# Patient Record
Sex: FEMALE | Race: BLACK OR AFRICAN AMERICAN | NOT HISPANIC OR LATINO | Employment: FULL TIME | URBAN - METROPOLITAN AREA
[De-identification: names, ages, dates, MRNs, and addresses within clinical notes are randomized per-mention and may not be internally consistent; named-entity substitution may affect disease eponyms.]

---

## 2017-01-17 ENCOUNTER — OFFICE VISIT (OUTPATIENT)
Dept: FAMILY MEDICINE | Facility: HOSPITAL | Age: 42
End: 2017-01-17
Attending: FAMILY MEDICINE
Payer: MEDICAID

## 2017-01-17 ENCOUNTER — LAB VISIT (OUTPATIENT)
Dept: LAB | Facility: HOSPITAL | Age: 42
End: 2017-01-17
Attending: FAMILY MEDICINE
Payer: MEDICAID

## 2017-01-17 VITALS — DIASTOLIC BLOOD PRESSURE: 102 MMHG | TEMPERATURE: 97 F | SYSTOLIC BLOOD PRESSURE: 142 MMHG | HEART RATE: 77 BPM

## 2017-01-17 DIAGNOSIS — J30.2 SEASONAL ALLERGIC RHINITIS, UNSPECIFIED ALLERGIC RHINITIS TRIGGER: ICD-10-CM

## 2017-01-17 DIAGNOSIS — I50.20 SYSTOLIC CONGESTIVE HEART FAILURE, UNSPECIFIED CONGESTIVE HEART FAILURE CHRONICITY: ICD-10-CM

## 2017-01-17 DIAGNOSIS — I10 ESSENTIAL HYPERTENSION: ICD-10-CM

## 2017-01-17 DIAGNOSIS — I50.20 SYSTOLIC HEART FAILURE, UNSPECIFIED HEART FAILURE CHRONICITY: ICD-10-CM

## 2017-01-17 DIAGNOSIS — I50.20 SYSTOLIC CONGESTIVE HEART FAILURE, UNSPECIFIED CONGESTIVE HEART FAILURE CHRONICITY: Primary | ICD-10-CM

## 2017-01-17 DIAGNOSIS — Z12.39 BREAST CANCER SCREENING: ICD-10-CM

## 2017-01-17 PROCEDURE — 99214 OFFICE O/P EST MOD 30 MIN: CPT | Performed by: FAMILY MEDICINE

## 2017-01-17 RX ORDER — LORATADINE 10 MG/1
10 TABLET ORAL DAILY PRN
Qty: 90 TABLET | Refills: 0 | Status: SHIPPED | OUTPATIENT
Start: 2017-01-17 | End: 2017-03-19

## 2017-01-17 RX ORDER — LISINOPRIL 40 MG/1
40 TABLET ORAL DAILY
Qty: 90 TABLET | Refills: 3 | Status: SHIPPED | OUTPATIENT
Start: 2017-01-17 | End: 2017-08-18

## 2017-01-17 RX ORDER — CLONIDINE HYDROCHLORIDE 0.2 MG/1
0.2 TABLET ORAL
Status: DISCONTINUED | OUTPATIENT
Start: 2017-01-17 | End: 2017-03-26 | Stop reason: HOSPADM

## 2017-01-17 RX ORDER — SPIRONOLACTONE 25 MG/1
TABLET ORAL
COMMUNITY
Start: 2016-12-14 | End: 2017-01-17 | Stop reason: DRUGHIGH

## 2017-01-17 RX ORDER — CLONIDINE HYDROCHLORIDE 0.1 MG/1
0.1 TABLET ORAL
Status: DISCONTINUED | OUTPATIENT
Start: 2017-01-17 | End: 2017-03-26 | Stop reason: HOSPADM

## 2017-01-17 RX ORDER — CARVEDILOL 25 MG/1
25 TABLET ORAL 2 TIMES DAILY WITH MEALS
Qty: 180 TABLET | Refills: 3 | Status: SHIPPED | OUTPATIENT
Start: 2017-01-17 | End: 2018-01-22 | Stop reason: SDUPTHER

## 2017-01-17 RX ORDER — SPIRONOLACTONE 50 MG/1
50 TABLET, FILM COATED ORAL DAILY
Qty: 90 TABLET | Refills: 3 | Status: SHIPPED | OUTPATIENT
Start: 2017-01-17 | End: 2018-01-22 | Stop reason: SDUPTHER

## 2017-01-17 RX ORDER — METOPROLOL TARTRATE 25 MG/1
25 TABLET, FILM COATED ORAL 2 TIMES DAILY
Qty: 180 TABLET | Refills: 3 | Status: SHIPPED | OUTPATIENT
Start: 2017-01-17 | End: 2017-01-17

## 2017-01-17 RX ORDER — AMLODIPINE BESYLATE 10 MG/1
10 TABLET ORAL DAILY
Qty: 90 TABLET | Refills: 3 | Status: SHIPPED | OUTPATIENT
Start: 2017-01-17 | End: 2017-08-04

## 2017-01-17 RX ORDER — FUROSEMIDE 20 MG/1
20 TABLET ORAL DAILY
Qty: 90 TABLET | Refills: 3 | Status: SHIPPED | OUTPATIENT
Start: 2017-01-17 | End: 2018-01-22 | Stop reason: SDUPTHER

## 2017-01-17 RX ORDER — CARVEDILOL 25 MG/1
TABLET ORAL
COMMUNITY
Start: 2016-12-14 | End: 2017-01-17 | Stop reason: SDUPTHER

## 2017-01-17 RX ORDER — CHLORTHALIDONE 25 MG/1
25 TABLET ORAL DAILY
Qty: 90 TABLET | Refills: 3 | Status: SHIPPED | OUTPATIENT
Start: 2017-01-17 | End: 2017-08-11

## 2017-01-17 NOTE — MR AVS SNAPSHOT
[facility]  [address], [address]  [place] [address]  Phone: [phone]  Fax: [phone]                  Josefina [place]   2017 8:40 AM   Office Visit    Description:  Female : 1975   Provider:  Wisam Arita MD   Department:  [facility]           Reason for Visit     Medication Refill     Headache           Diagnoses this Visit        Comments    Systolic congestive heart failure, unspecified congestive heart failure chronicity    -  Primary     Essential hypertension         Systolic heart failure, unspecified heart failure chronicity         Breast cancer screening         Seasonal allergic rhinitis, unspecified allergic rhinitis trigger                To Do List           Future Appointments        Provider Department Dept Phone    2017 11:40 AM APPOINTMENT LABEM [facility] [phone]    2017 11:50 AM APPOINTMENT LABEM [facility] [phone]    2017 11:20 AM Wisam Arita MD [facility] [phone]    2017 3:00 PM [facility] MAMMO1 [facility] [phone]    2017 3:45 PM [facility] US1 [facility] [phone]      Goals (5 Years of Data)     None       These Medications        Disp Refills Start End    spironolactone (ALDACTONE) 50 MG tablet 90 tablet 3 2017    Take 1 tablet (50 mg total) by mouth once daily. - Oral    [facility] [id number] [place] [address]  [address] [facility] AT [facility] Ph #: [phone]       lisinopril (PRINIVIL,ZESTRIL) 40 MG tablet 90 tablet 3 2017    Take 1 tablet (40 mg total) by mouth once daily. - Oral    Pharmacy: [facility] [id number] [place] [address], [place] - [address] [facility] AT [facility] Ph #: [phone]       furosemide (LASIX) 20 MG tablet 90 tablet 3 2017     Take 1 tablet (20 mg total) by mouth once daily. - Oral    Pharmacy: [address] AT [facility] Ph #: [phone]       amlodipine (NORVASC) 10 MG tablet 90 tablet 3 1/17/2017 1/17/2018    Take 1 tablet (10 mg total) by mouth once daily. - Oral    Pharmacy: [address] AT [facility] Ph #: [phone]       carvedilol (COREG) 25 MG tablet 180 tablet 3 1/17/2017     Take 1 tablet (25 mg total) by mouth 2 (two) times daily with meals. - Oral    Pharmacy: [address] AT [facility] Ph #: [phone]       loratadine (CLARITIN) 10 mg tablet 90 tablet 0 1/17/2017 1/17/2018    Take 1 tablet (10 mg total) by mouth daily as needed. - Oral    Pharmacy: [address] AT [facility] Ph #: [phone]       chlorthalidone (HYGROTEN) 25 MG Tab 90 tablet 3 1/17/2017 4/17/2017    Take 1 tablet (25 mg total) by mouth once daily. - Oral    Pharmacy: [address] AT [facility] Ph #: [phone]         [facility]     [facility] On [facility] Care Line - 24/7 Assistance  Registered nurses in the [facility] provide clinical advisement, health education, appointment booking, and other advisory services.  Call for this free service at [phone].             Medications           Message regarding Medications     Verify the changes and/or additions to your medication regime listed below are the same as discussed with your clinician today.  If any of these changes or additions are incorrect, please notify your healthcare provider.        START taking these NEW medications        Refills    carvedilol (COREG) 25 MG tablet 3    Sig: Take 1 tablet (25 mg total) by mouth 2 (two) times  daily with meals.    Class: Normal    Route: Oral    loratadine (CLARITIN) 10 mg tablet 0    Sig: Take 1 tablet (10 mg total) by mouth daily as needed.    Class: Normal    Route: Oral      These medications were administered today        Dose Freq    cloNIDine tablet 0.2 mg 0.2 mg Clinic/HOD 1 time    Sig: Take 1 tablet (0.2 mg total) by mouth one time.    Class: Normal    Route: Oral    cloNIDine tablet 0.1 mg 0.1 mg Clinic/HOD 1 time    Sig: Take 1 tablet (0.1 mg total) by mouth one time.    Class: Normal    Route: Oral      CHANGE how you are taking these medications     Start Taking Instead of    amlodipine (NORVASC) 10 MG tablet amlodipine (NORVASC) 5 MG tablet    Dosage:  Take 1 tablet (10 mg total) by mouth once daily. Dosage:  Take 1 tablet (5 mg total) by mouth once daily.    Reason for Change:  Reorder       STOP taking these medications     diazepam (VALIUM) 5 MG tablet Take 2 tablets (10 mg total) by mouth every 6 (six) hours as needed for Anxiety.    methylPREDNISolone (MEDROL DOSEPACK) 4 mg tablet use as directed    metoprolol tartrate (LOPRESSOR) 25 MG tablet Take 25 mg by mouth 2 (two) times daily.    cetirizine (ZYRTEC) 10 MG tablet Take 1 tablet (10 mg total) by mouth once daily.           Verify that the below list of medications is an accurate representation of the medications you are currently taking.  If none reported, the list may be blank. If incorrect, please contact your healthcare provider. Carry this list with you in case of emergency.           Current Medications     amlodipine (NORVASC) 10 MG tablet Take 1 tablet (10 mg total) by mouth once daily.    aspirin 81 MG Chew Take 1 tablet (81 mg total) by mouth once daily.    carvedilol (COREG) 25 MG tablet Take 1 tablet (25 mg total) by mouth 2 (two) times daily with meals.    fluticasone (FLONASE) 50 mcg/actuation nasal spray 1 spray by Each Nare route 2 (two) times daily as needed.    furosemide (LASIX) 20 MG tablet Take 1 tablet (20 mg  total) by mouth once daily.    lisinopril (PRINIVIL,ZESTRIL) 40 MG tablet Take 1 tablet (40 mg total) by mouth once daily.    loratadine (CLARITIN) 10 mg tablet Take 1 tablet (10 mg total) by mouth daily as needed.    nicotine (NICODERM CQ) 14 mg/24 hr Place 1 patch onto the skin once daily.    spironolactone (ALDACTONE) 50 MG tablet Take 1 tablet (50 mg total) by mouth once daily.    chlorthalidone (HYGROTEN) 25 MG Tab Take 1 tablet (25 mg total) by mouth once daily.           Clinical Reference Information           Vital Signs - Last Recorded  Most recent update: 1/17/2017  8:57 AM by Angela Guerin LPN    BP Pulse Temp             (!) 209/135 77 97.2 °F (36.2 °C)         Blood Pressure          Most Recent Value    BP  (!)  209/135      Allergies as of 1/17/2017     No Known Allergies      Immunizations Administered on Date of Encounter - 1/17/2017     None      Orders Placed During Today's Visit     Future Labs/Procedures Expected by Expires    ALDOSTERONE  1/17/2017 3/18/2018    CBC auto differential  1/17/2017 3/18/2018    Comprehensive metabolic panel  1/17/2017 3/18/2018    CORTISOL, RANDOM  1/17/2017 3/18/2018    Hemoglobin A1c  1/17/2017 3/18/2018    Lipid panel  1/17/2017 3/18/2018    Mammo Digital Screening Bilateral With CAD  1/17/2017 3/17/2018    RENIN  1/17/2017 3/18/2018    TSH  1/17/2017 3/18/2018    Urinalysis  1/17/2017 3/18/2018    US Renal Artery Stenosis Hyperten (xpd)  1/17/2017 1/17/2018    Metanephrines, Fractionated 24 hr Urine  As directed 1/17/2018      [facility] Sign-Up     Activating your [facility] account is as easy as 1-2-3!     1) Visit [email or web address], select Sign Up Now, enter this activation code and your date of birth, then select Next.  [email or web address]  Expires: 3/3/2017 10:42 AM      2) Create a username and password to use when you visit [facility] in the future and select a security question in case you lose your password and select Next.    3) Enter your e-mail address  and click Sign Up!    Additional Information  If you have questions, please e-mail [email or web address] or call [phone] to talk to our [facility] staff. Remember, [facility] is NOT to be used for urgent needs. For medical emergencies, dial 911.         Smoking Cessation     If you would like to quit smoking:   You may be eligible for free services if you are a [place] resident and started smoking cigarettes before September 1, 1988.  Call the [employer]oking [employer]essation [employer] ([employer]) toll free at (697) 804-6819 or (082) 342-8840.   Call 4-636-QUIT-NOW if you do not meet the above criteria.

## 2017-01-17 NOTE — PROGRESS NOTES
"Subjective:       Patient ID: Josefina [place]     Chief Complaint: Medication Refill and Headache    HPI   41 y.o. female with PMH of HTN, CHFrEF with EF of 30% (2/17/16) presenting with headache x 3 days. Pt states that she ran out of her medications 3 days ago. Endorses headache, blurry vision. Denies chest pain, palpitations, dizziness, photophobia. Also endorses non-productive cough x 2 weeks and chills. Denies fever, sick contacts, or N/V/d.    Baseline able to sleep flat most days and needs 2 pillows 2 days/week. Baseline able to walk 10 blocks which has not changed. Complains of bilateral lower extremity "shooting" pain when she wakes up in the morning, worse in feet and after walking all day at work. Last saw cardiology 1 year ago but lost her insurance and unable to follow-up, but now has new job with benefits.    Reports baseline BP 130s/70s at home and states she is medication compliant.    Review of Systems   Constitutional: Positive for chills. Negative for fever.   HENT: Negative for sore throat.    Eyes: Negative.    Respiratory: Positive for cough and shortness of breath. Negative for apnea.    Cardiovascular: Positive for leg swelling. Negative for chest pain.   Gastrointestinal: Negative.  Negative for abdominal pain.   Endocrine: Negative.    Genitourinary: Negative.    Musculoskeletal: Negative.    Skin: Negative.    Neurological: Positive for numbness and headaches.   Psychiatric/Behavioral: Negative for confusion.       Objective:      Vitals:    01/17/17 0856   BP: (!) 209/135   Pulse: 77   Temp: 97.2 °F (36.2 °C)     Physical Exam   Constitutional: She appears well-developed and well-nourished.   HENT:   Head: Normocephalic and atraumatic.   Eyes: EOM are normal. Pupils are equal, round, and reactive to light.   Neck: Normal range of motion.   Cardiovascular: Normal rate and regular rhythm.    Left pedal pulse +1  Right pedal pulse +2  Bilateral lower extremities cool to palpation "   Pulmonary/Chest: Breath sounds normal. She has no wheezes.   Abdominal: Soft.   Musculoskeletal: Normal range of motion.       Assessment:       1. Systolic congestive heart failure, unspecified congestive heart failure chronicity    2. Essential hypertension    3. Systolic heart failure, unspecified heart failure chronicity    4. Breast cancer screening    5. Seasonal allergic rhinitis, unspecified allergic rhinitis trigger        Plan:       Systolic congestive heart failure, unspecified congestive heart failure chronicity  -     spironolactone (ALDACTONE) 50 MG tablet; Take 1 tablet (50 mg total) by mouth once daily.  Dispense: 90 tablet; Refill: 3  -     lisinopril (PRINIVIL,ZESTRIL) 40 MG tablet; Take 1 tablet (40 mg total) by mouth once daily.  Dispense: 90 tablet; Refill: 3  -     furosemide (LASIX) 20 MG tablet; Take 1 tablet (20 mg total) by mouth once daily.  Dispense: 90 tablet; Refill: 3  -     Lipid panel; Future; Expected date: 1/17/17  -     Hemoglobin A1c; Future; Expected date: 1/17/17  -     Comprehensive metabolic panel; Future; Expected date: 1/17/17  -     Urinalysis; Future; Expected date: 1/17/17  -     carvedilol (COREG) 25 MG tablet; Take 1 tablet (25 mg total) by mouth 2 (two) times daily with meals.  Dispense: 180 tablet; Refill: 3  -     chlorthalidone (HYGROTEN) 25 MG Tab; Take 1 tablet (25 mg total) by mouth once daily.  Dispense: 90 tablet; Refill: 3  -  Cardiology consult  -  Last EF was 30%    Essential hypertension  -     cloNIDine tablet 0.2 mg; Take 1 tablet (0.2 mg total) by mouth one time.  -     furosemide (LASIX) 20 MG tablet; Take 1 tablet (20 mg total) by mouth once daily.  Dispense: 90 tablet; Refill: 3  -     amlodipine (NORVASC) 10 MG tablet; Take 1 tablet (10 mg total) by mouth once daily.  Dispense: 90 tablet; Refill: 3  -     CBC auto differential; Future; Expected date: 1/17/17  -      Renal Artery Stenosis Hyperten (xpd); Future; Expected date: 1/17/17  -      Metanephrines, Fractionated 24 hr Urine; Future  -     TSH; Future; Expected date: 1/17/17  -     CORTISOL, RANDOM; Future; Expected date: 1/17/17  -     RENIN; Future; Expected date: 1/17/17  -     ALDOSTERONE; Future; Expected date: 1/17/17  -     cloNIDine tablet 0.1 mg; Take 1 tablet (0.1 mg total) by mouth one time.  -  Clonidine 0.2 mg given in-office, re-check /128  -  Clonidine 0.1 mg given again, re-check /104 and relief of her HA. Stated she felt back at her baseline and remained asymptomatic for >1hr  -  patient was encouraged to go to the ED if she started having SOB, CP, HA, or other signs of end organ damage  -  Work up secondary hypertension ordered  - RTC in 1 week    Breast cancer screening  - Received a mammogram previously and stated it was abnormal but never followed up. Family history of breast cancer (2 aunts and 2 great aunts diagnosed in their 40s  -     Mammo Digital Screening Bilateral With CAD; Future; Expected date: 1/17/17    Seasonal allergic rhinitis, unspecified allergic rhinitis trigger  -     loratadine (CLARITIN) 10 mg tablet; Take 1 tablet (10 mg total) by mouth daily as needed.  Dispense: 90 tablet; Refill: 0    Health Maintenance:   - Pap smear at next visit  - Follow-up CBC, CMP, lipid panel.    Smoking Cessation counseling:  - Encourage smoking cessation. Patient went to smoking cessation meetings previously but does not have the time after she started working.  - Advised patient to f/u with meeting supervisors to see if there are other meetings on days she is off from work.    Return in about 1 week (around 1/24/2017).

## 2017-01-24 ENCOUNTER — TELEPHONE (OUTPATIENT)
Dept: FAMILY MEDICINE | Facility: HOSPITAL | Age: 42
End: 2017-01-24

## 2017-01-24 DIAGNOSIS — E11.9 TYPE 2 DIABETES MELLITUS WITHOUT COMPLICATION, WITHOUT LONG-TERM CURRENT USE OF INSULIN: Primary | ICD-10-CM

## 2017-01-24 DIAGNOSIS — E78.5 HYPERLIPIDEMIA, UNSPECIFIED HYPERLIPIDEMIA TYPE: ICD-10-CM

## 2017-01-24 RX ORDER — ATORVASTATIN CALCIUM 40 MG/1
40 TABLET, FILM COATED ORAL DAILY
Qty: 90 TABLET | Refills: 3 | Status: SHIPPED | OUTPATIENT
Start: 2017-01-24 | End: 2018-03-26 | Stop reason: SDUPTHER

## 2017-01-24 RX ORDER — METFORMIN HYDROCHLORIDE 500 MG/1
500 TABLET, EXTENDED RELEASE ORAL
Qty: 90 TABLET | Refills: 3 | Status: SHIPPED | OUTPATIENT
Start: 2017-01-24 | End: 2017-01-26 | Stop reason: SDUPTHER

## 2017-01-24 NOTE — TELEPHONE ENCOUNTER
Patient was called to inform her about her abnormal labs. She would benefit from lipitor and A1C was 6.5. Will start lipitor and metformin. She states her BP has improved to 170s SBP and is asymptomatic. Patient is scheduled to come in 2 days for a follow up. Will have further discussions at that time.

## 2017-01-26 ENCOUNTER — OFFICE VISIT (OUTPATIENT)
Dept: FAMILY MEDICINE | Facility: HOSPITAL | Age: 42
End: 2017-01-26
Attending: FAMILY MEDICINE
Payer: MEDICAID

## 2017-01-26 VITALS
HEIGHT: 68 IN | SYSTOLIC BLOOD PRESSURE: 113 MMHG | HEART RATE: 74 BPM | WEIGHT: 190.94 LBS | DIASTOLIC BLOOD PRESSURE: 76 MMHG | BODY MASS INDEX: 28.94 KG/M2

## 2017-01-26 DIAGNOSIS — E78.5 HYPERLIPIDEMIA, UNSPECIFIED HYPERLIPIDEMIA TYPE: ICD-10-CM

## 2017-01-26 DIAGNOSIS — I10 ESSENTIAL HYPERTENSION: Primary | ICD-10-CM

## 2017-01-26 DIAGNOSIS — E11.9 TYPE 2 DIABETES MELLITUS WITHOUT COMPLICATION, WITHOUT LONG-TERM CURRENT USE OF INSULIN: ICD-10-CM

## 2017-01-26 DIAGNOSIS — R19.7 DIARRHEA, UNSPECIFIED TYPE: ICD-10-CM

## 2017-01-26 PROCEDURE — 99213 OFFICE O/P EST LOW 20 MIN: CPT | Performed by: FAMILY MEDICINE

## 2017-01-26 RX ORDER — METFORMIN HYDROCHLORIDE 500 MG/1
TABLET, EXTENDED RELEASE ORAL
Qty: 90 TABLET | Refills: 3 | Status: SHIPPED | OUTPATIENT
Start: 2017-01-26 | End: 2018-03-27

## 2017-01-26 NOTE — PROGRESS NOTES
I assume primary medical responsibility for this patient, I have reviewed the case history, findings, diagnosis and treatment plan with the resident and agree that the care is reasonable and necessary. This service has been performed by a resident without the presence of a teaching physician under the primary care exception  Merly Uriarte  1/26/2017

## 2017-01-26 NOTE — PROGRESS NOTES
Subjective:       Patient ID: Josefina [place] is a 41 y.o. female.    Chief Complaint: Follow-up and Gynecologic Exam    HPI   42 yo female, w/ h/o HTN, CHFrEF with EF of 30%, newly dx with HLD and T2DM, presents for a pap smear and results. Patient was called earlier and asked to start metformin and lipitor due to her lab values. Patient states she has chronic diarrhea since having a cholecystectomy in 2013 but she thinks it was worse after taking metformin yesterday. Patient states she took her all her medications except her metformin today.     Review of Systems   Constitutional: Negative for chills and fever.   HENT: Negative for congestion.    Respiratory: Negative for cough, shortness of breath and wheezing.    Cardiovascular: Negative for chest pain, palpitations and leg swelling.   Gastrointestinal: Negative for abdominal pain, constipation, diarrhea, nausea and vomiting.   Genitourinary: Negative for difficulty urinating, dysuria, frequency and urgency.   Neurological: Negative for dizziness and headaches.       Objective:      Vitals:    01/26/17 1123   BP: 113/76   Pulse: 74     Physical Exam   Constitutional: She is oriented to person, place, and time. No distress.   HENT:   Head: Normocephalic and atraumatic.   Cardiovascular: Normal rate, regular rhythm and normal heart sounds.    Pulmonary/Chest: Effort normal and breath sounds normal.   Abdominal: Soft. Bowel sounds are normal. She exhibits no distension. There is no tenderness.   Genitourinary: Cervix exhibits no motion tenderness and no discharge. Right adnexum displays no mass and no tenderness. Left adnexum displays no mass and no tenderness. No tenderness in the vagina. No vaginal discharge found.   Musculoskeletal: She exhibits no edema.   Neurological: She is alert and oriented to person, place, and time.   Skin: Skin is warm and dry.   Psychiatric: She has a normal mood and affect. Her behavior is normal. Judgment and thought content  normal.       Assessment:       1. Essential hypertension    2. Hyperlipidemia, unspecified hyperlipidemia type    3. Type 2 diabetes mellitus without complication, without long-term current use of insulin    4. Diarrhea, unspecified type        Plan:       Essential hypertension  - continue meds  - cards consult placed at last visit due to her HF    Hyperlipidemia, unspecified hyperlipidemia type  - on lipitor    Type 2 diabetes mellitus without complication, without long-term current use of insulin  - Due to her diarrhea. Advised patient to slowly increase her metformin from 250mg to 500mg  -     metformin (GLUCOPHAGE-XR) 500 MG 24 hr tablet; Take .5 tab everyday and increase it to 1 tab in 2 weeks.  Dispense: 90 tablet; Refill: 3    Diarrhea, unspecified type  - may be 2/2 to cholecystectomy and worsened by metformin  - advised patient about a low fat diet.    Patient stopped smoking for 1 week. Brochure was given to her last visit.  F/u pap smear    Return in about 6 months (around 7/26/2017).

## 2017-02-07 ENCOUNTER — HOSPITAL ENCOUNTER (OUTPATIENT)
Dept: RADIOLOGY | Facility: HOSPITAL | Age: 42
Discharge: HOME | End: 2017-02-07
Attending: FAMILY MEDICINE
Payer: MEDICAID

## 2017-02-07 DIAGNOSIS — I10 ESSENTIAL HYPERTENSION: ICD-10-CM

## 2017-02-07 DIAGNOSIS — Z12.39 BREAST CANCER SCREENING: ICD-10-CM

## 2017-02-07 PROCEDURE — 76770 US EXAM ABDO BACK WALL COMP: CPT | Mod: 26,59,, | Performed by: RADIOLOGY

## 2017-02-07 PROCEDURE — 76770 US EXAM ABDO BACK WALL COMP: CPT | Mod: TC

## 2017-02-07 PROCEDURE — 77067 SCR MAMMO BI INCL CAD: CPT | Mod: 26,,, | Performed by: RADIOLOGY

## 2017-02-07 PROCEDURE — 77067 SCR MAMMO BI INCL CAD: CPT | Mod: TC

## 2017-02-07 PROCEDURE — 77063 BREAST TOMOSYNTHESIS BI: CPT | Mod: 26,,, | Performed by: RADIOLOGY

## 2017-02-07 PROCEDURE — 93975 VASCULAR STUDY: CPT | Mod: 26,,, | Performed by: RADIOLOGY

## 2017-02-09 ENCOUNTER — CLINICAL SUPPORT (OUTPATIENT)
Dept: SMOKING CESSATION | Facility: CLINIC | Age: 42
End: 2017-02-09
Payer: COMMERCIAL

## 2017-02-09 DIAGNOSIS — F17.200 NICOTINE DEPENDENCE: Primary | ICD-10-CM

## 2017-02-09 PROCEDURE — 99407 BEHAV CHNG SMOKING > 10 MIN: CPT | Mod: S$GLB,,, | Performed by: GENERAL PRACTICE

## 2017-03-19 ENCOUNTER — HOSPITAL ENCOUNTER (EMERGENCY)
Facility: HOSPITAL | Age: 42
Discharge: HOME | End: 2017-03-19
Attending: FAMILY MEDICINE
Payer: MEDICAID

## 2017-03-19 VITALS
DIASTOLIC BLOOD PRESSURE: 63 MMHG | TEMPERATURE: 98 F | HEIGHT: 68 IN | RESPIRATION RATE: 16 BRPM | BODY MASS INDEX: 29.7 KG/M2 | WEIGHT: 196 LBS | OXYGEN SATURATION: 100 % | SYSTOLIC BLOOD PRESSURE: 111 MMHG | HEART RATE: 89 BPM

## 2017-03-19 DIAGNOSIS — L02.612 ABSCESS OF LEFT GREAT TOE: ICD-10-CM

## 2017-03-19 DIAGNOSIS — L60.0 INGROWN NAIL OF GREAT TOE OF LEFT FOOT: Primary | ICD-10-CM

## 2017-03-19 PROCEDURE — 11765 WEDGE EXCISION SKN NAIL FOLD: CPT | Mod: TA

## 2017-03-19 PROCEDURE — 25000003 PHARM REV CODE 250: Performed by: FAMILY MEDICINE

## 2017-03-19 PROCEDURE — 99283 EMERGENCY DEPT VISIT LOW MDM: CPT | Mod: 25

## 2017-03-19 RX ORDER — LIDOCAINE HYDROCHLORIDE 10 MG/ML
5 INJECTION INFILTRATION; PERINEURAL
Status: COMPLETED | OUTPATIENT
Start: 2017-03-19 | End: 2017-03-19

## 2017-03-19 RX ORDER — HYDROCODONE BITARTRATE AND ACETAMINOPHEN 5; 325 MG/1; MG/1
1 TABLET ORAL EVERY 8 HOURS PRN
Qty: 12 TABLET | Refills: 0 | Status: SHIPPED | OUTPATIENT
Start: 2017-03-19 | End: 2017-07-21

## 2017-03-19 RX ORDER — HYDROCODONE BITARTRATE AND ACETAMINOPHEN 10; 325 MG/1; MG/1
1 TABLET ORAL
Status: COMPLETED | OUTPATIENT
Start: 2017-03-19 | End: 2017-03-19

## 2017-03-19 RX ORDER — CEPHALEXIN 500 MG/1
500 CAPSULE ORAL 4 TIMES DAILY
Qty: 40 CAPSULE | Refills: 0 | Status: ON HOLD | OUTPATIENT
Start: 2017-03-19 | End: 2017-03-26 | Stop reason: HOSPADM

## 2017-03-19 RX ORDER — SULFAMETHOXAZOLE AND TRIMETHOPRIM 800; 160 MG/1; MG/1
1 TABLET ORAL
Status: COMPLETED | OUTPATIENT
Start: 2017-03-19 | End: 2017-03-19

## 2017-03-19 RX ADMIN — SULFAMETHOXAZOLE AND TRIMETHOPRIM 1 TABLET: 800; 160 TABLET ORAL at 09:03

## 2017-03-19 RX ADMIN — LIDOCAINE HYDROCHLORIDE 5 ML: 10 INJECTION, SOLUTION INFILTRATION; PERINEURAL at 09:03

## 2017-03-19 RX ADMIN — HYDROCODONE BITARTRATE AND ACETAMINOPHEN 1 TABLET: 10; 325 TABLET ORAL at 09:03

## 2017-03-19 RX ADMIN — NEOMYCIN AND POLYMYXIN B SULFATES AND BACITRACIN ZINC 1 EACH: 400; 3.5; 5 OINTMENT TOPICAL at 10:03

## 2017-03-19 NOTE — ED AVS SNAPSHOT
[facility]  [address]  [place] [address]               Josefina Martin   3/19/2017  8:58 PM   ED    Description:  Female : 1975   Department:  [facility]           Your Care was Coordinated By:     Provider Role From To    Jacques Alva MD Attending Provider 17 2108 --      Reason for Visit     Toe Injury           Diagnoses this Visit        Comments    Ingrown nail of great toe of left foot    -  Primary     Abscess of left great toe           ED Disposition     None           To Do List           Follow-up Information     Follow up with Kris Robertson MD.    Specialty:  Family Medicine    Contact information:    [address] [facility] [address]   [address]   [facility] [address]  [phone]         These Medications        Disp Refills Start End    cephALEXin (KEFLEX) 500 MG capsule 40 capsule 0 3/19/2017 3/29/2017    Take 1 capsule (500 mg total) by mouth 4 (four) times daily. - Oral    [facility] [address] [place], [place] [address] [address] [facility] Ph #: [phone]       hydrocodone-acetaminophen 5-325mg (NORCO) 5-325 mg per tablet 12 tablet 0 3/19/2017     Take 1 tablet by mouth every 8 (eight) hours as needed. - Oral    [facility] [address] [place] [address] [address] [facility] Ph #: [phone]         [facility]     [facility] -  Assistance  Registered nurses in the [facility] provide clinical advisement, health education, appointment booking, and other advisory services.  Call for this free service at [phone].             Medications           Message regarding Medications     Verify the changes and/or additions to your medication regime listed below are the same as discussed with your clinician today.  If any of these changes or additions are incorrect, please notify your healthcare provider.         START taking these NEW medications        Refills    cephALEXin (KEFLEX) 500 MG capsule 0    Sig: Take 1 capsule (500 mg total) by mouth 4 (four) times daily.    Class: Print    Route: Oral    hydrocodone-acetaminophen 5-325mg (NORCO) 5-325 mg per tablet 0    Sig: Take 1 tablet by mouth every 8 (eight) hours as needed.    Class: Print    Route: Oral      These medications were administered today        Dose Freq    lidocaine HCL 10 mg/ml (1%) injection 5 mL 5 mL ED 1 Time    Si mLs by Infiltration route ED 1 Time.    Class: Normal    Route: Infiltration    sulfamethoxazole-trimethoprim 800-160mg per tablet 1 tablet 1 tablet ED 1 Time    Sig: Take 1 tablet by mouth ED 1 Time.    Class: Normal    Route: Oral    hydrocodone-acetaminophen 10-325mg per tablet 1 tablet 1 tablet ED 1 Time    Sig: Take 1 tablet by mouth ED 1 Time.    Class: Normal    Route: Oral    neomycin-bacitracnZn-polymyxnB packet 1 each 1 packet ED 1 Time    Sig: Apply 1 each topically ED 1 Time.    Class: Normal    Route: Topical (Top)      STOP taking these medications     nicotine (NICODERM CQ) 14 mg/24 hr Place 1 patch onto the skin once daily.    loratadine (CLARITIN) 10 mg tablet Take 1 tablet (10 mg total) by mouth daily as needed.    fluticasone (FLONASE) 50 mcg/actuation nasal spray 1 spray by Each Nare route 2 (two) times daily as needed.           Verify that the below list of medications is an accurate representation of the medications you are currently taking.  If none reported, the list may be blank. If incorrect, please contact your healthcare provider. Carry this list with you in case of emergency.           Current Medications     amlodipine (NORVASC) 10 MG tablet Take 1 tablet (10 mg total) by mouth once daily.    aspirin 81 MG Chew Take 1 tablet (81 mg total) by mouth once daily.    atorvastatin (LIPITOR) 40 MG tablet Take 1 tablet (40 mg total) by mouth once daily.    carvedilol (COREG) 25 MG tablet Take 1 tablet (25 mg  "total) by mouth 2 (two) times daily with meals.    chlorthalidone (HYGROTEN) 25 MG Tab Take 1 tablet (25 mg total) by mouth once daily.    furosemide (LASIX) 20 MG tablet Take 1 tablet (20 mg total) by mouth once daily.    lisinopril (PRINIVIL,ZESTRIL) 40 MG tablet Take 1 tablet (40 mg total) by mouth once daily.    metformin (GLUCOPHAGE-XR) 500 MG 24 hr tablet Take .5 tab everyday and increase it to 1 tab in 2 weeks.    spironolactone (ALDACTONE) 50 MG tablet Take 1 tablet (50 mg total) by mouth once daily.    cephALEXin (KEFLEX) 500 MG capsule Take 1 capsule (500 mg total) by mouth 4 (four) times daily.    cloNIDine tablet 0.1 mg Take 1 tablet (0.1 mg total) by mouth one time.    cloNIDine tablet 0.2 mg Take 1 tablet (0.2 mg total) by mouth one time.    hydrocodone-acetaminophen 5-325mg (NORCO) 5-325 mg per tablet Take 1 tablet by mouth every 8 (eight) hours as needed.    neomycin-bacitracnZn-polymyxnB packet 1 each Apply 1 each topically ED 1 Time.           Clinical Reference Information           Your Vitals Were     BP Pulse Temp Resp Height Weight    125/81 (BP Location: Right arm, Patient Position: Sitting) 91 97.9 °F (36.6 °C) (Oral) 20 5' 8" (1.727 m) 88.9 kg (196 lb)    Last Period SpO2 BMI          02/26/2017 100% 29.8 kg/m2        Allergies as of 3/19/2017     No Known Allergies      Immunizations Administered on Date of Encounter - 3/19/2017     None      ED Micro, Lab, POCT     None      ED Imaging Orders     Start Ordered       Status Ordering Provider    03/19/17 2103 03/19/17 2106  X-Ray Toe 2 or More Views Right  1 time imaging      Final result         Discharge Instructions         Ingrown Toenail (Excised)  An ingrown toenail occurs when the nail grows sideways into the skin alongside the nail. This can cause pain and may lead to an infection with redness, swelling, and sometimes drainage.  Cause  The most common cause of an ingrown toenail is trimmin your toenails wrong. Most people trim the " nails too close to the skin and try to round the nail too tightly around the shape of the toe. When you do this, the nail can grow into the skin of the toe. While it may look nice, your toenail can grow into the skin and cause infection.  Other causes include injury or wearing shoes that are too short or tight. This can cause the same problem that happens when trimming your toenails. Sometimes you are born with a toenail that grows too large for your toe.  Symptoms  The most common symptoms of an ingrown toenail include:  · Pain  · Redness  · Swelling  · Drainage  Treatment  It's important to treat an ingrown toenail as soon as you notice there is a problem. The longer you wait to do something, the worse it is likely to get. Sometimes it gets worse quickly. Other times it may take awhile. It can even feel better for awhile, and then get worse.  Inflammation  If the infection is mild, you may be able to take care of it at home. Home care includes:  · Frequent warm water soaks  · Keeping the nail clean  · Wearing loose, comfortable shoes or open toe sandals  Another method to help the toe heal is to use a small piece of cotton or waxed dental floss to gently lift the corner of the problem nail. Change the cotton or floss frequently, especially if it gets dirty.  Infection  If your infection is mild but home care isn't working, or the toenail is getting worse, see your health care provider. Signs of worsening infection include:  · Swelling  · Redness   · Pus drainage  In some cases, part of the toenail needs to be removed by your health care provider so that the infection can be drained.  If there is a lot of redness and swelling, then an antibiotic may also be used. The redness and pain should go away within 48 hours. It will take about 2 weeks for the exposed nail bed to become dry and for the swelling to go down.  If only the side of the nail was removed, it will begin to grow back in a few months. To prevent  recurrence, sometimes the side of nail bed may be treated with a strong chemical to prevent the nail from growing back.  Home care  Wound care  1) Twice a day for the first three days, clean and soak the toe as follows:  · Soak your foot in a tub of warm water for 5 minutes. Or, hold your toe under a faucet of warm running water for 5 minutes.  · Clean any remaining crust away with soap and water using a cotton swab.  · Put a small amount of antibiotic ointment on the infected area.  · Cover with a bandage until the exposed nail bed is dry and there is no more drainage.  2) Change the dressing or bandage every time you soak or clean it, or whenever it becomes wet or dirty.  3) If you were prescribed antibiotics, take them as directed until they are all gone.  4) Wear comfortable shoes with a lot of toe room, or open-toe sandals, while your toe is healing.  Medications  · You can take acetaminophen or ibuprofen for pain, unless you were given a different pain medicine to use. If you have chronic liver or kidney disease, or have ever had a stomach ulcer or gastrointestinal bleeding, or are taking blood thinner medications, talk with your doctor before using these medicines.  · If you were given antibiotics, take them until they are all gone. It is important to finish the antibiotics even if the wound looks better to make sure the infection clears.  Prevention  To prevent ingrown toenails:  1) Wear shoes that fit well. Avoid shoes that pinch the toes together.  2) When you trim your toenails, do not cut them too short. Cut straight across at the top and do not round the edges.  3) Do not use a sharp object to clean under your nail since this might cause an infection.  4) If the toenail starts to grow into the skin again, put a small piece of cotton under that side of the nail to help it grow out straight.  Follow-up care  Follow up with your doctor or this facility as advised by our staff. If the ingrown toenail  recurs, follow up with a podiatrist for nail bed ablation.  When to seek medical care  Get prompt medical attention if any of the following occur:  · Increasing redness, pain or swelling of the toe  · Red streaks in the skin leading away from the wound  · Continued pus or fluid drainage for more than 24 hours  · Fever of 100.4º F (38º C) or higher, or as directed by your health care provider  Date Last Reviewed: 3/10/2014  © 3441-8593 [employer]. [address] [place] [address]. All rights reserved. This information is not intended as a substitute for professional medical care. Always follow your healthcare professional's instructions.          [facility] Sign-Up     Activating your [facility] account is as easy as 1-2-3!     1) Visit [email or web address], select Sign Up Now, enter this activation code and your date of birth, then select Next.  [email or web address]  Expires: 5/3/2017 10:33 PM      2) Create a username and password to use when you visit [facility] in the future and select a security question in case you lose your password and select Next.    3) Enter your e-mail address and click Sign Up!    Additional Information  If you have questions, please e-mail [email or web address] or call [phone] to talk to our [facility] staff. Remember, [facility] is NOT to be used for urgent needs. For medical emergencies, dial 911.         Smoking Cessation     If you would like to quit smoking:   You may be eligible for free services if you are a [place] resident and started smoking cigarettes before September 1, 1988.  Call the Smoking Cessation Trust (SCT) toll free at (859) 954-3222 or (465) 614-6702.   Call 6-800-QUIT-NOW if you do not meet the above criteria.             [facility] complies with applicable Federal civil rights laws and does not discriminate on the basis of race, color, national origin, age, disability, or sex.        Language Assistance Services      ATTENTION: Language assistance services are available, free of charge. Please call [phone].      ATENCIÓN: Si habla español, tiene a tomas disposición servicios gratuitos de asistencia lingüística. Llame al [phone].     CHÚ Ý: N?u b?n nói Ti?ng Vi?t, có các d?ch v? h? tr? ngôn ng? mi?n phí dành cho b?n. G?i s? [phone].

## 2017-03-20 NOTE — ED PROVIDER NOTES
"Encounter Date: 3/19/2017       History     Chief Complaint   Patient presents with    Toe Injury     Pt states "Like a month ago I hit my toe and it's turning black and I can barely walk on it." Pt c/o injury to right great toe. Pt able to move toe, 2+ pulses noted.      Review of patient's allergies indicates:  No Known Allergies  HPI Comments: Pt c/o Right toe pain since few days with discoloration and tenderness , also swelling around  Nail. Pt says she had an toe injury about 1 month ago. No fever.    The history is provided by the patient.     Past Medical History:   Diagnosis Date    CHF (congestive heart failure)     Hypertension     Stroke      Past Surgical History:   Procedure Laterality Date    CHOLECYSTECTOMY  2013    history of cholelithiasis    TUBAL LIGATION       Family History   Problem Relation Age of Onset    Hypertension Mother     No Known Problems Father     No Known Problems Sister     No Known Problems Daughter     Diabetes Son 14     Takes 2 insulins and metformin use to be bigger wally    Stroke Maternal Uncle 48    Anuerysm Maternal Grandmother     No Known Problems Sister     No Known Problems Daughter     No Known Problems Son      Social History   Substance Use Topics    Smoking status: Current Every Day Smoker     Packs/day: 0.50     Years: 18.00     Types: Cigarettes    Smokeless tobacco: Never Used      Comment: 1 pack/week    Alcohol use Yes      Comment: social 1/month     Review of Systems   Constitutional: Negative for activity change, chills and fever.   HENT: Negative for congestion, rhinorrhea and sore throat.    Eyes: Negative for pain.   Respiratory: Negative for cough, shortness of breath and wheezing.    Gastrointestinal: Negative for diarrhea, nausea and vomiting.   Genitourinary: Negative for dysuria.   Musculoskeletal: Negative for back pain, gait problem, neck pain and neck stiffness.   Neurological: Negative for light-headedness and headaches. "   Psychiatric/Behavioral: The patient is not nervous/anxious.    All other systems reviewed and are negative.      Physical Exam   Initial Vitals   BP Pulse Resp Temp SpO2   03/19/17 2053 03/19/17 2053 03/19/17 2053 03/19/17 2053 03/19/17 2053   125/81 91 20 97.9 °F (36.6 °C) 100 %     Physical Exam    Nursing note and vitals reviewed.  Constitutional: She appears well-developed and well-nourished.   HENT:   Head: Normocephalic and atraumatic.   Eyes: Conjunctivae and EOM are normal. Pupils are equal, round, and reactive to light.   Neck: Normal range of motion. Neck supple.   Cardiovascular: Normal rate, regular rhythm, normal heart sounds and intact distal pulses. Exam reveals no gallop and no friction rub.    No murmur heard.  Pulmonary/Chest: Breath sounds normal. No respiratory distress. She has no wheezes. She has no rhonchi. She has no rales.   Abdominal: Soft. Bowel sounds are normal.   Musculoskeletal: Normal range of motion.        Right ankle: She exhibits swelling.        Feet:    Right great toe with pain ans welling around medial border.   Neurological: She is alert and oriented to person, place, and time. She has normal strength and normal reflexes. She displays normal reflexes. No cranial nerve deficit or sensory deficit.         ED Course   Nail Removal  Date/Time: 3/19/2017 10:34 PM  Performed by: ANNIE MATHEWS  Authorized by: ANNIE MATHEWS   Consent Done: Yes  Consent: Verbal consent obtained.  Consent given by: patient  Patient understanding: patient states understanding of the procedure being performed  Patient identity confirmed: verbally with patient  Location: left foot  Location details: left big toe  Anesthesia: digital block    Anesthesia:  Anesthesia: digital block  Local Anesthetic: lidocaine 1% with epinephrine   Anesthetic total: 3 mL  Preparation: skin prepped with Betadine  Amount removed: 1/5  Nail removed location: medial.  Wedge excision of skin of nail fold: yes  Nail  matrix removed: partial  Dressing: antibiotic ointment and dressing applied  Patient tolerance: Patient tolerated the procedure well with no immediate complications        Labs Reviewed - No data to display          Medical Decision Making:   Initial Assessment:   Possibility of left toenail infection on the medial aspect due to ingrown toenail in.  Differential Diagnosis:   Infection of medial edge of nail.  Paronychia  Fracture.  Clinical Tests:   Radiological Study: Ordered and Reviewed  ED Management:  There is a partial toenail removal on medial aspect active retching.  There was some minimal pus noted during the procedure and this dressed with antibiotic cream.  As to continue antibiotics and keep dressing every day for next 2 weeks and follow up with the PCP.                   ED Course     Clinical Impression:   The primary encounter diagnosis was Ingrown nail of great toe of left foot. A diagnosis of Abscess of right great toe was also pertinent to this visit.    Disposition:   Disposition: Discharged  Condition: Fair  Continue daily dressing avoid any dust or water soaking.  Continue antibiotics and pain medication as needed.  Follow up with primary care physician in next 2-3 days.       Jacques Alva MD  03/19/17 7042

## 2017-03-20 NOTE — ED TRIAGE NOTES
"Pt states "Like a month ago I hit my toe and it's turning black and I can barely walk on it." Pt c/o injury to right great toe. Pt able to move toe, 2+ pulses noted.   "

## 2017-03-20 NOTE — ED NOTES
Wound dressed with bacitracin/4x4/coban, patient and  given discharge instructions and scripts.  Stated verbal understanding, patient d/c'ed home, gait steady to exit, all questions answered prior to discharge.

## 2017-03-20 NOTE — DISCHARGE INSTRUCTIONS
Ingrown Toenail (Excised)  An ingrown toenail occurs when the nail grows sideways into the skin alongside the nail. This can cause pain and may lead to an infection with redness, swelling, and sometimes drainage.  Cause  The most common cause of an ingrown toenail is trimmin your toenails wrong. Most people trim the nails too close to the skin and try to round the nail too tightly around the shape of the toe. When you do this, the nail can grow into the skin of the toe. While it may look nice, your toenail can grow into the skin and cause infection.  Other causes include injury or wearing shoes that are too short or tight. This can cause the same problem that happens when trimming your toenails. Sometimes you are born with a toenail that grows too large for your toe.  Symptoms  The most common symptoms of an ingrown toenail include:  · Pain  · Redness  · Swelling  · Drainage  Treatment  It's important to treat an ingrown toenail as soon as you notice there is a problem. The longer you wait to do something, the worse it is likely to get. Sometimes it gets worse quickly. Other times it may take awhile. It can even feel better for awhile, and then get worse.  Inflammation  If the infection is mild, you may be able to take care of it at home. Home care includes:  · Frequent warm water soaks  · Keeping the nail clean  · Wearing loose, comfortable shoes or open toe sandals  Another method to help the toe heal is to use a small piece of cotton or waxed dental floss to gently lift the corner of the problem nail. Change the cotton or floss frequently, especially if it gets dirty.  Infection  If your infection is mild but home care isn't working, or the toenail is getting worse, see your health care provider. Signs of worsening infection include:  · Swelling  · Redness   · Pus drainage  In some cases, part of the toenail needs to be removed by your health care provider so that the infection can be drained.  If there is a  lot of redness and swelling, then an antibiotic may also be used. The redness and pain should go away within 48 hours. It will take about 2 weeks for the exposed nail bed to become dry and for the swelling to go down.  If only the side of the nail was removed, it will begin to grow back in a few months. To prevent recurrence, sometimes the side of nail bed may be treated with a strong chemical to prevent the nail from growing back.  Home care  Wound care  1) Twice a day for the first three days, clean and soak the toe as follows:  · Soak your foot in a tub of warm water for 5 minutes. Or, hold your toe under a faucet of warm running water for 5 minutes.  · Clean any remaining crust away with soap and water using a cotton swab.  · Put a small amount of antibiotic ointment on the infected area.  · Cover with a bandage until the exposed nail bed is dry and there is no more drainage.  2) Change the dressing or bandage every time you soak or clean it, or whenever it becomes wet or dirty.  3) If you were prescribed antibiotics, take them as directed until they are all gone.  4) Wear comfortable shoes with a lot of toe room, or open-toe sandals, while your toe is healing.  Medications  · You can take acetaminophen or ibuprofen for pain, unless you were given a different pain medicine to use. If you have chronic liver or kidney disease, or have ever had a stomach ulcer or gastrointestinal bleeding, or are taking blood thinner medications, talk with your doctor before using these medicines.  · If you were given antibiotics, take them until they are all gone. It is important to finish the antibiotics even if the wound looks better to make sure the infection clears.  Prevention  To prevent ingrown toenails:  1) Wear shoes that fit well. Avoid shoes that pinch the toes together.  2) When you trim your toenails, do not cut them too short. Cut straight across at the top and do not round the edges.  3) Do not use a sharp object to  clean under your nail since this might cause an infection.  4) If the toenail starts to grow into the skin again, put a small piece of cotton under that side of the nail to help it grow out straight.  Follow-up care  Follow up with your doctor or this facility as advised by our staff. If the ingrown toenail recurs, follow up with a podiatrist for nail bed ablation.  When to seek medical care  Get prompt medical attention if any of the following occur:  · Increasing redness, pain or swelling of the toe  · Red streaks in the skin leading away from the wound  · Continued pus or fluid drainage for more than 24 hours  · Fever of 100.4º F (38º C) or higher, or as directed by your health care provider  Date Last Reviewed: 3/10/2014  © 9791-2440 [employer]. [address] [place] [address], [place] [address]. All rights reserved. This information is not intended as a substitute for professional medical care. Always follow your healthcare professional's instructions.

## 2017-03-24 ENCOUNTER — HOSPITAL ENCOUNTER (INPATIENT)
Facility: HOSPITAL | Age: 42
LOS: 2 days | Discharge: HOME | DRG: 065 | End: 2017-03-26
Attending: EMERGENCY MEDICINE | Admitting: FAMILY MEDICINE
Payer: MEDICAID

## 2017-03-24 DIAGNOSIS — I63.9 CEREBROVASCULAR ACCIDENT (CVA), UNSPECIFIED MECHANISM: Primary | ICD-10-CM

## 2017-03-24 DIAGNOSIS — R51.9 ACUTE NONINTRACTABLE HEADACHE, UNSPECIFIED HEADACHE TYPE: ICD-10-CM

## 2017-03-24 DIAGNOSIS — N17.9 AKI (ACUTE KIDNEY INJURY): ICD-10-CM

## 2017-03-24 DIAGNOSIS — R11.2 NON-INTRACTABLE VOMITING WITH NAUSEA, UNSPECIFIED VOMITING TYPE: ICD-10-CM

## 2017-03-24 DIAGNOSIS — H53.9 VISION CHANGES: ICD-10-CM

## 2017-03-24 LAB
ALBUMIN SERPL BCP-MCNC: 4 G/DL
ALP SERPL-CCNC: 64 U/L
ALT SERPL W/O P-5'-P-CCNC: 27 U/L
ANION GAP SERPL CALC-SCNC: 8 MMOL/L
APTT BLDCRRT: 27.4 SEC
AST SERPL-CCNC: 21 U/L
BASOPHILS # BLD AUTO: 0.02 K/UL
BASOPHILS NFR BLD: 0.2 %
BILIRUB SERPL-MCNC: 0.5 MG/DL
BNP SERPL-MCNC: 12 PG/ML
BUN SERPL-MCNC: 26 MG/DL
CALCIUM SERPL-MCNC: 9.5 MG/DL
CHLORIDE SERPL-SCNC: 104 MMOL/L
CHOLEST/HDLC SERPL: 6.8 {RATIO}
CO2 SERPL-SCNC: 23 MMOL/L
CREAT SERPL-MCNC: 1.7 MG/DL
DIASTOLIC DYSFUNCTION: NO
DIFFERENTIAL METHOD: ABNORMAL
EOSINOPHIL # BLD AUTO: 0 K/UL
EOSINOPHIL NFR BLD: 0.5 %
ERYTHROCYTE [DISTWIDTH] IN BLOOD BY AUTOMATED COUNT: 13.2 %
EST. GFR  (AFRICAN AMERICAN): 42 ML/MIN/1.73 M^2
EST. GFR  (NON AFRICAN AMERICAN): 37 ML/MIN/1.73 M^2
ESTIMATED PA SYSTOLIC PRESSURE: 19
FOLATE SERPL-MCNC: 6.6 NG/ML
GLUCOSE SERPL-MCNC: 156 MG/DL
HCT VFR BLD AUTO: 33.9 %
HDL/CHOLESTEROL RATIO: 14.7 %
HDLC SERPL-MCNC: 190 MG/DL
HDLC SERPL-MCNC: 28 MG/DL
HGB BLD-MCNC: 11.8 G/DL
INR PPP: 1
LDLC SERPL CALC-MCNC: 124.4 MG/DL
LYMPHOCYTES # BLD AUTO: 1.9 K/UL
LYMPHOCYTES NFR BLD: 22.3 %
MCH RBC QN AUTO: 30.7 PG
MCHC RBC AUTO-ENTMCNC: 34.8 %
MCV RBC AUTO: 88 FL
MITRAL VALVE MOBILITY: NORMAL
MONOCYTES # BLD AUTO: 0.6 K/UL
MONOCYTES NFR BLD: 7.6 %
NEUTROPHILS # BLD AUTO: 5.8 K/UL
NEUTROPHILS NFR BLD: 69.2 %
NONHDLC SERPL-MCNC: 162 MG/DL
PLATELET # BLD AUTO: 242 K/UL
PMV BLD AUTO: 10.4 FL
POCT GLUCOSE: 139 MG/DL (ref 70–110)
POCT GLUCOSE: 165 MG/DL (ref 70–110)
POCT GLUCOSE: 87 MG/DL (ref 70–110)
POTASSIUM SERPL-SCNC: 4.6 MMOL/L
PROT SERPL-MCNC: 7.4 G/DL
PROTHROMBIN TIME: 10.9 SEC
RBC # BLD AUTO: 3.84 M/UL
RETIRED EF AND QEF - SEE NOTES: 60 (ref 55–65)
SODIUM SERPL-SCNC: 135 MMOL/L
TRICUSPID VALVE REGURGITATION: NORMAL
TRIGL SERPL-MCNC: 188 MG/DL
TROPONIN I SERPL DL<=0.01 NG/ML-MCNC: <0.006 NG/ML
TROPONIN I SERPL DL<=0.01 NG/ML-MCNC: <0.006 NG/ML
TSH SERPL DL<=0.005 MIU/L-ACNC: 0.5 UIU/ML
TSH SERPL DL<=0.005 MIU/L-ACNC: 0.64 UIU/ML
VIT B12 SERPL-MCNC: 625 PG/ML
WBC # BLD AUTO: 8.33 K/UL

## 2017-03-24 PROCEDURE — 63600175 PHARM REV CODE 636 W HCPCS: Performed by: EMERGENCY MEDICINE

## 2017-03-24 PROCEDURE — 96374 THER/PROPH/DIAG INJ IV PUSH: CPT

## 2017-03-24 PROCEDURE — 11000001 HC ACUTE MED/SURG PRIVATE ROOM

## 2017-03-24 PROCEDURE — 84443 ASSAY THYROID STIM HORMONE: CPT | Mod: 91

## 2017-03-24 PROCEDURE — 80053 COMPREHEN METABOLIC PANEL: CPT

## 2017-03-24 PROCEDURE — 25000003 PHARM REV CODE 250: Performed by: FAMILY MEDICINE

## 2017-03-24 PROCEDURE — 80074 ACUTE HEPATITIS PANEL: CPT

## 2017-03-24 PROCEDURE — 83036 HEMOGLOBIN GLYCOSYLATED A1C: CPT

## 2017-03-24 PROCEDURE — 85730 THROMBOPLASTIN TIME PARTIAL: CPT

## 2017-03-24 PROCEDURE — 85610 PROTHROMBIN TIME: CPT

## 2017-03-24 PROCEDURE — 63600175 PHARM REV CODE 636 W HCPCS: Performed by: FAMILY MEDICINE

## 2017-03-24 PROCEDURE — 80061 LIPID PANEL: CPT

## 2017-03-24 PROCEDURE — 93005 ELECTROCARDIOGRAM TRACING: CPT

## 2017-03-24 PROCEDURE — 84443 ASSAY THYROID STIM HORMONE: CPT

## 2017-03-24 PROCEDURE — 82746 ASSAY OF FOLIC ACID SERUM: CPT

## 2017-03-24 PROCEDURE — 25000003 PHARM REV CODE 250: Performed by: EMERGENCY MEDICINE

## 2017-03-24 PROCEDURE — 86592 SYPHILIS TEST NON-TREP QUAL: CPT

## 2017-03-24 PROCEDURE — 92610 EVALUATE SWALLOWING FUNCTION: CPT

## 2017-03-24 PROCEDURE — G8998 SWALLOW D/C STATUS: HCPCS | Mod: CH

## 2017-03-24 PROCEDURE — 93306 TTE W/DOPPLER COMPLETE: CPT | Mod: 26,,, | Performed by: INTERNAL MEDICINE

## 2017-03-24 PROCEDURE — A9585 GADOBUTROL INJECTION: HCPCS | Performed by: EMERGENCY MEDICINE

## 2017-03-24 PROCEDURE — 85025 COMPLETE CBC W/AUTO DIFF WBC: CPT

## 2017-03-24 PROCEDURE — 84484 ASSAY OF TROPONIN QUANT: CPT

## 2017-03-24 PROCEDURE — 82607 VITAMIN B-12: CPT

## 2017-03-24 PROCEDURE — G8997 SWALLOW GOAL STATUS: HCPCS | Mod: CH

## 2017-03-24 PROCEDURE — 84484 ASSAY OF TROPONIN QUANT: CPT | Mod: 91

## 2017-03-24 PROCEDURE — 36415 COLL VENOUS BLD VENIPUNCTURE: CPT

## 2017-03-24 PROCEDURE — 86703 HIV-1/HIV-2 1 RESULT ANTBDY: CPT

## 2017-03-24 PROCEDURE — 96365 THER/PROPH/DIAG IV INF INIT: CPT

## 2017-03-24 PROCEDURE — 99285 EMERGENCY DEPT VISIT HI MDM: CPT | Mod: 25

## 2017-03-24 PROCEDURE — G8996 SWALLOW CURRENT STATUS: HCPCS | Mod: CH

## 2017-03-24 PROCEDURE — 94761 N-INVAS EAR/PLS OXIMETRY MLT: CPT

## 2017-03-24 PROCEDURE — 25500020 PHARM REV CODE 255: Performed by: EMERGENCY MEDICINE

## 2017-03-24 PROCEDURE — 83880 ASSAY OF NATRIURETIC PEPTIDE: CPT

## 2017-03-24 PROCEDURE — 96375 TX/PRO/DX INJ NEW DRUG ADDON: CPT

## 2017-03-24 RX ORDER — ENOXAPARIN SODIUM 100 MG/ML
40 INJECTION SUBCUTANEOUS EVERY 24 HOURS
Status: DISCONTINUED | OUTPATIENT
Start: 2017-03-24 | End: 2017-03-26 | Stop reason: HOSPADM

## 2017-03-24 RX ORDER — CLOPIDOGREL BISULFATE 75 MG/1
300 TABLET ORAL ONCE
Status: COMPLETED | OUTPATIENT
Start: 2017-03-24 | End: 2017-03-24

## 2017-03-24 RX ORDER — ASPIRIN 325 MG
325 TABLET ORAL
Status: DISCONTINUED | OUTPATIENT
Start: 2017-03-24 | End: 2017-03-24

## 2017-03-24 RX ORDER — ACETAMINOPHEN 325 MG/1
650 TABLET ORAL EVERY 6 HOURS PRN
Status: DISCONTINUED | OUTPATIENT
Start: 2017-03-24 | End: 2017-03-26 | Stop reason: HOSPADM

## 2017-03-24 RX ORDER — IBUPROFEN 200 MG
1 TABLET ORAL DAILY
Status: DISCONTINUED | OUTPATIENT
Start: 2017-03-24 | End: 2017-03-26 | Stop reason: HOSPADM

## 2017-03-24 RX ORDER — NAPROXEN SODIUM 220 MG/1
81 TABLET, FILM COATED ORAL DAILY
Status: DISCONTINUED | OUTPATIENT
Start: 2017-03-24 | End: 2017-03-26 | Stop reason: HOSPADM

## 2017-03-24 RX ORDER — ASPIRIN 325 MG
325 TABLET ORAL
Status: COMPLETED | OUTPATIENT
Start: 2017-03-24 | End: 2017-03-24

## 2017-03-24 RX ORDER — MORPHINE SULFATE 2 MG/ML
1 INJECTION, SOLUTION INTRAMUSCULAR; INTRAVENOUS ONCE
Status: COMPLETED | OUTPATIENT
Start: 2017-03-24 | End: 2017-03-24

## 2017-03-24 RX ORDER — IBUPROFEN 200 MG
16 TABLET ORAL
Status: DISCONTINUED | OUTPATIENT
Start: 2017-03-24 | End: 2017-03-26 | Stop reason: HOSPADM

## 2017-03-24 RX ORDER — CLOPIDOGREL BISULFATE 75 MG/1
75 TABLET ORAL DAILY
Status: DISCONTINUED | OUTPATIENT
Start: 2017-03-25 | End: 2017-03-26 | Stop reason: HOSPADM

## 2017-03-24 RX ORDER — KETOROLAC TROMETHAMINE 30 MG/ML
30 INJECTION, SOLUTION INTRAMUSCULAR; INTRAVENOUS
Status: COMPLETED | OUTPATIENT
Start: 2017-03-24 | End: 2017-03-24

## 2017-03-24 RX ORDER — GADOBUTROL 604.72 MG/ML
10 INJECTION INTRAVENOUS
Status: COMPLETED | OUTPATIENT
Start: 2017-03-24 | End: 2017-03-24

## 2017-03-24 RX ORDER — ATORVASTATIN CALCIUM 40 MG/1
40 TABLET, FILM COATED ORAL DAILY
Status: DISCONTINUED | OUTPATIENT
Start: 2017-03-24 | End: 2017-03-26 | Stop reason: HOSPADM

## 2017-03-24 RX ORDER — GLUCAGON 1 MG
1 KIT INJECTION
Status: DISCONTINUED | OUTPATIENT
Start: 2017-03-24 | End: 2017-03-26 | Stop reason: HOSPADM

## 2017-03-24 RX ORDER — KETOROLAC TROMETHAMINE 30 MG/ML
30 INJECTION, SOLUTION INTRAMUSCULAR; INTRAVENOUS
Status: DISCONTINUED | OUTPATIENT
Start: 2017-03-24 | End: 2017-03-24

## 2017-03-24 RX ORDER — IBUPROFEN 200 MG
24 TABLET ORAL
Status: DISCONTINUED | OUTPATIENT
Start: 2017-03-24 | End: 2017-03-26 | Stop reason: HOSPADM

## 2017-03-24 RX ORDER — INSULIN ASPART 100 [IU]/ML
1-10 INJECTION, SOLUTION INTRAVENOUS; SUBCUTANEOUS
Status: DISCONTINUED | OUTPATIENT
Start: 2017-03-24 | End: 2017-03-26 | Stop reason: HOSPADM

## 2017-03-24 RX ADMIN — GADOBUTROL 10 ML: 604.72 INJECTION INTRAVENOUS at 09:03

## 2017-03-24 RX ADMIN — ASPIRIN 81 MG 81 MG: 81 TABLET ORAL at 01:03

## 2017-03-24 RX ADMIN — ACETAMINOPHEN 650 MG: 325 TABLET ORAL at 02:03

## 2017-03-24 RX ADMIN — ACETAMINOPHEN 650 MG: 325 TABLET ORAL at 09:03

## 2017-03-24 RX ADMIN — PROMETHAZINE HYDROCHLORIDE 12.5 MG: 25 INJECTION, SOLUTION INTRAMUSCULAR; INTRAVENOUS at 10:03

## 2017-03-24 RX ADMIN — ASPIRIN 325 MG ORAL TABLET 325 MG: 325 PILL ORAL at 10:03

## 2017-03-24 RX ADMIN — MORPHINE SULFATE 1 MG: 2 INJECTION, SOLUTION INTRAMUSCULAR; INTRAVENOUS at 10:03

## 2017-03-24 RX ADMIN — SODIUM CHLORIDE 1000 ML: 0.9 INJECTION, SOLUTION INTRAVENOUS at 10:03

## 2017-03-24 RX ADMIN — INSULIN ASPART 1 UNITS: 100 INJECTION, SOLUTION INTRAVENOUS; SUBCUTANEOUS at 09:03

## 2017-03-24 RX ADMIN — KETOROLAC TROMETHAMINE 30 MG: 30 INJECTION, SOLUTION INTRAMUSCULAR at 10:03

## 2017-03-24 RX ADMIN — CLOPIDOGREL BISULFATE 300 MG: 75 TABLET ORAL at 01:03

## 2017-03-24 RX ADMIN — ATORVASTATIN CALCIUM 40 MG: 40 TABLET, FILM COATED ORAL at 01:03

## 2017-03-24 RX ADMIN — ENOXAPARIN SODIUM 40 MG: 100 INJECTION SUBCUTANEOUS at 05:03

## 2017-03-24 RX ADMIN — NICOTINE 1 PATCH: 21 PATCH, EXTENDED RELEASE TRANSDERMAL at 01:03

## 2017-03-24 NOTE — ED NOTES
Pt is resting on stretcher with lights off per request. Pt is on cardiac, bp and o2 sat monitor. VS are WNL. Family at bedside. SR up and CB in reach.

## 2017-03-24 NOTE — PT/OT/SLP EVAL
"Speech Language Pathology  Swallow Evaluation    Josefina Martin   MRN: [id number]   Admitting Diagnosis: Acute CVA (cerebrovascular accident)    Diet recommendations: Solid Diet Level: Regular  Liquid Diet Level: Thin   Universal swallow precautions, upright for meals, whole meds      SLP Treatment Date: 17  Speech Start Time: 1300     Speech Stop Time: 1318     Speech Total (min): 18 min       TREATMENT BILLABLE MINUTES:  Eval Swallow and Oral Function 18    Diagnosis: Acute CVA (cerebrovascular accident)  42 y/o female with PMH of HTN, HLD, CVA, DM2, and HFrEF presents to the ER with left-sided temporal HA since yesterday and vision changes. She also reports nausea and vomiting 3 times yesterday evening. Pt's vision started to get blurry this morning on the way to work and she had to pull over and vomit another 3 times. She states she is complaint with all of her medications and goes to al of her primary care appointments. She smokes 1 pack of cigarettes per week. She denies drinking alcohol or using illicit drugs.      MRI: Acute infarction within the left occipital lobe.  Past Medical History:   Diagnosis Date    CHF (congestive heart failure)     Hypertension     Stroke      Past Surgical History:   Procedure Laterality Date    CHOLECYSTECTOMY  2013    history of cholelithiasis    TUBAL LIGATION         Has the patient been evaluated by SLP for swallowing? : Yes  Keep patient NPO?: No   General Precautions: Standard, fall, vision impaired    Current Respiratory Status:  (room air)     Social Hx: Patient lives at home with boyfriend    Prior diet: regular diet and thin liquids    Occupational/hobbies/homemaking: unknown at this time, pt reports headache and feels dizzy.     Subjective:  Pt seen at bedside for swallow eval. Pt s/p CVA.   Pt complaining of hungry and head pain.   Patient goals: " I need something for my head."    Pain Ratin/10        Location:  (headache)  Pain Addressed: Nurse " "notified  Pain Rating Post-Intervention: 6/10    Objective:   Patient found with: blood pressure cuff  Pt seen at bedside for swallow eval.   Pt agreeable to po trials. Pt reported difficulty with vision in R eye that she could not see.   RN is aware. Pt also waiting on teleservice for CVA work-up.   SLP did clear with RN prior to po trials.     Oral Musculature Evaluation  Oral Musculature: WFL  Dentition: present and adequate  Mucosal Quality: good, adequate  Mandibular Strength and Mobility: WFL  Oral Labial Strength and Mobility: WFL  Lingual Strength and Mobility: WFL  Velar Elevation: WFL  Buccal Strength and Mobility: WFL  Volitional Cough: elicited  Volitional Swallow: timely  Voice Prior to PO Intake: clear     Bedside Swallow Eval:  Consistencies Assessed: Thin liquids water by cup/straw, Puree pudding by tsp and Solids cracker (1/2" square)  Oral Phase: WFL  Pharyngeal Phase: no overt clinical  signs/symptoms of aspiration and no overt clinical signs/symptoms of pharyngeal dysphagia    Additional Treatment:  Educated family on ST role and notified RN of diet recs    FIM:  Social Interaction: 4 Minimal direction--The patient interacts appropriately 75 to 90% of the time.         Comprehension: 5 Standby Prompting--The patient understands directions and conversation about basic daily needs more than 90% of the time.  The patient requires prompting (slowed speech rate, use of repetition, stressing particular words or phrases, pauses, visual or                Assessment:  Josefina Martin is a 41 y.o. female with a medical diagnosis of Acute CVA (cerebrovascular accident) and presents with no clinical s/s of aspiration. Pt safe for initiation of regular diet and thin liquids. SLP did notify RN and primary Team.    Do you have any cultural, spiritual, Orthodoxy conflicts, given your current situation?: none     Discharge recommendations: Discharge Facility/Level Of Care Needs:  (TBD pending PT/OT eval and " progress overall)     Goals:   SLP Goals        Problem: SLP Goal    Goal Priority Disciplines Outcome   SLP Goal     SLP Ongoing (interventions implemented as appropriate)   Description:  Short Term Goals:  1. Pt will participate in ongoing swallow assessment to determine least restrictive diet.   2. Patient will successfully participate in [id number] evaluation to further assess for any communication impairments s/p stroke  **further goals  To follow s/p SLE results.                Plan:   Patient to be seen Therapy Frequency: 3 x/week   Plan of Care expires: 04/22/17  Plan of Care reviewed with: patient (sister, PA)     SLP - Next Visit Date: 03/25/17      SLP G-Codes  Functional Assessment Tool Used: NOMS  Score: 7  Functional Limitations: Swallowing  Swallow Current Status ():   Swallow Goal Status ():   Swallow Discharge Status (): JOLIE Rodriguez, CCC-SLP  03/24/2017

## 2017-03-24 NOTE — PLAN OF CARE
Problem: Patient Care Overview  Goal: Plan of Care Review  Outcome: Ongoing (interventions implemented as appropriate)  Reviewed plan of care with patient and family member. Patient verbalized understanding. Right sided blurred vision noted. Pt able to ambulate to bathroom with assist. PRN pain medication given for complaints of headache this afternoon. Stroke education packet introduced and reviewed with pt. Allotted time given for questions. Pt had 2D echo and US bilateral carotid done today. Seen by neurology this afternoon. Pt on continuous tele monitoring; HR 70s; NSR. No true red alarms or ectopy noted. Bed alarm set, bed in lowest position, call bell in reach. Will continue to monitor.

## 2017-03-24 NOTE — IP AVS SNAPSHOT
[facility]  [address]  [place] [address]  Phone: [phone]           Patient Discharge Instructions     Our goal is to set you up for success. This packet includes information on your condition, medications, and your home care. It will help you to care for yourself so you don't get sicker and need to go back to the hospital.     Please ask your nurse if you have any questions.        There are many details to remember when preparing to leave the hospital. Here is what you will need to do:    1. Take your medicine. If you are prescribed medications, review your Medication List in the following pages. You may have new medications to  at the pharmacy and others that you'll need to stop taking. Review the instructions for how and when to take your medications. Talk with your doctor or nurses if you are unsure of what to do.     2. Go to your follow-up appointments. Specific follow-up information is listed in the following pages. Your may be contacted by a transition nurse or clinical provider about future appointments. Be sure we have all of the phone numbers to reach you, if needed. Please contact your provider's office if you are unable to make an appointment.     3. Watch for warning signs. Your doctor or nurse will give you detailed warning signs to watch for and when to call for assistance. These instructions may also include educational information about your condition. If you experience any of warning signs to your health, call your doctor.               ** Verify the list of medication(s) below is accurate and up to date. Carry this with you in case of emergency. If your medications have changed, please notify your healthcare provider.             Medication List      START taking these medications        Additional Info                      clopidogrel 75 mg tablet   Commonly known as:  PLAVIX   Quantity:  90 tablet   Refills:  3   Dose:  75 mg    Last time this was given:  75  mg on 3/26/2017  9:03 AM   Instructions:  Take 1 tablet (75 mg total) by mouth once daily.     Begin Date    AM    Noon    PM    Bedtime       nicotine 21 mg/24 hr   Commonly known as:  NICODERM CQ   Refills:  0   Dose:  1 patch    Last time this was given:  1 patch on 3/26/2017  9:05 AM   Instructions:  Place 1 patch onto the skin once daily.     Begin Date    AM    Noon    PM    Bedtime       nitrofurantoin (macrocrystal-monohydrate) 100 MG capsule   Commonly known as:  MACROBID   Quantity:  14 capsule   Refills:  0   Dose:  100 mg   Indications:  UTI    Last time this was given:  100 mg on 3/26/2017 11:12 AM   Instructions:  Take 1 capsule (100 mg total) by mouth every 12 (twelve) hours.     Begin Date    AM    Noon    PM    Bedtime         CONTINUE taking these medications        Additional Info                      amlodipine 10 MG tablet   Commonly known as:  NORVASC   Quantity:  90 tablet   Refills:  3   Dose:  10 mg    Instructions:  Take 1 tablet (10 mg total) by mouth once daily.     Begin Date    AM    Noon    PM    Bedtime       aspirin 81 MG Chew   Quantity:  21 tablet   Refills:  0   Dose:  81 mg    Last time this was given:  81 mg on 3/26/2017  9:03 AM   Instructions:  Take 1 tablet (81 mg total) by mouth once daily.     Begin Date    AM    Noon    PM    Bedtime       atorvastatin 40 MG tablet   Commonly known as:  LIPITOR   Quantity:  90 tablet   Refills:  3   Dose:  40 mg    Last time this was given:  40 mg on 3/26/2017  9:02 AM   Instructions:  Take 1 tablet (40 mg total) by mouth once daily.     Begin Date    AM    Noon    PM    Bedtime       carvedilol 25 MG tablet   Commonly known as:  COREG   Quantity:  180 tablet   Refills:  3   Dose:  25 mg    Instructions:  Take 1 tablet (25 mg total) by mouth 2 (two) times daily with meals.     Begin Date    AM    Noon    PM    Bedtime       chlorthalidone 25 MG Tab   Commonly known as:  HYGROTEN   Quantity:  90 tablet   Refills:  3   Dose:  25 mg     Instructions:  Take 1 tablet (25 mg total) by mouth once daily.     Begin Date    AM    Noon    PM    Bedtime       furosemide 20 MG tablet   Commonly known as:  LASIX   Quantity:  90 tablet   Refills:  3   Dose:  20 mg    Instructions:  Take 1 tablet (20 mg total) by mouth once daily.     Begin Date    AM    Noon    PM    Bedtime       hydrocodone-acetaminophen 5-325mg 5-325 mg per tablet   Commonly known as:  NORCO   Quantity:  12 tablet   Refills:  0   Dose:  1 tablet    Instructions:  Take 1 tablet by mouth every 8 (eight) hours as needed.     Begin Date    AM    Noon    PM    Bedtime       lisinopril 40 MG tablet   Commonly known as:  PRINIVIL,ZESTRIL   Quantity:  90 tablet   Refills:  3   Dose:  40 mg    Instructions:  Take 1 tablet (40 mg total) by mouth once daily.     Begin Date    AM    Noon    PM    Bedtime       metformin 500 MG 24 hr tablet   Commonly known as:  GLUCOPHAGE-XR   Quantity:  90 tablet   Refills:  3    Instructions:  Take .5 tab everyday and increase it to 1 tab in 2 weeks.     Begin Date    AM    Noon    PM    Bedtime       spironolactone 50 MG tablet   Commonly known as:  ALDACTONE   Quantity:  90 tablet   Refills:  3   Dose:  50 mg    Instructions:  Take 1 tablet (50 mg total) by mouth once daily.     Begin Date    AM    Noon    PM    Bedtime         STOP taking these medications     cephALEXin 500 MG capsule   Commonly known as:  KEFLEX            Where to Get Your Medications      These medications were sent to [facility] [address] [facility] [address]  [facility] [address]  [address] [facility] [address]    Hours:  24-hours Phone:  [phone]     aspirin 81 MG Chew    clopidogrel 75 mg tablet    nitrofurantoin (macrocrystal-monohydrate) 100 MG capsule         You can get these medications from any pharmacy     You don't need a prescription for these medications     nicotine 21 mg/24 hr                  Please bring to all follow up  "appointments:    1. A copy of your discharge instructions.  2. All medicines you are currently taking in their original bottles.  3. Identification and insurance card.    Please arrive 15 minutes ahead of scheduled appointment time.    Please call 24 hours in advance if you must reschedule your appointment and/or time.        Follow-up Information     Follow up with Kris Robertson MD. Schedule an appointment as soon as possible for a visit in 1 week.    Specialty:  Family Medicine    Why:  hospital f/u, tobacco mgmt    Contact information:    [address]   [address]   [place] [address] [phone]          Follow up with Lynn Rosenthal MD. Schedule an appointment as soon as possible for a visit in 1 week.    Specialty:  Ophthalmology    Why:  Occiptial CVA with visual deficits    Contact information:    [address] [facility]  [address]  [place] [address] [phone]        Referrals     Future Orders    Ambulatory referral to Home Health     Ambulatory referral to Ophthalmology         Discharge Instructions     Future Orders    Activity as tolerated     Diet Cardiac     Diet Diabetic 2000 Calories     WALKER FOR HOME USE     Questions:    Type of Walker:  Adult (5'4"-6'6")    With wheels?:  Yes    Height:  5' 8" (1.727 m)    Weight:  87.7 kg (193 lb 5.5 oz)    Length of need (1-99 months):  99    Platform attachment:      Accessories/Other:      Assistance needed:      Does patient have medical equipment at home?:  none    Please check all that apply:  Patient's condition impairs ambulation.    Patient is unable to safely ambulate without equipment.    Patient needs help to get in and out of chair.    Walker will be used for gait training.    WHEELCHAIR FOR HOME USE     Questions:    Hours in W/C per day:  1    Type of Wheelchair:  Standard    Size(Width):  18"(STD adult)    Leg Support:  STD footrests    Arm Height:      Desired seat depth:      Back height:      Lower leg length:      Actual seat depth:  " "    Lap Belt:  Velcro    Accessories:      Cushion:  Basic    Justification for cushion:      Height:  5' 8" (1.727 m)    Weight:  87.7 kg (193 lb 5.5 oz)    Does patient have medical equipment at home?:  none    Length of need (1-99 months):  99    Please check all that apply:  Patient's upper body strength is sufficient for propulsion.    Patient mobility limitations cannot be sufficiently resolved by the use of other ambulatory therapies.      Discharge References/Attachments     CVA, COMPLETED (ENGLISH)    DIABETES, DIET (ENGLISH)    DIABETES, CARBOHYDRATES, FATS, AND PROTEIN, UNDERSTANDING (ENGLISH)    DIABETES, TYPE 2, UNDERSTANDING (ENGLISH)    STROKE, DISCHARGE INSTRUCTIONS FOR (ENGLISH)    STROKE AND HEART DISEASE (ENGLISH)    STROKE, PREVENTING RECURRENT WITH A HEALTHIER LIFESTYLE (ENGLISH)    STROKE, PREVENTING RECURRENT: EATING HEALTHY (ENGLISH)    STROKE, PREVENTING RECURRENT: GETTING ACTIVE (ENGLISH)    STROKE, RISK FACTORS FOR (ENGLISH)    STROKE, SYMPTOMS (ENGLISH)    STROKE, THE FIRST FEW HOURS AFTER A (ENGLISH)    VISION PROBLEMS, UNDERSTANDING (ENGLISH)        Primary Diagnosis     Your primary diagnosis was:  Stroke      Admission Information     Date & Time Provider Department CSN    3/24/2017  7:39 AM Phani Houser MD [facility] [id number]      Care Providers     Provider Role Specialty Primary office phone    Phani Houser MD Attending Provider Family Medicine [phone]    Phani Houser MD Team Attending  Family Medicine  [phone]    Mackenzie Lopez MD Consulting Physician  Neurology [phone]    Lynn Rosenthal MD Consulting Physician  Ophthalmology [phone]      Your Vitals Were     BP Pulse Temp Resp Height Weight    123/71 (BP Location: Right arm, Patient Position: Lying, BP Method: Automatic) 87 98.6 °F (37 °C) (Oral) 18 5' 8" (1.727 m) 87.7 kg (193 lb 5.5 oz)    Last Period SpO2 BMI          02/26/2017 100% 29.4 kg/m2        Recent Lab " Values        2/17/2016 1/17/2017 3/24/2017                     3:52 AM 10:51 AM  2:01 PM         A1C 5.7 6.5 (H) 8.0 (H)         Comment for A1C at 10:51 AM on 1/17/2017:  According to ADA guidelines, hemoglobin A1C <7.0% represents  optimal control in non-pregnant diabetic patients.  Different  metrics may apply to specific populations.   Standards of Medical Care in Diabetes - 2016.  For the purpose of screening for the presence of diabetes:  <5.7%     Consistent with the absence of diabetes  5.7-6.4%  Consistent with increasing risk for diabetes   (prediabetes)  >or=6.5%  Consistent with diabetes  Currently no consensus exists for use of hemoglobin A1C  for diagnosis of diabetes for children.      Comment for A1C at  2:01 PM on 3/24/2017:  According to ADA guidelines, hemoglobin A1C <7.0% represents  optimal control in non-pregnant diabetic patients.  Different  metrics may apply to specific populations.   Standards of Medical Care in Diabetes - 2016.  For the purpose of screening for the presence of diabetes:  <5.7%     Consistent with the absence of diabetes  5.7-6.4%  Consistent with increasing risk for diabetes   (prediabetes)  >or=6.5%  Consistent with diabetes  Currently no consensus exists for use of hemoglobin A1C  for diagnosis of diabetes for children.        Pending Labs     Order Current Status    HIV-1 and HIV-2 antibodies In process    Hepatitis panel, acute In process    Urine culture In process      Allergies as of 3/26/2017     No Known Allergies      [facility]     [facility] Nurse Care Line - 24/7 Assistance  Unless otherwise directed by your provider, please contact [facility], our nurse care line that is available for 24/7 assistance.     Registered nurses in the [facility] provide clinical advisement, health education, appointment booking, and other advisory services.  Call for this free service at [phone].        Advance Directives     An advance directive  is a document which, in the event you are no longer able to make decisions for yourself, tells your healthcare team what kind of treatment you do or do not want to receive, or who you would like to make those decisions for you.  If you do not currently have an advance directive, [facility] encourages you to create one.  For more information call:  [phone]), [phone] ([phone]),  or log on to [email or web address].        Smoking Cessation     If you would like to quit smoking:   You may be eligible for free services if you are a [place] resident and started smoking cigarettes before September 1, 1988.  Call the Smoking Cessation Trust (SCT) toll free at (034) 933-0163 or (810) 926-0266.   Call 4-402-QUIT-NOW if you do not meet the above criteria.            Language Assistance Services     ATTENTION: Language assistance services are available, free of charge. Please call [phone].      ATENCIÓN: Si habla español, tiene a tomas disposición servicios gratuitos de asistencia lingüística. Llame al [phone].     [facility] Ý: N?u b?n nói Ti?ng Vi?t, có các d?ch v? h? tr? ngôn ng? mi?n phí dành cho b?n. G?i s? [phone].        Stroke Education              Heart Failure Education       Heart Failure: Being Active  You have a condition called heart failure. Being active doesnt mean that you have to wear yourself out. Even a little movement each day helps to strengthen your heart. If you cant get out to exercise, you can do simple stretching and strengthening exercises at home. These are good ways to keep you well-conditioned and prevent you and your heart from becoming excessively weak.    Ideas to get you started  · Add a little movement to things you do now. Walk to mail letters. Park your car at the far end of the parking lot and walk to the store. Walk up a flight of stairs instead of taking the elevator.  · Choose activities you enjoy. You might walk, swim, or ride an exercise  bike. Things like gardening and washing the car count, too. Other possibilities include: washing dishes, walking the dog, walking around the mall, and doing aerobic activities with friends.  · Join a group exercise program at a [facility] or [facility], a senior center, or a community center. Or look into a hospital cardiac rehabilitation program. Ask your doctor if you qualify.  Tips to keep you going  · Get up and get dressed each day. Go to a coffee shop and read a newspaper or go somewhere that you'll be in the presence of other active people. Youll feel more like being active.  · Make a plan. Choose one or more activities that you enjoy and that you can easily do. Then plan to do at least one each day. You might write your plan on a calendar.  · Go with a friend or a group if you like company. This can help you feel supported and stay motivated, too.  · Plan social events that you enjoy. This will keep you mentally engaged as well as physically motivated to do things you find pleasure in.  For your safety  · Talk with your healthcare provider before starting an exercise program.  · Exercise indoors when its too hot or too cold outside, or when the air quality is poor. Try walking at a shopping mall.  · Wear socks and sturdy shoes to maintain your balance and prevent falls.  · Start slowly. Do a few minutes several times a day at first. Increase your time and speed little by little.  · Stop and rest whenever you feel tired or get short of breath.  · Dont push yourself on days when you dont feel well.  Date Last Reviewed: 3/20/2016  © 4347-7547 [employer]. [address] [place] [address], [place] [address]. All rights reserved. This information is not intended as a substitute for professional medical care. Always follow your healthcare professional's instructions.              Heart Failure: Evaluating Your Heart  You have a condition called heart failure. To evaluate your condition, your doctor will examine you,  ask questions, and do some tests. Along with looking for signs of heart failure, the doctor looks for any other health problems that may have led to heart failure. The results of your evaluation will help your doctor form a treatment plan.  Health history and physical exam  Your visit will start with a health history. Tell the doctor about any symptoms youve noticed and about all medicines you take. Then youll have a physical exam. This includes listening to your heartbeat and breathing. Youll also be checked for swelling (edema) in your legs and neck. When you have fluid buildup or fluid in the lungs, it may be called congestive heart failure.  Diagnosing heart failure     During an echocardiogram, sound waves bounce off the heart. These are converted into a picture on the screen.   The following may be done to help your doctor form a diagnosis:  · X-rays show the size and shape of your heart. These pictures can also show fluid in your lungs.  · An electrocardiogram (ECG or EKG) shows the pattern of your heartbeat. Small pads (electrodes) are placed on your chest, arms, and legs. Wires connect the pads to the ECG machine, which records your hearts electrical signals. This can give the doctor information about heart function.  · An echocardiogram uses ultrasound waves to show the structure and movement of your heart muscle. This shows how well the heart pumps. It also shows the thickness of the heart walls, and if the heart is enlarged. It is one of the most useful, non-invasive tests as it provides information about the heart's general function. This helps your doctor make treatment decisions.  · Lab tests evaluate small amounts of blood or urine for signs of problems. A BNP lab test can help diagnose and evaluate heart failure. BNP stands for B-type natriuretic peptide. The ventricles secrete more BNP when heart failure worsens. Lab tests can also provide information about metabolic dysfunction or heart  dysfunction.  Your treatment plan  Based on the results of your evaluation and tests, your doctor will develop a treatment plan. This plan is designed to relieve some of your heart failure symptoms and help make you more comfortable. Your treatment plan may include:  · Medicine to help your heart work better and improve your quality of life  · Changes in what you eat and drink to help prevent fluid from backing up in your body  · Daily monitoring of your weight and heart failure symptoms to see how well your treatment plan is working  · Exercise to help you stay healthy  · Help with quitting smoking  · Emotional and psychological support to help adjust to the changes  · Referrals to other specialists to make sure you are being treated comprehensively  Date Last Reviewed: 3/21/2016  © 2369-6206 [employer]. [address] [place] [address], [place] [address]. All rights reserved. This information is not intended as a substitute for professional medical care. Always follow your healthcare professional's instructions.              Heart Failure: Making Changes to Your Diet  You have a condition called heart failure. When you have heart failure, excess fluid is more likely to build up in your body because your heart isn't working well. This makes the heart work harder to pump blood. Fluid buildup causes symptoms such as shortness of breath and swelling (edema). This is often referred to as congestive heart failure or CHF. Controlling the amount of salt (sodium) you eat may help stop fluid from building up. Your doctor may also tell you to reduce the amount of fluid you drink.  Reading food labels    Your healthcare provider will tell you how much sodium you can eat each day. Read food labels to keep track. Keep in mind that certain foods are high in salt. These include canned, frozen, and processed foods. Check the amount of sodium in each serving. Watch out for high-sodium ingredients. These include MSG (monosodium  glutamate), baking soda, and sodium phosphate.   Eating less salt  Give yourself time to get used to eating less salt. It may take a little while. Here are some tips to help:  · Take the saltshaker off the table. Replace it with salt-free herb mixes and spices.  · Eat fresh or plain frozen vegetables. These have much less salt than canned vegetables.  · Choose low-sodium snacks like sodium-free pretzels, crackers, or air-popped popcorn.  · Dont add salt to your food when youre cooking. Instead, season your foods with pepper, lemon, garlic, or onion.  · When you eat out, ask that your food be cooked without added salt.  · Avoid eating fried foods as these often have a great deal of salt.  If youre told to limit fluids  You may need to limit how much fluid you have to help prevent swelling. This includes anything that is liquid at room temperature, such as ice cream and soup. If your doctor tells you to limit fluid, try these tips:  · Measure drinks in a measuring cup before you drink them. This will help you meet daily goals.  · Chill drinks to make them more refreshing.  · Suck on frozen lemon wedges to quench thirst.  · Only drink when youre thirsty.  · Chew sugarless gum or suck on hard candy to keep your mouth moist.  · Weigh yourself daily to know if your body's fluid content is rising.  My sodium goal  Your healthcare provider may give you a sodium goal to meet each day. This includes sodium found in food as well as salt that you add. My goal is to eat no more than ___________ mg of sodium per day.     When to call your doctor  Call your doctor right away if you have any symptoms of worsening heart failure. These can include:  · Sudden weight gain  · Increased swelling of your legs or ankles  · Trouble breathing when youre resting or at night  · Increase in the number of pillows you have to sleep on  · Chest pain, pressure, discomfort, or pain in the jaw, neck, or back   Date Last Reviewed: 3/21/2016  ©  3381-2191 [employer]. [address] [place] [address], [place] [address]. All rights reserved. This information is not intended as a substitute for professional medical care. Always follow your healthcare professional's instructions.              Heart Failure: Medicines to Help Your Heart    You have a condition called heart failure (also known as congestive heart failure, or CHF). Your doctor will likely prescribe medicines for heart failure and any underlying health problems you have. Most heart failure patients take one or more types of medicinen. Your healthcare provider will work to find the combination of medicines that works best for you.  Heart failure medicines  Here are the most common heart failure medicines:  · ACE inhibitors lower blood pressure and decrease strain on the heart. This makes it easier for the heart to pump. Angiotensin receptor blockers have similar effects. These are prescribed for some patients instead of ACE inhibitors.  · Beta-blockers relieve stress on the heart. They also improve symptoms. They may also improve the heart's pumping action over time.  · Diuretics (also called water pills) help rid your body of excess water. This can help rid your body of swelling (edema). Having less fluid to pump means your heart doesnt have to work as hard. Some diuretics make your body lose a mineral called potassium. Your doctor will tell you if you need to take supplements or eat more foods high in potassium.  · Digoxin helps your heart pump with more strength. This helps your heart pump more blood with each beat. So, more oxygen-rich blood travels to the rest of the body.  · Aldosterone antagonists help alter hormones and decrease strain on the heart.  · Hydralazine and nitrates are two separate medicines used together to treat heart failure. They may come in one combination pill. They lower blood pressure and decrease how hard the heart has to pump.  Medicines for related  conditions  Controlling other heart problems helps keep heart failure under control, too. Depending on other heart problems you have, medicines may be prescribed to:  · Lower blood pressure (antihypertensives).  · Lower cholesterol levels (statins).  · Prevent blood clots (anticoagulants or aspirin).  · Keep the heartbeat steady (antiarrhythmics).  Date Last Reviewed: 3/5/2016  © 3785-5538 "[employer]. [address] [place] [address], [place] [address]. All rights reserved. This information is not intended as a substitute for professional medical care. Always follow your healthcare professional's instructions.              Heart Failure: Procedures That May Help    The heart is a muscle that pumps oxygen-rich blood to all parts of the body. When you have heart failure, the heart is not able to pump as well as it should. Blood and fluid may back up into the lungs (congestive heart failure), and some parts of the body dont get enough oxygen-rich blood to work normally. These problems lead to the symptoms of heart failure.     Certain procedures may help the heart pump better in some cases of heart failure. Some procedures are done to treat health problems that may have caused the heart failure such as coronary artery disease or heart rhythm problems. For more serious heart failure, other options are available.  Treating artery and valve problems  If you have coronary artery disease or valve disease, procedures may be done to improve blood flow. This helps the heart pump better, which can improve heart failure symptoms. First, your doctor may do a cardiac catheterization to help detect clogged blood vessels or valve damage. During this procedure, a  thin tube (catheter) in inserted into a blood vessel and guided to the heart. There a dye is injected and a special type of X-ray (angiogram) is taken of the blood vessels. Procedures to open a blocked artery or fix damaged valves can also be done using  catheterization.  · Angioplasty uses a balloon-tipped instrument at the end of the catheter. The balloon is inflated to widen the narrowed artery. In many cases, a stent is expanded to further support the narrowed artery. A stent is a metal mesh tube.  · Valve surgery repairs or replacement of faulty valves can also be done during catheterization so blood can flow properly through the chambers of the heart.  Bypass surgery is another option to help treat blocked arteries. It uses a healthy blood vessel from elsewhere in the body. The healthy blood vessel is attached above and below the blocked area so that blood can flow around the blocked artery.  Treating heart rhythm problems  A device may be placed in the chest to help a weak heart maintain a healthy, heartbeat so the heart can pump more effectively:  · Pacemaker. A pacemaker is an implanted device that regulates your heartbeat electronically. It monitors your heart's rhythm and generates a painless electric impulse that helps the heart beat in a regular rhythm. A pacemaker is programmed to meet your specific heart rhythm needs.  · Biventricular pacing/cardiac resynchronization therapy. A type of pacemaker that paces both pumping chambers of the heart at the same time to coordinate contractions and to improve the heart's function. Some people with heart failure are candidates for this therapy.  · Implantable cardioverter defibrillator. A device similar to a pacemaker that senses when the heart is beating too fast and delivers an electrical shock to convert the fast rhythm to a normal rhythm. This can be a life saving device.  In severe cases  In more serious cases of heart failure when other treatments no longer work, other options may include:  · Ventricular assist devices (VADs). These are mechanical devices used to take over the pumping function for one or both of the heart's ventricles, or pumping chambers. A VAD may be necessary when heart failure  progresses to the point that medicines and other treatments no longer help. In some cases, a VAD may be used as a bridge to transplant.  · Heart transplant. This is replacing the diseased heart with a healthy one from a donor. This is an option for a few people who are very sick. A heart transplant is very serious and not an option for all patients. Your doctor can tell you more.  Date Last Reviewed: 3/20/2016  © 7483-3222 [employer]. [address] [place] [address], [place] [address]. All rights reserved. This information is not intended as a substitute for professional medical care. Always follow your healthcare professional's instructions.              Heart Failure: Tracking Your Weight  You have a condition called heart failure. When you have heart failure, a sudden weight gain or a steady rise in weight is a warning sign that your body is retaining too much water and salt. This could mean your heart failure is getting worse. If left untreated, it can cause problems for your lungs and result in shortness of breath. Weighing yourself each day is the best way to know if youre retaining water. If your weight goes up quickly, call your doctor. You will be given instructions on how to get rid of the excess water. You will likely need medicines and to avoid salt. This will help your heart work better.  Call your doctor if you gain more than 2 pounds in 1 day, more than 5 pounds in 1 week, or whatever weight gain you were told to report by your doctor. This is often a sign of worsening heart failure and needs to be evaluated and treated. Your doctor will tell you what to do next.   Tips for weighing yourself    · Weigh yourself at the same time each morning, wearing the same clothes. Weigh yourself after urinating and before eating.  · Use the same scale each day. Make sure the numbers are easy to read. Put the scale on a flat, hard surface -- not on a rug or carpet.  · Do not stop weighing yourself. If you  forget one day, weigh again the next morning.  How to use your weight chart  · Keep your weight chart near the scale. Write your weight on the chart as soon as you get off the scale.  · Fill in the month and the start date on the chart. Then write down your weight each day. Your chart will look like this:    · If you miss a day, leave the space blank. Weigh yourself the next day and write your weight in the next space.  · Take your weight chart with you when you go to see your doctor.  Date Last Reviewed: 3/20/2016  © 1179-2427 [employer]. [address] [place] [address], [place] [address]. All rights reserved. This information is not intended as a substitute for professional medical care. Always follow your healthcare professional's instructions.              Heart Failure: Warning Signs of a Flare-Up  You have a condition called heart failure. Once you have heart failure, flare-ups can happen. Below are signs that can mean your heart failure is getting worse. If you notice any of these warning signs, call your healthcare provider.  Swelling    · Your feet, ankles, or lower legs get puffier.  · You notice skin changes on your lower legs.  · Your shoes feel too tight.  · Your clothes are tighter in the waist.  · You have trouble getting rings on or off your fingers.  Shortness of breath  · You have to breathe harder even when youre doing your normal activities or when youre resting.  · You are short of breath walking up stairs or even short distances.  · You wake up at night short of breath or coughing.  · You need to use more pillows or sit up to sleep.  · You wake up tired or restless.  Other warning signs  · You feel weaker, dizzy, or more tired.  · You have chest pain or changes in your heartbeat.  · You have a cough that wont go away.  · You cant remember things or dont feel like eating.  Tracking your weight  Gaining weight is often the first warning sign that heart failure is getting worse. Gaining  even a few pounds can be a sign that your body is retaining excess water and salt. Weighing yourself each day in the morning after you urinate and before you eat, is the best way to know if you're retaining water. Get a scale that is easy to read and make sure you wear the same clothes and use the same scale every time you weigh. Your healthcare provider will show you how to track your weight. Call your doctor if you gain more than 2 pounds in 1 day, 5 pounds in 1 week, or whatever weight gain you were told to report by your doctor. This is often a sign of worsening heart failure and needs to be evaluated and treated before it compromises your breathing. Your doctor will tell you what to do next.    Date Last Reviewed: 3/15/2016  © 6865-1818 [employer]. [address] [place] [address]. All rights reserved. This information is not intended as a substitute for professional medical care. Always follow your healthcare professional's instructions.              Diabetes Discharge Instructions                                   [facility] Sign-Up     Activating your [facility] account is as easy as 1-2-3!     1) Visit [email or web address], select Sign Up Now, enter this activation code and your date of birth, then select Next.  [email or web address]  Expires: 5/3/2017 10:33 PM      2) Create a username and password to use when you visit [facility] in the future and select a security question in case you lose your password and select Next.    3) Enter your e-mail address and click Sign Up!    Additional Information  If you have questions, please e-mail [email or web address] or call [phone] to talk to our [facility] staff. Remember, [facility] is NOT to be used for urgent needs. For medical emergencies, dial 911.          [facility] complies with applicable Federal civil rights laws and does not discriminate on the basis of race, color, national origin, age, disability, or sex.

## 2017-03-24 NOTE — ED NOTES
APPEARANCE: Alert, oriented and in no acute distress.  CARDIAC: Normal rate and rhythm, no murmur heard.   PERIPHERAL VASCULAR: peripheral pulses present. Normal cap refill. No edema. Warm to touch.    RESPIRATORY:Normal rate and effort, breath sounds clear bilaterally throughout chest. Respirations are equal and unlabored no obvious signs of distress.  GASTRO: soft, bowel sounds normal, no tenderness, no abdominal distention. Nausea and vomiting  MUSC: Full ROM. No bony tenderness or soft tissue tenderness. No obvious deformity.  SKIN: Skin is warm and dry, normal skin turgor, mucous membranes moist.  NEURO: 5/5 strength major flexors/extensors bilaterally. Sensory intact to light touch bilaterally. Shayla coma scale: eyes open spontaneously-4, oriented & converses-5, obeys commands-6. No neurological abnormalities.   MENTAL STATUS: awake, alert and aware of environment.  EYE: Right hemianopsia  ENT: EARS: no obvious drainage. NOSE: no active bleeding.   Pt complains of headache, starting yesteday. Pt has right hemianopsia at this time.

## 2017-03-24 NOTE — PT/OT/SLP PROGRESS
Occupational Therapy      Josefina Martin  MRN: [id number]    Patient not seen today secondary to  (Pt. in process of going for 2D Echo per nurse report. ). Will follow-up .    Kellie Novak OT  3/24/2017

## 2017-03-24 NOTE — PLAN OF CARE
SLP eval done at bedside. Neurology consult attempted. Office stated MD needs to call in referral. MD notified.

## 2017-03-24 NOTE — ED PROVIDER NOTES
"Encounter Date: 3/24/2017       History     Chief Complaint   Patient presents with    Headache     left-sided headache since yesterday. Also c/o blurred vision to both eyes, worse to left eye, since about 1430 yesterday. Also c/o vomiting since yesterday     Review of patient's allergies indicates:  No Known Allergies  HPI Comments: Patient is a 41 year old female with a h/o MI and CVA last year presents with "blurred vision" and headache.  Headache was gradual onset yesterday after she vomited when she got off of work.  No fever, chills. No neck stiffness.  No "thunderclap onset".  No fever, chills.  No rash.  Constant.  Nothing makes better or worse.  Took BC powder - no effect.     Patient is a 41 y.o. female presenting with the following complaint: headaches and neurologic complaint. The history is provided by the patient.   Headache    This is a new problem. The current episode started yesterday. The problem occurs constantly. The problem has been unchanged. The pain is located in the bilateral and frontal region. The pain does not radiate. The pain quality is not similar to prior headaches. The quality of the pain is described as pressure. Associated symptoms include a visual change. Pertinent negatives include no abdominal pain, back pain, fever, nausea, numbness, phonophobia, photophobia, seizures, sore throat, vomiting or weakness. Nothing aggravates the symptoms. She has tried nothing for the symptoms. Her past medical history is significant for hypertension. There is no history of migraine headaches or pseudotumor cerebri.   Neurologic Problem   The primary symptoms include headaches and visual change. Primary symptoms do not include loss of consciousness, altered mental status, seizures, paresthesias, focal weakness, speech change, memory loss, fever, nausea or vomiting. The symptoms began yesterday. The episode lasted 1 day. The symptoms are improving. The neurological symptoms are focal.   The " headache began yesterday. Headache is a new problem. The headache is associated with visual change. The headache is not associated with photophobia, neck stiffness, paresthesias or weakness.   The visual change began yesterday. The visual change is located in the medial field of vision of the left eye. The visual change is located in the lateral field of vision of the right eye. The visual change does not include photophobia.     Additional symptoms do not include neck stiffness, weakness, photophobia or anxiety. Medical issues also include cerebral vascular accident and hypertension.     Past Medical History:   Diagnosis Date    CHF (congestive heart failure)     Hypertension     Stroke      Past Surgical History:   Procedure Laterality Date    CHOLECYSTECTOMY  2013    history of cholelithiasis    TUBAL LIGATION       Family History   Problem Relation Age of Onset    Hypertension Mother     No Known Problems Father     No Known Problems Sister     No Known Problems Daughter     Diabetes Son 14     Takes 2 insulins and metformin use to be bigger wally    Stroke Maternal Uncle 48    Anuerysm Maternal Grandmother     No Known Problems Sister     No Known Problems Daughter     No Known Problems Son      Social History   Substance Use Topics    Smoking status: Current Every Day Smoker     Packs/day: 0.00     Years: 18.00     Types: Cigarettes    Smokeless tobacco: Never Used      Comment: 1 pack/week    Alcohol use Yes      Comment: social 1/month     Review of Systems   Constitutional: Negative for diaphoresis and fever.   HENT: Negative for sore throat.    Eyes: Positive for visual disturbance. Negative for photophobia.   Respiratory: Negative for shortness of breath and wheezing.    Cardiovascular: Negative for chest pain and palpitations.   Gastrointestinal: Negative for abdominal pain, diarrhea, nausea and vomiting.   Genitourinary: Negative for dysuria.   Musculoskeletal: Negative for back pain,  joint swelling and neck stiffness.   Skin: Negative for rash.   Neurological: Positive for headaches. Negative for speech change, focal weakness, seizures, loss of consciousness, weakness, numbness and paresthesias.   Hematological: Does not bruise/bleed easily.   Psychiatric/Behavioral: Negative for memory loss.       Physical Exam   Initial Vitals   BP Pulse Resp Temp SpO2   03/24/17 0737 03/24/17 0737 03/24/17 0737 03/24/17 0737 03/24/17 0737   107/71 75 18 98 °F (36.7 °C) 99 %     Physical Exam    Nursing note and vitals reviewed.  Constitutional: She appears well-developed and well-nourished. No distress.   HENT:   Head: Normocephalic and atraumatic.   Eyes: Conjunctivae and EOM are normal. Pupils are equal, round, and reactive to light.   Right hemianopsia   Neck: Normal range of motion. Neck supple.   Cardiovascular: Normal rate and regular rhythm.   Pulmonary/Chest: Breath sounds normal. No respiratory distress. She has no wheezes.   Abdominal: Soft. There is no tenderness. There is no rebound and no guarding.   Musculoskeletal: Normal range of motion.   Neurological: She is alert and oriented to person, place, and time. She has normal strength. No cranial nerve deficit or sensory deficit.   Skin: Skin is warm and dry. No rash noted.   Psychiatric: She has a normal mood and affect.         ED Course   Procedures  Labs Reviewed   CBC W/ AUTO DIFFERENTIAL   COMPREHENSIVE METABOLIC PANEL   PROTIME-INR   APTT   TSH   TROPONIN I   B-TYPE NATRIURETIC PEPTIDE   LIPID PANEL     EKG Readings: (Independently Interpreted)   Initial Reading: No STEMI. Rhythm: Sinus Bradycardia. Heart Rate: 59. Ectopy: No Ectopy. Other Impression: nonspecific ST and T wave changes                            ED Course     Clinical Impression:   The primary encounter diagnosis was Cerebrovascular accident (CVA), unspecified mechanism. Diagnoses of Vision changes, YOLA (acute kidney injury), Non-intractable vomiting with nausea, unspecified  vomiting type, and Acute nonintractable headache, unspecified headache type were also pertinent to this visit.          Klever Horn MD  03/24/17 1899

## 2017-03-24 NOTE — H&P
History & Physical  [facility]      SUBJECTIVE:     History of Present Illness:  40 y/o female with PMH of HTN, HLD, CVA, DM2, and HFrEF presents to the ER with left-sided temporal HA since yesterday and vision changes. She also reports nausea and vomiting 3 times yesterday evening.  Pt's vision started to get blurry this morning on the way to work and she had to pull over and vomit another 3 times. She states she is complaint with all of her medications and goes to al of her primary care appointments. She smokes 1 pack of cigarettes per week. She denies drinking alcohol or using illicit drugs.     Facility-Administered Medications Prior to Admission   Medication    cloNIDine tablet 0.1 mg    cloNIDine tablet 0.2 mg     PTA Medications   Medication Sig    amlodipine (NORVASC) 10 MG tablet Take 1 tablet (10 mg total) by mouth once daily.    aspirin 81 MG Chew Take 1 tablet (81 mg total) by mouth once daily.    atorvastatin (LIPITOR) 40 MG tablet Take 1 tablet (40 mg total) by mouth once daily.    carvedilol (COREG) 25 MG tablet Take 1 tablet (25 mg total) by mouth 2 (two) times daily with meals.    cephALEXin (KEFLEX) 500 MG capsule Take 1 capsule (500 mg total) by mouth 4 (four) times daily.    chlorthalidone (HYGROTEN) 25 MG Tab Take 1 tablet (25 mg total) by mouth once daily.    furosemide (LASIX) 20 MG tablet Take 1 tablet (20 mg total) by mouth once daily.    hydrocodone-acetaminophen 5-325mg (NORCO) 5-325 mg per tablet Take 1 tablet by mouth every 8 (eight) hours as needed.    lisinopril (PRINIVIL,ZESTRIL) 40 MG tablet Take 1 tablet (40 mg total) by mouth once daily.    metformin (GLUCOPHAGE-XR) 500 MG 24 hr tablet Take .5 tab everyday and increase it to 1 tab in 2 weeks.    spironolactone (ALDACTONE) 50 MG tablet Take 1 tablet (50 mg total) by mouth once daily.       Review of patient's allergies indicates:  No Known Allergies    Past Medical History:   Diagnosis Date    CHF  (congestive heart failure)     Hypertension     Stroke      Past Surgical History:   Procedure Laterality Date    CHOLECYSTECTOMY  2013    history of cholelithiasis    TUBAL LIGATION       Family History   Problem Relation Age of Onset    Hypertension Mother     No Known Problems Father     No Known Problems Sister     No Known Problems Daughter     Diabetes Son 14     Takes 2 insulins and metformin use to be bigger wally    Stroke Maternal Uncle 48    Anuerysm Maternal Grandmother     No Known Problems Sister     No Known Problems Daughter     No Known Problems Son      Social History   Substance Use Topics    Smoking status: Current Every Day Smoker     Packs/day: 0.00     Years: 18.00     Types: Cigarettes    Smokeless tobacco: Never Used      Comment: 1 pack/week    Alcohol use Yes      Comment: social 1/month        Review of Systems:  As per Subjective  Pos: HA, vision changes,      OBJECTIVE:     Vital Signs (Most Recent)  Temp: 98.4 °F (36.9 °C) (03/24/17 1231)  Pulse: 62 (03/24/17 1231)  Resp: 18 (03/24/17 1231)  BP: (!) 102/57 (03/24/17 1231)  SpO2: 100 % (03/24/17 1150)    Physical Exam:  Gen: NAD  HEENT: NC, AT. Right-sided peripheral field deficit.   Chest: CTABL  CVS: RRR, no m/r/g  Abdomen: soft, NT, ND, no TTP, no masses, +BS  Ext: no edema, pulses 2+   Skin: ro rashes  Neuro: A&O x 3, CN's grossly intact, sensation intact to light touch  Psych: Normal mood, affect, thought content    LABS  CBC    Recent Labs  Lab 03/24/17  0847   WBC 8.33   RBC 3.84*   HGB 11.8*   HCT 33.9*      MCV 88   MCH 30.7   MCHC 34.8     BMP    Recent Labs  Lab 03/24/17  0847   *   K 4.6   CO2 23      BUN 26*   CREATININE 1.7*     POCT-Glucose  No results found for: POCTGLUCOSE      Recent Labs  Lab 03/24/17  0847   CALCIUM 9.5     LFT    Recent Labs  Lab 03/24/17  0847   PROT 7.4   ALBUMIN 4.0   BILITOT 0.5   AST 21   ALKPHOS 64   ALT 27       COAGS    Recent Labs  Lab 03/24/17  0847   INR  1.0   APTT 27.4     CE    Recent Labs  Lab 03/24/17  0847   TROPONINI <0.006     ABGs  No results for input(s): PH, PCO2, PO2, HCO3, POCSATURATED, BE in the last 24 hours.  BNP    Recent Labs  Lab 03/24/17  0847   BNP 12     UA  No results for input(s): COLORU, CLARITYU, SPECGRAV, PHUR, PROTEINUA, GLUCOSEU, BLOODU, WBCU, RBCU, BACTERIA, MUCUS in the last 24 hours.    Invalid input(s):  BILIRUBINCON  LAST HbA1c  Lab Results   Component Value Date    HGBA1C 6.5 (H) 01/17/2017         Diagnostic Results:  Imaging Results         MRA Neck with contrast (Final result) Result time:  03/24/17 09:44:27    Final result by Teresa Cheatham MD (03/24/17 09:44:27)    Impression:       1.  Acute infarction within the left occipital lobe.    2.  Small remote lacunar infarct within the right basal ganglia/corona radiata.    3.  Unremarkable MRA brain and neck without evidence for significant focal stenosis or occlusion.      Electronically signed by: TERESA CHEATHAM MD  Date:     03/24/17  Time:    09:44     Narrative:    Time of Procedure: 03/24/17 08:42:04  Accession # [id number]    Technique: Multiplanar, multisequence images were obtained through the brain before and after administration of 10 cc gadavist IV contrast.  Additionally, noncontrast 3-D time of flight MRA head and postcontrast 3-D time-of-flight MRA neck was performed.    Comparison: CT head from the same day.  MRI brain 9/2014.     Findings:    There is a sizable region of diffusion restriction with corresponding low ADC signal and FLAIR hyperintensity involving the left occipital lobe consistent with acute infarction.  Mild leptomeningeal enhancement visualized within this region.  No additional areas of acute infarction seen.  There is FLAIR hyperintensity consistent with small remote lacunar infarct involving the right basal ganglia/corona radiata.  Ventricles are normal in size and configuration.  Cavum septum pellucidum incidentally noted.  No intracranial  hemorrhage or extraaxial fluid collection.  No mass or mass effect.  No diffusion signal abnormality to suggest acute infarction.  After administration of gadolinium, no pathologic foci of enhancement.    Minimal patchy ethmoid and left maxillary sinus disease noted.  Trace fluid seen in the left mastoid air cells. Orbits appear normal.    The bilateral distal ICA's and the visualized bilateral anterior and middle cerebral arteries are patent without evidence for significant focal stenosis or occlusion.  The distal vertebral arteries, basilar artery and posterior cerebral arteries are patent without evidence for significant focal stenosis or occlusion.  Vertebral arteries are codominant.    The origins of the right brachiocephalic,  left common carotid and left subclavian arteries from the arch are within normal limits.  Vertebral artery origins are within normal limits.  The vertebral arteries are unremarkable throughout their course without evidence for focal stenosis or occlusion.  The bilateral common carotid arteries, carotid bifurcation, and extra cranial portions of the internal carotid arteries are patent without evidence for focal stenosis or occlusion.            MRA Brain without contrast (Final result) Result time:  03/24/17 09:44:27    Final result by Teresa Cheatham MD (03/24/17 09:44:27)    Impression:       1.  Acute infarction within the left occipital lobe.    2.  Small remote lacunar infarct within the right basal ganglia/corona radiata.    3.  Unremarkable MRA brain and neck without evidence for significant focal stenosis or occlusion.      Electronically signed by: TERESA CHEATHAM MD  Date:     03/24/17  Time:    09:44     Narrative:    Time of Procedure: 03/24/17 08:42:04  Accession # [id number]    Technique: Multiplanar, multisequence images were obtained through the brain before and after administration of 10 cc gadavist IV contrast.  Additionally, noncontrast 3-D time of flight MRA head and  postcontrast 3-D time-of-flight MRA neck was performed.    Comparison: CT head from the same day.  MRI brain 9/2014.     Findings:    There is a sizable region of diffusion restriction with corresponding low ADC signal and FLAIR hyperintensity involving the left occipital lobe consistent with acute infarction.  Mild leptomeningeal enhancement visualized within this region.  No additional areas of acute infarction seen.  There is FLAIR hyperintensity consistent with small remote lacunar infarct involving the right basal ganglia/corona radiata.  Ventricles are normal in size and configuration.  Cavum septum pellucidum incidentally noted.  No intracranial hemorrhage or extraaxial fluid collection.  No mass or mass effect.  No diffusion signal abnormality to suggest acute infarction.  After administration of gadolinium, no pathologic foci of enhancement.    Minimal patchy ethmoid and left maxillary sinus disease noted.  Trace fluid seen in the left mastoid air cells. Orbits appear normal.    The bilateral distal ICA's and the visualized bilateral anterior and middle cerebral arteries are patent without evidence for significant focal stenosis or occlusion.  The distal vertebral arteries, basilar artery and posterior cerebral arteries are patent without evidence for significant focal stenosis or occlusion.  Vertebral arteries are codominant.    The origins of the right brachiocephalic,  left common carotid and left subclavian arteries from the arch are within normal limits.  Vertebral artery origins are within normal limits.  The vertebral arteries are unremarkable throughout their course without evidence for focal stenosis or occlusion.  The bilateral common carotid arteries, carotid bifurcation, and extra cranial portions of the internal carotid arteries are patent without evidence for focal stenosis or occlusion.            MRI Brain W WO Contrast (Final result) Result time:  03/24/17 09:44:27    Final result by Annalisa  SCAR Cheatham MD (03/24/17 09:44:27)    Impression:       1.  Acute infarction within the left occipital lobe.    2.  Small remote lacunar infarct within the right basal ganglia/corona radiata.    3.  Unremarkable MRA brain and neck without evidence for significant focal stenosis or occlusion.      Electronically signed by: TERESA CHEATHAM MD  Date:     03/24/17  Time:    09:44     Narrative:    Time of Procedure: 03/24/17 08:42:04  Accession # [id number]    Technique: Multiplanar, multisequence images were obtained through the brain before and after administration of 10 cc gadavist IV contrast.  Additionally, noncontrast 3-D time of flight MRA head and postcontrast 3-D time-of-flight MRA neck was performed.    Comparison: CT head from the same day.  MRI brain 9/2014.     Findings:    There is a sizable region of diffusion restriction with corresponding low ADC signal and FLAIR hyperintensity involving the left occipital lobe consistent with acute infarction.  Mild leptomeningeal enhancement visualized within this region.  No additional areas of acute infarction seen.  There is FLAIR hyperintensity consistent with small remote lacunar infarct involving the right basal ganglia/corona radiata.  Ventricles are normal in size and configuration.  Cavum septum pellucidum incidentally noted.  No intracranial hemorrhage or extraaxial fluid collection.  No mass or mass effect.  No diffusion signal abnormality to suggest acute infarction.  After administration of gadolinium, no pathologic foci of enhancement.    Minimal patchy ethmoid and left maxillary sinus disease noted.  Trace fluid seen in the left mastoid air cells. Orbits appear normal.    The bilateral distal ICA's and the visualized bilateral anterior and middle cerebral arteries are patent without evidence for significant focal stenosis or occlusion.  The distal vertebral arteries, basilar artery and posterior cerebral arteries are patent without evidence for significant  focal stenosis or occlusion.  Vertebral arteries are codominant.    The origins of the right brachiocephalic,  left common carotid and left subclavian arteries from the arch are within normal limits.  Vertebral artery origins are within normal limits.  The vertebral arteries are unremarkable throughout their course without evidence for focal stenosis or occlusion.  The bilateral common carotid arteries, carotid bifurcation, and extra cranial portions of the internal carotid arteries are patent without evidence for focal stenosis or occlusion.            X-Ray Chest AP Portable (Final result) Result time:  03/24/17 08:45:35    Final result by Teresa Cheatham MD (03/24/17 08:45:35)    Impression:      No acute cardiopulmonary process identified.      Electronically signed by: TERESA CHEATHAM MD  Date:     03/24/17  Time:    08:45     Narrative:    Chest AP single view.  Comparison: 3/2016.    Mediastinal structures are midline.  Cardiac silhouette is normal in size.  Lungs are symmetrically expanded.  No evidence of focal consolidative process, pneumothorax, or significant effusion.  Bones appear intact.  No free air visualized beneath the diaphragm.            CT Head Without Contrast (Final result) Result time:  03/24/17 08:19:07    Final result by Teresa Cheatham MD (03/24/17 08:19:07)    Impression:       1.  Asymmetric subtle hypoattenuation within the left occipital lobe may represent recent infarction in this patient with reported clinical history of visual disturbance.  Recommend further evaluation with MRI with diffusion weighted imaging.    2.  Small remote lacunar infarct within the right basal ganglia/corona radiata, stable from 2016.      Findings were communicated from Dr. Cheatham to DR SUKHDEV DANIELS at 08:18:57 on 03/24/17.      Electronically signed by: TERESA CHEATHAM MD  Date:     03/24/17  Time:    08:19     Narrative:    Exam: CT of the head without contrast -- 41-year-old female with visual  disturbance.    Comparison: CT head 2/2016.    Technique: 5 mm noncontrast axial images through the brain were obtained.    Findings: There is asymmetric parenchymal hypoattenuation seen within the left occipital lobe which could represent recent infarction.  Stable small focus of hypoattenuation in the region of the right basal ganglia/corona radiata suggestive for remote lacunar infarct.  No evidence to suggest acute/recent major vascular distribution cerebral infarction, intraparenchymal hemorrhage, or intra-axial space occupying lesion. The ventricular system is normal in appearance for age. No hydrocephalus. No effacement of the skull-base cisterns. No abnormal extra-axial fluid collections or blood products.  Mild left maxillary sinuses is noted. The calvarium shows no significant abnormality.              ASSESSMENT/PLAN:   40 y/o female with PMH of HTN, HLD, CVA, DM2, and HFrEF presents to the ER with left-sided temporal HA since yesterday and vision changes admitted for acute CVA and YOLA.      Plan: Admit to inpatient    CVA  - Pt with HA, nausea, vomiting, and vision changes  - VSS, hemodynamically stable  - CT head: hypoattenuation within the left occipital lobe may represent recent infarction.  - MRI brain confirmed acute infarction within the left occipital lobe.  - Unremarkable MRA brain and neck  - ASA, statin, plavix with loading dose  - CVA workup: B12, B9, HIV, RPR, Hep panel, lipid panel pending  - Last echo 2/2016: EF 30% with DD  - Repeat echo pending  - Carotid US pending  - Consult neuro  - NPO    HTN  - BP stable on admit 107/71  - Will hold home BP meds for now  - Monitor    HLD  - Cholesterol panel mildly elevated  - On home statin     DM2  - Last A1c was 6.5  - Repeat A1c  - Glucose on admit was 156    Tobacco abuse  - nicotine patch in house    YOLA  - Admit BUN/Cr 26/1.7  - 2 months ago BUN/Cr 12/0.9  - Pt received 1L NS bolus in ED  - Monitor, consider fluids after repeat echo     Diet:  NPO  Ppx: Lovenox    Dispo: f/u CVA labs and neuro recs  3/24/2017 Wan Hoover M.D.

## 2017-03-24 NOTE — NURSING
Pt arrived to floor via stretcher. Able to ambulate to bed w/ 1 person assist. AAOx3. Complains of headache 3/10. Pain med given in ED. 500cc Normal Saline infusing from ED per MD orders. Neuro check done. Right sided hemianopsia. No other deficits noted.  SLP eval to be done. Pt on room air; sats 100%. Pt verbalized understanding to be NPO. Pt placed on tele; NSR. Stroke education at bedside.Pt stated she is tired and wants to rest; will retry stroke education. Bed alarm set, bed in lowest position, call bell in reach. Family at bedside. Will continue to monitor.

## 2017-03-24 NOTE — PLAN OF CARE
Problem: SLP Goal  Goal: SLP Goal  Short Term Goals:  1. Pt will participate in ongoing swallow assessment to determine least restrictive diet.   2. Patient will successfully participate in [id number] evaluation to further assess for any communication impairments s/p stroke  **further goals To follow s/p SLE results.   Outcome: Ongoing (interventions implemented as appropriate)  3/24: Swallow eval completed this pm, pt presents with no clinical s/s of dysphagia per bedside swallow assessment. REC: regular diet and thin liquids, Speech and lang eval to be done next session.   JOLIE Allen, CCC-SLP  Speech Pathologist 3/24/2017

## 2017-03-24 NOTE — CONSULTS
[facility] Consult Note    Reason for Consult -   Stroke    History of Present Illness -   41 year old woman with PMH of HTN, HLD, CVA, DMII, and HFrEF who presented with L sided headache nausea and vomiting and vision blurriness since yesterday.  Since patient's CVA in February of last year, patient reports daily ASA use.    Review of Systems -  As stated in HPI    Past Medical History -   Past Medical History:   Diagnosis Date    CHF (congestive heart failure)     Hypertension     Stroke        Social History -   Social History     Social History    Marital status: Single     Spouse name: N/A    Number of children: N/A    Years of education: N/A     Occupational History     [employer]     Social History Main Topics    Smoking status: Current Every Day Smoker     Packs/day: 0.00     Years: 18.00     Types: Cigarettes    Smokeless tobacco: Never Used      Comment: 1 pack/week    Alcohol use Yes      Comment: social 1/month    Drug use: No    Sexual activity: Yes     Partners: Male     Birth control/ protection: None, Surgical     Other Topics Concern    None     Social History Narrative       Family History -   Family History   Problem Relation Age of Onset    Hypertension Mother     No Known Problems Father     No Known Problems Sister     No Known Problems Daughter     Diabetes Son 14     Takes 2 insulins and metformin use to be bigger wally    Stroke Maternal Uncle 48    Anuerysm Maternal Grandmother     No Known Problems Sister     No Known Problems Daughter     No Known Problems Son        Allergies - NKDA  Home Neuro Medications -   No current facility-administered medications on file prior to encounter.      Current Outpatient Prescriptions on File Prior to Encounter   Medication Sig Dispense Refill    amlodipine (NORVASC) 10 MG tablet Take 1 tablet (10 mg total) by mouth once daily. 90 tablet 3    aspirin 81 MG Chew Take 1 tablet (81 mg total) by mouth once daily. 30 tablet 11     "atorvastatin (LIPITOR) 40 MG tablet Take 1 tablet (40 mg total) by mouth once daily. 90 tablet 3    carvedilol (COREG) 25 MG tablet Take 1 tablet (25 mg total) by mouth 2 (two) times daily with meals. 180 tablet 3    cephALEXin (KEFLEX) 500 MG capsule Take 1 capsule (500 mg total) by mouth 4 (four) times daily. 40 capsule 0    chlorthalidone (HYGROTEN) 25 MG Tab Take 1 tablet (25 mg total) by mouth once daily. 90 tablet 3    furosemide (LASIX) 20 MG tablet Take 1 tablet (20 mg total) by mouth once daily. 90 tablet 3    hydrocodone-acetaminophen 5-325mg (NORCO) 5-325 mg per tablet Take 1 tablet by mouth every 8 (eight) hours as needed. 12 tablet 0    lisinopril (PRINIVIL,ZESTRIL) 40 MG tablet Take 1 tablet (40 mg total) by mouth once daily. 90 tablet 3    metformin (GLUCOPHAGE-XR) 500 MG 24 hr tablet Take .5 tab everyday and increase it to 1 tab in 2 weeks. 90 tablet 3    spironolactone (ALDACTONE) 50 MG tablet Take 1 tablet (50 mg total) by mouth once daily. 90 tablet 3       Vitals -     Vitals:    03/24/17 1121 03/24/17 1150 03/24/17 1231 03/24/17 1346   BP: 111/77 105/69 (!) 102/57    BP Location:   Left arm    Patient Position:   Lying    BP Method:   Automatic    Pulse: 71 63 62    Resp: 16 16 18    Temp:   98.4 °F (36.9 °C)    TempSrc:   Oral    SpO2: 100% 100%  100%   Weight:   87.7 kg (193 lb 5.5 oz)    Height:   5' 8" (1.727 m)        Neurological Exam -     General appearance: Well nourished, well developed, no acute distress.  Facial Expression: normal       Affect: full       Orientation to time & place:  Oriented to time, place, person and situation       Attention & concentration:  Normal attention span and concentration       Memory:  Recent and remote memory intact  Language: Spontaneous, fluent; able to repeat and name objects        Fund of knowledge:  Aware of current events        Speech:  normal (not dysarthric)    Cranial nerves: Congrous homonymous hemianopia with decreased vision of " the right, pupils equal round and reactive, extraocular movements intact, facial sensation intact, face symmetrical, hearing intact to whisper, palate raises midline, shoulder shrug strength normal, tongue protrudes midline.      Musculoskeletal:  Muscle tone: all 4 extremities normal        Muscle Bulk: all 4 extremities normal        Muscle strength:  5/5 in upper left extremities and lower left extremities in proximal and distal flexion and extension;   4/5 strength in Left upper and lower extremity    No pronator drift  Sensation: Intact to light touch, pin prick, vibration in all extremities         Deep tendon Reflexes: 2+ bilateral triceps, biceps, and brachioradialis; 2+ in bilateral patellae. Positive ankle jerks.  Plantar flexor responses bilaterally    Cerebellar and Coordination:  Gait:  normal, able to tandem without difficulty        Finger-nose: no dysmetria           Labs -      Recent Labs  Lab 03/24/17  0847 03/24/17  1401   WBC 8.33  --    HGB 11.8*  --    HCT 33.9*  --      --    *  --    K 4.6  --      --    CO2 23  --    BUN 26*  --    *  --    PROT 7.4  --    ALBUMIN 4.0  --    BILITOT 0.5  --    AST 21  --    ALKPHOS 64  --    ALT 27  --    INR 1.0  --    TROPONINI <0.006  --    TSH 0.502 0.637       Imaging -     Time of Procedure: 03/24/17 08:42:04  Accession # [id number]    Technique: Multiplanar, multisequence images were obtained through the brain before and after administration of 10 cc gadavist IV contrast.  Additionally, noncontrast 3-D time of flight MRA head and postcontrast 3-D time-of-flight MRA neck was performed.    Comparison: CT head from the same day.  MRI brain 9/2014.     Findings:    There is a sizable region of diffusion restriction with corresponding low ADC signal and FLAIR hyperintensity involving the left occipital lobe consistent with acute infarction.  Mild leptomeningeal enhancement visualized within this region.  No additional areas of  acute infarction seen.  There is FLAIR hyperintensity consistent with small remote lacunar infarct involving the right basal ganglia/corona radiata.  Ventricles are normal in size and configuration.  Cavum septum pellucidum incidentally noted.  No intracranial hemorrhage or extraaxial fluid collection.  No mass or mass effect.  No diffusion signal abnormality to suggest acute infarction.  After administration of gadolinium, no pathologic foci of enhancement.    Minimal patchy ethmoid and left maxillary sinus disease noted.  Trace fluid seen in the left mastoid air cells. Orbits appear normal.    The bilateral distal ICA's and the visualized bilateral anterior and middle cerebral arteries are patent without evidence for significant focal stenosis or occlusion.  The distal vertebral arteries, basilar artery and posterior cerebral arteries are patent without evidence for significant focal stenosis or occlusion.  Vertebral arteries are codominant.    The origins of the right brachiocephalic,  left common carotid and left subclavian arteries from the arch are within normal limits.  Vertebral artery origins are within normal limits.  The vertebral arteries are unremarkable throughout their course without evidence for focal stenosis or occlusion.  The bilateral common carotid arteries, carotid bifurcation, and extra cranial portions of the internal carotid arteries are patent without evidence for focal stenosis or occlusion.   Impression      1.  Acute infarction within the left occipital lobe.    2.  Small remote lacunar infarct within the right basal ganglia/corona radiata.    3.  Unremarkable MRA brain and neck without evidence for significant focal stenosis or occlusion.      Electronically signed by: TERESA CHEATHAM MD  Date: 03/24/17  Time: 09:44          Assessment and Plan -   41 year old woman with PMH of HTN, HLD, CVA, DMII, and HFrEF who presented with L sided headache nausea and vomiting and vision blurriness  since yesterday.  Since patient's CVA in February of last year, patient reports daily ASA use.  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 Renzo Carvajal MD  [facility]

## 2017-03-25 LAB
ALBUMIN SERPL BCP-MCNC: 3.6 G/DL
ALP SERPL-CCNC: 58 U/L
ALT SERPL W/O P-5'-P-CCNC: 21 U/L
ANION GAP SERPL CALC-SCNC: 8 MMOL/L
AST SERPL-CCNC: 16 U/L
BACTERIA #/AREA URNS HPF: ABNORMAL /HPF
BASOPHILS # BLD AUTO: 0.02 K/UL
BASOPHILS NFR BLD: 0.3 %
BILIRUB SERPL-MCNC: 0.3 MG/DL
BILIRUB UR QL STRIP: NEGATIVE
BUN SERPL-MCNC: 25 MG/DL
CALCIUM SERPL-MCNC: 9.2 MG/DL
CHLORIDE SERPL-SCNC: 106 MMOL/L
CHOLEST/HDLC SERPL: 6.8 {RATIO}
CLARITY UR: CLEAR
CO2 SERPL-SCNC: 21 MMOL/L
COLOR UR: YELLOW
CREAT SERPL-MCNC: 1.3 MG/DL
DIFFERENTIAL METHOD: ABNORMAL
EOSINOPHIL # BLD AUTO: 0.1 K/UL
EOSINOPHIL NFR BLD: 1.2 %
ERYTHROCYTE [DISTWIDTH] IN BLOOD BY AUTOMATED COUNT: 13.2 %
EST. GFR  (AFRICAN AMERICAN): 58 ML/MIN/1.73 M^2
EST. GFR  (NON AFRICAN AMERICAN): 51 ML/MIN/1.73 M^2
ESTIMATED AVG GLUCOSE: 183 MG/DL
GLUCOSE SERPL-MCNC: 124 MG/DL
GLUCOSE UR QL STRIP: NEGATIVE
HBA1C MFR BLD HPLC: 8 %
HCT VFR BLD AUTO: 32.4 %
HDL/CHOLESTEROL RATIO: 14.7 %
HDLC SERPL-MCNC: 163 MG/DL
HDLC SERPL-MCNC: 24 MG/DL
HGB BLD-MCNC: 11.3 G/DL
HGB UR QL STRIP: ABNORMAL
KETONES UR QL STRIP: NEGATIVE
LDLC SERPL CALC-MCNC: 98.4 MG/DL
LEUKOCYTE ESTERASE UR QL STRIP: ABNORMAL
LYMPHOCYTES # BLD AUTO: 2.7 K/UL
LYMPHOCYTES NFR BLD: 40.8 %
MAGNESIUM SERPL-MCNC: 2.1 MG/DL
MCH RBC QN AUTO: 30.8 PG
MCHC RBC AUTO-ENTMCNC: 34.9 %
MCV RBC AUTO: 88 FL
MICROSCOPIC COMMENT: ABNORMAL
MONOCYTES # BLD AUTO: 0.5 K/UL
MONOCYTES NFR BLD: 8 %
NEUTROPHILS # BLD AUTO: 3.3 K/UL
NEUTROPHILS NFR BLD: 49.5 %
NITRITE UR QL STRIP: NEGATIVE
NONHDLC SERPL-MCNC: 139 MG/DL
PH UR STRIP: 6 [PH] (ref 5–8)
PHOSPHATE SERPL-MCNC: 4.1 MG/DL
PLATELET # BLD AUTO: 239 K/UL
PMV BLD AUTO: 10.4 FL
POCT GLUCOSE: 105 MG/DL (ref 70–110)
POCT GLUCOSE: 123 MG/DL (ref 70–110)
POCT GLUCOSE: 139 MG/DL (ref 70–110)
POCT GLUCOSE: 168 MG/DL (ref 70–110)
POCT GLUCOSE: 35 MG/DL (ref 70–110)
POTASSIUM SERPL-SCNC: 4 MMOL/L
PROT SERPL-MCNC: 6.9 G/DL
PROT UR QL STRIP: NEGATIVE
RBC # BLD AUTO: 3.67 M/UL
RBC #/AREA URNS HPF: 0 /HPF (ref 0–4)
RPR SER QL: NORMAL
SODIUM SERPL-SCNC: 135 MMOL/L
SP GR UR STRIP: <=1.005 (ref 1–1.03)
TRIGL SERPL-MCNC: 203 MG/DL
URN SPEC COLLECT METH UR: ABNORMAL
UROBILINOGEN UR STRIP-ACNC: NEGATIVE EU/DL
WBC # BLD AUTO: 6.66 K/UL
WBC #/AREA URNS HPF: 6 /HPF (ref 0–5)

## 2017-03-25 PROCEDURE — 25000003 PHARM REV CODE 250: Performed by: FAMILY MEDICINE

## 2017-03-25 PROCEDURE — 11000001 HC ACUTE MED/SURG PRIVATE ROOM

## 2017-03-25 PROCEDURE — 84100 ASSAY OF PHOSPHORUS: CPT

## 2017-03-25 PROCEDURE — G8978 MOBILITY CURRENT STATUS: HCPCS | Mod: CK | Performed by: PHYSICAL THERAPIST

## 2017-03-25 PROCEDURE — 87086 URINE CULTURE/COLONY COUNT: CPT

## 2017-03-25 PROCEDURE — 85025 COMPLETE CBC W/AUTO DIFF WBC: CPT

## 2017-03-25 PROCEDURE — 36415 COLL VENOUS BLD VENIPUNCTURE: CPT

## 2017-03-25 PROCEDURE — 94761 N-INVAS EAR/PLS OXIMETRY MLT: CPT

## 2017-03-25 PROCEDURE — G8979 MOBILITY GOAL STATUS: HCPCS | Mod: CJ | Performed by: PHYSICAL THERAPIST

## 2017-03-25 PROCEDURE — 97116 GAIT TRAINING THERAPY: CPT | Performed by: PHYSICAL THERAPIST

## 2017-03-25 PROCEDURE — 80053 COMPREHEN METABOLIC PANEL: CPT

## 2017-03-25 PROCEDURE — 80061 LIPID PANEL: CPT

## 2017-03-25 PROCEDURE — 97162 PT EVAL MOD COMPLEX 30 MIN: CPT | Performed by: PHYSICAL THERAPIST

## 2017-03-25 PROCEDURE — 92523 SPEECH SOUND LANG COMPREHEN: CPT

## 2017-03-25 PROCEDURE — 83735 ASSAY OF MAGNESIUM: CPT

## 2017-03-25 PROCEDURE — 81000 URINALYSIS NONAUTO W/SCOPE: CPT

## 2017-03-25 PROCEDURE — 63600175 PHARM REV CODE 636 W HCPCS: Performed by: FAMILY MEDICINE

## 2017-03-25 RX ORDER — DOCUSATE SODIUM 100 MG/1
100 CAPSULE, LIQUID FILLED ORAL DAILY
Status: DISCONTINUED | OUTPATIENT
Start: 2017-03-25 | End: 2017-03-25

## 2017-03-25 RX ORDER — POLYETHYLENE GLYCOL 3350 17 G/17G
17 POWDER, FOR SOLUTION ORAL DAILY
Status: DISCONTINUED | OUTPATIENT
Start: 2017-03-25 | End: 2017-03-25

## 2017-03-25 RX ORDER — SODIUM CHLORIDE 9 MG/ML
INJECTION, SOLUTION INTRAVENOUS CONTINUOUS
Status: DISCONTINUED | OUTPATIENT
Start: 2017-03-25 | End: 2017-03-26 | Stop reason: HOSPADM

## 2017-03-25 RX ORDER — DOCUSATE SODIUM 100 MG/1
100 CAPSULE, LIQUID FILLED ORAL DAILY
Status: DISCONTINUED | OUTPATIENT
Start: 2017-03-25 | End: 2017-03-26 | Stop reason: HOSPADM

## 2017-03-25 RX ADMIN — ACETAMINOPHEN 650 MG: 325 TABLET ORAL at 11:03

## 2017-03-25 RX ADMIN — ENOXAPARIN SODIUM 40 MG: 100 INJECTION SUBCUTANEOUS at 04:03

## 2017-03-25 RX ADMIN — ATORVASTATIN CALCIUM 40 MG: 40 TABLET, FILM COATED ORAL at 08:03

## 2017-03-25 RX ADMIN — CLOPIDOGREL BISULFATE 75 MG: 75 TABLET ORAL at 08:03

## 2017-03-25 RX ADMIN — NICOTINE 1 PATCH: 21 PATCH, EXTENDED RELEASE TRANSDERMAL at 08:03

## 2017-03-25 RX ADMIN — SODIUM CHLORIDE: 0.9 INJECTION, SOLUTION INTRAVENOUS at 04:03

## 2017-03-25 RX ADMIN — ACETAMINOPHEN 650 MG: 325 TABLET ORAL at 04:03

## 2017-03-25 RX ADMIN — ACETAMINOPHEN 650 MG: 325 TABLET ORAL at 06:03

## 2017-03-25 RX ADMIN — DOCUSATE SODIUM 100 MG: 100 CAPSULE, LIQUID FILLED ORAL at 08:03

## 2017-03-25 RX ADMIN — ASPIRIN 81 MG 81 MG: 81 TABLET ORAL at 08:03

## 2017-03-25 NOTE — PLAN OF CARE
03/25/17 1335   Discharge Assessment   Confirmed/corrected address and phone number on facesheet? Yes   Assessment information obtained from? Patient   Prior to hospitilization cognitive status: Alert/Oriented   Prior to hospitalization functional status: Independent   Current cognitive status: Alert/Oriented   Arrived From home or self-care   Lives With child(dennis), adult;child(dennis), dependent   How many people do you have in your home that can help with your care after discharge? 3   Who are your caregiver(s) and their phone number(s)? Ely Guzman(s/o)[phone]/Violette Jennings(children's father)[phone]   Patient's perception of discharge disposition home or selfcare   Readmission Within The Last 30 Days no previous admission in last 30 days   Patient currently being followed by outpatient case management? No   Patient currently receives home health services? No   Does the patient currently use HME? No   Patient currently receives private duty nursing? No   Patient currently receives any other outside agency services? No   Equipment Currently Used at Home none   Do you have any problems affording any of your prescribed medications? No   Is the patient taking medications as prescribed? yes   Do you have any financial concerns preventing you from receiving the healthcare you need? No   Does the patient have transportation to healthcare appointments? Yes   Transportation Available car;family or friend will provide   On Dialysis? No   Does the patient receive services at the [facility]? No   Are there any open cases? No   Discharge Plan A Home   Discharge Plan B Home with family   Patient/Family In Agreement With Plan yes

## 2017-03-25 NOTE — NURSING
Reported to Md that pt had a pre-meal blood sugar of 35 and symptomatic with headache. Fast acting sugars given to pt and will recheck blood sugars shortly.

## 2017-03-25 NOTE — PLAN OF CARE
"Problem: Stroke (Ischemic) (Adult)  Goal: Signs and Symptoms of Listed Potential Problems Will be Absent, Minimized or Managed (Stroke)  Signs and symptoms of listed potential problems will be absent, minimized or managed by discharge/transition of care (reference Stroke (Ischemic) (Adult) CPG).  Outcome: Ongoing (interventions implemented as appropriate)  Bedside report completed with day shift Rn. Pt VSS and resting comfortably in bed, family present at the bedside. Pt reports earlier headache relieved by prn medication. Pt still reports right eye numbness, and reports her right side "feels different" and reports some weakness to that side. Plan of care reviewed with patient. Patient verbalized complete understanding. Fall precautions maintained. Bed in lowest position, locked, call light within reach, and bed alarm on. Side rails up x's 2 with slip resistant socks on. Nurse instructed patient to notify staff for any assistance and pt verbalized complete understanding.      Nurse will continue to monitor.       "

## 2017-03-25 NOTE — PROGRESS NOTES
[facility] Progress Note    Subjective -   Patient is a 41 year old female followed by Neurology Service for new left occipital stroke.     Patient reports minor headache, continued blurry vision of her right eye, dysuria and continued decreased energy. PT saw patient and recommends home health PT vs. Inpatient rehabilitation.     Vitals -     Vitals:    16 0638 16 0800 16 0832 16 1200   BP:  117/68  (!) 136/95   BP Location:       Patient Position:       BP Method:  Automatic  Automatic   Pulse:  98  105   Resp:  20  20   Temp:  96.6 °F (35.9 °C)  98 °F (36.7 °C)   TempSrc:  Oral  Oral   SpO2: 98%  100%    Weight:       Height:         NIH Stroke Scale     1a. Level of consciousness 0=alert; keenly responsive   1b. LOC questions 0=Answers both tasks correctly   1c. LOC commands 0=Answers both tasks correctly   2. Best gaze 0=normal   3. Visual 2=Complete hemianopia   4.  Facial palsy 0=Normal symmetric movement   5.  Motor left arm 0=No drift, limb holds 90 (or 45) degrees for full 10 seconds   5b. Motor right arm 0=No drift, limb holds 90 (or 45) degrees for full 10 seconds   6a. Motor left leg 0=No drift, limb holds 90 (or 45) degrees for full 10 seconds   6b. Motor right leg 0=No drift, limb holds 90 (or 45) degrees for full 10 seconds   7. Limb ataxia 0=Absent   8. Sensory 1=Mild to moderate sensory loss; patient feels pinprick is less sharp or is dull on the affected side; there is a loss of superficial pain with pinprick but patient is aware She is being touched   9. Best language 0=No aphasia, normal   10. Dysarthria 0=Normal   11.  Extinction and inattention 0=No abnormality                                TOTAL: 3           Neurological Exam -     Gen: Alert and oriented to state/city/place name, year/month/date/day, full name/, situation    CN: PERRLA. EOMI. Right congrous homonymous hemanopia Tactile sensation subjectively intact and equal bilaterally in V1-3 distribution.   Smile symmetric; no nasolabial fold flattening.  Uvual midline.  Testing of trapezius and sternocleidomastoid resulted with adequate strength to resistance.  Tongue protrusion midline.    Tonicity: No appreciable hyper/hypo-tonicity of the upper/lower extremities    Strength: 4+/5 strength RUE and RLE, 5/5 strength LUE and LLE    Sensory: decreased sensation minorly to light touch over right arm and leg compared to left, proprioception upper and lower extremeties intact    Reflexes: 2+ and symmetric through upper and lower extremeties    Other: no pronator drift    Cerebellar: finger-nose normal bilaterally    Gait: deferred      Labs -  Labs reviewed      Imaging -   Imaging Results         US Carotid Bilateral (Final result) Result time:  03/24/17 15:20:50    Final result by Alonzo Gonzalez MD (03/24/17 15:20:50)    Impression:      1.  No significant stenosis in either carotid artery.      Electronically signed by: ALONZO GONZALEZ MD  Date:     03/24/17  Time:    15:20     Narrative:    Bilateral carotid sonogram    Technique: Grayscale, as well as color and spectral Doppler sonography utilizing the [place] symptomatic carotid endarterectomy trial (NASCET) criteria.    Comparison: None    Findings:    Right - Small amount of plaque in the common and internal carotid arteries.  Right internal carotid artery PSV is 45.6 cm/sec; EDV is 22.8 cm/sec.  The PSV of the right common carotid artery is 101 cm/sec.  The ICA/CCA PSV ratio is 0.45, indicating a degree of stenosis in the range of 0-50%.  Right vertebral artery blood flow is antegrade.    Left - Small amount of plaque in the common and internal carotid arteries.  Left internal carotid artery PSV is 45.9 cm/sec; EDV is 23.2 cm/sec.  The PSV of the left common carotid artery is 114 cm/sec.  The ICA/CCA PSV ratio is 0.4, indicating a degree of stenosis in the range of 0-50%.  Left vertebral artery blood flow is antegrade.            MRA Neck with contrast  (Final result) Result time:  03/24/17 09:44:27    Final result by Teresa Cheatham MD (03/24/17 09:44:27)    Impression:       1.  Acute infarction within the left occipital lobe.    2.  Small remote lacunar infarct within the right basal ganglia/corona radiata.    3.  Unremarkable MRA brain and neck without evidence for significant focal stenosis or occlusion.      Electronically signed by: TERESA CHEATHAM MD  Date:     03/24/17  Time:    09:44     Narrative:    Time of Procedure: 03/24/17 08:42:04  Accession # [id number]    Technique: Multiplanar, multisequence images were obtained through the brain before and after administration of 10 cc gadavist IV contrast.  Additionally, noncontrast 3-D time of flight MRA head and postcontrast 3-D time-of-flight MRA neck was performed.    Comparison: CT head from the same day.  MRI brain 9/2014.     Findings:    There is a sizable region of diffusion restriction with corresponding low ADC signal and FLAIR hyperintensity involving the left occipital lobe consistent with acute infarction.  Mild leptomeningeal enhancement visualized within this region.  No additional areas of acute infarction seen.  There is FLAIR hyperintensity consistent with small remote lacunar infarct involving the right basal ganglia/corona radiata.  Ventricles are normal in size and configuration.  Cavum septum pellucidum incidentally noted.  No intracranial hemorrhage or extraaxial fluid collection.  No mass or mass effect.  No diffusion signal abnormality to suggest acute infarction.  After administration of gadolinium, no pathologic foci of enhancement.    Minimal patchy ethmoid and left maxillary sinus disease noted.  Trace fluid seen in the left mastoid air cells. Orbits appear normal.    The bilateral distal ICA's and the visualized bilateral anterior and middle cerebral arteries are patent without evidence for significant focal stenosis or occlusion.  The distal vertebral arteries, basilar artery and  posterior cerebral arteries are patent without evidence for significant focal stenosis or occlusion.  Vertebral arteries are codominant.    The origins of the right brachiocephalic,  left common carotid and left subclavian arteries from the arch are within normal limits.  Vertebral artery origins are within normal limits.  The vertebral arteries are unremarkable throughout their course without evidence for focal stenosis or occlusion.  The bilateral common carotid arteries, carotid bifurcation, and extra cranial portions of the internal carotid arteries are patent without evidence for focal stenosis or occlusion.            MRA Brain without contrast (Final result) Result time:  03/24/17 09:44:27    Final result by Teresa Cheatham MD (03/24/17 09:44:27)    Impression:       1.  Acute infarction within the left occipital lobe.    2.  Small remote lacunar infarct within the right basal ganglia/corona radiata.    3.  Unremarkable MRA brain and neck without evidence for significant focal stenosis or occlusion.      Electronically signed by: TERESA CHEATHAM MD  Date:     03/24/17  Time:    09:44     Narrative:    Time of Procedure: 03/24/17 08:42:04  Accession # [id number]    Technique: Multiplanar, multisequence images were obtained through the brain before and after administration of 10 cc gadavist IV contrast.  Additionally, noncontrast 3-D time of flight MRA head and postcontrast 3-D time-of-flight MRA neck was performed.    Comparison: CT head from the same day.  MRI brain 9/2014.     Findings:    There is a sizable region of diffusion restriction with corresponding low ADC signal and FLAIR hyperintensity involving the left occipital lobe consistent with acute infarction.  Mild leptomeningeal enhancement visualized within this region.  No additional areas of acute infarction seen.  There is FLAIR hyperintensity consistent with small remote lacunar infarct involving the right basal ganglia/corona radiata.  Ventricles  are normal in size and configuration.  Cavum septum pellucidum incidentally noted.  No intracranial hemorrhage or extraaxial fluid collection.  No mass or mass effect.  No diffusion signal abnormality to suggest acute infarction.  After administration of gadolinium, no pathologic foci of enhancement.    Minimal patchy ethmoid and left maxillary sinus disease noted.  Trace fluid seen in the left mastoid air cells. Orbits appear normal.    The bilateral distal ICA's and the visualized bilateral anterior and middle cerebral arteries are patent without evidence for significant focal stenosis or occlusion.  The distal vertebral arteries, basilar artery and posterior cerebral arteries are patent without evidence for significant focal stenosis or occlusion.  Vertebral arteries are codominant.    The origins of the right brachiocephalic,  left common carotid and left subclavian arteries from the arch are within normal limits.  Vertebral artery origins are within normal limits.  The vertebral arteries are unremarkable throughout their course without evidence for focal stenosis or occlusion.  The bilateral common carotid arteries, carotid bifurcation, and extra cranial portions of the internal carotid arteries are patent without evidence for focal stenosis or occlusion.            MRI Brain W WO Contrast (Final result) Result time:  03/24/17 09:44:27    Final result by Teresa Cheatham MD (03/24/17 09:44:27)    Impression:       1.  Acute infarction within the left occipital lobe.    2.  Small remote lacunar infarct within the right basal ganglia/corona radiata.    3.  Unremarkable MRA brain and neck without evidence for significant focal stenosis or occlusion.      Electronically signed by: TERESA CHEATHAM MD  Date:     03/24/17  Time:    09:44     Narrative:    Time of Procedure: 03/24/17 08:42:04  Accession # [id number]    Technique: Multiplanar, multisequence images were obtained through the brain before and after  administration of 10 cc gadavist IV contrast.  Additionally, noncontrast 3-D time of flight MRA head and postcontrast 3-D time-of-flight MRA neck was performed.    Comparison: CT head from the same day.  MRI brain 9/2014.     Findings:    There is a sizable region of diffusion restriction with corresponding low ADC signal and FLAIR hyperintensity involving the left occipital lobe consistent with acute infarction.  Mild leptomeningeal enhancement visualized within this region.  No additional areas of acute infarction seen.  There is FLAIR hyperintensity consistent with small remote lacunar infarct involving the right basal ganglia/corona radiata.  Ventricles are normal in size and configuration.  Cavum septum pellucidum incidentally noted.  No intracranial hemorrhage or extraaxial fluid collection.  No mass or mass effect.  No diffusion signal abnormality to suggest acute infarction.  After administration of gadolinium, no pathologic foci of enhancement.    Minimal patchy ethmoid and left maxillary sinus disease noted.  Trace fluid seen in the left mastoid air cells. Orbits appear normal.    The bilateral distal ICA's and the visualized bilateral anterior and middle cerebral arteries are patent without evidence for significant focal stenosis or occlusion.  The distal vertebral arteries, basilar artery and posterior cerebral arteries are patent without evidence for significant focal stenosis or occlusion.  Vertebral arteries are codominant.    The origins of the right brachiocephalic,  left common carotid and left subclavian arteries from the arch are within normal limits.  Vertebral artery origins are within normal limits.  The vertebral arteries are unremarkable throughout their course without evidence for focal stenosis or occlusion.  The bilateral common carotid arteries, carotid bifurcation, and extra cranial portions of the internal carotid arteries are patent without evidence for focal stenosis or occlusion.             X-Ray Chest AP Portable (Final result) Result time:  03/24/17 08:45:35    Final result by Teresa Cheatham MD (03/24/17 08:45:35)    Impression:      No acute cardiopulmonary process identified.      Electronically signed by: TERESA CHEATHAM MD  Date:     03/24/17  Time:    08:45     Narrative:    Chest AP single view.  Comparison: 3/2016.    Mediastinal structures are midline.  Cardiac silhouette is normal in size.  Lungs are symmetrically expanded.  No evidence of focal consolidative process, pneumothorax, or significant effusion.  Bones appear intact.  No free air visualized beneath the diaphragm.            CT Head Without Contrast (Final result) Result time:  03/24/17 08:19:07    Final result by Teresa Cheatham MD (03/24/17 08:19:07)    Impression:       1.  Asymmetric subtle hypoattenuation within the left occipital lobe may represent recent infarction in this patient with reported clinical history of visual disturbance.  Recommend further evaluation with MRI with diffusion weighted imaging.    2.  Small remote lacunar infarct within the right basal ganglia/corona radiata, stable from 2016.      Findings were communicated from Dr. Cheatham to DR SUKHDEV DANIELS at 08:18:57 on 03/24/17.      Electronically signed by: TERESA CHEATHAM MD  Date:     03/24/17  Time:    08:19     Narrative:    Exam: CT of the head without contrast -- 41-year-old female with visual disturbance.    Comparison: CT head 2/2016.    Technique: 5 mm noncontrast axial images through the brain were obtained.    Findings: There is asymmetric parenchymal hypoattenuation seen within the left occipital lobe which could represent recent infarction.  Stable small focus of hypoattenuation in the region of the right basal ganglia/corona radiata suggestive for remote lacunar infarct.  No evidence to suggest acute/recent major vascular distribution cerebral infarction, intraparenchymal hemorrhage, or intra-axial space occupying lesion. The  ventricular system is normal in appearance for age. No hydrocephalus. No effacement of the skull-base cisterns. No abnormal extra-axial fluid collections or blood products.  Mild left maxillary sinuses is noted. The calvarium shows no significant abnormality.                  Assessment and Plan -   41 year old woman with PMH of HTN, HLD, CVA, Type 2 DM, and HFrEF who has new left occipital stroke consistent with hemianopia noted on exam. Concern for UTI.     -MRI indicates acute L occipital stroke  -Since patient on ASA prior and compliant recommend dual therapy of ASA 81 mg daily + Plavix 75 mg daily for 21 days. After 21 days can switch to monotherapy of Plavix 75 mg daily.   -MRA shows no occlusions  -2D echo shows improvement since last year in patient's heart function with EF 60  -TSH euthyroid  -B12 normal, Folate nomral, RPR negative, HBA1C 8, lipid panel LDL 98.4, , HDL 24  -continue intensive statin treatment  - UTI treatment per primary team.   - Counseled patient that vision unlikely to fully return to normal however possibility that it can improve over months.   -Speech Therapy has signed off  - PT debating home health PT or rehabilitation. Continue to follow for PT final recommendations.   - Patient can follow up with Dr. Mackenzie Lopez in [place] for Neurology follow up in 1-2 months.   - Will sign off. Please call for further questions.       Case discussed with Dr. Lizzy Cornejo MD  [employer] Neurology PGY-3

## 2017-03-25 NOTE — PLAN OF CARE
Problem: Patient Care Overview  Goal: Plan of Care Review  Outcome: Ongoing (interventions implemented as appropriate)  Plan of care reviewed with pt. Pt verbalized understanding. Pt on tele: NSR, HR: 70s-90s. No red alarms noted as of this time. Pt seen by PT and OT during shift. Sister at bedside. Work excuse from 3-14-17 given to her at bedside. Safety measures maintained. Bed is in the lowest position and locked. Call bell is within reach. Instructed pt to call for assistance. PT verbalized understanding. Will continue to monitor.

## 2017-03-25 NOTE — PT/OT/SLP EVAL
Physical Therapy  Evaluation    Josefina Martin   MRN: [id number]   Admitting Diagnosis: Acute CVA (cerebrovascular accident)    PT Received On: 17  PT Start Time: 1245     PT Stop Time: 1313    PT Total Time (min): 28 min       Billable Minutes:  Evaluation 15 Gait 13     Diagnosis: Acute CVA (cerebrovascular accident)      Past Medical History:   Diagnosis Date    CHF (congestive heart failure)     Hypertension     Stroke       Past Surgical History:   Procedure Laterality Date    CHOLECYSTECTOMY      history of cholelithiasis    TUBAL LIGATION         Referring physician: Dr. Houser  Date referred to PT: 3/25/2017      General Precautions: Standard, fall  Orthopedic Precautions:     Braces:         Do you have any cultural, spiritual, Druze conflicts, given your current situation?: None    Patient History:  Lives With:  (boyfriend, 19 year old child)  Home Accessibility:  (no steps)  Equipment Currently Used at Home: none  DME owned (not currently used): none    Previous Level of Function:  Ambulation Skills: independent  Transfer Skills: independent  ADL Skills: independent  Work/Leisure Activity: independent ([place] in wheelchairs, one issuing tickets)    Subjective:  Communicated with nurse prior to session.  Chief Complaint: Unable to see out of right eye, RUE and RLE weakness  Patient goals: Get well enough to go home    Pain Ratin/10         Location:  (left side of head, less than it was yesterday)  Pain Addressed: Distraction  Pain Rating Post-Intervention: 2/10    Objective:         Cognitive Exam:  Oriented to: Person, Place, Time and Situation    Follows Commands/attention: Follows two-step commands  Communication: clear/fluent  Safety awareness/insight to disability: intact    Physical Exam:  Postural examination/scapula alignment: Rounded shoulder and Head forward    Skin integrity: Visible skin intact  Edema:  None noted     Sensation:   Intact grossly    Upper Extremity Range of Motion:  Right Upper Extremity: WFL except limited to 135 degrees shoulder flexion and abduction  Left Upper Extremity: WFL    Upper Extremity Strength:  Right Upper Extremity: WFL except 3+/5 at shoulder  Left Upper Extremity: WFL    Lower Extremity Range of Motion:  Right Lower Extremity: WFL  Left Lower Extremity: WFL    Lower Extremity Strength:  Right Lower Extremity: WFL except 3 to 3+/5 at hip  Left Lower Extremity: WFL     Fine motor coordination:  Intact grossly    Gross motor coordination: WFL    Functional Mobility:  Bed Mobility:  Rolling/Turning to Left: Modified independent  Rolling/Turning Right: Modified independent  Scooting/Bridging: Modified Independent  Supine to Sit: Supervision  Sit to Supine: Supervision    Transfers:  Sit <> Stand Assistance: Supervision  Sit <> Stand Assistive Device: Rolling Walker  Bed <> Chair Technique: Stand Pivot    Gait:   Gait Distance: Pt ambulated 80' with RW with decreased stance phase RLE slightly.  Pt fatigued at end of ambulating, c/o right hand fatigue and pain and starting to bend forward at waist.      Stairs:  NT    Balance:   Static Sit: GOOD: Takes MODERATE challenges from all directions  Dynamic Sit: GOOD-: Maintains balance through MODERATE excursions of active trunk movement,     Static Stand: FAIR: Maintains without assist but unable to take challenges  Dynamic stand: FAIR: Needs CONTACT GUARD during gait    AM-PAC 6 CLICK MOBILITY  How much help from another person does this patient currently need?   1 = Unable, Total/Dependent Assistance  2 = A lot, Maximum/Moderate Assistance  3 = A little, Minimum/Contact Guard/Supervision  4 = None, Modified [place]/Independent    Turning over in bed (including adjusting bedclothes, sheets and blankets)?: 4  Sitting down on and standing up from a chair with arms (e.g., wheelchair, bedside commode, etc.): 3  Moving from lying on back to  sitting on the side of the bed?: 3  Moving to and from a bed to a chair (including a wheelchair)?: 3  Need to walk in hospital room?: 3  Climbing 3-5 steps with a railing?: 3  Total Score: 19     AM-PAC Raw Score CMS G-Code Modifier Level of Impairment Assistance   6 % Total / Unable   7 - 9 CM 80 - 100% Maximal Assist   10 - 14 CL 60 - 80% Moderate Assist   15 - 19 CK 40 - 60% Moderate Assist   20 - 22 CJ 20 - 40% Minimal Assist   23 CI 1-20% SBA / CGA   24 CH 0% Independent/ Mod I     Patient left supine with call button in reach, nurse notified and sister present.    Assessment:   Josefina Martin is a 41 y.o. female with a medical diagnosis of Acute CVA (cerebrovascular accident) and presents with decreased hip and shoulder proximal strength and decreased vision in right eye.    Rehab identified problem list/impairments: Rehab identified problem list/impairments: weakness, impaired balance, impaired endurance, pain, impaired self care skills, impaired functional mobilty, decreased upper extremity function, gait instability, decreased lower extremity function    Rehab potential is good.    Activity tolerance: Fair    Discharge recommendations: Home health PT vs. Rehab    Barriers to discharge: Barriers to Discharge: None    Equipment recommendations: Equipment Needed After Discharge: walker, rolling, wheelchair     GOALS:   Physical Therapy Goals        Problem: Physical Therapy Goal    Goal Priority Disciplines Outcome Goal Variances Interventions   Physical Therapy Goal     PT/OT, PT Ongoing (interventions implemented as appropriate)     Description:  1.  Supine to sit with Mod [place].  2.  Bed to/from b/s chair with Mod [place].  3.  Ambulate 150' with RW with Sup.  4.  Independent HEP.              PLAN:    Patient to be seen daily to address the above listed problems via gait training, therapeutic activities, therapeutic exercises, neuromuscular re-education  Plan of Care expires:  04/25/17  Plan of Care reviewed with: patient    Functional Assessment Tool Used: ampac  Score: 19  Functional Limitation: Mobility: Walking and moving around  Mobility: Walking and Moving Around Current Status (): JUAN  Mobility: Walking and Moving Around Goal Status (): ARNOLD Pierre, PT  03/25/2017

## 2017-03-25 NOTE — PT/OT/SLP EVAL
Speech Language Pathology Evaluation    Josefina Martin   MRN: [id number]   Admitting Diagnosis: Acute CVA (cerebrovascular accident)    Diet recommendations: Solid Diet Level: Regular  Liquid Diet Level: Thin Feed only when awake/alert, HOB to 90 degrees, Small bites/sips, Alternating bites/sips, 1 bite/sip at a time, Remain upright 30 minutes post meal and Meds whole 1 at a time    SLP Treatment Date: 17  Speech Start Time: 1220     Speech Stop Time: 1235     Speech Total (min): 15 min       TREATMENT BILLABLE MINUTES:  Eval 15     Diagnosis: Acute CVA (cerebrovascular accident)  42 y/o female with PMH of HTN, HLD, CVA, DM2, and HFrEF presents to the ER with left-sided temporal HA since yesterday and vision changes. She also reports nausea and vomiting 3 times yesterday evening. Pt's vision started to get blurry this morning on the way to work and she had to pull over and vomit another 3 times. She states she is complaint with all of her medications and goes to al of her primary care appointments. She smokes 1 pack of cigarettes per week. She denies drinking alcohol or using illicit drugs    Past Medical History:   Diagnosis Date    CHF (congestive heart failure)     Hypertension     Stroke      Past Surgical History:   Procedure Laterality Date    CHOLECYSTECTOMY  2013    history of cholelithiasis    TUBAL LIGATION         Has the patient been evaluated by SLP for swallowing? : Yes  Keep patient NPO?: No   General Precautions: Standard, fall, vision impaired    Current Respiratory Status:  (room air)     Subjective:  Pt awake and alert--oriented x4. Reports she was experiencing word finding difficulties yesterday, however, pt reports being back to baseline for speech, language, cognition, and swallowing.     Pain Ratin/10  Location: head  Pain Addressed: Nurse notified  Pain Rating Post-Intervention: 4/10    Objective:   Patient found with: blood pressure cuff  Informal assessment completed this PM  reveals no speech/language/cognitive deficits. No anomia or dysarthria noted during conversational speech. Pt at baseline.    Oral Musculature Evaluation  Oral Musculature: WFL  Dentition: present and adequate  Mucosal Quality: good, adequate  Mandibular Strength and Mobility: WFL  Oral Labial Strength and Mobility: WFL  Lingual Strength and Mobility: WFL  Velar Elevation: WFL  Buccal Strength and Mobility: WFL  Volitional Cough: elicited  Volitional Swallow: timely  Voice Prior to PO Intake: clear     Cognitive Status:  Behavioral Observations: alert, appropriate and cooperative-  Memory and Orientation: Oriented x4  Attention: WFL  Problem Solving: WFL  Pragmatics: WFL  Executive Function: WFL     Language: no language deficits noted     Auditory Comprehension: able to identify objects, answers yes/no questions and able to follow commands --[facility]     Verbal Expression: naming, automatic speech, repetition, fluency and conversational speech --[facility]     Motor Speech: [facility]    Voice: sustained phonation, quality, volume, rate, prosody and breath support --[facility]     Augmentative Alternative Communication: no     Reading: oral reading ability and comprehension --[facility]     Additional Treatment: Discussed rationale for informal language/speech/cognitive assessment and D/C of ST services.    FIM:  Social Interaction: 6 Complete [place]--The patient interacts appropriately with staff, other patients, and family members (e.g., controls temper, accepts criticism, is aware that words and actions have an impact on others), and does not require medication for   Problem Solvin Modified [place]--In most situations, the patient recognizes a present problem, and with only mild difficulty makes appropriate decisions, initiates and carries out a sequence of steps to solve complex problems, or requires more than a   Comprehension: 7 Complete [place]--The patient understand complex or abstract directions and conversation, and  understand either spoken or written language (not necessarily English).   Expression: 7 Complete Gratis--The patient expresses complex or abstract ideas clearly and fluently (not necessarily in English).   Memory: 6 Modified Gratis--The patient appears to have only mild difficulty recognizing people frequently encountered, remembering daily routines, and responding to requests of others.  The patient may use  self-initiated or environmental cues, prompts,     Assessment:  Josefina Martin is a 41 y.o. female presents with no language, speech, cognitive, or swallowing difficulties. Pt is at baseline for speech, language, cognition, and swallowing.    Do you have any cultural, spiritual, Tenriism conflicts, given your current situation?: none    Discharge recommendations: Discharge Facility/Level Of Care Needs:  (TBD pending PT/OT eval and progress overall)     Goals:   SLP Goals     Not on file      Multidisciplinary Problems (Resolved)        Problem: SLP Goal    Goal Priority Disciplines Outcome   SLP Goal   (Resolved)     SLP Outcome(s) achieved   Description:  Short Term Goals:  1. Pt will participate in ongoing swallow assessment to determine least restrictive diet. --MET 3/24  2. Patient will successfully participate in acsuoq-iuywform-ujgbbfxdg evaluation to further assess for any communication impairments s/p stroke. --MET 3/25               Plan:   No additional ST services warranted at this time.  Plan of Care expires: 03/25/17  Plan of Care reviewed with: patient  SLP Follow-up?: No         Celena Worley CCC-SLP  03/25/2017

## 2017-03-25 NOTE — PLAN OF CARE
Problem: SLP Goal  Goal: SLP Goal  Short Term Goals:  1. Pt will participate in ongoing swallow assessment to determine least restrictive diet. --MET 3/24  2. Patient will successfully participate in xcojww-upjqmecn-kikjepdbf evaluation to further assess for any communication impairments s/p stroke. --MET 3/25  Outcome: Outcome(s) achieved Date Met:  03/25/17  3/25: Informal speech/language/cognitive assessment completed this PM. Pt is at speech, language, and cognitive baseline. Pt's swallow is WFL per BSE completed on 3/24. No additional ST services warranted at this time.   BECKY Mane. CF-SLP  Speech-Language Pathologist

## 2017-03-25 NOTE — PT/OT/SLP PROGRESS
Occupational Therapy      Josefina Martin  MRN: [id number]    Patient not seen today secondary to  (nurse deferred 2/2 low Blood sugar and trying to stabilize ). Will follow-up.    Kellie Novak OT  3/25/2017

## 2017-03-25 NOTE — PROGRESS NOTES
Progress Note  [facility]    Admit Date: 3/24/2017   LOS: 1 day     SUBJECTIVE:     Follow-up For:  CVA and YOLA    Pt was seen and examined this AM. BRIANEON. Pt now with dysuria and mild HA. Still complains of right-sided peripheral field deficit. Denies CO, SOB.     Scheduled Meds:   aspirin  81 mg Oral Daily    atorvastatin  40 mg Oral Daily    clopidogrel  75 mg Oral Daily    docusate sodium  100 mg Oral Daily    enoxaparin  40 mg Subcutaneous Daily    nicotine  1 patch Transdermal Daily     Continuous Infusions:   PRN Meds:acetaminophen, dextrose 50%, dextrose 50%, glucagon (human recombinant), glucose, glucose, flu vac [id number] 36mos up(PF), insulin aspart, pneumoc 13-hal conj-dip cr(PF)    Review of patient's allergies indicates:  No Known Allergies    Review of Systems  As per subjective     OBJECTIVE:     Vital Signs (Most Recent)  Temp: 97.8 °F (36.6 °C) (03/25/17 1120)  Pulse: 77 (03/25/17 1120)  Resp: 20 (03/25/17 1120)  BP: 134/81 (03/25/17 1120)  SpO2: 100 % (03/25/17 1159)    Vital Signs Range (Last 24H):  Temp:  [97.4 °F (36.3 °C)-98.6 °F (37 °C)]   Pulse:  [66-84]   Resp:  [18-20]   BP: (111-134)/(63-81)   SpO2:  [98 %-100 %]     I & O (Last 24H):  Intake/Output Summary (Last 24 hours) at 03/25/17 1513  Last data filed at 03/25/17 0523   Gross per 24 hour   Intake              600 ml   Output              800 ml   Net             -200 ml     Wt Readings from Last 3 Encounters:   03/24/17 87.7 kg (193 lb 5.5 oz)   03/19/17 88.9 kg (196 lb)   01/26/17 86.6 kg (190 lb 14.7 oz)     Physical Exam:  Gen: NAD  HEENT: NC, AT, right-sided hemispatial neglect  Chest: CTABL  CVS: RRR, no m/r/g  Abdomen: soft, NT, ND, no TTP, no masses, +BS  Ext: no edema, pulses 2+   Skin: ro rashes  Neuro: A&O x 3, CN's grossly intact, sensation intact to light touch  Psych: Normal mood, affect, thought content    Laboratory:  LABS  CBC    Recent Labs  Lab 03/24/17  0847 03/25/17  0434   WBC 8.33 6.66   RBC 3.84*  3.67*   HGB 11.8* 11.3*   HCT 33.9* 32.4*    239   MCV 88 88   MCH 30.7 30.8   MCHC 34.8 34.9     BMP    Recent Labs  Lab 03/24/17  0847 03/25/17  0434   * 135*   K 4.6 4.0   CO2 23 21*    106   BUN 26* 25*   CREATININE 1.7* 1.3   * 124*       POCT-Glucose  POCT Glucose   Date Value Ref Range Status   03/25/2017 139 (H) 70 - 110 mg/dL Final   03/24/2017 165 (H) 70 - 110 mg/dL Final   03/24/2017 139 (H) 70 - 110 mg/dL Final   03/24/2017 87 70 - 110 mg/dL Final         Recent Labs  Lab 03/24/17  0847 03/25/17  0434   CALCIUM 9.5 9.2   MG  --  2.1   PHOS  --  4.1     LFT    Recent Labs  Lab 03/24/17  0847 03/25/17  0434   PROT 7.4 6.9   ALBUMIN 4.0 3.6   BILITOT 0.5 0.3   AST 21 16   ALKPHOS 64 58   ALT 27 21       COAGS    Recent Labs  Lab 03/24/17  0847   INR 1.0   APTT 27.4     CE    Recent Labs  Lab 03/24/17  0847 03/24/17  1755   TROPONINI <0.006 <0.006     BNP    Recent Labs  Lab 03/24/17  0847   BNP 12       LAST HbA1c  Lab Results   Component Value Date    HGBA1C 8.0 (H) 03/24/2017         Diagnostic Results:    IP CONSULT TO NEUROLOGY  IP CONSULT TO OPHTHALMOLOGY    ASSESSMENT/PLAN:   42 y/o female with PMH of HTN, HLD, CVA, DM2, and HFrEF admitted for acute CVA and YOLA.     CVA  - Pt with HA, nausea, vomiting, and vision changes (homonymous hemianopsia vs hemispatial neglect)  - VSS, hemodynamically stable  - CT head: hypoattenuation within the left occipital lobe may represent recent infarction.  - MRI brain confirmed acute infarction within the left occipital lobe.  - Unremarkable MRA brain and neck  - ASA, statin, plavix with loading dose  - CVA workup: B12, B9, HIV, RPR, Hep panel, lipid panel pending  - Last echo 2/2016: EF 30% with DD  - Repeat echo: improved from prior, now with EF 60%  - Carotid US pending  - Neuro recs (appreciated): Plavix daily   - SLP recs: regular diet with thin liquids     HTN  - BP stable on admit 107/71  - Will hold home BP meds for now  - BP this AM  was 111/63  - Monitor     HLD  - Cholesterol panel mildly elevated  - On home statin      DM2  - Last A1c was 6.5  - Repeat A1c  - Glucose on admit was 156  - BS this AM was 124     Tobacco abuse  - nicotine patch in house     YOLA  - Admit BUN/Cr 26/1.7  - 2 months ago BUN/Cr 12/0.9  - Pt received 1L NS bolus in ED  - BUN/Cr today was 25/1.3, improving  - Echo wnl, started NS fluids      ?UTI  - Pt now with dysuria  - UA and urine Cx pending  - Monitor     Diet: Regular (low Na/Chol)  Ppx: Lovenox     Dispo: f/u UA and urine Cx, Optho recs    3/25/2017 Wan Hoover MD  3:13 PM

## 2017-03-26 VITALS
SYSTOLIC BLOOD PRESSURE: 123 MMHG | OXYGEN SATURATION: 100 % | HEIGHT: 68 IN | DIASTOLIC BLOOD PRESSURE: 71 MMHG | TEMPERATURE: 99 F | HEART RATE: 87 BPM | WEIGHT: 193.31 LBS | BODY MASS INDEX: 29.3 KG/M2 | RESPIRATION RATE: 18 BRPM

## 2017-03-26 LAB
ALBUMIN SERPL BCP-MCNC: 3.3 G/DL
ALP SERPL-CCNC: 57 U/L
ALT SERPL W/O P-5'-P-CCNC: 18 U/L
ANION GAP SERPL CALC-SCNC: 7 MMOL/L
AST SERPL-CCNC: 15 U/L
BACTERIA UR CULT: NORMAL
BACTERIA UR CULT: NORMAL
BASOPHILS # BLD AUTO: 0.01 K/UL
BASOPHILS NFR BLD: 0.2 %
BILIRUB SERPL-MCNC: 0.3 MG/DL
BUN SERPL-MCNC: 17 MG/DL
CALCIUM SERPL-MCNC: 8.8 MG/DL
CHLORIDE SERPL-SCNC: 108 MMOL/L
CHOLEST/HDLC SERPL: 5.9 {RATIO}
CO2 SERPL-SCNC: 23 MMOL/L
CREAT SERPL-MCNC: 1.1 MG/DL
DIFFERENTIAL METHOD: ABNORMAL
EOSINOPHIL # BLD AUTO: 0.1 K/UL
EOSINOPHIL NFR BLD: 2.1 %
ERYTHROCYTE [DISTWIDTH] IN BLOOD BY AUTOMATED COUNT: 13.1 %
EST. GFR  (AFRICAN AMERICAN): >60 ML/MIN/1.73 M^2
EST. GFR  (NON AFRICAN AMERICAN): >60 ML/MIN/1.73 M^2
GLUCOSE SERPL-MCNC: 115 MG/DL
HCT VFR BLD AUTO: 31.5 %
HDL/CHOLESTEROL RATIO: 16.9 %
HDLC SERPL-MCNC: 148 MG/DL
HDLC SERPL-MCNC: 25 MG/DL
HGB BLD-MCNC: 10.7 G/DL
LDLC SERPL CALC-MCNC: 85 MG/DL
LYMPHOCYTES # BLD AUTO: 2.5 K/UL
LYMPHOCYTES NFR BLD: 39.4 %
MAGNESIUM SERPL-MCNC: 1.8 MG/DL
MCH RBC QN AUTO: 30.1 PG
MCHC RBC AUTO-ENTMCNC: 34 %
MCV RBC AUTO: 89 FL
MONOCYTES # BLD AUTO: 0.5 K/UL
MONOCYTES NFR BLD: 7.9 %
NEUTROPHILS # BLD AUTO: 3.2 K/UL
NEUTROPHILS NFR BLD: 50.1 %
NONHDLC SERPL-MCNC: 123 MG/DL
PHOSPHATE SERPL-MCNC: 3.1 MG/DL
PLATELET # BLD AUTO: 221 K/UL
PMV BLD AUTO: 10.5 FL
POCT GLUCOSE: 122 MG/DL (ref 70–110)
POCT GLUCOSE: 130 MG/DL (ref 70–110)
POTASSIUM SERPL-SCNC: 4.1 MMOL/L
PROT SERPL-MCNC: 6.6 G/DL
RBC # BLD AUTO: 3.55 M/UL
SODIUM SERPL-SCNC: 138 MMOL/L
TRIGL SERPL-MCNC: 190 MG/DL
WBC # BLD AUTO: 6.29 K/UL

## 2017-03-26 PROCEDURE — 36415 COLL VENOUS BLD VENIPUNCTURE: CPT

## 2017-03-26 PROCEDURE — 25000003 PHARM REV CODE 250: Performed by: FAMILY MEDICINE

## 2017-03-26 PROCEDURE — 85025 COMPLETE CBC W/AUTO DIFF WBC: CPT

## 2017-03-26 PROCEDURE — 97116 GAIT TRAINING THERAPY: CPT

## 2017-03-26 PROCEDURE — 80061 LIPID PANEL: CPT

## 2017-03-26 PROCEDURE — 83735 ASSAY OF MAGNESIUM: CPT

## 2017-03-26 PROCEDURE — 94761 N-INVAS EAR/PLS OXIMETRY MLT: CPT

## 2017-03-26 PROCEDURE — 97530 THERAPEUTIC ACTIVITIES: CPT

## 2017-03-26 PROCEDURE — 84100 ASSAY OF PHOSPHORUS: CPT

## 2017-03-26 PROCEDURE — 80053 COMPREHEN METABOLIC PANEL: CPT

## 2017-03-26 RX ORDER — CLOPIDOGREL BISULFATE 75 MG/1
75 TABLET ORAL DAILY
Qty: 90 TABLET | Refills: 3 | Status: SHIPPED | OUTPATIENT
Start: 2017-03-26 | End: 2018-03-26 | Stop reason: SDUPTHER

## 2017-03-26 RX ORDER — DIPHENHYDRAMINE HCL 25 MG
25 CAPSULE ORAL ONCE
Status: COMPLETED | OUTPATIENT
Start: 2017-03-26 | End: 2017-03-26

## 2017-03-26 RX ORDER — NAPROXEN SODIUM 220 MG/1
81 TABLET, FILM COATED ORAL DAILY
Qty: 21 TABLET | Refills: 0 | Status: SHIPPED | OUTPATIENT
Start: 2017-03-26 | End: 2018-03-26 | Stop reason: SDUPTHER

## 2017-03-26 RX ORDER — NITROFURANTOIN 25; 75 MG/1; MG/1
100 CAPSULE ORAL EVERY 12 HOURS
Qty: 14 CAPSULE | Refills: 0 | Status: SHIPPED | OUTPATIENT
Start: 2017-03-26 | End: 2017-04-02

## 2017-03-26 RX ORDER — IBUPROFEN 200 MG
1 TABLET ORAL DAILY
Refills: 0 | COMMUNITY
Start: 2017-03-26 | End: 2017-04-09

## 2017-03-26 RX ORDER — NITROFURANTOIN 25; 75 MG/1; MG/1
100 CAPSULE ORAL EVERY 12 HOURS
Status: DISCONTINUED | OUTPATIENT
Start: 2017-03-26 | End: 2017-03-26 | Stop reason: HOSPADM

## 2017-03-26 RX ADMIN — NICOTINE 1 PATCH: 21 PATCH, EXTENDED RELEASE TRANSDERMAL at 09:03

## 2017-03-26 RX ADMIN — ASPIRIN 81 MG 81 MG: 81 TABLET ORAL at 09:03

## 2017-03-26 RX ADMIN — SODIUM CHLORIDE: 0.9 INJECTION, SOLUTION INTRAVENOUS at 12:03

## 2017-03-26 RX ADMIN — DIPHENHYDRAMINE HYDROCHLORIDE 25 MG: 25 CAPSULE ORAL at 12:03

## 2017-03-26 RX ADMIN — CLOPIDOGREL BISULFATE 75 MG: 75 TABLET ORAL at 09:03

## 2017-03-26 RX ADMIN — SODIUM CHLORIDE: 0.9 INJECTION, SOLUTION INTRAVENOUS at 09:03

## 2017-03-26 RX ADMIN — DOCUSATE SODIUM 100 MG: 100 CAPSULE, LIQUID FILLED ORAL at 09:03

## 2017-03-26 RX ADMIN — NITROFURANTOIN (MONOHYDRATE/MACROCRYSTALS) 100 MG: 75; 25 CAPSULE ORAL at 11:03

## 2017-03-26 RX ADMIN — ACETAMINOPHEN 650 MG: 325 TABLET ORAL at 09:03

## 2017-03-26 RX ADMIN — ATORVASTATIN CALCIUM 40 MG: 40 TABLET, FILM COATED ORAL at 09:03

## 2017-03-26 NOTE — PLAN OF CARE
Problem: Stroke (Ischemic) (Adult)  Goal: Signs and Symptoms of Listed Potential Problems Will be Absent, Minimized or Managed (Stroke)  Signs and symptoms of listed potential problems will be absent, minimized or managed by discharge/transition of care (reference Stroke (Ischemic) (Adult) CPG).   Outcome: Ongoing (interventions implemented as appropriate)  Bedside shift report completed with day shift Rn. Pt reports headache is still present, feels more like pressure, feels mild relief with prn pain medications, heat pack offered for additional relief. Pt continues to feel tingling to the right side. Pt also complaining of dysuria. IV fluids @ 125 ml/hr. Plan of care reviewed with patient. Patient verbalized complete understanding. Fall precautions maintained. Bed in lowest position, locked, call light within reach, and bed alarm on. Side rails up x's 2 with slip resistant socks on. Nurse instructed patient to notify staff for any assistance and pt verbalized complete understanding.       Tele NSR 70's     Nurse will continue to monitor.

## 2017-03-26 NOTE — PLAN OF CARE
Discharge orders noted, no HH or HME ordered.    Out Patient PT/OT ordered, [place] unable to set up on weekend, weekday [place] to follow up    Future Appointments  Date Time Provider Department Center   4/6/2017 1:00 PM [facility]hon [facility] DYLAN Chapman          03/26/17 1530   Final Note   Assessment Type Discharge Planning Assessment   Discharge Disposition Home  (outpatient PT/OT to be set up)   What phone number can be called within the next 1-3 days to see how you are doing after discharge? [phone]   Hospital Follow Up  Appt(s) scheduled? No  (offices closed on weekend, [place] to follwo up)   Offered [facility] -- Bedside Delivery? n/a   Discharge/Hospital Encounter Summary to (non-[facility]) PCP Yes   Referral to Outpatient Case Management complete? n/a   Referral to / orders for Home Health Complete? n/a   30 day supply of medicines given at discharge, if documented non-compliance / non-adherence? n/a   Any social issues identified prior to discharge? n/a   Did you assess the readiness or willingness of the family or caregiver to support self management of care? n/a   Right Care Referral Info   Post Acute Recommendation No Care     Lupe Lizarraga, RN Transitional Navigator  [phone]

## 2017-03-26 NOTE — PT/OT/SLP PROGRESS
"Physical Therapy  Treatment    Josefina Martin   MRN: [id number]   Admitting Diagnosis: Acute CVA (cerebrovascular accident)    PT Received On: 17  PT Start Time: 1007     PT Stop Time: 1051    PT Total Time (min): 44 min       Billable Minutes:  Gait [id number] and Therapeutic Activity 14    Treatment Type: Treatment  PT/PTA: PT     PTA Visit Number: 0       General Precautions: Standard, fall  Orthopedic Precautions: N/A   Braces: N/A    Do you have any cultural, spiritual, Yarsanism conflicts, given your current situation?: None    Subjective:  Communicated with nsg prior to session.      Pain Ratin/10        Location:  (Reports "tight" or "pull" at back of R knee/upper calf   -  ed provided for gentle self stretch for R HS to pt - as pt has not been ambulating past days and is used to being on feet at jobs walking all day)          Objective:   Patient found with: peripheral IV, telemetry    Functional Mobility:  Bed Mobility:   Rolling/Turning to Left: Independent  Rolling/Turning Right: Independent  Scooting/Bridging: Independent  Supine to Sit: Independent  Sit to Supine: Independent    Transfers:  Sit <> Stand Assistance: Stand By Assistance, Contact Guard Assistance  Sit <> Stand Assistive Device: Rolling Walker, No Assistive Device  Bed <> Chair Technique: Stand Pivot  Bed <> Chair Assistance: Contact Guard Assistance    Gait:   Gait Distance: pt amb with RW ~125' with CGA/SBA with visual and VC for improved posture and gait pattern, amb ~40' x 3 without AD with CGA - pt amb with decreased R knee ext/heel strike, flat foot and decreased toe off for RLE, step to gait pattern, decreased kings and tendency to look at feet rather than environment  Assistance 1: Stand by Assistance, Contact Guard Assistance  Gait Assistive Device: No device, Rolling walker  Gait Pattern: swing-to gait  Gait Deviation(s): decreased kings, increased time in double stance, decreased toe-to-floor clearance, decreased " step length, decreased weight-shifting ability, foot flat, excessive knee flexion, forward lean        Balance:   Static Sit: GOOD: Takes MODERATE challenges from all directions  Dynamic Sit: GOOD-: Maintains balance through MODERATE excursions of active trunk movement,     Static Stand: FAIR: Maintains without assist but unable to take challenges  Dynamic stand: FAIR: Needs CONTACT GUARD during gait     Therapeutic Activities and Exercises:  Bed mobility training, toileting and self care training, ADL don/doff gown and slipper socks, txf training, gait training, posture kristina, balance activities seated and in stance and with gait     AM-[facility] 6 CLICK MOBILITY  How much help from another person does this patient currently need?   1 = Unable, Total/Dependent Assistance  2 = A lot, Maximum/Moderate Assistance  3 = A little, Minimum/Contact Guard/Supervision  4 = None, Modified Cooper/Independent    Turning over in bed (including adjusting bedclothes, sheets and blankets)?: 4  Sitting down on and standing up from a chair with arms (e.g., wheelchair, bedside commode, etc.): 4  Moving from lying on back to sitting on the side of the bed?: 4  Moving to and from a bed to a chair (including a wheelchair)?: 4  Need to walk in hospital room?: 3  Climbing 3-5 steps with a railing?: 3  Total Score: 22    AM-PAC Raw Score CMS G-Code Modifier Level of Impairment Assistance   6 % Total / Unable   7 - 9 CM 80 - 100% Maximal Assist   10 - 14 CL 60 - 80% Moderate Assist   15 - 19 CK 40 - 60% Moderate Assist   20 - 22 CJ 20 - 40% Minimal Assist   23 CI 1-20% SBA / CGA   24 CH 0% Independent/ Mod I     Patient left supine with all lines intact, call button in reach, nsg notified and family present.    Assessment:  Josefina Martin is a 41 y.o. female with a medical diagnosis of Acute CVA (cerebrovascular accident) and presents with good participation and motivation for improvement in independence levels.    Rehab  identified problem list/impairments: Rehab identified problem list/impairments: weakness, gait instability, impaired endurance, impaired balance, impaired sensation, visual deficits, impaired functional mobilty, decreased coordination, decreased lower extremity function, decreased upper extremity function    Rehab potential is excellent.    Activity tolerance: Good    Discharge recommendations: Discharge Facility/Level Of Care Needs: outpatient PT     Barriers to discharge: Barriers to Discharge: None    Equipment recommendations: Equipment Needed After Discharge: walker, rolling     GOALS:   Physical Therapy Goals        Problem: Physical Therapy Goal    Goal Priority Disciplines Outcome Goal Variances Interventions   Physical Therapy Goal     PT/OT, PT Ongoing (interventions implemented as appropriate)     Description:  1.  Supine to sit with Mod Thrall.  2.  Bed to/from b/s chair with Mod Thrall.  3.  Ambulate 150' with RW with Sup.  4.  Independent HEP.              PLAN:    Patient to be seen 6 x/week  to address the above listed problems via gait training, therapeutic activities, therapeutic exercises  Plan of Care expires: 04/25/17  Plan of Care reviewed with: patient         Lindy Serrano, PT  03/26/2017

## 2017-03-26 NOTE — PROGRESS NOTES
"Progress Note    Admit Date: 3/24/2017   LOS: 2 days     SUBJECTIVE:     Patient with some difficulty sleeping, given PO bendaryl. Denies any pain, continued L temporal pressure that is stable. Continued "film" over R eye that is stable.  No new focal weakness. Tolerating PO without N/V. No other complaints.     Scheduled Meds:   aspirin  81 mg Oral Daily    atorvastatin  40 mg Oral Daily    clopidogrel  75 mg Oral Daily    docusate sodium  100 mg Oral Daily    enoxaparin  40 mg Subcutaneous Daily    nicotine  1 patch Transdermal Daily     Continuous Infusions:   sodium chloride 0.9% 125 mL/hr at 03/26/17 0015     PRN Meds:acetaminophen, dextrose 50%, dextrose 50%, glucagon (human recombinant), glucose, glucose, flu vac [id number] 36mos up(PF), insulin aspart, pneumoc 13-hal conj-dip cr(PF)    Review of patient's allergies indicates:  No Known Allergies    Review of Systems:  Constitutional: no fever or chills  Eyes: no visual changes  Respiratory: no cough or shortness of breath  Cardiovascular: no chest pain   Gastrointestinal: no nausea or vomiting; no abdominal pain   Genitourinary: +urination   Neurological: no new focal weakness      OBJECTIVE:     Vital Signs (Most Recent)  Temp: 98.4 °F (36.9 °C) (03/26/17 0400)  Pulse: 80 (03/26/17 0600)  Resp: 18 (03/26/17 0400)  BP: 121/63 (03/26/17 0400)  SpO2: 99 % (03/26/17 0453)    Vital Signs Range (Last 24H):  Temp:  [97.6 °F (36.4 °C)-98.5 °F (36.9 °C)]   Pulse:  [76-92]   Resp:  [18-20]   BP: (121-140)/(63-84)   SpO2:  [99 %-100 %]     I & O (Last 24H):  Intake/Output Summary (Last 24 hours) at 03/26/17 0708  Last data filed at 03/25/17 1800   Gross per 24 hour   Intake            572.5 ml   Output              300 ml   Net            272.5 ml     Physical Exam:  General: well developed, well nourished  Eyes: conjunctivae/corneas clear.   Lungs:  clear to auscultation bilaterally and normal respiratory effort  Cardiovascular: Heart: regular rate and rhythm, " S1, S2 normal, no murmur, click, rub or gallop. Chest Wall: no tenderness.   Abdomen/Rectal: Abdomen: soft, non-tender non-distented; bowel sounds normal; no masses,  no organomegaly.    Musculoskeletal: no LE edema or calf tenderness  Neurologic: AO, no change in mental status ; decreased vision in R eye, 4/5 strength on R side (residual from prior stroke per patient)  Psych/Behavioral:  Alert and oriented, appropriate affect.    Laboratory:  CBC:   Recent Labs  Lab 03/26/17  0433   WBC 6.29   RBC 3.55*   HGB 10.7*   HCT 31.5*      MCV 89   MCH 30.1   MCHC 34.0     BMP:   Recent Labs  Lab 03/25/17  0434   *   *   K 4.0      CO2 21*   BUN 25*   CREATININE 1.3   CALCIUM 9.2   MG 2.1     CMP:   Recent Labs  Lab 03/25/17  0434   *   CALCIUM 9.2   ALBUMIN 3.6   PROT 6.9   *   K 4.0   CO2 21*      BUN 25*   CREATININE 1.3   ALKPHOS 58   ALT 21   AST 16   BILITOT 0.3     LFTs:   Recent Labs  Lab 03/25/17  0434   ALT 21   AST 16   ALKPHOS 58   BILITOT 0.3   PROT 6.9   ALBUMIN 3.6     Coagulation:   Recent Labs  Lab 03/24/17  0847   INR 1.0   APTT 27.4     Cardiac markers:   Recent Labs  Lab 03/24/17  1755   TROPONINI <0.006     Microbiology Results (last 7 days)     Procedure Component Value Units Date/Time    Urine culture [[id number]] Collected:  03/25/17 1622    Order Status:  Sent Specimen:  Urine from Urine, Clean Catch Updated:  03/25/17 1950        Specimen     None          Recent Labs  Lab 03/25/17  1622   COLORU Yellow   SPECGRAV <=1.005*   PHUR 6.0   PROTEINUA Negative   BACTERIA Few*   NITRITE Negative   LEUKOCYTESUR 1+*   UROBILINOGEN Negative       Diagnostic Results:  No new images.     ASSESSMENT/PLAN:     42 y/o female with PMH of HTN, HLD, CVA, DM2, and HFrEF admitted for acute CVA and YOLA.      CVA  - homonymous hemianopsia vs hemispatial neglect  - VSS, hemodynamically stable  - CT head: hypoattenuation within the left occipital lobe may represent recent  infarction.  - MRI brain confirmed acute infarction within the left occipital lobe.  - Unremarkable MRA brain and neck  - ASA, statin, plavix with loading dose  - CVA workup: B12, B9 WNL;  HIV pending, RPR NR, Hep panel pending, lipid panel shows elevated TG's at 203 and low HDL at 24 with normal LDL  - Last echo 2/2016: EF 30% with DD  - Repeat echo: improved from prior, now with EF 60%  - Carotid US shows no stenosis  - Neuro recs: Plavix daily   - SLP recs: regular diet with thin liquids  - F/u Optho consult for further classification of visual deficit.       HTN  - BP stable on admit 107/71  - Will hold home BP meds for now  - BP at 121/63  - Continue to monitor      HLD  - elevated TG's and low HDL  - On home statin      DM2  - Last A1c was 6.5  - Repeat A1c at 8.0  - BS this AM was 122  - Patient will need Metformin and close monitoring outpatient.       Tobacco abuse  - continue nicotine patch      YOLA  - Admit BUN/Cr 26/1.7  - 2 months ago BUN/Cr 12/0.9  - Pt received 1L NS bolus in ED  - BUN/Cr today pending, currently on NS 125ml/hr     Dysuria  - U/A negative for nitrites with 6+ WBC and few bacteria  - f/u urine cx, no treatment currently  - Continue to monitor      Diet: Regular (low Na/Chol)  Ppx: Lovenox      Dispo: Continue to monitor urine cx; f/u PT/OT/ST and neuro recs.    3/26/2017 7:20 AM Wan Lim M.D.

## 2017-03-26 NOTE — PLAN OF CARE
"Problem: Stroke (Ischemic) (Adult)  Goal: Signs and Symptoms of Listed Potential Problems Will be Absent, Minimized or Managed (Stroke)  Signs and symptoms of listed potential problems will be absent, minimized or managed by discharge/transition of care (reference Stroke (Ischemic) (Adult) CPG).   Outcome: Ongoing (interventions implemented as appropriate)  Bedside shift report completed with day shift Rn. Pt VSS and resting comfortably. Pt denied worsening headache overnight, however still reports feeling "pressure." Pt also reported her tingling to her right side is  "getting better." Pt continues to report not being able to see out of right eye. Pt receiving NS @125. Plan of care reviewed with patient. Patient verbalized complete understanding. Fall precautions maintained. Bed in lowest position, locked, call light within reach, and bed alarm on. Side rails up x's 2 with slip resistant socks on. Nurse instructed patient to notify staff for any assistance and pt verbalized complete understanding.      Tele Recap:  NSR, 70's     Nurse handed off care of pt.       "

## 2017-03-26 NOTE — PLAN OF CARE
Problem: Physical Therapy Goal  Goal: Physical Therapy Goal  1. Supine to sit with Mod [place].  2. Bed to/from b/s chair with Mod [place].  3. Ambulate 150 with RW with Sup.  4. Independent HEP.   Outcome: Ongoing (interventions implemented as appropriate)  Rec OP PT upon discharge home with family support to improve functional mobility safety and independence levels.

## 2017-03-26 NOTE — NURSING
Patient discharge instructions given and reviewed. Med rec reviewed with Patient. Patient verbalized understanding. Education provided on new medication and diagnosis. PIV d/robert tip intact. Pt tolerated well. Tele box returned to nurses station. Awaiting transportation home.   Pt cell phone number: [phone]

## 2017-03-26 NOTE — DISCHARGE SUMMARY
[facility]  Discharge Summary     Patient ID:  Josefina Martin  [id number]  41 y.o.  1975    Admit date: 3/24/2017    Discharge Date and Time:  03/26/2017    Admitting Physician: Phani Houser MD     Discharge Provider: Klever Hills MD    Reason for Admission: Vision changes [H53.9]  YOLA (acute kidney injury) [N17.9]  Acute nonintractable headache, unspecified headache type [R51]  Cerebrovascular accident (CVA), unspecified mechanism [I63.9]  Non-intractable vomiting with nausea, unspecified vomiting type [R11.2]    Admission Condition: stable    Procedures Performed: * No surgery found *    Hospital Course (synopsis of major diagnoses, care, treatment, and services provided during the course of the hospital stay):     40 y/o female with PMH of HTN, HLD, CVA, DM2, and HFrEF presents to the ER with left-sided temporal HA since yesterday and vision changes. She also reports nausea and vomiting 3 times yesterday evening. Pt's vision started to get blurry this morning on the way to work and she had to pull over and vomit another 3 times. She states she is complaint with all of her medications and goes to al of her primary care appointments. She smokes 1 pack of cigarettes per week. She denies drinking alcohol or using illicit drugs. Patient was found to have an CVA in the left occipital lobe on head MRI.  Neurology was consulted who recommended patient to be started on ASA and plavix for 21 days followed by plavix monotherapy. Patient had homonymous hemianopia and ophthalmology was considered to rule out other causes of vision loss. Secondary workup for causes of CVA including Folate, Vitamin B12, Lipid panel, TSH, RPR were unremarkable. Patient had HbA1C 8.0. PT/OT was ordered who recommended patient have home health or rehab. Patient was also found to have a UTI based on UA and symptoms.  Patient was started on macrobid. Urine cultures are pending. Patient was stable at discharge.  Patient will need to see Ophthalmology, Dr. Morales or who is available sooner, to rule-out other causes of vision loss. Social work will get in contact with the patient for scheduling for ophthalmology.     Consults: Neurology    Significant Diagnostic Studies:   Imaging Results         US Carotid Bilateral (Final result) Result time:  03/24/17 15:20:50    Final result by Alonzo Nguyễn MD (03/24/17 15:20:50)    Impression:      1.  No significant stenosis in either carotid artery.      Electronically signed by: ALONZO NGUYỄN MD  Date:     03/24/17  Time:    15:20     Narrative:    Bilateral carotid sonogram    Technique: Grayscale, as well as color and spectral Doppler sonography utilizing the [place] symptomatic carotid endarterectomy trial (NASCET) criteria.    Comparison: None    Findings:    Right - Small amount of plaque in the common and internal carotid arteries.  Right internal carotid artery PSV is 45.6 cm/sec; EDV is 22.8 cm/sec.  The PSV of the right common carotid artery is 101 cm/sec.  The ICA/CCA PSV ratio is 0.45, indicating a degree of stenosis in the range of 0-50%.  Right vertebral artery blood flow is antegrade.    Left - Small amount of plaque in the common and internal carotid arteries.  Left internal carotid artery PSV is 45.9 cm/sec; EDV is 23.2 cm/sec.  The PSV of the left common carotid artery is 114 cm/sec.  The ICA/CCA PSV ratio is 0.4, indicating a degree of stenosis in the range of 0-50%.  Left vertebral artery blood flow is antegrade.            MRA Neck with contrast (Final result) Result time:  03/24/17 09:44:27    Final result by Teresa Cheatham MD (03/24/17 09:44:27)    Impression:       1.  Acute infarction within the left occipital lobe.    2.  Small remote lacunar infarct within the right basal ganglia/corona radiata.    3.  Unremarkable MRA brain and neck without evidence for significant focal stenosis or occlusion.      Electronically signed by: TERESA CHEATHAM  MD  Date:     03/24/17  Time:    09:44     Narrative:    Time of Procedure: 03/24/17 08:42:04  Accession # [id number]    Technique: Multiplanar, multisequence images were obtained through the brain before and after administration of 10 cc gadavist IV contrast.  Additionally, noncontrast 3-D time of flight MRA head and postcontrast 3-D time-of-flight MRA neck was performed.    Comparison: CT head from the same day.  MRI brain 9/2014.     Findings:    There is a sizable region of diffusion restriction with corresponding low ADC signal and FLAIR hyperintensity involving the left occipital lobe consistent with acute infarction.  Mild leptomeningeal enhancement visualized within this region.  No additional areas of acute infarction seen.  There is FLAIR hyperintensity consistent with small remote lacunar infarct involving the right basal ganglia/corona radiata.  Ventricles are normal in size and configuration.  Cavum septum pellucidum incidentally noted.  No intracranial hemorrhage or extraaxial fluid collection.  No mass or mass effect.  No diffusion signal abnormality to suggest acute infarction.  After administration of gadolinium, no pathologic foci of enhancement.    Minimal patchy ethmoid and left maxillary sinus disease noted.  Trace fluid seen in the left mastoid air cells. Orbits appear normal.    The bilateral distal ICA's and the visualized bilateral anterior and middle cerebral arteries are patent without evidence for significant focal stenosis or occlusion.  The distal vertebral arteries, basilar artery and posterior cerebral arteries are patent without evidence for significant focal stenosis or occlusion.  Vertebral arteries are codominant.    The origins of the right brachiocephalic,  left common carotid and left subclavian arteries from the arch are within normal limits.  Vertebral artery origins are within normal limits.  The vertebral arteries are unremarkable throughout their course without evidence for  focal stenosis or occlusion.  The bilateral common carotid arteries, carotid bifurcation, and extra cranial portions of the internal carotid arteries are patent without evidence for focal stenosis or occlusion.            MRA Brain without contrast (Final result) Result time:  03/24/17 09:44:27    Final result by Teresa Cheatham MD (03/24/17 09:44:27)    Impression:       1.  Acute infarction within the left occipital lobe.    2.  Small remote lacunar infarct within the right basal ganglia/corona radiata.    3.  Unremarkable MRA brain and neck without evidence for significant focal stenosis or occlusion.      Electronically signed by: TERESA CHEATHAM MD  Date:     03/24/17  Time:    09:44     Narrative:    Time of Procedure: 03/24/17 08:42:04  Accession # [id number]    Technique: Multiplanar, multisequence images were obtained through the brain before and after administration of 10 cc gadavist IV contrast.  Additionally, noncontrast 3-D time of flight MRA head and postcontrast 3-D time-of-flight MRA neck was performed.    Comparison: CT head from the same day.  MRI brain 9/2014.     Findings:    There is a sizable region of diffusion restriction with corresponding low ADC signal and FLAIR hyperintensity involving the left occipital lobe consistent with acute infarction.  Mild leptomeningeal enhancement visualized within this region.  No additional areas of acute infarction seen.  There is FLAIR hyperintensity consistent with small remote lacunar infarct involving the right basal ganglia/corona radiata.  Ventricles are normal in size and configuration.  Cavum septum pellucidum incidentally noted.  No intracranial hemorrhage or extraaxial fluid collection.  No mass or mass effect.  No diffusion signal abnormality to suggest acute infarction.  After administration of gadolinium, no pathologic foci of enhancement.    Minimal patchy ethmoid and left maxillary sinus disease noted.  Trace fluid seen in the left mastoid air  cells. Orbits appear normal.    The bilateral distal ICA's and the visualized bilateral anterior and middle cerebral arteries are patent without evidence for significant focal stenosis or occlusion.  The distal vertebral arteries, basilar artery and posterior cerebral arteries are patent without evidence for significant focal stenosis or occlusion.  Vertebral arteries are codominant.    The origins of the right brachiocephalic,  left common carotid and left subclavian arteries from the arch are within normal limits.  Vertebral artery origins are within normal limits.  The vertebral arteries are unremarkable throughout their course without evidence for focal stenosis or occlusion.  The bilateral common carotid arteries, carotid bifurcation, and extra cranial portions of the internal carotid arteries are patent without evidence for focal stenosis or occlusion.            MRI Brain W WO Contrast (Final result) Result time:  03/24/17 09:44:27    Final result by Teresa Cheatham MD (03/24/17 09:44:27)    Impression:       1.  Acute infarction within the left occipital lobe.    2.  Small remote lacunar infarct within the right basal ganglia/corona radiata.    3.  Unremarkable MRA brain and neck without evidence for significant focal stenosis or occlusion.      Electronically signed by: TERESA CHEATHAM MD  Date:     03/24/17  Time:    09:44     Narrative:    Time of Procedure: 03/24/17 08:42:04  Accession # [id number]    Technique: Multiplanar, multisequence images were obtained through the brain before and after administration of 10 cc gadavist IV contrast.  Additionally, noncontrast 3-D time of flight MRA head and postcontrast 3-D time-of-flight MRA neck was performed.    Comparison: CT head from the same day.  MRI brain 9/2014.     Findings:    There is a sizable region of diffusion restriction with corresponding low ADC signal and FLAIR hyperintensity involving the left occipital lobe consistent with acute infarction.   Mild leptomeningeal enhancement visualized within this region.  No additional areas of acute infarction seen.  There is FLAIR hyperintensity consistent with small remote lacunar infarct involving the right basal ganglia/corona radiata.  Ventricles are normal in size and configuration.  Cavum septum pellucidum incidentally noted.  No intracranial hemorrhage or extraaxial fluid collection.  No mass or mass effect.  No diffusion signal abnormality to suggest acute infarction.  After administration of gadolinium, no pathologic foci of enhancement.    Minimal patchy ethmoid and left maxillary sinus disease noted.  Trace fluid seen in the left mastoid air cells. Orbits appear normal.    The bilateral distal ICA's and the visualized bilateral anterior and middle cerebral arteries are patent without evidence for significant focal stenosis or occlusion.  The distal vertebral arteries, basilar artery and posterior cerebral arteries are patent without evidence for significant focal stenosis or occlusion.  Vertebral arteries are codominant.    The origins of the right brachiocephalic,  left common carotid and left subclavian arteries from the arch are within normal limits.  Vertebral artery origins are within normal limits.  The vertebral arteries are unremarkable throughout their course without evidence for focal stenosis or occlusion.  The bilateral common carotid arteries, carotid bifurcation, and extra cranial portions of the internal carotid arteries are patent without evidence for focal stenosis or occlusion.            X-Ray Chest AP Portable (Final result) Result time:  03/24/17 08:45:35    Final result by Teresa Cheatham MD (03/24/17 08:45:35)    Impression:      No acute cardiopulmonary process identified.      Electronically signed by: TERESA CHEATHAM MD  Date:     03/24/17  Time:    08:45     Narrative:    Chest AP single view.  Comparison: 3/2016.    Mediastinal structures are midline.  Cardiac silhouette is normal  in size.  Lungs are symmetrically expanded.  No evidence of focal consolidative process, pneumothorax, or significant effusion.  Bones appear intact.  No free air visualized beneath the diaphragm.            CT Head Without Contrast (Final result) Result time:  03/24/17 08:19:07    Final result by Teresa Cheatham MD (03/24/17 08:19:07)    Impression:       1.  Asymmetric subtle hypoattenuation within the left occipital lobe may represent recent infarction in this patient with reported clinical history of visual disturbance.  Recommend further evaluation with MRI with diffusion weighted imaging.    2.  Small remote lacunar infarct within the right basal ganglia/corona radiata, stable from 2016.      Findings were communicated from Dr. Cheatham to DR SUKHDEV DANIELS at 08:18:57 on 03/24/17.      Electronically signed by: TERESA CHEATHAM MD  Date:     03/24/17  Time:    08:19     Narrative:    Exam: CT of the head without contrast -- 41-year-old female with visual disturbance.    Comparison: CT head 2/2016.    Technique: 5 mm noncontrast axial images through the brain were obtained.    Findings: There is asymmetric parenchymal hypoattenuation seen within the left occipital lobe which could represent recent infarction.  Stable small focus of hypoattenuation in the region of the right basal ganglia/corona radiata suggestive for remote lacunar infarct.  No evidence to suggest acute/recent major vascular distribution cerebral infarction, intraparenchymal hemorrhage, or intra-axial space occupying lesion. The ventricular system is normal in appearance for age. No hydrocephalus. No effacement of the skull-base cisterns. No abnormal extra-axial fluid collections or blood products.  Mild left maxillary sinuses is noted. The calvarium shows no significant abnormality.               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       Discharged Condition: stable    Discharge Exam:  General: well developed, well nourished  Eyes: conjunctivae/corneas clear.   Lungs: clear to auscultation bilaterally and normal respiratory effort  Cardiovascular: Heart: regular rate and rhythm, S1, S2 normal, no murmur, click, rub or gallop. Chest Wall: no tenderness.   Abdomen/Rectal: Abdomen: soft, non-tender non-distented; bowel sounds normal; no masses, no organomegaly.   Musculoskeletal: no LE edema or calf tenderness  Neurologic: AO, no change in mental status ; decreased vision in R eye, 4/5 strength on R side (residual from prior stroke per patient)  Psych/Behavioral: Alert and oriented, appropriate affect.    Disposition: Home or Self Care    Follow Up/Patient Instructions:     Medications:  Reconciled Home Medications:   Current Discharge Medication List      START taking these medications    Details   clopidogrel (PLAVIX) 75 mg tablet Take 1 tablet (75 mg total) by mouth once daily.  Qty: 90 tablet, Refills: 3      nicotine (NICODERM CQ) 21 mg/24 hr Place 1 patch onto the skin once daily.  Refills: 0      nitrofurantoin, macrocrystal-monohydrate, (MACROBID) 100 MG capsule Take 1 capsule (100 mg total) by mouth every 12 (twelve) hours.  Qty: 14 capsule, Refills: 0         CONTINUE these medications which have CHANGED    Details   aspirin 81 MG Chew Take 1 tablet (81 mg total) by mouth once  daily.  Qty: 21 tablet, Refills: 0         CONTINUE these medications which have NOT CHANGED    Details   amlodipine (NORVASC) 10 MG tablet Take 1 tablet (10 mg total) by mouth once daily.  Qty: 90 tablet, Refills: 3    Associated Diagnoses: Essential hypertension      atorvastatin (LIPITOR) 40 MG tablet Take 1 tablet (40 mg total) by mouth once daily.  Qty: 90 tablet, Refills: 3    Associated Diagnoses: Hyperlipidemia, unspecified hyperlipidemia type      carvedilol (COREG) 25 MG tablet Take 1 tablet (25 mg total) by mouth 2 (two) times daily with meals.  Qty: 180 tablet, Refills: 3    Associated Diagnoses: Systolic congestive heart failure, unspecified congestive heart failure chronicity      chlorthalidone (HYGROTEN) 25 MG Tab Take 1 tablet (25 mg total) by mouth once daily.  Qty: 90 tablet, Refills: 3    Associated Diagnoses: Systolic congestive heart failure, unspecified congestive heart failure chronicity      furosemide (LASIX) 20 MG tablet Take 1 tablet (20 mg total) by mouth once daily.  Qty: 90 tablet, Refills: 3    Associated Diagnoses: Essential hypertension; Systolic congestive heart failure, unspecified congestive heart failure chronicity      hydrocodone-acetaminophen 5-325mg (NORCO) 5-325 mg per tablet Take 1 tablet by mouth every 8 (eight) hours as needed.  Qty: 12 tablet, Refills: 0      lisinopril (PRINIVIL,ZESTRIL) 40 MG tablet Take 1 tablet (40 mg total) by mouth once daily.  Qty: 90 tablet, Refills: 3    Associated Diagnoses: Systolic congestive heart failure, unspecified congestive heart failure chronicity      metformin (GLUCOPHAGE-XR) 500 MG 24 hr tablet Take .5 tab everyday and increase it to 1 tab in 2 weeks.  Qty: 90 tablet, Refills: 3    Associated Diagnoses: Type 2 diabetes mellitus without complication, without long-term current use of insulin      spironolactone (ALDACTONE) 50 MG tablet Take 1 tablet (50 mg total) by mouth once daily.  Qty: 90 tablet, Refills: 3    Associated  "Diagnoses: Systolic congestive heart failure, unspecified congestive heart failure chronicity         STOP taking these medications       cephALEXin (KEFLEX) 500 MG capsule Comments:   Reason for Stopping:               Discharge Procedure Orders  WALKER FOR HOME USE   Order Specific Question Answer Comments   Type of Walker: Adult (5'4"-6'6")    With wheels? Yes    Height: 5' 8" (1.727 m)    Weight: 87.7 kg (193 lb 5.5 oz)    Length of need (1-99 months): 99    Does patient have medical equipment at home? none    Please check all that apply: Patient's condition impairs ambulation.    Please check all that apply: Patient is unable to safely ambulate without equipment.    Please check all that apply: Patient needs help to get in and out of chair.    Please check all that apply: Walker will be used for gait training.      WHEELCHAIR FOR HOME USE   Order Specific Question Answer Comments   Hours in W/C per day: 1    Type of Wheelchair: Standard    Size(Width): 18"(STD adult)    Leg Support: STD footrests    Lap Belt: Velcro    Cushion: Basic    Height: 5' 8" (1.727 m)    Weight: 87.7 kg (193 lb 5.5 oz)    Does patient have medical equipment at home? none    Length of need (1-99 months): 99    Please check all that apply: Patient's upper body strength is sufficient for propulsion.    Please check all that apply: Patient mobility limitations cannot be sufficiently resolved by the use of other ambulatory therapies.      Ambulatory referral to Ophthalmology   Referral Priority: Routine Referral Type: Consultation   Referral Reason: Specialty Services Required    Referred to Provider: DALTON FRIAS Requested Specialty: Ophthalmology   Number of Visits Requested: 1      Ambulatory referral to Home Health   Referral Priority: Routine Referral Type: Home Health   Referral Reason: Specialty Services Required    Requested Specialty: Home Health Services    Number of Visits Requested: 1      Diet Cardiac     Diet Diabetic " 2000 Calories     Activity as tolerated       Follow-up Information     Follow up with Kris Robertson MD. Schedule an appointment as soon as possible for a visit in 1 week.    Specialty:  Family Medicine    Why:  hospital f/u, tobacco mgmt    Contact information:    [address]   [address]   [place] [id number] [phone]          Follow up with Lynn Rosenthal MD. Schedule an appointment as soon as possible for a visit in 1 week.    Specialty:  Ophthalmology    Why:  Occiptial CVA with visual deficits    Contact information:    [address]  [address]  [place] [address] [phone]          Activity: activity as tolerated  Diet: cardiac diet and diabetic diet    Signed:  Klever Hills MD  3/26/2017  1:28 PM

## 2017-03-27 ENCOUNTER — PATIENT OUTREACH (OUTPATIENT)
Dept: ADMINISTRATIVE | Facility: CLINIC | Age: 42
End: 2017-03-27
Payer: MEDICAID

## 2017-03-27 LAB
HAV IGM SERPL QL IA: NEGATIVE
HBV CORE IGM SERPL QL IA: NEGATIVE
HBV SURFACE AG SERPL QL IA: NEGATIVE
HCV AB SERPL QL IA: NEGATIVE
HIV 1+2 AB+HIV1 P24 AG SERPL QL IA: NEGATIVE

## 2017-03-27 NOTE — PROGRESS NOTES
C3 nurse attempted to contact patient. No answer. The following message was left for the patient to return the call:  Good afternoon I am a nurse calling on behalf of [facility] from the [facility].  This is a Transitional Care Call for Josefina Martin. When you have a moment please contact us at [phone] or [phone] Monday through Friday, between the hours of 8 am to 4 pm. We look forward to speaking with you. On behalf of [facility] have a nice day.    The patient does not have an [facility] pcp. C3 nurse will continue with efforts to contact the patient.

## 2017-03-27 NOTE — PROGRESS NOTES
SSC made phone contact with [facility] outpatient therapy dept to schedule pt's PT/OT.SSC spoke with J informed her she was discharged on yesterday 3/2/17. She was able to confirm patient in EPIC and informed SSC that she will made contact with patient.

## 2017-03-28 NOTE — PATIENT INSTRUCTIONS
Discharge Instructions for Stroke  You have been diagnosed with a stroke, or with a TIA (transient ischemic attack). Or you have been identified as having a high risk for stroke. During a stroke, blood stops flowing to part of your brain. This can damage areas in the brain that control other parts of the body. Symptoms after a stroke depend on which part of the brain has been affected.  Stroke risk factors  Once youve had a stroke, youre at greater risk for another one. Listed below are some other factors that can increase your risk for a stroke:  · High blood pressure  · High cholesterol  · Cigarette or cigar smoking  · Diabetes  · Carotid or other artery disease  · Atrial fibrillation, atrial flutter, or other heart disease  · Not being physically active  · Obesity  · Certain blood disorders (such as sickle cell anemia)  · Excessive alcohol use  · Abuse of street drugs  · Race  · Gender  · Family history of stroke  · Diet high in salty, fried, or greasy foods  Changes in daily living  Doing your regular tasks may be difficult after youve had a stroke, but you can learn new ways to manage your daily activities. In fact, doing daily activities may help you to regain muscle strength and bring back function to affected limbs. Be patient, give yourself time to adjust, and appreciate the progress you make.  Daily activities  You may be at risk of falling. Make changes to your home to help you walk more easily. A therapist will decide if you need an assistive device to walk safely.  You may need to see an occupational therapist or physical therapist to learn new ways of doing things. For example, you may need to make adjustments when bathing or dressing:  Tips for showering or bathing  · Test the water temperature with a hand or foot that was not affected by the stroke.  · Use grab bars, a shower seat, a hand-held showerhead, and a long-handled brush.  Tips for getting dressed  · Dress while sitting, starting with  the affected side or limb.  · Wear shirts that pull easily over your head. Wear pants or skirts with elastic waistbands.  · Use zippers with loops attached to the pull tabs.  Lifestyle changes  · Take your medicines exactly as directed. Dont skip doses.  · Begin an exercise program. Ask your provider how to get started. Also ask how much activity you should try to get on a daily or weekly basis. You can benefit from simple activities such as walking or gardening.  · Limit alcohol intake. Men should have no more than 2 alcoholic drinks a day. Women should limit themselves to 1 alcoholic drink per day.  · Know your cholesterol level. Follow your providers recommendations about how to keep cholesterol under control.  · If you are a smoker, quit now. Join a stop-smoking program to improve your chances of success. Ask your provider about medicines or other methods to help you quit.  · Learn stress management techniques to help you deal with stress in your home and work life.  Diet  Your healthcare provider will give you information on dietary changes that you may need to make, based on your situation. Your provider may recommend that you see a registered dietitian for help with diet changes. Changes may include:  · Reducing fat and cholesterol intake  · Reducing salt (sodium) intake, especially if you have high blood pressure  · Eating more fresh vegetables and fruits  · Eating more lean proteins, such as fish, poultry, and beans and peas (legumes)  · Eating less red meat and processed meats  · Using low-fat dairy products  · Limiting vegetable oils and nut oils  · Limiting sweets and processed foods such as chips, cookies, and baked goods  Follow-up care  · Keep your medical appointments. Close follow-up is important to stroke rehabilitation and recovery.  · Some medicines require blood tests to check for progress or problems. Keep follow-up appointments for any blood tests ordered by your providers.  When to call  911  Call 911 right away if you have any of the following symptoms of stroke:  · Weakness, tingling, or loss of feeling on one side of your face or body  · Sudden double vision or trouble seeing in one or both eyes  · Sudden trouble talking or slurred speech  · Trouble understanding others  · Sudden, severe headache  · Dizziness, loss of balance, or a sense of falling  · Blackouts or seizures      F.A.S.T. is an easy way to remember the signs of stroke. When you see these signs, you know that you need to call 911 fast.  F.A.S.T. stands for:  · F is for face drooping. One side of the face is drooping or numb. When the person smiles, the smile is uneven.  · A is for arm weakness. One arm is weak or numb. When the person lifts both arms at the same time, one arm may drift downward.  · S is for speech difficulty. You may notice slurred speech or trouble speaking. The person can't repeat a simple sentence correctly when asked.  · T is for time to call 911. If someone shows any of these symptoms, even if they go away, call 911 immediately. Make note of the time the symptoms first appeared.  Date Last Reviewed: 8/26/2015 © 2000-2016 [employer]. [address] [place] [address], [place] [address]. All rights reserved. This information is not intended as a substitute for professional medical care. Always follow your healthcare professional's instructions.

## 2017-03-30 ENCOUNTER — OFFICE VISIT (OUTPATIENT)
Dept: FAMILY MEDICINE | Facility: HOSPITAL | Age: 42
End: 2017-03-30
Attending: FAMILY MEDICINE
Payer: MEDICAID

## 2017-03-30 VITALS
BODY MASS INDEX: 29.34 KG/M2 | WEIGHT: 193.56 LBS | DIASTOLIC BLOOD PRESSURE: 68 MMHG | RESPIRATION RATE: 20 BRPM | SYSTOLIC BLOOD PRESSURE: 101 MMHG | HEIGHT: 68 IN | HEART RATE: 79 BPM

## 2017-03-30 DIAGNOSIS — Z09 HOSPITAL DISCHARGE FOLLOW-UP: Primary | ICD-10-CM

## 2017-03-30 PROCEDURE — 99214 OFFICE O/P EST MOD 30 MIN: CPT | Performed by: FAMILY MEDICINE

## 2017-03-30 NOTE — MR AVS SNAPSHOT
[facility]  [address], [address]  [place] [address]  Phone: [phone]  Fax: [phone]                  Josefina Martin   3/30/2017 3:40 PM   Office Visit    Description:  Female : 1975   Provider:  Kris Robertson MD   Department:  [facility]           Reason for Visit     Follow-up                To Do List           Future Appointments        Provider Department Dept Phone    2017 8:40 AM Kris Robertson MD [facility] [phone]      Goals (5 Years of Data)     None      [facility]     [facility] Nurse Care Line -  Assistance  Unless otherwise directed by your provider, please contact [facility], our nurse care line that is available for  assistance.     Registered nurses in the [facility] provide: appointment scheduling, clinical advisement, health education, and other advisory services.  Call: [phone] (toll free)               Medications           Message regarding Medications     Verify the changes and/or additions to your medication regime listed below are the same as discussed with your clinician today.  If any of these changes or additions are incorrect, please notify your healthcare provider.             Verify that the below list of medications is an accurate representation of the medications you are currently taking.  If none reported, the list may be blank. If incorrect, please contact your healthcare provider. Carry this list with you in case of emergency.           Current Medications     amlodipine (NORVASC) 10 MG tablet Take 1 tablet (10 mg total) by mouth once daily.    aspirin 81 MG Chew Take 1 tablet (81 mg total) by mouth once daily.    atorvastatin (LIPITOR) 40 MG tablet Take 1 tablet (40 mg total) by mouth once daily.    carvedilol (COREG) 25 MG tablet Take 1 tablet (25 mg total) by mouth 2 (two) times daily with meals.    chlorthalidone  "(HYGROTEN) 25 MG Tab Take 1 tablet (25 mg total) by mouth once daily.    clopidogrel (PLAVIX) 75 mg tablet Take 1 tablet (75 mg total) by mouth once daily.    furosemide (LASIX) 20 MG tablet Take 1 tablet (20 mg total) by mouth once daily.    hydrocodone-acetaminophen 5-325mg (NORCO) 5-325 mg per tablet Take 1 tablet by mouth every 8 (eight) hours as needed.    lisinopril (PRINIVIL,ZESTRIL) 40 MG tablet Take 1 tablet (40 mg total) by mouth once daily.    metformin (GLUCOPHAGE-XR) 500 MG 24 hr tablet Take .5 tab everyday and increase it to 1 tab in 2 weeks.    nicotine (NICODERM CQ) 21 mg/24 hr Place 1 patch onto the skin once daily.    nitrofurantoin, macrocrystal-monohydrate, (MACROBID) 100 MG capsule Take 1 capsule (100 mg total) by mouth every 12 (twelve) hours.    spironolactone (ALDACTONE) 50 MG tablet Take 1 tablet (50 mg total) by mouth once daily.           Clinical Reference Information           Your Vitals Were     BP Pulse Resp Height Weight Last Period    101/68 79 20 5' 8" (1.727 m) 87.8 kg (193 lb 9 oz) 02/26/2017    BMI                29.43 kg/m2          Blood Pressure          Most Recent Value    BP  101/68      Allergies as of 3/30/2017     No Known Allergies      Immunizations Administered on Date of Encounter - 3/30/2017     None      [facility] Sign-Up     Activating your [facility] account is as easy as 1-2-3!     1) Visit [email or web address], select Sign Up Now, enter this activation code and your date of birth, then select Next.  [email or web address]  Expires: 5/3/2017 10:33 PM      2) Create a username and password to use when you visit [facility] in the future and select a security question in case you lose your password and select Next.    3) Enter your e-mail address and click Sign Up!    Additional Information  If you have questions, please e-mail [email or web address] or call [phone] to talk to our [facility] staff. Remember, [facility] is NOT to be used for urgent needs. For medical " emergencies, dial 911.         Smoking Cessation     If you would like to quit smoking:   You may be eligible for free services if you are a [place] resident and started smoking cigarettes before September 1, 1988.  Call the Smoking Cessation Trust (SCT) toll free at (189) 129-8867 or (761) 648-9875.   Call 1-800-QUIT-NOW if you do not meet the above criteria.   Contact us via email: [email or web address]   View our website for more information: [email or web address]        Language Assistance Services     ATTENTION: Language assistance services are available, free of charge. Please call [phone].      ATENCIÓN: Si habla maru, tiene a tomas disposición servicios gratuitos de asistencia lingüística. Llame al [phone].     CHÚ Ý: N?u b?n nói Ti?ng Vi?t, có các d?ch v? h? tr? ngôn ng? mi?n phí dành cho b?n. G?i s? [phone].         [facility] complies with applicable Federal civil rights laws and does not discriminate on the basis of race, color, national origin, age, disability, or sex.

## 2017-03-30 NOTE — LETTER
March 30, 2017                 [facility] [address]  [facility] [address]  Phone: [phone]  Fax: [phone]   March 30, 2017     Patient: Josefina Martin   YOB: 1975   Date of Visit: 3/30/2017       To Whom it May Concern:    Josefina Martin was seen in my clinic on 3/30/2017.  She was accompanied by Ely Staples during this appointment.    If you have any questions or concerns, please don't hesitate to call.    Sincerely,         Kris Robertson MD

## 2017-03-31 NOTE — PROGRESS NOTES
I assume primary medical responsibility for this patient, I have reviewed the case history, findings, diagnosis and treatment plan with the resident and agree that the care is reasonable and necessary. This service has been performed by a resident without the presence of a teaching physician under the primary care exception  Merly Uriarte  3/31/2017

## 2017-03-31 NOTE — PROGRESS NOTES
Subjective:       Patient ID: Josefina Martin is a 41 y.o. female.    Chief Complaint: Follow-up    HPI Comments:    40 y/o female with PMH of HTN, HLD, CVA, DM2, and HFrEF here for hospital follow up after being found to have an CVA in the left occipital lobe with residual R eye deficits.  Patient states that she has been doing well since discharge and is currently being worked up by Dr. Morales with Ophthalmology.  Patient states that she has been compliant with her current medication regiment however she continues to abuse tobacco.          Review of Systems   Constitutional: Negative for chills, diaphoresis, fatigue and fever.   HENT: Negative for congestion, ear pain, postnasal drip, rhinorrhea and sore throat.    Eyes: Negative for pain and discharge.   Respiratory: Negative for cough, shortness of breath and wheezing.    Cardiovascular: Negative for chest pain, palpitations and leg swelling.   Gastrointestinal: Negative for abdominal distention, abdominal pain, blood in stool, constipation, diarrhea, nausea and vomiting.   Endocrine: Negative for polydipsia and polyuria.   Genitourinary: Negative for dysuria and hematuria.   Musculoskeletal: Negative for arthralgias, back pain, joint swelling, myalgias and neck pain.   Skin: Negative.    Allergic/Immunologic: Negative.    Neurological: Negative for dizziness, weakness, light-headedness, numbness and headaches.   Hematological: Negative.    Psychiatric/Behavioral: Negative.           Objective:      Vitals:    03/30/17 1605   BP: 101/68   Pulse: 79   Resp: 20     Physical Exam    General: well developed, well nourished  Eyes: conjunctivae/corneas clear.   Lungs: clear to auscultation bilaterally and normal respiratory effort  Cardiovascular: Heart: regular rate and rhythm, S1, S2 normal, no murmur, click, rub or gallop. Chest Wall: no tenderness.   Abdomen/Rectal: Abdomen: soft, non-tender non-distented; bowel sounds normal; no masses, no organomegaly.    Musculoskeletal: no LE edema or calf tenderness  Neurologic: AO, no change in mental status ; decreased vision in R eye, 4/5 strength on R side (residual from prior stroke per patient)  Psych/Behavioral: Alert and oriented, appropriate affect.  Assessment:       1. Hospital discharge follow-up        Plan:       Hospital discharge follow-up    Plan of care reviewed with patient.  Patient's current hemoglobin A1C is 8.0.  Goal's of treatment reviewed: hemoglobin A1C <7.0 while avoiding HYPOGLYCEMIA, yearly checks on lipid profile or more frequent if LDL not at goal of <100 without CAD or <70 with CAD, monitor for microalbuminuria to avoid CKD and to keep b/p at a goal of 130/80 if possible while maintaining low side effect profile of medication.  Goal is also to have eye and foot exams yearly and to insure patient has pneumococcal shot.  Glucose monitoring is critical and can be 1-4x daily depending on treatment and fluctuations.    Patient counseled on benefits of regular exercise. Encourage to exercise MOST days with a goal of at least 5 days a week of moderate intensity aerobic exercise consisting of 45 minutes of walking/cycling a day.  Patient encouraged to participate in low intensity strength exercising and if unfamiliar with strength and conditioning training, I recommend joining a gym with access to a  to further instruct the patient.  If weight/obesity is a concern we will set a goal for moderate weight loss of 1-2lbs per month to reach a goal of 10% weight loss or a BMI less than 25.    Explained importance of compliance with medications and signs/symptoms of worsening heart failure. Covered symptoms of orthopnea, swelling of lower extremities that could indicate exacerbation.  Stressed importance of BP control (BB /ACEI grade 1A evidence) and compliance with Low salt diet to reduce future risk of cardiovascular complications.     The patient was counseled on the dangers of tobacco use.   Patient has made arrangements to work with [facility] on smoking cessation.  Handout given explaining side effects and tips to quit smoking. Reviewed strategies to maximize success, including adherence to medications and social support from friends and family.     Return in about 3 months (around 6/30/2017).

## 2017-04-17 ENCOUNTER — OFFICE VISIT (OUTPATIENT)
Dept: FAMILY MEDICINE | Facility: HOSPITAL | Age: 42
End: 2017-04-17
Attending: FAMILY MEDICINE
Payer: MEDICAID

## 2017-04-17 VITALS
WEIGHT: 196.19 LBS | RESPIRATION RATE: 20 BRPM | DIASTOLIC BLOOD PRESSURE: 72 MMHG | HEIGHT: 67 IN | BODY MASS INDEX: 30.79 KG/M2 | SYSTOLIC BLOOD PRESSURE: 105 MMHG | HEART RATE: 86 BPM

## 2017-04-17 DIAGNOSIS — Z86.73 HISTORY OF CVA (CEREBROVASCULAR ACCIDENT): Primary | ICD-10-CM

## 2017-04-17 PROCEDURE — 99214 OFFICE O/P EST MOD 30 MIN: CPT | Performed by: FAMILY MEDICINE

## 2017-04-17 NOTE — PROGRESS NOTES
Subjective:       Patient ID: Josefina Martin is a 41 y.o. female.    Chief Complaint: release to work    40 y/o female with PMH of HTN, HLD, CVA, DM2, and HFrEF here for release to work after being found to have an CVA in the left occipital lobe with residual R eye deficits.  Patient states that she has been doing well since discharge and is currently being worked up by Dr. Morales with Ophthalmology.  Patient states that she has been compliant with her current medication regiment however she continues to abuse tobacco.  Patient states that she does light duty work and is ready to return to her job.        Review of Systems   Constitutional: Negative for chills and fever.   Respiratory: Negative for shortness of breath.    Cardiovascular: Negative for chest pain.   Gastrointestinal: Negative for abdominal pain, nausea and vomiting.   Neurological: Negative for headaches.       Objective:      Vitals:    04/17/17 1538   BP: 105/72   Pulse: 86   Resp: 20     Physical Exam    General: well developed, well nourished  Eyes: conjunctivae/corneas clear.   Lungs: clear to auscultation bilaterally and normal respiratory effort  Cardiovascular: Heart: regular rate and rhythm, S1, S2 normal, no murmur, click, rub or gallop. Chest Wall: no tenderness.   Abdomen/Rectal: Abdomen: soft, non-tender non-distented; bowel sounds normal; no masses, no organomegaly.   Musculoskeletal: no LE edema or calf tenderness  Neurologic: AO, no change in mental status ; decreased vision in R eye, 4/5 strength on R side (residual from prior stroke per patient)  Psych/Behavioral: Alert and oriented, appropriate affect.  Assessment:       1. History of CVA (cerebrovascular accident)        Plan:       History of CVA (cerebrovascular accident)      Return in about 3 months (around 7/17/2017).        Patient doing well and would like to return to work.  Will mandi clearance for her to return to work.      Plan of care reviewed with patient.   Patient's current hemoglobin A1C is 8.0.  Goal's of treatment reviewed: hemoglobin A1C <7.0 while avoiding HYPOGLYCEMIA, yearly checks on lipid profile or more frequent if LDL not at goal of <100 without CAD or <70 with CAD, monitor for microalbuminuria to avoid CKD and to keep b/p at a goal of 130/80 if possible while maintaining low side effect profile of medication.  Goal is also to have eye and foot exams yearly and to insure patient has pneumococcal shot.  Glucose monitoring is critical and can be 1-4x daily depending on treatment and fluctuations.     Patient counseled on benefits of regular exercise. Encourage to exercise MOST days with a goal of at least 5 days a week of moderate intensity aerobic exercise consisting of 45 minutes of walking/cycling a day.  Patient encouraged to participate in low intensity strength exercising and if unfamiliar with strength and conditioning training, I recommend joining a gym with access to a  to further instruct the patient.  If weight/obesity is a concern we will set a goal for moderate weight loss of 1-2lbs per month to reach a goal of 10% weight loss or a BMI less than 25.     Explained importance of compliance with medications and signs/symptoms of worsening heart failure. Covered symptoms of orthopnea, swelling of lower extremities that could indicate exacerbation.  Stressed importance of BP control (BB /ACEI grade 1A evidence) and compliance with Low salt diet to reduce future risk of cardiovascular complications.      The patient was counseled on the dangers of tobacco use.  Patient has made arrangements to work with [facility] on smoking cessation.  Handout given explaining side effects and tips to quit smoking. Reviewed strategies to maximize success, including adherence to medications and social support from friends and family.

## 2017-04-17 NOTE — LETTER
April 17, 2017      [facility] [address]  [place] [address]  Phone: [phone]  Fax: [phone]       Patient: Josefina Martin   YOB: 1975  Date of Visit: 04/17/2017    To Whom It May Concern:    Josefina Dallas was at [facility] on 04/17/2017. She may return to work on 4/19/17 with no restrictions. If you have any questions or concerns, or if I can be of further assistance, please do not hesitate to contact me.    Sincerely,    Kris Robertson MD

## 2017-07-05 RX ORDER — LORATADINE 10 MG/1
TABLET ORAL
Qty: 90 TABLET | Refills: 0 | Status: SHIPPED | OUTPATIENT
Start: 2017-07-05 | End: 2017-11-24 | Stop reason: SDUPTHER

## 2017-07-21 ENCOUNTER — OFFICE VISIT (OUTPATIENT)
Dept: FAMILY MEDICINE | Facility: HOSPITAL | Age: 42
End: 2017-07-21
Payer: MEDICAID

## 2017-07-21 VITALS
DIASTOLIC BLOOD PRESSURE: 70 MMHG | WEIGHT: 197.31 LBS | HEART RATE: 74 BPM | BODY MASS INDEX: 29.9 KG/M2 | HEIGHT: 68 IN | SYSTOLIC BLOOD PRESSURE: 98 MMHG

## 2017-07-21 DIAGNOSIS — R61 ABNORMAL FLUSHING AND SWEATING: Primary | ICD-10-CM

## 2017-07-21 DIAGNOSIS — R55 NEAR SYNCOPE: ICD-10-CM

## 2017-07-21 DIAGNOSIS — R19.7 DIARRHEA, UNSPECIFIED TYPE: ICD-10-CM

## 2017-07-21 DIAGNOSIS — R23.2 ABNORMAL FLUSHING AND SWEATING: Primary | ICD-10-CM

## 2017-07-21 PROCEDURE — 99213 OFFICE O/P EST LOW 20 MIN: CPT | Performed by: FAMILY MEDICINE

## 2017-07-21 NOTE — PROGRESS NOTES
Subjective:       Patient ID: Josefina Martin is a 42 y.o. female.    Chief Complaint: Follow-up; Dizziness; and Fatigue    Ms Martin is a 43 yo female with a past medical history of CHF (congestive heart failure); Hypertension; and Stroke that presents today with a 4 week history of facial flushing and near syncope followed by explosive watery diarrhea.  Patient states that she is not able to sleep at night because she is experiencing abdominal and leg cramping.  Patient states that when she wakes up her clothes are soaking wet.  Patient denies weight loss during this time.  Patient denies nausea, vomiting, headache, chest pain and dysuria.        Review of Systems    Positive for facial flushing, diarrhea, leg and abdominal cramping.    Objective:      Vitals:    07/21/17 1204   BP: 98/70   Pulse: 74     Physical Exam   Constitutional: She is oriented to person, place, and time. She appears well-developed and well-nourished. No distress.   HENT:   Head: Normocephalic and atraumatic.   Right Ear: External ear normal.   Left Ear: External ear normal.   Nose: Nose normal.   Mouth/Throat: Oropharynx is clear and moist. No oropharyngeal exudate.   Facial flushing   Eyes: Conjunctivae and EOM are normal. Pupils are equal, round, and reactive to light. Right eye exhibits no discharge. Left eye exhibits no discharge. No scleral icterus.   Neck: Normal range of motion. No JVD present. No thyromegaly present.   Cardiovascular: Normal rate, regular rhythm, normal heart sounds and intact distal pulses.  Exam reveals no gallop and no friction rub.    No murmur heard.  Pulmonary/Chest: Effort normal and breath sounds normal. No respiratory distress. She has no wheezes. She has no rales. She exhibits no tenderness.   Abdominal: Soft. Bowel sounds are normal. She exhibits no distension and no mass. There is no tenderness. There is no rebound and no guarding. No hernia.   Musculoskeletal: Normal range of motion.    Neurological: She is alert and oriented to person, place, and time. No cranial nerve deficit.   Skin: Skin is warm and dry. She is not diaphoretic.   Psychiatric: She has a normal mood and affect. Her behavior is normal.   Vitals reviewed.      Assessment:       1. Abnormal flushing and sweating    2. Diarrhea, unspecified type    3. Near syncope        Plan:       Abnormal flushing and sweating  -     5 HIAA, quantitative, Urine; Future  -     CBC auto differential; Future; Expected date: 07/21/2017  -     Comprehensive metabolic panel; Future; Expected date: 07/21/2017  -     TSH; Future; Expected date: 07/21/2017  -     Hemoglobin A1c; Future; Expected date: 07/21/2017  -     Lipid panel; Future; Expected date: 07/21/2017  -     EKG 12-lead; Future    Diarrhea, unspecified type  -     5 HIAA, quantitative, Urine; Future  -     CBC auto differential; Future; Expected date: 07/21/2017  -     Comprehensive metabolic panel; Future; Expected date: 07/21/2017  -     TSH; Future; Expected date: 07/21/2017  -     Hemoglobin A1c; Future; Expected date: 07/21/2017  -     Lipid panel; Future; Expected date: 07/21/2017  -     EKG 12-lead; Future    Near syncope  -     5 HIAA, quantitative, Urine; Future  -     CBC auto differential; Future; Expected date: 07/21/2017  -     Comprehensive metabolic panel; Future; Expected date: 07/21/2017  -     TSH; Future; Expected date: 07/21/2017  -     Hemoglobin A1c; Future; Expected date: 07/21/2017  -     Lipid panel; Future; Expected date: 07/21/2017  -     EKG 12-lead; Future      Return in about 1 week (around 7/28/2017).

## 2017-07-21 NOTE — LETTER
July 21, 2017                   [facility] [address], [address]  [facility] [address]  Phone: [phone]  Fax: [phone]   July 21, 2017     Patient: Josefina Martin   YOB: 1975   Date of Visit: 7/21/2017       To Whom it May Concern:    Josefina Martin was seen in my clinic on 7/21/2017. Mr. Ely Staples's presence was required to aid in her medical care.  Please excuse him from work today.      If you have any questions or concerns, please don't hesitate to call.    Sincerely,         Kris Robertson MD

## 2017-07-21 NOTE — LETTER
July 21, 2017                   [facility] [address], [address]  [facility] [address]  Phone: [phone]  Fax: [phone]   July 21, 2017     Patient: Josefina Martin   YOB: 1975   Date of Visit: 7/21/2017       To Whom it May Concern:    Josefina Martin was seen in my clinic on 7/21/2017. Please excuse her for this day.    If you have any questions or concerns, please don't hesitate to call.    Sincerely,         Kris Robertson MD

## 2017-07-23 ENCOUNTER — HOSPITAL ENCOUNTER (EMERGENCY)
Facility: HOSPITAL | Age: 42
Discharge: HOME | End: 2017-07-23
Attending: EMERGENCY MEDICINE
Payer: MEDICAID

## 2017-07-23 VITALS
HEIGHT: 68 IN | DIASTOLIC BLOOD PRESSURE: 69 MMHG | OXYGEN SATURATION: 100 % | BODY MASS INDEX: 29.4 KG/M2 | TEMPERATURE: 98 F | RESPIRATION RATE: 20 BRPM | WEIGHT: 194 LBS | SYSTOLIC BLOOD PRESSURE: 102 MMHG | HEART RATE: 72 BPM

## 2017-07-23 DIAGNOSIS — R10.9 ABDOMINAL CRAMPING: Primary | ICD-10-CM

## 2017-07-23 LAB
ALBUMIN SERPL BCP-MCNC: 4 G/DL
ALP SERPL-CCNC: 75 U/L
ALT SERPL W/O P-5'-P-CCNC: 38 U/L
ANION GAP SERPL CALC-SCNC: 9 MMOL/L
AST SERPL-CCNC: 43 U/L
B-HCG UR QL: NEGATIVE
BASOPHILS # BLD AUTO: 0.03 K/UL
BASOPHILS NFR BLD: 0.5 %
BILIRUB SERPL-MCNC: 0.4 MG/DL
BUN SERPL-MCNC: 28 MG/DL
CALCIUM SERPL-MCNC: 9.3 MG/DL
CHLORIDE SERPL-SCNC: 106 MMOL/L
CO2 SERPL-SCNC: 20 MMOL/L
CREAT SERPL-MCNC: 1.7 MG/DL
CTP QC/QA: YES
DIFFERENTIAL METHOD: ABNORMAL
EOSINOPHIL # BLD AUTO: 0.1 K/UL
EOSINOPHIL NFR BLD: 1.8 %
ERYTHROCYTE [DISTWIDTH] IN BLOOD BY AUTOMATED COUNT: 12.1 %
EST. GFR  (AFRICAN AMERICAN): 42 ML/MIN/1.73 M^2
EST. GFR  (NON AFRICAN AMERICAN): 36 ML/MIN/1.73 M^2
GLUCOSE SERPL-MCNC: 97 MG/DL
HCT VFR BLD AUTO: 33.9 %
HGB BLD-MCNC: 11.6 G/DL
LYMPHOCYTES # BLD AUTO: 2.6 K/UL
LYMPHOCYTES NFR BLD: 41.5 %
MCH RBC QN AUTO: 31.4 PG
MCHC RBC AUTO-ENTMCNC: 34.2 G/DL
MCV RBC AUTO: 92 FL
MONOCYTES # BLD AUTO: 0.4 K/UL
MONOCYTES NFR BLD: 6.5 %
NEUTROPHILS # BLD AUTO: 3 K/UL
NEUTROPHILS NFR BLD: 49.5 %
PLATELET # BLD AUTO: 207 K/UL
PMV BLD AUTO: 10.8 FL
POCT GLUCOSE: 105 MG/DL (ref 70–110)
POTASSIUM SERPL-SCNC: 4 MMOL/L
PROT SERPL-MCNC: 7.4 G/DL
RBC # BLD AUTO: 3.7 M/UL
SODIUM SERPL-SCNC: 135 MMOL/L
WBC # BLD AUTO: 6.14 K/UL

## 2017-07-23 PROCEDURE — 25000003 PHARM REV CODE 250: Performed by: EMERGENCY MEDICINE

## 2017-07-23 PROCEDURE — 96372 THER/PROPH/DIAG INJ SC/IM: CPT

## 2017-07-23 PROCEDURE — 85025 COMPLETE CBC W/AUTO DIFF WBC: CPT

## 2017-07-23 PROCEDURE — 82962 GLUCOSE BLOOD TEST: CPT

## 2017-07-23 PROCEDURE — 99283 EMERGENCY DEPT VISIT LOW MDM: CPT | Mod: 25

## 2017-07-23 PROCEDURE — 63600175 PHARM REV CODE 636 W HCPCS: Performed by: EMERGENCY MEDICINE

## 2017-07-23 PROCEDURE — 80053 COMPREHEN METABOLIC PANEL: CPT

## 2017-07-23 RX ORDER — SIMETHICONE 125 MG
125 TABLET,CHEWABLE ORAL
Status: COMPLETED | OUTPATIENT
Start: 2017-07-23 | End: 2017-07-23

## 2017-07-23 RX ORDER — LOPERAMIDE HYDROCHLORIDE 2 MG/1
4 CAPSULE ORAL
Status: COMPLETED | OUTPATIENT
Start: 2017-07-23 | End: 2017-07-23

## 2017-07-23 RX ORDER — DICYCLOMINE HYDROCHLORIDE 10 MG/ML
20 INJECTION INTRAMUSCULAR
Status: COMPLETED | OUTPATIENT
Start: 2017-07-23 | End: 2017-07-23

## 2017-07-23 RX ADMIN — DICYCLOMINE HYDROCHLORIDE 20 MG: 20 INJECTION, SOLUTION INTRAMUSCULAR at 04:07

## 2017-07-23 RX ADMIN — SIMETHICONE 125 MG: 125 TABLET, CHEWABLE ORAL at 05:07

## 2017-07-23 RX ADMIN — LOPERAMIDE HYDROCHLORIDE 4 MG: 2 CAPSULE ORAL at 05:07

## 2017-07-23 NOTE — ED PROVIDER NOTES
Encounter Date: 7/23/2017       History     Chief Complaint   Patient presents with    Abdominal Pain     abdominal pain with episodes of diarrhea after hot flushes and sweating since May.  Was seen on Friday at PCP and they ran blood tests, and told she could possibly have carcinoid syndrome.  Patient complaints of increased pain since then.     42-year-old female presents with crampy abdominal pain since yesterday.  The pain is constant since onset.  She did take one dose of Imodium with some relief but it came right back.  She reports that she is not passing as and has no diarrhea.  She reports associated decreased appetite.  The patient states she has been having intermittent hot flashes and feeling like she is going to pass out and episode of diarrhea since May.  She has crampy abdominal pain with those episodes.  She saw her primary care physician earlier this week and they are considering a diagnosis of carcinoid syndrome.  She is supposed to start a 24-hour urine collection in the morning.  She felt like she could no longer tolerate this crampy abdominal pain so she came to the ER.          Review of patient's allergies indicates:  No Known Allergies  Past Medical History:   Diagnosis Date    CHF (congestive heart failure)     Hypertension     Stroke      Past Surgical History:   Procedure Laterality Date    CHOLECYSTECTOMY  2013    history of cholelithiasis    TUBAL LIGATION       Family History   Problem Relation Age of Onset    Hypertension Mother     No Known Problems Father     No Known Problems Sister     No Known Problems Daughter     Diabetes Son 14     Takes 2 insulins and metformin use to be bigger wally    Stroke Maternal Uncle 48    Anuerysm Maternal Grandmother     No Known Problems Sister     No Known Problems Daughter     No Known Problems Son      Social History   Substance Use Topics    Smoking status: Current Every Day Smoker     Packs/day: 0.00     Years: 18.00     Types:  Cigarettes    Smokeless tobacco: Never Used      Comment: 1 pack/week    Alcohol use Yes      Comment: social 1/month     Review of Systems   Constitutional: Positive for appetite change. Negative for diaphoresis.   Gastrointestinal: Positive for abdominal pain. Negative for diarrhea, nausea and vomiting.   Neurological: Negative for syncope.   All other systems reviewed and are negative.      Physical Exam     Initial Vitals [07/23/17 1521]   BP Pulse Resp Temp SpO2   136/80 83 15 98 °F (36.7 °C) 99 %      MAP       98.67         Physical Exam    Nursing note and vitals reviewed.  Constitutional: She appears well-developed and well-nourished. No distress.   HENT:   Head: Normocephalic and atraumatic.   Eyes: Conjunctivae are normal.   Neck: Normal range of motion.   Cardiovascular: Normal rate, regular rhythm and normal heart sounds.   No murmur heard.  Pulmonary/Chest: Breath sounds normal. She has no wheezes. She has no rhonchi. She has no rales.   Abdominal: Soft. Bowel sounds are decreased. There is no tenderness. There is no rigidity, no rebound and no guarding.   Musculoskeletal: Normal range of motion.   Neurological: She is alert and oriented to person, place, and time.   Skin: Skin is warm and dry.   Psychiatric: She has a normal mood and affect. Her behavior is normal.         ED Course   Procedures  Labs Reviewed   CBC W/ AUTO DIFFERENTIAL - Abnormal; Notable for the following:        Result Value    RBC 3.70 (*)     Hemoglobin 11.6 (*)     Hematocrit 33.9 (*)     MCH 31.4 (*)     All other components within normal limits   COMPREHENSIVE METABOLIC PANEL - Abnormal; Notable for the following:     Sodium 135 (*)     CO2 20 (*)     BUN, Bld 28 (*)     Creatinine 1.7 (*)     AST 43 (*)     eGFR if  42 (*)     eGFR if non  36 (*)     All other components within normal limits   POCT GLUCOSE             Medical Decision Making:   History:   Old Medical Records: I decided to  obtain old medical records.  Old Records Summarized: records from clinic visits.       <> Summary of Records: I reviewed recent clinic visits.  Patient is being evaluated for possible carcinoid syndrome.  Clinical Tests:   Lab Tests: Ordered and Reviewed  Radiological Study: Ordered and Reviewed  ED Management:  42-year-old female presents with abdominal cramping since yesterday.  She has no associated fever, vomiting, or diarrhea.  She does have decreased appetite.  Her abdomen is soft on exam with no focal abdominal tenderness.  She does have decreased bowel sounds.  X-ray was done and does not suggest ileus or obstruction.  Labs are unremarkable for acute abnormality.  She does have renal insufficiency which she also has a history of with no acute worsening.  She had no relief of have her abdominal cramping with Bentyl.  She was then given Imodium and simethicone with improvement.  She is instructed to continue these medications as needed.  She is to follow-up with her doctor.                   ED Course     Clinical Impression:   The encounter diagnosis was Abdominal cramping.                           Jessica Gonzalez MD  07/23/17 6976

## 2017-07-23 NOTE — DISCHARGE INSTRUCTIONS
Your labs and xray were normal today.  You should take IMODIUM as directed on package - take at the suggested frequency.  Take SIMETHICONE as directed on package.  If your pain continues, see your doctor.

## 2017-07-23 NOTE — ED NOTES
Pt resting on stretcher. Pt states she feels much better and is ready to go home. Dr. Lisa tipton.

## 2017-07-23 NOTE — ED NOTES
APPEARANCE: Alert, oriented and in no acute distress.  CARDIAC: Normal rate and rhythm, no murmur heard.   PERIPHERAL VASCULAR: peripheral pulses present. Normal cap refill. No edema. Warm to touch.    RESPIRATORY:Normal rate and effort, breath sounds clear bilaterally throughout chest. Respirations are equal and unlabored no obvious signs of distress.  GASTRO: soft, bowel sounds normal, Tenderness to abdomen, no abdominal distention. +diarrhea  MUSC: Full ROM. No bony tenderness or soft tissue tenderness. No obvious deformity.  SKIN: Skin is warm and dry, normal skin turgor, mucous membranes moist.  NEURO: 5/5 strength major flexors/extensors bilaterally. Sensory intact to light touch bilaterally. [place] coma scale: eyes open spontaneously-4, oriented & converses-5, obeys commands-6. No neurological abnormalities.   MENTAL STATUS: awake, alert and aware of environment.  EYE: PERRL, both eyes: pupils brisk and reactive to light. Normal size.  ENT: EARS: no obvious drainage. NOSE: no active bleeding.   Pt complains of diarrhea and stomach cramps x several weeks. Pt states she saw her doctor but did not follow up.

## 2017-07-25 ENCOUNTER — CLINICAL SUPPORT (OUTPATIENT)
Dept: LAB | Facility: HOSPITAL | Age: 42
End: 2017-07-25
Attending: FAMILY MEDICINE
Payer: MEDICAID

## 2017-07-25 DIAGNOSIS — R23.2 ABNORMAL FLUSHING AND SWEATING: ICD-10-CM

## 2017-07-25 DIAGNOSIS — R61 ABNORMAL FLUSHING AND SWEATING: ICD-10-CM

## 2017-07-25 DIAGNOSIS — R55 NEAR SYNCOPE: ICD-10-CM

## 2017-07-25 DIAGNOSIS — R19.7 DIARRHEA, UNSPECIFIED TYPE: ICD-10-CM

## 2017-07-25 PROCEDURE — 93010 ELECTROCARDIOGRAM REPORT: CPT | Mod: ,,, | Performed by: INTERNAL MEDICINE

## 2017-07-25 PROCEDURE — 93005 ELECTROCARDIOGRAM TRACING: CPT

## 2017-07-25 PROCEDURE — 83497 ASSAY OF 5-HIAA: CPT

## 2017-07-25 PROCEDURE — 93010 EKG 12-LEAD: ICD-10-PCS | Mod: ,,, | Performed by: INTERNAL MEDICINE

## 2017-07-28 LAB
5HIAA & CREATININE UR-IMP: NORMAL
5OH-INDOLEACETATE 24H UR-MCNC: 3.2 MG/L
5OH-INDOLEACETATE 24H UR-MRATE: NORMAL MG/D (ref 0–15)
5OH-INDOLEACETATE/CREAT 24H UR: 3 MG/GCR (ref 0–14)
COLLECT DURATION TIME SPEC: 24 HR
CREAT 24H UR-MRATE: NORMAL MG/D (ref 700–1600)
CREAT UR-MCNC: 108 MG/DL
SPECIMEN VOL ?TM UR: NORMAL ML

## 2017-08-04 ENCOUNTER — HOSPITAL ENCOUNTER (OUTPATIENT)
Dept: RADIOLOGY | Facility: HOSPITAL | Age: 42
Discharge: HOME | End: 2017-08-04
Attending: FAMILY MEDICINE
Payer: MEDICAID

## 2017-08-04 ENCOUNTER — HOSPITAL ENCOUNTER (EMERGENCY)
Facility: HOSPITAL | Age: 42
Discharge: HOME | End: 2017-08-04
Attending: EMERGENCY MEDICINE
Payer: MEDICAID

## 2017-08-04 ENCOUNTER — TELEPHONE (OUTPATIENT)
Dept: FAMILY MEDICINE | Facility: HOSPITAL | Age: 42
End: 2017-08-04

## 2017-08-04 ENCOUNTER — OFFICE VISIT (OUTPATIENT)
Dept: FAMILY MEDICINE | Facility: HOSPITAL | Age: 42
End: 2017-08-04
Attending: FAMILY MEDICINE
Payer: MEDICAID

## 2017-08-04 VITALS
HEIGHT: 68 IN | BODY MASS INDEX: 29.2 KG/M2 | HEART RATE: 87 BPM | WEIGHT: 192.69 LBS | DIASTOLIC BLOOD PRESSURE: 62 MMHG | SYSTOLIC BLOOD PRESSURE: 98 MMHG

## 2017-08-04 VITALS
WEIGHT: 192 LBS | BODY MASS INDEX: 29.1 KG/M2 | DIASTOLIC BLOOD PRESSURE: 63 MMHG | OXYGEN SATURATION: 98 % | SYSTOLIC BLOOD PRESSURE: 99 MMHG | HEART RATE: 78 BPM | RESPIRATION RATE: 18 BRPM | TEMPERATURE: 98 F | HEIGHT: 68 IN

## 2017-08-04 DIAGNOSIS — I10 ESSENTIAL HYPERTENSION: ICD-10-CM

## 2017-08-04 DIAGNOSIS — Z86.19 HISTORY OF CLOSTRIDIUM DIFFICILE COLITIS: ICD-10-CM

## 2017-08-04 DIAGNOSIS — N17.9 AKI (ACUTE KIDNEY INJURY): ICD-10-CM

## 2017-08-04 DIAGNOSIS — I95.9 HYPOTENSION: ICD-10-CM

## 2017-08-04 DIAGNOSIS — Z72.0 TOBACCO USE: ICD-10-CM

## 2017-08-04 DIAGNOSIS — R06.2 WHEEZING: ICD-10-CM

## 2017-08-04 DIAGNOSIS — R19.5 WATERY STOOLS: Primary | ICD-10-CM

## 2017-08-04 DIAGNOSIS — I95.9 HYPOTENSION, UNSPECIFIED HYPOTENSION TYPE: ICD-10-CM

## 2017-08-04 LAB
ANION GAP SERPL CALC-SCNC: 10 MMOL/L
BUN SERPL-MCNC: 25 MG/DL
CALCIUM SERPL-MCNC: 8.6 MG/DL
CHLORIDE SERPL-SCNC: 104 MMOL/L
CO2 SERPL-SCNC: 22 MMOL/L
CREAT SERPL-MCNC: 1.9 MG/DL
EST. GFR  (AFRICAN AMERICAN): 37 ML/MIN/1.73 M^2
EST. GFR  (NON AFRICAN AMERICAN): 32 ML/MIN/1.73 M^2
GLUCOSE SERPL-MCNC: 115 MG/DL
GLUCOSE SERPL-MCNC: 160 MG/DL (ref 70–110)
POTASSIUM SERPL-SCNC: 3.9 MMOL/L
SODIUM SERPL-SCNC: 136 MMOL/L

## 2017-08-04 PROCEDURE — 80048 BASIC METABOLIC PNL TOTAL CA: CPT

## 2017-08-04 PROCEDURE — 25000003 PHARM REV CODE 250: Performed by: EMERGENCY MEDICINE

## 2017-08-04 PROCEDURE — 82962 GLUCOSE BLOOD TEST: CPT

## 2017-08-04 PROCEDURE — 96360 HYDRATION IV INFUSION INIT: CPT

## 2017-08-04 PROCEDURE — 99284 EMERGENCY DEPT VISIT MOD MDM: CPT | Mod: 25,27

## 2017-08-04 PROCEDURE — 71020 XR CHEST PA AND LATERAL: CPT | Mod: 26,,, | Performed by: RADIOLOGY

## 2017-08-04 PROCEDURE — 96361 HYDRATE IV INFUSION ADD-ON: CPT

## 2017-08-04 RX ORDER — ALBUTEROL SULFATE 90 UG/1
2 AEROSOL, METERED RESPIRATORY (INHALATION) EVERY 6 HOURS PRN
Qty: 18 G | Refills: 1 | Status: SHIPPED | OUTPATIENT
Start: 2017-08-04 | End: 2018-03-27

## 2017-08-04 RX ORDER — METRONIDAZOLE 500 MG/1
500 TABLET ORAL EVERY 8 HOURS
Qty: 30 TABLET | Refills: 0 | Status: SHIPPED | OUTPATIENT
Start: 2017-08-04 | End: 2017-08-11 | Stop reason: ALTCHOICE

## 2017-08-04 RX ADMIN — SODIUM CHLORIDE 1000 ML: 0.9 INJECTION, SOLUTION INTRAVENOUS at 06:08

## 2017-08-04 RX ADMIN — SODIUM CHLORIDE 1000 ML: 0.9 INJECTION, SOLUTION INTRAVENOUS at 07:08

## 2017-08-04 NOTE — PROGRESS NOTES
Subjective:       Patient ID: Josefina Martin is a 42 y.o. female.    Chief Complaint: Follow-up    Ms. Martin is a 42 year old female with PMH YOLA, C diff, Tobacco use and CHF who RTC for follow-up for hypotension and diarrhea. Patient reports it has been stable. Having 2-3 watery BM's per day. Occ. abd cramps that are intermittent and relieved with diarrhea. +green sputum and reports having a cold recently. Reports being in the hospital in March for a stroke, visual symptoms have resolved. States she is drinking a lot of water at home. Currently taking all her BP meds.       Review of Systems   Constitutional: Negative for chills and fever.   HENT: Negative for congestion and sore throat.    Eyes: Negative for pain and redness.   Respiratory: Negative for cough and shortness of breath.    Cardiovascular: Negative for chest pain and palpitations.   Gastrointestinal: Positive for abdominal pain and diarrhea. Negative for nausea and vomiting.   Genitourinary: Negative for difficulty urinating and dysuria.   Musculoskeletal: Negative for arthralgias and myalgias.   Skin: Negative for rash.   Neurological: Positive for light-headedness. Negative for syncope and headaches.   Psychiatric/Behavioral: Negative for agitation and confusion.       Objective:      Vitals:    08/04/17 1153   BP: 98/62   Pulse:      Physical Exam   Constitutional: She is oriented to person, place, and time. She appears well-developed and well-nourished. No distress.   HENT:   Head: Normocephalic and atraumatic.   Right Ear: External ear normal.   Left Ear: External ear normal.   Eyes: Conjunctivae are normal. Pupils are equal, round, and reactive to light. Right eye exhibits no discharge. Left eye exhibits no discharge.   Neck: Normal range of motion. Neck supple.   Cardiovascular: Normal rate and regular rhythm.  Exam reveals no gallop and no friction rub.    No murmur heard.  Pulmonary/Chest: Effort normal. No respiratory distress. She has  wheezes. She has no rales.   LL wheezing.    Abdominal: Soft. Bowel sounds are normal. She exhibits no distension. There is no tenderness.   Musculoskeletal: Normal range of motion.   Neurological: She is alert and oriented to person, place, and time.   Skin: Skin is warm. No rash noted. No erythema.   Psychiatric: She has a normal mood and affect. Her behavior is normal.       Assessment:       1. Watery stools    2. Hypotension, unspecified hypotension type    3. Essential hypertension    4. YOLA (acute kidney injury)    5. Wheezing    6. Tobacco use    7. History of Clostridium difficile colitis        Plan:       Watery stools  -     metronidazole (FLAGYL) 500 MG tablet; Take 1 tablet (500 mg total) by mouth every 8 (eight) hours.  Dispense: 30 tablet; Refill: 0  -     CLOSTRIDIUM DIFFICILE; Future; Expected date: 08/04/2017  -     CBC auto differential; Future; Expected date: 08/04/2017    Hypotension, unspecified hypotension type  -     Comprehensive metabolic panel; Future; Expected date: 08/04/2017    Essential hypertension  -     Comprehensive metabolic panel; Future; Expected date: 08/04/2017    YOLA (acute kidney injury)  -     Comprehensive metabolic panel; Future; Expected date: 08/04/2017    Wheezing  -     X-Ray Chest PA And Lateral; Future; Expected date: 08/04/2017  -     albuterol 90 mcg/actuation inhaler; Inhale 2 puffs into the lungs every 6 (six) hours as needed for Wheezing. Rescue  Dispense: 18 g; Refill: 1    Tobacco use  -     X-Ray Chest PA And Lateral; Future; Expected date: 08/04/2017  -     albuterol 90 mcg/actuation inhaler; Inhale 2 puffs into the lungs every 6 (six) hours as needed for Wheezing. Rescue  Dispense: 18 g; Refill: 1    History of Clostridium difficile colitis  -     metronidazole (FLAGYL) 500 MG tablet; Take 1 tablet (500 mg total) by mouth every 8 (eight) hours.  Dispense: 30 tablet; Refill: 0  -     CLOSTRIDIUM DIFFICILE; Future; Expected date: 08/04/2017  -     CBC auto  differential; Future; Expected date: 08/04/2017    Sent in Flagyl and told patient to get stool sample prior to starting. Patient with history of C diff per MR. Will check CMP, encouraged PO and holding Lisinopril and Lasix at this time. Consider restarting next week. May send to ED if worsening YOLA/concerning CXR or labs. Given albuterol inhaler PRN for wheezing.     Return in about 1 week (around 8/11/2017).

## 2017-08-04 NOTE — TELEPHONE ENCOUNTER
Patient seen and examined earlier today in clinic. Patient with recent YOLA, hypotension and diarrhea. F/U CMP shows worsening kidney function. Talked to patient on phone and notified patient to proceed to emergency department for IVF 2/2 YOLA. Patient also with recent URI and diarrhea and history of C.Diff. Normal CXR. Sent in Flagyl to pharmacy, patient will likely need C diff testing and antibiotic therapy. Recommend holding nephrotoxic agents at this time, already notified patient to hold Lisinopril and Lasix. Patient will proceed to emergency department for IVF and monitoring of YOLA, diarrhea and URI. Called and notified Dr. Hooker in ED who is aware.     8/4/2017 3:32 PM Wan Lim M.D.

## 2017-08-04 NOTE — ED TRIAGE NOTES
43 Y/O F with CC of hypotension. Pt was advised to come into ED by PCP. Was taken off BP meds with last dose today. Congested cough noted. Complains of fatigue. No other complaints verbalized. NAD noted. Will continue to monitor.

## 2017-08-04 NOTE — LETTER
August 4, 2017    Josefina Martin  [address]  [place] [address]         [facility] [address]  [place] [address]  Phone: [phone]  Fax: [phone] August 4, 2017     Patient: Josefina Martin   YOB: 1975   Date of Visit: 8/4/2017       To Whom It May Concern:  Ms. Martin has been under our care at the clinic and was seen in clinic on 07/24/17 and 08/04/17.     If you have any questions or concerns, please don't hesitate to call.    Sincerely,        Wan Lim MD

## 2017-08-04 NOTE — ED NOTES
Pt updated on plan of care. Verbalizes understanding. Denies needs/complaints at this time. NAD noted.

## 2017-08-04 NOTE — ED PROVIDER NOTES
Encounter Date: 8/4/2017       History     Chief Complaint   Patient presents with    Hypotension     was sent by Dr Colon for hyptoension and AKD so patient was taken off BP med with last dose taken this morning, patient complains of cough for one week, generalized weakness     Patient is a 42-year-old female who was referred here from the [facility] clinic.  Patient was seen there for follow-up.  She has been having diarrhea.  She is also had a cough productive for greenish sputum.  She was noted to be hypotensive in the clinic with lab work showing acute kidney injury.  She was recently taken off of Lasix and lisinopril.      The history is provided by the patient.     Review of patient's allergies indicates:  No Known Allergies  Past Medical History:   Diagnosis Date    CHF (congestive heart failure)     Diabetes mellitus     Hypertension     Stroke      Past Surgical History:   Procedure Laterality Date    CHOLECYSTECTOMY  2013    history of cholelithiasis    TUBAL LIGATION       Family History   Problem Relation Age of Onset    Hypertension Mother     No Known Problems Father     No Known Problems Sister     No Known Problems Daughter     Diabetes Son 14     Takes 2 insulins and metformin use to be bigger wally    Stroke Maternal Uncle 48    Anuerysm Maternal Grandmother     No Known Problems Sister     No Known Problems Daughter     No Known Problems Son      Social History   Substance Use Topics    Smoking status: Current Every Day Smoker     Packs/day: 0.00     Years: 18.00     Types: Cigarettes    Smokeless tobacco: Never Used      Comment: 1 pack/week    Alcohol use Yes      Comment: social 1/month     Review of Systems   Constitutional: Negative for chills and fever.   Respiratory: Positive for cough. Negative for shortness of breath.    Cardiovascular: Negative for chest pain.   Gastrointestinal: Positive for diarrhea. Negative for vomiting.   Neurological: Negative for  syncope and light-headedness.   All other systems reviewed and are negative.      Physical Exam     Initial Vitals [08/04/17 1722]   BP Pulse Resp Temp SpO2   112/71 85 20 97.8 °F (36.6 °C) 99 %      MAP       84.67         Physical Exam    Nursing note and vitals reviewed.  Constitutional: She appears well-developed and well-nourished.   HENT:   Head: Normocephalic and atraumatic.   Eyes: Conjunctivae and EOM are normal. Pupils are equal, round, and reactive to light.   Neck: Normal range of motion. Neck supple.   Cardiovascular: Normal rate and regular rhythm.   Pulmonary/Chest: Breath sounds normal.   Abdominal: Soft. There is no tenderness. There is no rebound and no guarding.   Musculoskeletal: Normal range of motion.   Neurological: She is alert and oriented to person, place, and time. She has normal strength.   Skin: Skin is warm and dry.   Psychiatric: She has a normal mood and affect.         ED Course   Procedures  Labs Reviewed - No data to display          Medical Decision Making:   ED Management:  42-year-old female sent here from her primary physician's office for evaluation of hypotension and possible dehydration.  Patient was also noted elevated creatinine of 2.2.  After IV hydration here in the ED with a creatinine was 1.9.  Vital signs are stable and the patient had no complaints.  Discharged in stable condition to follow-up with her primary physician as soon as able for recheck.                   ED Course     Clinical Impression:   The encounter diagnosis was Hypotension.                           Isauro Hooker MD  08/06/17 0721

## 2017-08-05 LAB — POCT GLUCOSE: 160 MG/DL (ref 70–110)

## 2017-08-05 NOTE — DISCHARGE INSTRUCTIONS
1) PLEASE FOLLOW UP WITH YOUR PRIMARY CARE PROVIDER IN 3 DAYS IF YOU ARE NOT SIGNIFICANTLY IMPROVED  2) PLEASE TAKE YOUR MEDICATIONS AS PRESCRIBED  3) RETURN TO THE ED FOR WORSENING SYMPTOMS

## 2017-08-11 ENCOUNTER — OFFICE VISIT (OUTPATIENT)
Dept: FAMILY MEDICINE | Facility: HOSPITAL | Age: 42
End: 2017-08-11
Attending: FAMILY MEDICINE
Payer: MEDICAID

## 2017-08-11 VITALS
HEART RATE: 72 BPM | DIASTOLIC BLOOD PRESSURE: 92 MMHG | BODY MASS INDEX: 28.8 KG/M2 | WEIGHT: 194.44 LBS | SYSTOLIC BLOOD PRESSURE: 130 MMHG | HEIGHT: 69 IN

## 2017-08-11 DIAGNOSIS — R55 SYNCOPE, UNSPECIFIED SYNCOPE TYPE: Primary | ICD-10-CM

## 2017-08-11 PROCEDURE — 99214 OFFICE O/P EST MOD 30 MIN: CPT | Performed by: FAMILY MEDICINE

## 2017-08-11 NOTE — LETTER
August 11, 2017                 [facility] [address], [address]  [facility] [address]  Phone: [phone]  Fax: [phone]   August 11, 2017     Patient: Josefina Martin   YOB: 1975   Date of Visit: 8/11/2017       To Whom it May Concern:    Josefina Martin was seen in my clinic on 8/4/2017 and is under my medical care.  Please excuse her from work for the following dates:  8/4/17 - 8/21/17.    If you have any questions or concerns, please don't hesitate to call.    Sincerely,         Kris Robertson MD

## 2017-08-15 NOTE — PROGRESS NOTES
Subjective:       Patient ID: Josefina Martin is a 42 y.o. female.    Chief Complaint: Follow-up and Results    Patient presents today for follow up of ED visit where she was evaluated for hypotension and possible dehydration.  Patient was noted to have elevated creatinine of 2.2.  After IV hydration in the ED creatinine improved to 1.9.  Patient is here today for follow up lab work.  She has complaints of feeling light headed with near syncope.        Review of Systems   Constitutional: Negative for chills and fever.   Respiratory: Negative for shortness of breath.    Cardiovascular: Negative for chest pain.   Gastrointestinal: Negative for abdominal pain, nausea and vomiting.   Neurological: Positive for syncope and light-headedness. Negative for headaches.       Objective:      Vitals:    08/11/17 1106   BP: (!) 130/92   Pulse: 72     Physical Exam   Constitutional: She is oriented to person, place, and time. She appears well-developed and well-nourished. No distress.   HENT:   Head: Normocephalic and atraumatic.   Right Ear: External ear normal.   Left Ear: External ear normal.   Nose: Nose normal.   Mouth/Throat: Oropharynx is clear and moist. No oropharyngeal exudate.   Eyes: Conjunctivae and EOM are normal. Pupils are equal, round, and reactive to light. Right eye exhibits no discharge. Left eye exhibits no discharge. No scleral icterus.   Neck: Normal range of motion. No JVD present. No thyromegaly present.   Cardiovascular: Normal rate, regular rhythm, normal heart sounds and intact distal pulses.  Exam reveals no gallop and no friction rub.    No murmur heard.  Pulmonary/Chest: Effort normal and breath sounds normal. No respiratory distress. She has no wheezes. She has no rales. She exhibits no tenderness.   Abdominal: Soft. Bowel sounds are normal. She exhibits no distension and no mass. There is no tenderness. There is no rebound and no guarding. No hernia.   Musculoskeletal: Normal range of motion.    Neurological: She is alert and oriented to person, place, and time. No cranial nerve deficit.   Skin: Skin is warm and dry. She is not diaphoretic.   Psychiatric: She has a normal mood and affect. Her behavior is normal.   Vitals reviewed.      Assessment:       1. Syncope, unspecified syncope type        Plan:       Syncope, unspecified syncope type  -     CBC auto differential; Future; Expected date: 08/11/2017  -     Comprehensive metabolic panel; Future; Expected date: 08/11/2017  -     Magnesium; Future; Expected date: 08/11/2017  -     Phosphorus; Future; Expected date: 08/11/2017  -     Holter monitor - 48 hour; Future  -     Vitamin D; Future; Expected date: 08/11/2017  -     PTH, intact; Future; Expected date: 08/11/2017  -     TSH; Future; Expected date: 08/11/2017      Return in about 1 week (around 8/18/2017).

## 2017-08-18 ENCOUNTER — OFFICE VISIT (OUTPATIENT)
Dept: FAMILY MEDICINE | Facility: HOSPITAL | Age: 42
End: 2017-08-18
Payer: MEDICAID

## 2017-08-18 VITALS
DIASTOLIC BLOOD PRESSURE: 103 MMHG | SYSTOLIC BLOOD PRESSURE: 167 MMHG | BODY MASS INDEX: 29.73 KG/M2 | HEART RATE: 71 BPM | HEIGHT: 68 IN | WEIGHT: 196.19 LBS

## 2017-08-18 DIAGNOSIS — I10 HTN (HYPERTENSION), MALIGNANT: Primary | ICD-10-CM

## 2017-08-18 DIAGNOSIS — I50.20 SYSTOLIC HEART FAILURE, UNSPECIFIED HEART FAILURE CHRONICITY: ICD-10-CM

## 2017-08-18 PROCEDURE — 99213 OFFICE O/P EST LOW 20 MIN: CPT | Performed by: FAMILY MEDICINE

## 2017-08-18 RX ORDER — LISINOPRIL 20 MG/1
20 TABLET ORAL DAILY
Qty: 90 TABLET | Refills: 3 | Status: SHIPPED | OUTPATIENT
Start: 2017-08-18 | End: 2018-01-26

## 2017-08-18 NOTE — PROGRESS NOTES
Subjective:       Patient ID: Josefina Martin is a 42 y.o. female.    Chief Complaint: Follow-up    Pt presents for followup visit to check BP after having her lisinopril discontinued due to hypotension leading to YOLA. Patient YOLA has resolved. The patient notes occasional lightheadedness when standing up quickly but otherwise has been doing well. The patient states that she has not seen her cardiologist in over 1 year and chart review shows history of MI and CHF.      Review of Systems   Constitutional: Negative for chills and fever.   HENT: Negative for sore throat.    Eyes: Negative for visual disturbance.   Respiratory: Negative for shortness of breath.    Cardiovascular: Negative for chest pain.   Gastrointestinal: Negative for abdominal pain.   Endocrine: Negative for polyuria.   Genitourinary: Negative for dysuria.   Musculoskeletal: Negative for back pain.   Skin: Negative for color change.   Neurological: Negative for headaches.   Psychiatric/Behavioral: Negative for agitation and confusion.       Objective:      Vitals:    08/18/17 1041   BP: (!) 167/103   Pulse: 71     Physical Exam   Constitutional: She is oriented to person, place, and time. She appears well-developed and well-nourished.   HENT:   Head: Normocephalic and atraumatic.   Eyes: EOM are normal. Pupils are equal, round, and reactive to light.   Neck: Normal range of motion. Neck supple.   Cardiovascular: Normal rate, regular rhythm, normal heart sounds and intact distal pulses.    Pulmonary/Chest: Effort normal and breath sounds normal.   Abdominal: Soft. She exhibits no distension.   Musculoskeletal: Normal range of motion.   Neurological: She is alert and oriented to person, place, and time.   Skin: Skin is warm and dry.   Psychiatric: She has a normal mood and affect. Her behavior is normal. Judgment and thought content normal.   Nursing note and vitals reviewed.      Assessment:       1. HTN (hypertension), malignant    2. Systolic  heart failure, unspecified heart failure chronicity        Plan:       HTN (hypertension), malignant  -     lisinopril (PRINIVIL,ZESTRIL) 20 MG tablet; Take 1 tablet (20 mg total) by mouth once daily.  Dispense: 90 tablet; Refill: 3    Systolic heart failure, unspecified heart failure chronicity  - EF improved from 30 to 60  - established patient an appointment with cards for f/u medication adjustment as needed with now improved EF    No Follow-up on file.

## 2017-08-28 NOTE — PROGRESS NOTES
I assume primary medical responsibility for this patient, I have reviewed the case history, findings, diagnosis and treatment plan with the resident and agree that the care is reasonable and necessary. This service has been performed by a resident without the presence of a teaching physician under the primary care exception  Merly Uriarte  8/28/2017

## 2017-11-29 RX ORDER — LORATADINE 10 MG/1
TABLET ORAL
Qty: 90 TABLET | Refills: 0 | Status: SHIPPED | OUTPATIENT
Start: 2017-11-29 | End: 2017-12-01 | Stop reason: CLARIF

## 2017-12-01 ENCOUNTER — HOSPITAL ENCOUNTER (EMERGENCY)
Facility: HOSPITAL | Age: 42
Discharge: HOME | End: 2017-12-01
Attending: EMERGENCY MEDICINE
Payer: MEDICAID

## 2017-12-01 VITALS
HEART RATE: 69 BPM | TEMPERATURE: 98 F | SYSTOLIC BLOOD PRESSURE: 110 MMHG | RESPIRATION RATE: 12 BRPM | HEIGHT: 68 IN | WEIGHT: 192 LBS | DIASTOLIC BLOOD PRESSURE: 62 MMHG | BODY MASS INDEX: 29.1 KG/M2 | OXYGEN SATURATION: 100 %

## 2017-12-01 DIAGNOSIS — R51.9 SINUS HEADACHE: ICD-10-CM

## 2017-12-01 DIAGNOSIS — B34.9 ACUTE VIRAL SYNDROME: Primary | ICD-10-CM

## 2017-12-01 LAB
B-HCG UR QL: NEGATIVE
CTP QC/QA: YES
FLUAV AG SPEC QL IA: NEGATIVE
FLUBV AG SPEC QL IA: NEGATIVE
SPECIMEN SOURCE: NORMAL

## 2017-12-01 PROCEDURE — 87400 INFLUENZA A/B EACH AG IA: CPT

## 2017-12-01 PROCEDURE — 99283 EMERGENCY DEPT VISIT LOW MDM: CPT

## 2017-12-01 PROCEDURE — 25000003 PHARM REV CODE 250: Performed by: NURSE PRACTITIONER

## 2017-12-01 RX ORDER — FLUTICASONE PROPIONATE 50 MCG
1 SPRAY, SUSPENSION (ML) NASAL 2 TIMES DAILY PRN
Qty: 15 G | Refills: 0 | Status: SHIPPED | OUTPATIENT
Start: 2017-12-01 | End: 2018-03-27

## 2017-12-01 RX ORDER — BENZONATATE 100 MG/1
100 CAPSULE ORAL 3 TIMES DAILY PRN
Qty: 20 CAPSULE | Refills: 0 | Status: SHIPPED | OUTPATIENT
Start: 2017-12-01 | End: 2017-12-11

## 2017-12-01 RX ORDER — ACETAMINOPHEN 325 MG/1
650 TABLET ORAL
Status: COMPLETED | OUTPATIENT
Start: 2017-12-01 | End: 2017-12-01

## 2017-12-01 RX ORDER — CETIRIZINE HYDROCHLORIDE 10 MG/1
10 TABLET ORAL DAILY
Qty: 30 TABLET | Refills: 0 | Status: SHIPPED | OUTPATIENT
Start: 2017-12-01 | End: 2018-03-27

## 2017-12-01 RX ADMIN — ACETAMINOPHEN 650 MG: 325 TABLET ORAL at 05:12

## 2017-12-01 NOTE — ED PROVIDER NOTES
Encounter Date: 12/1/2017       History     Chief Complaint   Patient presents with    Generalized Body Aches     since yesterday with generalized headache, nasal congestion & dry cough.      42-year-old female with past medical history of diabetes, hypertension, CHF, and status post CVA is here for nasal congestion with frontal headache and dry cough since yesterday.  No known fever, but the patient does report chills.  No known sick contacts.  She is tolerating by mouth fluids without difficulty.  She denies chest pain and shortness of breath.  No vomiting or diarrhea.  No recent antibiotic use.  She has tried nothing for her symptoms at home.  Nothing makes the symptoms worse or better.  No neck pain/stiffness, visual changes, dyspnea, or rash.  This is the extent of the patient's complaints at this time.      The history is provided by the patient.   General Illness    The current episode started yesterday. The problem occurs continuously. The problem has been unchanged. The pain is at a severity of 7/10. Nothing relieves the symptoms. Nothing aggravates the symptoms. Associated symptoms include congestion, headaches, rhinorrhea, sore throat, muscle aches, cough and URI. Pertinent negatives include no fever, no abdominal pain, no diarrhea, no vomiting, no ear discharge, no ear pain, no mouth sores, no neck pain, no neck stiffness, no shortness of breath, no wheezing, no rash, no discharge and no eye redness. The cough has no precipitants. The cough is dry. Nothing relieves the cough. There is nasal congestion. The congestion does not interfere with sleep. The congestion does not interfere with eating or drinking. The rhinorrhea has been occurring continuously. The nasal discharge has a clear appearance. The throat pain is at a severity of 6/10. Neither side is more painful than the other. The sore throat is characterized by pain only. She has received no recent medical care.     Review of patient's allergies  indicates:  No Known Allergies  Past Medical History:   Diagnosis Date    CHF (congestive heart failure)     Diabetes mellitus     Hypertension     Stroke      Past Surgical History:   Procedure Laterality Date    CHOLECYSTECTOMY  2013    history of cholelithiasis    TUBAL LIGATION       Family History   Problem Relation Age of Onset    Hypertension Mother     No Known Problems Father     No Known Problems Sister     No Known Problems Daughter     Diabetes Son 14     Takes 2 insulins and metformin use to be bigger wally    Stroke Maternal Uncle 48    Anuerysm Maternal Grandmother     No Known Problems Sister     No Known Problems Daughter     No Known Problems Son      Social History   Substance Use Topics    Smoking status: Current Every Day Smoker     Packs/day: 0.00     Years: 18.00     Types: Cigarettes    Smokeless tobacco: Never Used      Comment: 1 pack/week    Alcohol use Yes      Comment: social 1/month     Review of Systems   Constitutional: Positive for chills. Negative for fever.   HENT: Positive for congestion, postnasal drip, rhinorrhea, sinus pressure and sore throat. Negative for drooling, ear discharge, ear pain, facial swelling, mouth sores, trouble swallowing and voice change.    Eyes: Negative for discharge, redness and visual disturbance.   Respiratory: Positive for cough. Negative for shortness of breath and wheezing.    Cardiovascular: Negative for chest pain and palpitations.   Gastrointestinal: Negative for abdominal pain, diarrhea and vomiting.   Musculoskeletal: Negative for neck pain and neck stiffness.   Skin: Negative for rash.   Allergic/Immunologic: Negative for immunocompromised state.   Neurological: Positive for headaches. Negative for dizziness, weakness, light-headedness and numbness.   Hematological: Does not bruise/bleed easily.   Psychiatric/Behavioral: Negative for confusion.       Physical Exam     Initial Vitals [12/01/17 1611]   BP Pulse Resp Temp SpO2    106/66 83 20 97.8 °F (36.6 °C) 99 %      MAP       79.33         Physical Exam    Nursing note and vitals reviewed.  Constitutional: Vital signs are normal. She appears well-developed and well-nourished. She is active and cooperative. She is easily aroused.  Non-toxic appearance. She does not have a sickly appearance. She appears ill. No distress.   HENT:   Head: Normocephalic and atraumatic.   Right Ear: External ear normal.   Left Ear: External ear normal.   Nose: Mucosal edema, rhinorrhea and sinus tenderness present. No nasal deformity. Right sinus exhibits maxillary sinus tenderness and frontal sinus tenderness. Left sinus exhibits maxillary sinus tenderness and frontal sinus tenderness.   Mouth/Throat: Uvula is midline, oropharynx is clear and moist and mucous membranes are normal.   Postnasal drip.   Eyes: Conjunctivae and EOM are normal. Pupils are equal, round, and reactive to light.   Neck: Normal range of motion and full passive range of motion without pain. Neck supple.   Cardiovascular: Normal rate, regular rhythm and normal heart sounds.   Pulmonary/Chest: Effort normal and breath sounds normal. No accessory muscle usage. She has no wheezes.   Abdominal: Soft. Normal appearance and bowel sounds are normal. She exhibits no distension. There is no tenderness. There is no rigidity, no rebound, no guarding and no CVA tenderness.   Neurological: She is alert, oriented to person, place, and time and easily aroused. She has normal strength. GCS eye subscore is 4. GCS verbal subscore is 5. GCS motor subscore is 6.   Skin: Skin is warm, dry and intact. No bruising and no rash noted.   Psychiatric: She has a normal mood and affect. Her speech is normal and behavior is normal. Judgment and thought content normal. Cognition and memory are normal.         ED Course   Procedures  Labs Reviewed   INFLUENZA A AND B ANTIGEN             Medical Decision Making:   Initial Assessment:   42-year-old female here for URI  symptoms since yesterday.  No fever, but she does report chills.  She is tolerating by mouth fluids without difficulty.  She denies chest pain and shortness of breath.  The patient appears ill but nontoxic.  Vitals stable.  Frontal sinus tenderness bilaterally.  Nasal rhinorrhea and mucosal edema.  Postnasal drip.  Mucous membranes moist.  Neck normal.  Heart rate regular rate and rhythm without murmur.  Lungs clear to auscultation and equal bilaterally.  Differential Diagnosis:   URI, influenza, rhinitis, viral syndrome  Clinical Tests:   Lab Tests: Ordered and Reviewed  ED Management:  Labs, Tylenol  There is no indication for imaging at this time. Patient has no neck stiffness/meningismus, fever,elevated blood pressure, confusion, vision loss, temporal artery tenderness, rash, vomiting, photophobia or thunderclap onset to suggest pseudotumor cerebri, meningitis, tumor, ICH, temporal arteritis, or encephalitis. I feel the headache is due to sinus pain.    Pt's symptoms are due to viral syndrome.  Pt is tolerating PO fluids without difficulty.  I advised increased water intake and use of tyelnol as directed on the labeling for fever/discomfort.  I advised f/u with PCP on Monday, and return to the ED if condition changes, progresses, or if there are any concerns.  I did caution the patient that any virus is contagious, and she should not be around her new grandson at this time.  Pt verbalized understanding, compliance, and agreement with the treatment plan.    RX Flonase, zyrtec, and tessalon              Attending Attestation:     Physician Attestation Statement for NP/PA:   I have conducted a face to face encounter with this patient in addition to the NP/PA, due to NP/PA Request    Other NP/PA Attestation Additions:    History of Present Illness: Agree; 42-year-old female presents emergency department complaining of nasal congestion, frontal aching headache that is constant without exacerbating alleviating  factors, dry cough.  Onset yesterday.  Also reports generalized body aches.  No other symptoms reported.   Physical Exam: agree   Medical Decision Making: Agree; patient given symptomatic management and is feeling much better.  Instructed on home management as well as follow-up with primary care physician, prescriptions for Flonase, Zyrtec, Tessalon, reasons to return to the emergency room.                 ED Course      Clinical Impression:   The primary encounter diagnosis was Acute viral syndrome. A diagnosis of Sinus headache was also pertinent to this visit.                           JUN Payne  12/01/17 0571       Agusto Gutiérrez MD  12/12/17 0642

## 2017-12-02 NOTE — DISCHARGE INSTRUCTIONS
Increase your water intake.  Use tylenol as directed on the labeling for fever/discomfort.  Return to the ED if condition changes, progresses, or if you have any concerns.

## 2018-01-22 DIAGNOSIS — I10 ESSENTIAL HYPERTENSION: ICD-10-CM

## 2018-01-22 DIAGNOSIS — I50.20 SYSTOLIC CONGESTIVE HEART FAILURE, UNSPECIFIED CONGESTIVE HEART FAILURE CHRONICITY: ICD-10-CM

## 2018-01-24 RX ORDER — CHLORTHALIDONE 25 MG/1
TABLET ORAL
Qty: 90 TABLET | Refills: 0 | Status: SHIPPED | OUTPATIENT
Start: 2018-01-24 | End: 2018-01-26

## 2018-01-24 RX ORDER — AMLODIPINE BESYLATE 10 MG/1
TABLET ORAL
Qty: 90 TABLET | Refills: 0 | Status: SHIPPED | OUTPATIENT
Start: 2018-01-24 | End: 2018-03-26 | Stop reason: SDUPTHER

## 2018-01-24 RX ORDER — FUROSEMIDE 20 MG/1
TABLET ORAL
Qty: 90 TABLET | Refills: 0 | Status: SHIPPED | OUTPATIENT
Start: 2018-01-24 | End: 2018-03-27 | Stop reason: SDUPTHER

## 2018-01-24 RX ORDER — CARVEDILOL 25 MG/1
TABLET ORAL
Qty: 180 TABLET | Refills: 0 | Status: SHIPPED | OUTPATIENT
Start: 2018-01-24 | End: 2018-03-26 | Stop reason: SDUPTHER

## 2018-01-24 RX ORDER — SPIRONOLACTONE 50 MG/1
TABLET, FILM COATED ORAL
Qty: 90 TABLET | Refills: 0 | Status: SHIPPED | OUTPATIENT
Start: 2018-01-24 | End: 2018-03-26 | Stop reason: SDUPTHER

## 2018-01-26 ENCOUNTER — HOSPITAL ENCOUNTER (EMERGENCY)
Facility: HOSPITAL | Age: 43
Discharge: HOME | End: 2018-01-26
Attending: EMERGENCY MEDICINE
Payer: MEDICAID

## 2018-01-26 VITALS
SYSTOLIC BLOOD PRESSURE: 135 MMHG | WEIGHT: 228 LBS | RESPIRATION RATE: 18 BRPM | OXYGEN SATURATION: 99 % | BODY MASS INDEX: 34.56 KG/M2 | HEART RATE: 90 BPM | DIASTOLIC BLOOD PRESSURE: 67 MMHG | HEIGHT: 68 IN | TEMPERATURE: 99 F

## 2018-01-26 DIAGNOSIS — Z72.0 TOBACCO USE: ICD-10-CM

## 2018-01-26 DIAGNOSIS — R51.9 ACUTE NONINTRACTABLE HEADACHE, UNSPECIFIED HEADACHE TYPE: Primary | ICD-10-CM

## 2018-01-26 DIAGNOSIS — E11.9 TYPE 2 DIABETES MELLITUS WITHOUT COMPLICATION, WITHOUT LONG-TERM CURRENT USE OF INSULIN: ICD-10-CM

## 2018-01-26 DIAGNOSIS — I10 HTN (HYPERTENSION), MALIGNANT: ICD-10-CM

## 2018-01-26 DIAGNOSIS — I10 ESSENTIAL HYPERTENSION: ICD-10-CM

## 2018-01-26 LAB
ANION GAP SERPL CALC-SCNC: 10 MMOL/L
BUN SERPL-MCNC: 15 MG/DL
CALCIUM SERPL-MCNC: 8.8 MG/DL
CHLORIDE SERPL-SCNC: 105 MMOL/L
CO2 SERPL-SCNC: 25 MMOL/L
CREAT SERPL-MCNC: 1.16 MG/DL
EST. GFR  (AFRICAN AMERICAN): >60 ML/MIN/1.73 M^2
EST. GFR  (NON AFRICAN AMERICAN): 57.8 ML/MIN/1.73 M^2
GLUCOSE SERPL-MCNC: 124 MG/DL
POCT GLUCOSE: 138 MG/DL (ref 70–110)
POTASSIUM SERPL-SCNC: 3.4 MMOL/L
SODIUM SERPL-SCNC: 140 MMOL/L

## 2018-01-26 PROCEDURE — 99283 EMERGENCY DEPT VISIT LOW MDM: CPT | Mod: 25

## 2018-01-26 PROCEDURE — 96374 THER/PROPH/DIAG INJ IV PUSH: CPT

## 2018-01-26 PROCEDURE — 80048 BASIC METABOLIC PNL TOTAL CA: CPT

## 2018-01-26 PROCEDURE — 63600175 PHARM REV CODE 636 W HCPCS: Performed by: EMERGENCY MEDICINE

## 2018-01-26 PROCEDURE — 82962 GLUCOSE BLOOD TEST: CPT

## 2018-01-26 RX ORDER — METOCLOPRAMIDE HYDROCHLORIDE 5 MG/ML
10 INJECTION INTRAMUSCULAR; INTRAVENOUS
Status: COMPLETED | OUTPATIENT
Start: 2018-01-26 | End: 2018-01-26

## 2018-01-26 RX ORDER — CHLORTHALIDONE 25 MG/1
25 TABLET ORAL DAILY
Qty: 20 TABLET | Refills: 0 | Status: SHIPPED | OUTPATIENT
Start: 2018-01-26 | End: 2019-06-07 | Stop reason: SDUPTHER

## 2018-01-26 RX ORDER — LISINOPRIL 20 MG/1
20 TABLET ORAL DAILY
Qty: 20 TABLET | Refills: 0 | Status: SHIPPED | OUTPATIENT
Start: 2018-01-26 | End: 2018-03-26 | Stop reason: SDUPTHER

## 2018-01-26 RX ADMIN — METOCLOPRAMIDE 10 MG: 5 INJECTION, SOLUTION INTRAMUSCULAR; INTRAVENOUS at 07:01

## 2018-01-27 NOTE — ED PROVIDER NOTES
Encounter Date: 1/26/2018       History     Chief Complaint   Patient presents with    Headache     Pt states has had headache to left side of head x 2 days, denies n/v, denies photosensitivity, denies dizziness.  Took aleve at 1600.     CC Unilateral headache for 2 days, ran out of medications 3 days ago    HPI: 41 yo female, htn DM type ii; ran out of meds 3 days ago, left sided unilateral pulsation ha for 2 days    Chronic blurred vision;  No thunderclap, no acute visual chagne, no N/V      The history is provided by the patient.   Headache    This is a new problem. Episode onset: 2 days. The problem occurs constantly. The problem has been unchanged. The pain is located in the left unilateral region. The pain does not radiate. The quality of the pain is described as aching, pulsating and localized. Pain scale: moderate-severe. Associated symptoms comments: Ran out of hypertensive meds and diabetes meds 3 days ago. Nothing aggravates the symptoms.     Review of patient's allergies indicates:  No Known Allergies  Past Medical History:   Diagnosis Date    CHF (congestive heart failure)     Diabetes mellitus     Hypertension     Stroke      Past Surgical History:   Procedure Laterality Date    CHOLECYSTECTOMY  2013    history of cholelithiasis    TUBAL LIGATION       Family History   Problem Relation Age of Onset    Hypertension Mother     No Known Problems Father     No Known Problems Sister     No Known Problems Daughter     Diabetes Son 14     Takes 2 insulins and metformin use to be bigger wally    Stroke Maternal Uncle 48    Anuerysm Maternal Grandmother     No Known Problems Sister     No Known Problems Daughter     No Known Problems Son      Social History   Substance Use Topics    Smoking status: Current Every Day Smoker     Packs/day: 0.00     Years: 18.00     Types: Cigarettes    Smokeless tobacco: Never Used      Comment: 1 pack/week    Alcohol use Yes      Comment: social 1/month      Review of Systems   Eyes: Positive for visual disturbance.   Neurological: Positive for headaches.   All other systems reviewed and are negative.      Physical Exam     Initial Vitals [01/26/18 1817]   BP Pulse Resp Temp SpO2   (!) 139/90 96 18 98.1 °F (36.7 °C) 98 %      MAP       106.33         Physical Exam    Nursing note and vitals reviewed.  Constitutional: She appears well-developed and well-nourished. She is not diaphoretic. No distress.   HENT:   Head: Normocephalic.   Right Ear: External ear normal.   Left Ear: External ear normal.   Nose: Nose normal.   Mouth/Throat: Oropharynx is clear and moist. No oropharyngeal exudate.   Eyes: EOM are normal. Pupils are equal, round, and reactive to light. Right eye exhibits no discharge. Left eye exhibits no discharge. No scleral icterus.   Shady negative diapoter, ? bilat cataracts, difficult to see fundi vessels   Neck: Normal range of motion. Neck supple. No thyromegaly present.   Cardiovascular: Normal rate, regular rhythm and normal heart sounds.   Abdominal: Soft.   Musculoskeletal: Normal range of motion. She exhibits no edema or tenderness.   Neurological: She is alert and oriented to person, place, and time. She has normal strength and normal reflexes. She displays normal reflexes. No cranial nerve deficit or sensory deficit.   Skin: Skin is warm and dry. Capillary refill takes less than 2 seconds. No rash and no abscess noted. No erythema. No pallor.   Psychiatric: She has a normal mood and affect.         ED Course   Procedures  Labs Reviewed   BASIC METABOLIC PANEL   POCT GLUCOSE MONITORING CONTINUOUS             Medical Decision Making:   Initial Assessment:   Unilateral ha, DM typi ii, htn (normotensive in ED)  Clinical Tests:   Lab Tests: Ordered and Reviewed       <> Summary of Lab: poct g;;ucose 138  Chem  Gluicose 124  ED Management:  Plan: check BMP; Rx with Reglan, refill one week of DM and htn meds    Additional MDM:   Smoking Cessation: The  patient was counseled on tobacco related  health complications.                 ED Course    HA improved  Reviewed labs with patient  Clinical Impression:   The primary encounter diagnosis was Acute nonintractable headache, unspecified headache type. Diagnoses of Type 2 diabetes mellitus without complication, without long-term current use of insulin, Essential hypertension, Tobacco use, and HTN (hypertension), malignant were also pertinent to this visit.                           Lucho Cerda MD  01/26/18 2010

## 2018-03-10 DIAGNOSIS — E11.9 TYPE 2 DIABETES MELLITUS WITHOUT COMPLICATION, WITHOUT LONG-TERM CURRENT USE OF INSULIN: ICD-10-CM

## 2018-03-12 RX ORDER — METFORMIN HYDROCHLORIDE 500 MG/1
TABLET, EXTENDED RELEASE ORAL
Qty: 90 TABLET | Refills: 0 | Status: SHIPPED | OUTPATIENT
Start: 2018-03-12 | End: 2018-03-26 | Stop reason: SDUPTHER

## 2018-03-13 ENCOUNTER — TELEPHONE (OUTPATIENT)
Dept: SMOKING CESSATION | Facility: CLINIC | Age: 43
End: 2018-03-13

## 2018-03-26 ENCOUNTER — OFFICE VISIT (OUTPATIENT)
Dept: FAMILY MEDICINE | Facility: HOSPITAL | Age: 43
End: 2018-03-26
Attending: FAMILY MEDICINE
Payer: MEDICAID

## 2018-03-26 VITALS
BODY MASS INDEX: 28.37 KG/M2 | RESPIRATION RATE: 20 BRPM | HEART RATE: 70 BPM | WEIGHT: 187.19 LBS | DIASTOLIC BLOOD PRESSURE: 75 MMHG | HEIGHT: 68 IN | SYSTOLIC BLOOD PRESSURE: 105 MMHG

## 2018-03-26 DIAGNOSIS — F32.A DEPRESSION, UNSPECIFIED DEPRESSION TYPE: Primary | ICD-10-CM

## 2018-03-26 DIAGNOSIS — I10 HTN (HYPERTENSION), MALIGNANT: ICD-10-CM

## 2018-03-26 DIAGNOSIS — M54.30 SCIATIC LEG PAIN: ICD-10-CM

## 2018-03-26 DIAGNOSIS — E78.5 HYPERLIPIDEMIA, UNSPECIFIED HYPERLIPIDEMIA TYPE: ICD-10-CM

## 2018-03-26 DIAGNOSIS — E11.9 TYPE 2 DIABETES MELLITUS WITHOUT COMPLICATION, WITHOUT LONG-TERM CURRENT USE OF INSULIN: ICD-10-CM

## 2018-03-26 DIAGNOSIS — I50.20 SYSTOLIC CONGESTIVE HEART FAILURE, UNSPECIFIED CONGESTIVE HEART FAILURE CHRONICITY: ICD-10-CM

## 2018-03-26 PROCEDURE — 99214 OFFICE O/P EST MOD 30 MIN: CPT | Performed by: FAMILY MEDICINE

## 2018-03-26 RX ORDER — LISINOPRIL 20 MG/1
20 TABLET ORAL DAILY
Qty: 20 TABLET | Refills: 0 | Status: SHIPPED | OUTPATIENT
Start: 2018-03-26 | End: 2018-03-26 | Stop reason: SDUPTHER

## 2018-03-26 RX ORDER — NAPROXEN SODIUM 220 MG/1
81 TABLET, FILM COATED ORAL DAILY
Qty: 90 TABLET | Refills: 3 | Status: SHIPPED | OUTPATIENT
Start: 2018-03-26 | End: 2020-01-13 | Stop reason: SDUPTHER

## 2018-03-26 RX ORDER — AMLODIPINE BESYLATE 10 MG/1
10 TABLET ORAL DAILY
Qty: 90 TABLET | Refills: 3 | Status: SHIPPED | OUTPATIENT
Start: 2018-03-26 | End: 2019-06-07 | Stop reason: SDUPTHER

## 2018-03-26 RX ORDER — CARVEDILOL 25 MG/1
25 TABLET ORAL 2 TIMES DAILY WITH MEALS
Qty: 180 TABLET | Refills: 3 | Status: SHIPPED | OUTPATIENT
Start: 2018-03-26 | End: 2019-06-07 | Stop reason: SDUPTHER

## 2018-03-26 RX ORDER — LISINOPRIL 20 MG/1
20 TABLET ORAL DAILY
Qty: 90 TABLET | Refills: 3 | Status: SHIPPED | OUTPATIENT
Start: 2018-03-26 | End: 2018-11-28

## 2018-03-26 RX ORDER — VENLAFAXINE HYDROCHLORIDE 37.5 MG/1
37.5 CAPSULE, EXTENDED RELEASE ORAL DAILY
Qty: 30 CAPSULE | Refills: 0 | Status: SHIPPED | OUTPATIENT
Start: 2018-03-26 | End: 2018-04-09 | Stop reason: SDUPTHER

## 2018-03-26 RX ORDER — GABAPENTIN 100 MG/1
100 CAPSULE ORAL 3 TIMES DAILY
Qty: 90 CAPSULE | Refills: 0 | Status: SHIPPED | OUTPATIENT
Start: 2018-03-26 | End: 2018-03-27

## 2018-03-26 RX ORDER — SPIRONOLACTONE 50 MG/1
50 TABLET, FILM COATED ORAL DAILY
Qty: 90 TABLET | Refills: 3 | Status: SHIPPED | OUTPATIENT
Start: 2018-03-26 | End: 2018-03-27

## 2018-03-26 RX ORDER — ATORVASTATIN CALCIUM 40 MG/1
40 TABLET, FILM COATED ORAL DAILY
Qty: 90 TABLET | Refills: 3 | Status: SHIPPED | OUTPATIENT
Start: 2018-03-26 | End: 2019-06-07 | Stop reason: SDUPTHER

## 2018-03-26 RX ORDER — LISINOPRIL 20 MG/1
20 TABLET ORAL DAILY
Qty: 90 TABLET | Refills: 3 | Status: SHIPPED | OUTPATIENT
Start: 2018-03-26 | End: 2018-03-26 | Stop reason: SDUPTHER

## 2018-03-26 RX ORDER — SERTRALINE HYDROCHLORIDE 25 MG/1
25 TABLET, FILM COATED ORAL DAILY
Qty: 30 TABLET | Refills: 0 | Status: SHIPPED | OUTPATIENT
Start: 2018-03-26 | End: 2018-03-26

## 2018-03-26 RX ORDER — IBUPROFEN 600 MG/1
600 TABLET ORAL 3 TIMES DAILY
Qty: 30 TABLET | Refills: 0 | Status: SHIPPED | OUTPATIENT
Start: 2018-03-26 | End: 2018-03-27

## 2018-03-26 RX ORDER — CLOPIDOGREL BISULFATE 75 MG/1
75 TABLET ORAL DAILY
Qty: 90 TABLET | Refills: 3 | Status: SHIPPED | OUTPATIENT
Start: 2018-03-26 | End: 2019-06-07 | Stop reason: SDUPTHER

## 2018-03-26 RX ORDER — METFORMIN HYDROCHLORIDE 500 MG/1
500 TABLET, EXTENDED RELEASE ORAL DAILY
Qty: 90 TABLET | Refills: 3 | Status: SHIPPED | OUTPATIENT
Start: 2018-03-26 | End: 2018-03-27 | Stop reason: SDUPTHER

## 2018-03-26 NOTE — PROGRESS NOTES
"Subjective:       Patient ID: Josefina Martin is a 42 y.o. female.    Chief Complaint: Back pain and Depression  HPI   Josefina Martin is a 42yoF who is presenting for recent onset lower right back pain that radiates to her right leg. She states that this pain has been present for approximately 2 weeks that has been progressively worsening. She states that the pain in her back is a "soreness" over her right lower back and she feels a "shock-like" pain down her right leg to her mid calf. Denies any trauma or injury to the location.     Additionally she endorses feeling depressed. States this has been going on for a few months where she "just hasn't been happy" but has been exacerbated recently in the past month as she has gotten a new boss who has increased the stress level at her job significantly. She states that since Friday she has barely had any time when she hasn't been crying. Denies any suicidal ideations.     Additionally, she endorses increased swelling in both legs and hands and increased shortness of breath with exertion, stating that she could barely walk up a flight of stairs without becoming very short of breath. No chest pain, nausea, vomiting, fever, diarrhea, constipation, or any other consitutional symptoms not previously described above.     Review of Systems   Constitutional: Negative for chills, diaphoresis, fatigue and fever.   HENT: Negative for nosebleeds, rhinorrhea and sneezing.    Eyes: Negative for pain.   Respiratory: Positive for shortness of breath. Negative for cough, chest tightness and wheezing.    Cardiovascular: Positive for leg swelling. Negative for chest pain.   Gastrointestinal: Negative for abdominal pain, constipation, diarrhea, nausea and vomiting.   Endocrine: Negative.    Genitourinary: Negative for dysuria, frequency and urgency.   Musculoskeletal: Positive for back pain. Negative for arthralgias and myalgias.   Skin: Negative.    Allergic/Immunologic: Negative.  "   Neurological: Negative for dizziness, weakness and light-headedness.   Hematological: Negative.    Psychiatric/Behavioral: Negative.        Objective:      Vitals:    03/26/18 1050   BP: 105/75   Pulse: 70   Resp: 20     Physical Exam   Constitutional: She is oriented to person, place, and time. She appears well-developed and well-nourished. No distress.   HENT:   Head: Normocephalic and atraumatic.   Eyes: EOM are normal. Pupils are equal, round, and reactive to light.   Neck: Normal range of motion. Neck supple.   Cardiovascular: Normal rate, regular rhythm and normal heart sounds.    No murmur heard.  Pulmonary/Chest: Effort normal and breath sounds normal. No respiratory distress. She has no wheezes.   Abdominal: Soft. Bowel sounds are normal. She exhibits no distension. There is no tenderness.   Musculoskeletal: Normal range of motion.   Tender to palpation right lower and mid back to right hip. + Right straight leg raise.    Neurological: She is alert and oriented to person, place, and time.   Skin: Skin is warm and dry.   Psychiatric: Her speech is normal and behavior is normal. Judgment and thought content normal. Her mood appears not anxious. Cognition and memory are normal. She exhibits a depressed mood.   Denies SI or HI.        Assessment:       1. Depression, unspecified depression type    2. Sciatic leg pain    3. Hyperlipidemia, unspecified hyperlipidemia type    4. Systolic congestive heart failure, unspecified congestive heart failure chronicity    5. HTN (hypertension), malignant    6. Type 2 diabetes mellitus without complication, without long-term current use of insulin        Plan:       Depression, unspecified depression type  -     Ambulatory referral to Psychology  - PHQ9- 9 GAD7- 4  -     Discontinue: sertraline (ZOLOFT) 25 MG tablet; Take 1 tablet (25 mg total) by mouth once daily.  Dispense: 30 tablet; Refill: 0  -     venlafaxine (EFFEXOR-XR) 37.5 MG 24 hr capsule; Take 1 capsule (37.5  mg total) by mouth once daily.  Dispense: 30 capsule; Refill: 0    Sciatic leg pain  -     gabapentin (NEURONTIN) 100 MG capsule; Take 1 capsule (100 mg total) by mouth 3 (three) times daily.  Dispense: 90 capsule; Refill: 0    Hyperlipidemia, unspecified hyperlipidemia type  -     atorvastatin (LIPITOR) 40 MG tablet; Take 1 tablet (40 mg total) by mouth once daily.  Dispense: 90 tablet; Refill: 3    Systolic congestive heart failure, unspecified congestive heart failure chronicity  -     aspirin 81 MG Chew; Take 1 tablet (81 mg total) by mouth once daily.  Dispense: 90 tablet; Refill: 3  -     carvedilol (COREG) 25 MG tablet; Take 1 tablet (25 mg total) by mouth 2 (two) times daily with meals.  Dispense: 180 tablet; Refill: 3  -     spironolactone (ALDACTONE) 50 MG tablet; Take 1 tablet (50 mg total) by mouth once daily.  Dispense: 90 tablet; Refill: 3  -     2D Echo w/ Color Flow Doppler; Future  -     Discontinue: lisinopril (PRINIVIL,ZESTRIL) 20 MG tablet; Take 1 tablet (20 mg total) by mouth once daily.  Dispense: 90 tablet; Refill: 3  -     lisinopril (PRINIVIL,ZESTRIL) 20 MG tablet; Take 1 tablet (20 mg total) by mouth once daily.  Dispense: 90 tablet; Refill: 3    HTN (hypertension), malignant  -     amLODIPine (NORVASC) 10 MG tablet; Take 1 tablet (10 mg total) by mouth once daily.  Dispense: 90 tablet; Refill: 3  -     Discontinue: lisinopril (PRINIVIL,ZESTRIL) 20 MG tablet; Take 1 tablet (20 mg total) by mouth once daily.  Dispense: 20 tablet; Refill: 0  -     Discontinue: lisinopril (PRINIVIL,ZESTRIL) 20 MG tablet; Take 1 tablet (20 mg total) by mouth once daily.  Dispense: 90 tablet; Refill: 3  -     lisinopril (PRINIVIL,ZESTRIL) 20 MG tablet; Take 1 tablet (20 mg total) by mouth once daily.  Dispense: 90 tablet; Refill: 3    Type 2 diabetes mellitus without complication, without long-term current use of insulin  -     metFORMIN (GLUCOPHAGE-XR) 500 MG 24 hr tablet; Take 1 tablet (500 mg total) by mouth  once daily.  Dispense: 90 tablet; Refill: 3  -     Hemoglobin A1c; Future; Expected date: 03/26/2018    Unsure if patient is taking all of her medication since refills did not match up. Advised pt to f/u either today or tomorrow with her bottles to go over all her meds.

## 2018-03-27 ENCOUNTER — OFFICE VISIT (OUTPATIENT)
Dept: FAMILY MEDICINE | Facility: HOSPITAL | Age: 43
End: 2018-03-27
Attending: FAMILY MEDICINE
Payer: MEDICAID

## 2018-03-27 VITALS
DIASTOLIC BLOOD PRESSURE: 75 MMHG | BODY MASS INDEX: 28.63 KG/M2 | HEART RATE: 73 BPM | SYSTOLIC BLOOD PRESSURE: 105 MMHG | WEIGHT: 188.25 LBS

## 2018-03-27 DIAGNOSIS — I50.20 SYSTOLIC CONGESTIVE HEART FAILURE, UNSPECIFIED CONGESTIVE HEART FAILURE CHRONICITY: ICD-10-CM

## 2018-03-27 DIAGNOSIS — I10 ESSENTIAL HYPERTENSION: ICD-10-CM

## 2018-03-27 DIAGNOSIS — E11.9 TYPE 2 DIABETES MELLITUS WITHOUT COMPLICATION, WITHOUT LONG-TERM CURRENT USE OF INSULIN: ICD-10-CM

## 2018-03-27 PROCEDURE — 99214 OFFICE O/P EST MOD 30 MIN: CPT | Performed by: FAMILY MEDICINE

## 2018-03-27 RX ORDER — METFORMIN HYDROCHLORIDE 500 MG/1
500 TABLET, EXTENDED RELEASE ORAL 2 TIMES DAILY WITH MEALS
Qty: 180 TABLET | Refills: 3 | Status: SHIPPED | OUTPATIENT
Start: 2018-03-27 | End: 2019-06-07 | Stop reason: SDUPTHER

## 2018-03-27 RX ORDER — FUROSEMIDE 20 MG/1
20 TABLET ORAL DAILY
Qty: 90 TABLET | Refills: 1 | Status: SHIPPED | OUTPATIENT
Start: 2018-03-27 | End: 2019-06-07 | Stop reason: SDUPTHER

## 2018-03-27 RX ORDER — LORATADINE 10 MG/1
10 TABLET ORAL DAILY PRN
COMMUNITY
End: 2022-09-03

## 2018-03-27 RX ORDER — IBUPROFEN 600 MG/1
600 TABLET ORAL 3 TIMES DAILY
COMMUNITY
End: 2019-06-07

## 2018-03-27 RX ORDER — GABAPENTIN 100 MG/1
100 CAPSULE ORAL 3 TIMES DAILY
COMMUNITY
End: 2019-06-07 | Stop reason: SDUPTHER

## 2018-03-27 RX ORDER — SPIRONOLACTONE 50 MG/1
50 TABLET, FILM COATED ORAL DAILY
COMMUNITY
End: 2018-03-27

## 2018-03-27 NOTE — PROGRESS NOTES
Subjective:       Patient ID: Josefina Martin is a 42 y.o. female.    Chief Complaint: medication reconciliation    HPI   41 yo female presents for [facility]. Patient was seen yesterday but was unsure of what medication she was taking. Patient has brought her medications today except for her metformin 500mg that she takes BID. Patient is not on aldactone and has not been taking that medication. She stated she scheduled her echo for mon. Will f/u results after that. She stated she would like a refill on her lasix as well which she takes prn for swelling. Patient denies any other complaints.     Review of Systems   Constitutional: Negative for chills and fever.   Respiratory: Negative for shortness of breath and wheezing.    Cardiovascular: Negative for chest pain, palpitations and leg swelling.   Gastrointestinal: Negative for abdominal pain, constipation, diarrhea, nausea and vomiting.   Genitourinary: Negative for dysuria.       Objective:      Vitals:    03/27/18 1008   BP: 105/75   Pulse: 73     Physical Exam   Constitutional: She is oriented to person, place, and time. No distress.   HENT:   Head: Normocephalic and atraumatic.   Eyes: Conjunctivae are normal.   Neck: Neck supple.   Cardiovascular: Normal rate, regular rhythm and normal heart sounds.  Exam reveals no gallop and no friction rub.    No murmur heard.  Pulmonary/Chest: Effort normal and breath sounds normal. She has no wheezes. She has no rales.   Abdominal: Soft. Bowel sounds are normal. She exhibits no distension. There is no tenderness.   Musculoskeletal: She exhibits no edema.   Neurological: She is alert and oriented to person, place, and time.   Skin: Skin is warm and dry.   Psychiatric: She has a normal mood and affect. Her behavior is normal. Judgment and thought content normal.       Assessment:       1. Essential hypertension    2. Systolic congestive heart failure, unspecified congestive heart failure chronicity    3. Type 2 diabetes  mellitus without complication, without long-term current use of insulin        Plan:       Systolic congestive heart failure, unspecified congestive heart failure chronicity  - F/u echo  -     furosemide (LASIX) 20 MG tablet; Take 1 tablet (20 mg total) by mouth once daily.  Dispense: 90 tablet; Refill: 1    Type 2 diabetes mellitus without complication, without long-term current use of insulin  -     Ambulatory referral to Ophthalmology  -     metFORMIN (GLUCOPHAGE-XR) 500 MG 24 hr tablet; Take 1 tablet (500 mg total) by mouth 2 (two) times daily with meals.  Dispense: 180 tablet; Refill: 3    Other meds were refilled yesterday.    RTC in 2 weeks for depression f/u and pap

## 2018-04-03 DIAGNOSIS — I50.20 SYSTOLIC CHF: Primary | ICD-10-CM

## 2018-04-06 ENCOUNTER — HOSPITAL ENCOUNTER (OUTPATIENT)
Dept: CARDIOLOGY | Facility: HOSPITAL | Age: 43
Discharge: HOME | End: 2018-04-06
Payer: MEDICAID

## 2018-04-06 DIAGNOSIS — I50.20 SYSTOLIC CHF: ICD-10-CM

## 2018-04-06 LAB
DIASTOLIC DYSFUNCTION: YES
ESTIMATED PA SYSTOLIC PRESSURE: 23.79
MITRAL VALVE MOBILITY: NORMAL
RETIRED EF AND QEF - SEE NOTES: 55 (ref 55–65)
TRICUSPID VALVE REGURGITATION: ABNORMAL

## 2018-04-06 PROCEDURE — 93306 TTE W/DOPPLER COMPLETE: CPT | Mod: PO

## 2018-04-06 PROCEDURE — 93306 TTE W/DOPPLER COMPLETE: CPT | Mod: 26,,, | Performed by: INTERNAL MEDICINE

## 2018-04-09 ENCOUNTER — OFFICE VISIT (OUTPATIENT)
Dept: FAMILY MEDICINE | Facility: HOSPITAL | Age: 43
End: 2018-04-09
Attending: FAMILY MEDICINE
Payer: MEDICAID

## 2018-04-09 VITALS
HEART RATE: 80 BPM | SYSTOLIC BLOOD PRESSURE: 102 MMHG | HEIGHT: 68 IN | WEIGHT: 190.25 LBS | BODY MASS INDEX: 28.83 KG/M2 | DIASTOLIC BLOOD PRESSURE: 67 MMHG

## 2018-04-09 DIAGNOSIS — F32.A DEPRESSION, UNSPECIFIED DEPRESSION TYPE: ICD-10-CM

## 2018-04-09 PROCEDURE — 99214 OFFICE O/P EST MOD 30 MIN: CPT | Performed by: FAMILY MEDICINE

## 2018-04-09 RX ORDER — VENLAFAXINE HYDROCHLORIDE 37.5 MG/1
37.5 CAPSULE, EXTENDED RELEASE ORAL DAILY
Qty: 90 CAPSULE | Refills: 1 | Status: SHIPPED | OUTPATIENT
Start: 2018-04-09 | End: 2020-01-13 | Stop reason: SDUPTHER

## 2018-04-09 NOTE — PROGRESS NOTES
Subjective:       Patient ID: Josefina Martin is a 42 y.o. female.    Chief Complaint: Follow-up    HPI   43 yo female presents for depression follow up. Patient was started on effexor. States she's doing well and denies crying spells that she was previously having. Patient was suppose to follow up for pap and echo results as well. Patient stated she would like to do a pap later since she had to babysit her grandson who was in the room as well.     Review of Systems   Constitutional: Negative for chills and fever.   HENT: Negative for congestion.    Respiratory: Negative for shortness of breath and wheezing.    Cardiovascular: Negative for chest pain, palpitations and leg swelling.   Gastrointestinal: Negative for abdominal pain, constipation, diarrhea, nausea and vomiting.   Genitourinary: Negative for dysuria.   Neurological: Negative for dizziness and headaches.       Objective:      Vitals:    04/09/18 0908   BP: 102/67   Pulse: 80     Physical Exam   Constitutional: She is oriented to person, place, and time.   Eyes: Conjunctivae are normal.   Neck: Normal range of motion. Neck supple.   Cardiovascular: Normal rate, regular rhythm and normal heart sounds.  Exam reveals no gallop and no friction rub.    No murmur heard.  Pulmonary/Chest: Effort normal and breath sounds normal. She has no wheezes. She has no rales.   Abdominal: Soft. Bowel sounds are normal. There is no tenderness.   Musculoskeletal: She exhibits no edema.   Neurological: She is alert and oriented to person, place, and time.   Skin: Skin is warm and dry.   Psychiatric: She has a normal mood and affect. Her behavior is normal. Judgment and thought content normal.       Assessment:       1. Depression, unspecified depression type        Plan:       Depression, unspecified depression type  -     venlafaxine (EFFEXOR-XR) 37.5 MG 24 hr capsule; Take 1 capsule (37.5 mg total) by mouth once daily.  Dispense: 90 capsule; Refill: 1    Echo showed EF  of 55% similar to his result about 1 yr ago. No symptoms today. Continue meds.    RTC in 2 weeks for pap

## 2018-06-18 DIAGNOSIS — M54.30 SCIATIC LEG PAIN: ICD-10-CM

## 2018-06-18 DIAGNOSIS — F32.A DEPRESSION, UNSPECIFIED DEPRESSION TYPE: ICD-10-CM

## 2018-06-19 RX ORDER — SERTRALINE HYDROCHLORIDE 25 MG/1
TABLET, FILM COATED ORAL
Qty: 30 TABLET | Refills: 0 | Status: SHIPPED | OUTPATIENT
Start: 2018-06-19 | End: 2019-06-10 | Stop reason: SDUPTHER

## 2018-06-19 RX ORDER — GABAPENTIN 100 MG/1
CAPSULE ORAL
Qty: 90 CAPSULE | Refills: 0 | Status: SHIPPED | OUTPATIENT
Start: 2018-06-19 | End: 2018-12-18 | Stop reason: SDUPTHER

## 2018-09-30 DIAGNOSIS — I10 HTN (HYPERTENSION), MALIGNANT: ICD-10-CM

## 2018-09-30 RX ORDER — LISINOPRIL 20 MG/1
TABLET ORAL
Qty: 90 TABLET | Refills: 0 | OUTPATIENT
Start: 2018-09-30

## 2018-11-19 ENCOUNTER — HOSPITAL ENCOUNTER (EMERGENCY)
Facility: HOSPITAL | Age: 43
Discharge: HOME | End: 2018-11-19
Attending: EMERGENCY MEDICINE
Payer: MEDICAID

## 2018-11-19 VITALS
DIASTOLIC BLOOD PRESSURE: 106 MMHG | SYSTOLIC BLOOD PRESSURE: 181 MMHG | BODY MASS INDEX: 27.58 KG/M2 | WEIGHT: 182 LBS | TEMPERATURE: 98 F | OXYGEN SATURATION: 98 % | HEART RATE: 76 BPM | RESPIRATION RATE: 16 BRPM | HEIGHT: 68 IN

## 2018-11-19 DIAGNOSIS — N30.90 CYSTITIS: Primary | ICD-10-CM

## 2018-11-19 DIAGNOSIS — R10.9 ABDOMINAL PAIN: ICD-10-CM

## 2018-11-19 DIAGNOSIS — R10.10 UPPER ABDOMINAL PAIN: ICD-10-CM

## 2018-11-19 LAB
ALBUMIN SERPL BCP-MCNC: 4.2 G/DL
ALP SERPL-CCNC: 75 U/L
ALT SERPL W/O P-5'-P-CCNC: 23 U/L
ANION GAP SERPL CALC-SCNC: 8 MMOL/L
AST SERPL-CCNC: 20 U/L
B-HCG UR QL: NEGATIVE
BACTERIA #/AREA URNS HPF: ABNORMAL /HPF
BASOPHILS # BLD AUTO: 0.02 K/UL
BASOPHILS NFR BLD: 0.3 %
BILIRUB SERPL-MCNC: 0.4 MG/DL
BILIRUB UR QL STRIP: NEGATIVE
BUN SERPL-MCNC: 16 MG/DL
CALCIUM SERPL-MCNC: 9.5 MG/DL
CHLORIDE SERPL-SCNC: 106 MMOL/L
CLARITY UR: ABNORMAL
CO2 SERPL-SCNC: 24 MMOL/L
COLOR UR: YELLOW
CREAT SERPL-MCNC: 1 MG/DL
CTP QC/QA: YES
DIFFERENTIAL METHOD: NORMAL
EOSINOPHIL # BLD AUTO: 0.1 K/UL
EOSINOPHIL NFR BLD: 0.8 %
ERYTHROCYTE [DISTWIDTH] IN BLOOD BY AUTOMATED COUNT: 12.9 %
EST. GFR  (AFRICAN AMERICAN): >60 ML/MIN/1.73 M^2
EST. GFR  (NON AFRICAN AMERICAN): >60 ML/MIN/1.73 M^2
GLUCOSE SERPL-MCNC: 107 MG/DL
GLUCOSE UR QL STRIP: NEGATIVE
HCT VFR BLD AUTO: 41.2 %
HGB BLD-MCNC: 13.8 G/DL
HGB UR QL STRIP: ABNORMAL
KETONES UR QL STRIP: NEGATIVE
LEUKOCYTE ESTERASE UR QL STRIP: ABNORMAL
LIPASE SERPL-CCNC: 37 U/L
LYMPHOCYTES # BLD AUTO: 2.8 K/UL
LYMPHOCYTES NFR BLD: 44.5 %
MCH RBC QN AUTO: 30.7 PG
MCHC RBC AUTO-ENTMCNC: 33.5 G/DL
MCV RBC AUTO: 92 FL
MICROSCOPIC COMMENT: ABNORMAL
MONOCYTES # BLD AUTO: 0.5 K/UL
MONOCYTES NFR BLD: 8.3 %
NEUTROPHILS # BLD AUTO: 2.9 K/UL
NEUTROPHILS NFR BLD: 46.1 %
NITRITE UR QL STRIP: NEGATIVE
NON-SQ EPI CELLS #/AREA URNS HPF: 1 /HPF
PH UR STRIP: 7 [PH] (ref 5–8)
PLATELET # BLD AUTO: 206 K/UL
PMV BLD AUTO: 10.9 FL
POTASSIUM SERPL-SCNC: 4 MMOL/L
PROT SERPL-MCNC: 7.3 G/DL
PROT UR QL STRIP: ABNORMAL
RBC # BLD AUTO: 4.49 M/UL
RBC #/AREA URNS HPF: 0 /HPF (ref 0–4)
SODIUM SERPL-SCNC: 138 MMOL/L
SP GR UR STRIP: 1.02 (ref 1–1.03)
SQUAMOUS #/AREA URNS HPF: 25 /HPF
TROPONIN I SERPL DL<=0.01 NG/ML-MCNC: <0.006 NG/ML
URN SPEC COLLECT METH UR: ABNORMAL
UROBILINOGEN UR STRIP-ACNC: NEGATIVE EU/DL
WBC # BLD AUTO: 6.29 K/UL
WBC #/AREA URNS HPF: 65 /HPF (ref 0–5)

## 2018-11-19 PROCEDURE — 84484 ASSAY OF TROPONIN QUANT: CPT

## 2018-11-19 PROCEDURE — 99285 EMERGENCY DEPT VISIT HI MDM: CPT | Mod: 25

## 2018-11-19 PROCEDURE — 85025 COMPLETE CBC W/AUTO DIFF WBC: CPT

## 2018-11-19 PROCEDURE — 25500020 PHARM REV CODE 255: Performed by: EMERGENCY MEDICINE

## 2018-11-19 PROCEDURE — 81025 URINE PREGNANCY TEST: CPT | Performed by: EMERGENCY MEDICINE

## 2018-11-19 PROCEDURE — 81000 URINALYSIS NONAUTO W/SCOPE: CPT

## 2018-11-19 PROCEDURE — 87086 URINE CULTURE/COLONY COUNT: CPT

## 2018-11-19 PROCEDURE — 25000003 PHARM REV CODE 250: Performed by: PHYSICIAN ASSISTANT

## 2018-11-19 PROCEDURE — 83690 ASSAY OF LIPASE: CPT

## 2018-11-19 PROCEDURE — 80053 COMPREHEN METABOLIC PANEL: CPT

## 2018-11-19 RX ORDER — CEPHALEXIN 500 MG/1
500 CAPSULE ORAL EVERY 12 HOURS
Qty: 14 CAPSULE | Refills: 0 | Status: SHIPPED | OUTPATIENT
Start: 2018-11-19 | End: 2018-11-26

## 2018-11-19 RX ORDER — MAG HYDROX/ALUMINUM HYD/SIMETH 200-200-20
30 SUSPENSION, ORAL (FINAL DOSE FORM) ORAL
Qty: 354 ML | Refills: 0 | Status: SHIPPED | OUTPATIENT
Start: 2018-11-19 | End: 2019-06-07

## 2018-11-19 RX ADMIN — IOHEXOL 75 ML: 350 INJECTION, SOLUTION INTRAVENOUS at 09:11

## 2018-11-19 RX ADMIN — LIDOCAINE HYDROCHLORIDE: 20 SOLUTION ORAL; TOPICAL at 08:11

## 2018-11-20 DIAGNOSIS — I50.20 SYSTOLIC CONGESTIVE HEART FAILURE: ICD-10-CM

## 2018-11-20 DIAGNOSIS — M54.30 SCIATIC LEG PAIN: ICD-10-CM

## 2018-11-20 RX ORDER — CARVEDILOL 25 MG/1
TABLET ORAL
Qty: 180 TABLET | Refills: 0 | OUTPATIENT
Start: 2018-11-20

## 2018-11-20 NOTE — ED PROVIDER NOTES
Encounter Date: 11/19/2018       History     Chief Complaint   Patient presents with    Abdominal Pain     43y F ambulatory to ED with c/o abdominal pain x3-4 days, denies n/v/d      Afebrile 43-year-old male with PMH of HTN, see HF, CVA, DM and CAD status post Stent placement x3 presents the ED for evaluation of abdominal pain.  Patient states pain was initially intermittent beginning approximately 2-3 months ago.  She  Denies any known exacerbating or mitigating factors.  She states it would typically be located to the epigastric and periumbilical region and was described as a cramping sensation that would resolve on its own volition.  She states over the past 3-4 days pain has gradually worsened in severity and become more constant.  She does report that she had some pain radiation to chest with initial onset of pain however denies any chest pain or pain radiation at this time.  Patient denies any other complaints or associated symptoms including nausea, vomiting, diarrhea, abdominal distention or lower extremity weakness.   She states that she has tried Advil for some time with no improvement in pain.      The history is provided by the patient.     Review of patient's allergies indicates:  No Known Allergies  Past Medical History:   Diagnosis Date    CHF (congestive heart failure)     Diabetes mellitus     Hypertension     Stroke      Past Surgical History:   Procedure Laterality Date    CHOLECYSTECTOMY  2013    history of cholelithiasis    TUBAL LIGATION       Family History   Problem Relation Age of Onset    Hypertension Mother     No Known Problems Father     No Known Problems Sister     No Known Problems Daughter     Diabetes Son 14        Takes 2 insulins and metformin use to be bigger wally    Stroke Maternal Uncle 48    Anuerysm Maternal Grandmother     No Known Problems Sister     No Known Problems Daughter     No Known Problems Son      Social History     Tobacco Use    Smoking status:  Current Every Day Smoker     Packs/day: 0.00     Years: 18.00     Pack years: 0.00     Types: Cigarettes    Smokeless tobacco: Never Used    Tobacco comment: 1 pack/week   Substance Use Topics    Alcohol use: Yes     Comment: social 1/month    Drug use: No     Review of Systems   Constitutional: Negative for appetite change, chills and fever.   HENT: Negative for sore throat.    Respiratory: Negative for cough and shortness of breath.    Cardiovascular: Negative for chest pain and palpitations.   Gastrointestinal: Positive for abdominal pain. Negative for constipation, diarrhea, nausea and vomiting.   Genitourinary: Negative for dysuria, flank pain and hematuria.   Musculoskeletal: Negative for arthralgias, back pain and myalgias.   Skin: Negative for rash.   Neurological: Negative for weakness and headaches.   Hematological: Does not bruise/bleed easily.       Physical Exam     Initial Vitals [11/19/18 1934]   BP Pulse Resp Temp SpO2   (!) 183/115 72 16 98.1 °F (36.7 °C) 100 %      MAP       --         Physical Exam    Nursing note and vitals reviewed.  Constitutional: Vital signs are normal. She appears well-developed and well-nourished. She is cooperative.  Non-toxic appearance. She does not appear ill. No distress.   HENT:   Head: Normocephalic and atraumatic.   Eyes: Conjunctivae and lids are normal.   Neck: Neck supple. No neck rigidity.   Cardiovascular: Normal rate and regular rhythm.   Pulmonary/Chest: Breath sounds normal. No respiratory distress. She has no wheezes. She has no rhonchi.   Abdominal: Soft. Normal appearance. There is no tenderness. There is no rigidity, no rebound, no guarding and no CVA tenderness.     Mild TTP of epigastric and left upper quadrant with no guarding, rebound or rigidity.   Musculoskeletal: Normal range of motion.   Neurological: She is alert and oriented to person, place, and time. GCS eye subscore is 4. GCS verbal subscore is 5. GCS motor subscore is 6.   Skin: Skin is  warm, dry and intact. No rash noted.   Psychiatric: She has a normal mood and affect. Her speech is normal and behavior is normal. Thought content normal.         ED Course   Procedures  Labs Reviewed   URINALYSIS, REFLEX TO URINE CULTURE - Abnormal; Notable for the following components:       Result Value    Appearance, UA Cloudy (*)     Protein, UA Trace (*)     Occult Blood UA Trace (*)     Leukocytes, UA 2+ (*)     All other components within normal limits    Narrative:     Preferred Collection Type->Urine, Clean Catch   URINALYSIS MICROSCOPIC - Abnormal; Notable for the following components:    WBC, UA 65 (*)     Bacteria, UA Many (*)     Non-Squam Epith 1 (*)     All other components within normal limits    Narrative:     Preferred Collection Type->Urine, Clean Catch   CULTURE, URINE   CULTURE, URINE   CBC W/ AUTO DIFFERENTIAL   COMPREHENSIVE METABOLIC PANEL   LIPASE   TROPONIN I   POCT URINE PREGNANCY          Imaging Results          CT Abdomen Pelvis With Contrast (Final result)  Result time 11/19/18 21:45:24    Final result by Tim Solano MD (11/19/18 21:45:24)                 Impression:      Wall thickening involving the stomach.  No evidence of gastric outlet obstruction. Endoscopic correlation is recommended after the patient's acute symptoms resolve.    Scattered air-fluid levels involving the small bowel loops with no transition point.  The findings may represent enteritis.    Extensive colonic diverticula without evidence of acute diverticulitis.    Postoperative changes of prior cholecystectomy and bilateral tubal ligation.    Heterogeneous appearance of the adnexa with suspected complex bilateral ovarian cysts.  Outpatient ultrasound follow-up may be obtained for further evaluation.    Additional findings as above.      Electronically signed by: Tim Solano MD  Date:    11/19/2018  Time:    21:45             Narrative:    EXAMINATION:  CT ABDOMEN PELVIS WITH CONTRAST    CLINICAL  HISTORY:  abdominal pain;    TECHNIQUE:  Low dose axial images, sagittal and coronal reformations were obtained from the lung bases to the pubic symphysis following the IV administration of 75 mL of Omnipaque 350 .  Oral contrast was not given.    COMPARISON:  10/05/2015..    FINDINGS:  There are no pleural effusions.  There is no evidence of a pneumothorax.  No airspace opacity is present.  No discrete pulmonary nodule is identified.    The heart is unremarkable.  There is normal tapering of the abdominal aorta.  The there are scattered calcifications along the course of the abdominal aorta.  The aortic branch vessels are within normal limits.  The portal vein and mesenteric vessels are unremarkable.  The IVC and the remainder of the venous structures are within normal limits.  There is no evidence of lymphadenopathy in the abdomen or pelvis.    The esophagus is unremarkable.  There is wall thickening involving the stomach.  The duodenum are within normal limits.  There are scattered air-fluid levels involving the small bowel with no apparent transition point.  The appendix is within normal limits.  There is extensive colonic diverticula.  No inflammatory changes are identified involving the mesocolon.  There is no evidence of bowel obstruction.    The liver is unremarkable.  The patient is status post cholecystectomy.  The biliary tree is unremarkable.  The spleen is within normal limits.  The pancreas is unremarkable.    The adrenal glands are within normal limits.  There are stable of lobulations involving both kidneys.  There are bilateral extrarenal pelvises.  The distal ureters are poorly visualized.  The urinary bladder is unremarkable.    There are multiple uterine fibroids.  There are stable changes of bilateral tubal ligation.  There is heterogeneous of the bilateral adnexa.  There is a 3.2 x 3.2 cm soft tissue attenuation lesion in the right adnexa.  This probably represents the right ovary.  The left  ovary is also poorly characterized.  There is also a 2.4 cm low-attenuation lesion in the left adnexa with Hounsfield unit of 32.    No evidence of free fluid is identified in the abdomen or pelvis.  There is no evidence of free air.  There is no evidence of pneumatosis.    The psoas margins are unremarkable.  The abdominal wall is within normal limits.  There are degenerative changes involving the osseous structures.  There is no evidence of a fracture.                                                      Clinical Impression:   The primary encounter diagnosis was Cystitis. Diagnoses of Abdominal pain and Upper abdominal pain were also pertinent to this visit.                             GONZALEZ Dover  11/20/18 5545

## 2018-11-21 LAB
BACTERIA UR CULT: NORMAL
BACTERIA UR CULT: NORMAL

## 2018-11-21 RX ORDER — CARVEDILOL 25 MG/1
TABLET ORAL
Qty: 180 TABLET | Refills: 0 | OUTPATIENT
Start: 2018-11-21

## 2018-11-21 RX ORDER — GABAPENTIN 100 MG/1
CAPSULE ORAL
Qty: 90 CAPSULE | Refills: 0 | OUTPATIENT
Start: 2018-11-21

## 2018-11-28 ENCOUNTER — OFFICE VISIT (OUTPATIENT)
Dept: FAMILY MEDICINE | Facility: HOSPITAL | Age: 43
End: 2018-11-28
Attending: FAMILY MEDICINE
Payer: MEDICAID

## 2018-11-28 VITALS
BODY MASS INDEX: 27.43 KG/M2 | HEIGHT: 68 IN | HEART RATE: 62 BPM | SYSTOLIC BLOOD PRESSURE: 127 MMHG | WEIGHT: 181 LBS | DIASTOLIC BLOOD PRESSURE: 80 MMHG

## 2018-11-28 DIAGNOSIS — N30.90 CYSTITIS: Primary | ICD-10-CM

## 2018-11-28 DIAGNOSIS — D21.9 LEIOMYOMA: ICD-10-CM

## 2018-11-28 PROCEDURE — 99215 OFFICE O/P EST HI 40 MIN: CPT | Performed by: STUDENT IN AN ORGANIZED HEALTH CARE EDUCATION/TRAINING PROGRAM

## 2018-11-28 RX ORDER — METHOCARBAMOL 500 MG/1
500 TABLET, FILM COATED ORAL 4 TIMES DAILY
Qty: 20 TABLET | Refills: 0 | Status: SHIPPED | OUTPATIENT
Start: 2018-11-28 | End: 2018-12-03

## 2018-11-28 RX ORDER — NITROFURANTOIN 25; 75 MG/1; MG/1
100 CAPSULE ORAL 2 TIMES DAILY
Qty: 10 CAPSULE | Refills: 0 | Status: SHIPPED | OUTPATIENT
Start: 2018-11-28 | End: 2018-12-03

## 2018-11-28 RX ORDER — DOXYLAMINE SUCCINATE 25 MG
TABLET ORAL 2 TIMES DAILY
Qty: 57 G | Refills: 0 | COMMUNITY
Start: 2018-11-28 | End: 2019-06-07

## 2018-11-28 RX ORDER — SPIRONOLACTONE 50 MG/1
TABLET, FILM COATED ORAL
Refills: 2 | COMMUNITY
Start: 2018-11-20 | End: 2019-06-07 | Stop reason: SDUPTHER

## 2018-11-28 RX ORDER — LISINOPRIL 20 MG/1
20 TABLET ORAL DAILY
Refills: 3 | COMMUNITY
Start: 2018-11-20 | End: 2019-06-07 | Stop reason: SDUPTHER

## 2018-11-28 NOTE — PROGRESS NOTES
Subjective:       Patient ID: Josefina Martin is a 43 y.o. female.    Chief Complaint: ED follow up   HPI   Josefina Martin is a 43 y.o. female recently seen in the ED on 11/21/2018 for emergent evaluation of  lower abdominal pain and urinary frequency. Urinalysis revealed cloudy urine with 2+ leukocytes and many bacteria. The patient had a CT pelvis which revealed multiple uterine fibroids and bilateral ovarian cyst. The patient was given keflex and discharged home.     Today   The patient states she has completed the antibiotic therapy and continues to still have lower abdominal pain and dysuria and frequency. The patient states she has not improved and feels like she has gotten worse. The patient says the abdominal pain is crampy in nature 8/10, constant and localized to the suprapubic area. The patient states she is in a monogamous relationship of 10 years and declined STI screen. Pregnancy test in the ED was negative. She denies any fever, chills or flank pain        Review of Systems   Constitutional: Negative for chills and fever.   HENT: Negative for congestion.    Eyes: Negative for visual disturbance.   Respiratory: Negative for cough, choking and shortness of breath.    Cardiovascular: Negative for chest pain.   Gastrointestinal: Positive for abdominal pain. Negative for constipation, diarrhea, nausea and vomiting.   Genitourinary: Positive for dysuria, frequency, pelvic pain, urgency and vaginal bleeding. Negative for flank pain, vaginal discharge and vaginal pain.   Musculoskeletal: Negative for back pain, neck pain and neck stiffness.   Neurological: Negative for dizziness, weakness, numbness and headaches.       Objective:      Vitals:    11/28/18 1056   BP: 127/80   Pulse: 62     Physical Exam   Constitutional: She is oriented to person, place, and time. She appears well-developed and well-nourished.   Pleasant, well groomed in NAD    HENT:   Head: Normocephalic and atraumatic.   Eyes:  Conjunctivae and EOM are normal. Pupils are equal, round, and reactive to light.   Neck: Normal range of motion. Neck supple.   Cardiovascular: Normal rate, regular rhythm, normal heart sounds and intact distal pulses.   Pulmonary/Chest: Effort normal and breath sounds normal. No respiratory distress.   Abdominal: Soft. Bowel sounds are normal. She exhibits no distension. There is no tenderness.   Musculoskeletal: Normal range of motion. She exhibits no edema.   Neurological: She is alert and oriented to person, place, and time.   Skin: Skin is warm and dry.   Psychiatric: She has a normal mood and affect.   Nursing note and vitals reviewed.      Assessment:       1. Cystitis    2. Leiomyoma        Plan:       Cystitis  -     nitrofurantoin, macrocrystal-monohydrate, (MACROBID) 100 MG capsule; Take 1 capsule (100 mg total) by mouth 2 (two) times daily. for 5 days  Dispense: 10 capsule; Refill: 0  -     miconazole (MICOTIN) 2 % cream; Apply topically 2 (two) times daily.  Dispense: 57 g; Refill: 0  -     methocarbamol (ROBAXIN) 500 MG Tab; Take 1 tablet (500 mg total) by mouth 4 (four) times daily. for 5 days  Dispense: 20 tablet; Refill: 0    Leiomyoma  -     Ambulatory referral to Obstetrics / Gynecology  -     methocarbamol (ROBAXIN) 500 MG Tab; Take 1 tablet (500 mg total) by mouth 4 (four) times daily. for 5 days  Dispense: 20 tablet; Refill: 0      Follow-up in about 3 weeks (around 12/19/2018).      D'Amico C Johnson, MD  11/28/2018  [facility] HO-1

## 2018-11-28 NOTE — PATIENT INSTRUCTIONS
Anatomy of the Female Urinary Tract  Your urinary tract helps get rid of urine (your bodys liquid waste). The kidneys collect chemicals and water your body doesnt need. This is turned into urine. Urine travels out of the kidneys through the ureters to the bladder. The bladder holds urine until youre ready to release it. The urethra carries urine from the bladder out of the body. The main sphincter muscle circles the mid-urethra.      Front view of female urinary tract.     Date Last Reviewed: 1/1/2017 © 2000-2017 [employer]. [address] [place] [id number]. All rights reserved. This information is not intended as a substitute for professional medical care. Always follow your healthcare professional's instructions.        Understanding Urinary Tract Infections (UTIs)  Most UTIs are caused by bacteria, although they may also be caused by viruses or fungi. Bacteria from the bowel are the most common source of infection. The infection may start because of any of the following:  · Sexual activity. During sex, bacteria can travel from the penis, vagina, or rectum into the urethra.   · Bacteria on the skin outside the rectum may travel into the urethra. This is more common in women since the rectum and urethra are closer to each other than in men. Wiping from front to back after using the toilet and keeping the area clean can help prevent germs from getting to the urethra.  · Blockage of urine flow through the urinary tract. If urine sits too long, germs may start to grow out of control.      Parts of the urinary tract  The infection can occur in any part of the urinary tract.  · The kidneys collect and store urine.  · The ureters carry urine from the kidneys to the bladder.  · The bladder holds urine until you are ready to let it out.  · The urethra carries urine from the bladder out of the body. It is shorter in women, so bacteria can move through it more easily. The urethra is longer in  men, so a UTI is less likely to reach the bladder or kidneys in men.  Date Last Reviewed: 1/1/2017  © 2597-5315 [employer]. [address] [place] [address], [place] [address]. All rights reserved. This information is not intended as a substitute for professional medical care. Always follow your healthcare professional's instructions.

## 2018-11-28 NOTE — LETTER
November 28, 2018    Josefina Martin  [address]  [place] [address]      [facility], [address]  [place] [address]  Phone: [phone]  Fax: [phone] Josefina Dallas Mario    Was treated here on 11/28/2018    May Return to work/school on 11/28/2018    No Restrictions        Sincerely,    D'Amico C Johnson, MD

## 2018-11-29 NOTE — PROGRESS NOTES
I assume primary medical responsibility for this patient, I have seen the patient, reviewed the case history, findings, diagnosis and treatment plan with the resident and agree that the care is reasonable and necessary.  Merly Uriarte  11/29/2018

## 2018-12-18 ENCOUNTER — OFFICE VISIT (OUTPATIENT)
Dept: OBSTETRICS AND GYNECOLOGY | Facility: CLINIC | Age: 43
End: 2018-12-18
Payer: MEDICAID

## 2018-12-18 VITALS
SYSTOLIC BLOOD PRESSURE: 178 MMHG | HEIGHT: 68 IN | DIASTOLIC BLOOD PRESSURE: 112 MMHG | BODY MASS INDEX: 27.56 KG/M2 | WEIGHT: 181.81 LBS

## 2018-12-18 DIAGNOSIS — D25.9 UTERINE LEIOMYOMA, UNSPECIFIED LOCATION: ICD-10-CM

## 2018-12-18 DIAGNOSIS — G89.29 CHRONIC FEMALE PELVIC PAIN: Primary | ICD-10-CM

## 2018-12-18 DIAGNOSIS — R10.2 CHRONIC FEMALE PELVIC PAIN: Primary | ICD-10-CM

## 2018-12-18 DIAGNOSIS — R19.00 PELVIC MASS: ICD-10-CM

## 2018-12-18 PROCEDURE — 99202 OFFICE O/P NEW SF 15 MIN: CPT | Mod: S$PBB,,, | Performed by: OBSTETRICS & GYNECOLOGY

## 2018-12-18 PROCEDURE — 99213 OFFICE O/P EST LOW 20 MIN: CPT | Mod: PBBFAC,PN | Performed by: OBSTETRICS & GYNECOLOGY

## 2018-12-18 PROCEDURE — 99999 PR PBB SHADOW E&M-EST. PATIENT-LVL III: CPT | Mod: PBBFAC,,, | Performed by: OBSTETRICS & GYNECOLOGY

## 2018-12-18 NOTE — PROGRESS NOTES
"Chief Complaint   Patient presents with    Fibroids       HISTORY OF PRESENT ILLNESS:   Josefina Martin is a 43 y.o. female  who presents for well woman exam.  Patient's last menstrual period was 11/28/2018..  She was seen on 11/21/2018 in ER for lower abdominal pain and urinary frequency. She did not feel like the antibiotics for the UTI she was given helped and saw her PCP 11/28 and was changed to macrobid. That didn't help. She isn't having dysuria or frequency. She was having constipation which is improved with colace. She was found to have a fibroid uterus on CT and a possible ovarian mass so was referred here for evaluation of that (of note had a similar mass on CT scan in 2014). Cycles are "regular but starting in Nov they have been coming ever 2 weeks, changing pad Q2-3 hours and more painful.      Past Medical History:   Diagnosis Date    CHF (congestive heart failure)     Diabetes mellitus     Hypertension     Stroke           Past Surgical History:   Procedure Laterality Date    CHOLECYSTECTOMY  2013    history of cholelithiasis    TUBAL LIGATION           Social History     Socioeconomic History    Marital status: Single     Spouse name: Not on file    Number of children: Not on file    Years of education: Not on file    Highest education level: Not on file   Social Needs    Financial resource strain: Not on file    Food insecurity - worry: Not on file    Food insecurity - inability: Not on file    Transportation needs - medical: Not on file    Transportation needs - non-medical: Not on file   Occupational History     Employer: [employer]   Tobacco Use    Smoking status: Current Every Day Smoker     Packs/day: 0.00     Years: 18.00     Pack years: 0.00     Types: Cigarettes    Smokeless tobacco: Never Used    Tobacco comment: 1 pack/week   Substance and Sexual Activity    Alcohol use: Yes     Comment: social 1/month    Drug use: No    Sexual activity: Yes     Partners: Male     " "Birth control/protection: None, Surgical   Other Topics Concern    Not on file   Social History Narrative    Not on file       Family History   Problem Relation Age of Onset    Hypertension Mother     Lung cancer Mother     No Known Problems Father     No Known Problems Sister     No Known Problems Daughter     Diabetes Son 14        Takes 2 insulins and metformin use to be bigger wally    Stroke Maternal Uncle 48    Anuerysm Maternal Grandmother     No Known Problems Sister     No Known Problems Daughter     No Known Problems Son          OB History    Para Term  AB Living   5 4     1     SAB TAB Ectopic Multiple Live Births   1              # Outcome Date GA Lbr Flex/2nd Weight Sex Delivery Anes PTL Lv   5 SAB            4 Para            3 Para            2 Para            1 Para                   GYN HISTORY:  PAP History: Denies abnormal Paps  Infection History:Denies STDs. Denies PID.  Benign History: Denies uterine fibroids. Denies ovarian cysts. Denies endometriosis Denies other conditions.  Cancer History: Denies cervical cancer. Denies uterine cancer or hyperplasia. Denies ovarian cancer. Denies vulvar cancer or pre-cancer. Denies vaginal cancer or pre-cancer. Denies breast cancer. Denies colon cancer.  Cycle:  until 2018 when started coming Q2 weeks     ROS:  GENERAL: Denies weight gain or weight loss. Feeling well overall.   SKIN: Denies rash or lesions.   HEAD: Denies headache.   NODES: Denies enlarged lymph nodes.   CHEST: Denies shortness of breath.   ABDOMEN: No abdominal pain, constipation, diarrhea, nausea, vomiting or rectal bleeding.   URINARY: No frequency, dysuria, hematuria, or burning on urination.  REPRODUCTIVE: See HPI.   BREASTS: The patient denies pain, lumps, or nipple discharge.       BP (!) 178/112   Ht 5' 8" (1.727 m)   Wt 82.5 kg (181 lb 12.8 oz)   LMP 2018   BMI 27.64 kg/m²      APPEARANCE: Well nourished, well developed, in no acute " distress.  NECK: Neck symmetric without  thyromegaly.  NODES: No inguinal, cervical lymph node enlargement.  CHEST: Lungs clear to auscultation.  HEART: Regular rate and rhythm, no murmurs, rubs or gallops.  ABDOMEN: Soft. No tenderness or masses. No hernias. No hepatosplenomegaly.   Declined exam 2/2 cycle     1. Chronic female pelvic pain    2. Pelvic mass    3. Uterine leiomyoma, unspecified location        Plan:  1. Will see back for US and full annual and likely EMB. She desires hyst but would like be high risk 2/2 h/o MI with cardiac stents, 2 CVAs and several DVT in the past. Will discuss options and will send PCP a message.       F/u in 1-2 weeks

## 2019-01-04 ENCOUNTER — PROCEDURE VISIT (OUTPATIENT)
Dept: OBSTETRICS AND GYNECOLOGY | Facility: CLINIC | Age: 44
End: 2019-01-04
Payer: MEDICAID

## 2019-01-04 ENCOUNTER — OFFICE VISIT (OUTPATIENT)
Dept: OBSTETRICS AND GYNECOLOGY | Facility: CLINIC | Age: 44
End: 2019-01-04
Payer: MEDICAID

## 2019-01-04 VITALS — DIASTOLIC BLOOD PRESSURE: 90 MMHG | WEIGHT: 178 LBS | SYSTOLIC BLOOD PRESSURE: 130 MMHG | BODY MASS INDEX: 27.06 KG/M2

## 2019-01-04 DIAGNOSIS — Z11.3 SCREENING EXAMINATION FOR STD (SEXUALLY TRANSMITTED DISEASE): ICD-10-CM

## 2019-01-04 DIAGNOSIS — N93.9 ABNORMAL UTERINE BLEEDING (AUB): ICD-10-CM

## 2019-01-04 DIAGNOSIS — Z01.419 ENCOUNTER FOR ANNUAL ROUTINE GYNECOLOGICAL EXAMINATION: Primary | ICD-10-CM

## 2019-01-04 DIAGNOSIS — D25.9 UTERINE LEIOMYOMA, UNSPECIFIED LOCATION: ICD-10-CM

## 2019-01-04 DIAGNOSIS — R19.00 PELVIC MASS: ICD-10-CM

## 2019-01-04 DIAGNOSIS — Z12.31 SCREENING MAMMOGRAM, ENCOUNTER FOR: ICD-10-CM

## 2019-01-04 DIAGNOSIS — Z12.4 PAP SMEAR FOR CERVICAL CANCER SCREENING: ICD-10-CM

## 2019-01-04 PROCEDURE — 88305 TISSUE EXAM BY PATHOLOGIST: CPT | Mod: 26,,, | Performed by: PATHOLOGY

## 2019-01-04 PROCEDURE — 76830 PR  ECHOGRAPHY,TRANSVAGINAL: ICD-10-PCS | Mod: 26,S$PBB,, | Performed by: OBSTETRICS & GYNECOLOGY

## 2019-01-04 PROCEDURE — 87480 CANDIDA DNA DIR PROBE: CPT

## 2019-01-04 PROCEDURE — 99396 PREV VISIT EST AGE 40-64: CPT | Mod: 25,S$PBB,, | Performed by: OBSTETRICS & GYNECOLOGY

## 2019-01-04 PROCEDURE — 99396 PR PREVENTIVE VISIT,EST,40-64: ICD-10-PCS | Mod: 25,S$PBB,, | Performed by: OBSTETRICS & GYNECOLOGY

## 2019-01-04 PROCEDURE — 76830 TRANSVAGINAL US NON-OB: CPT | Mod: 26,S$PBB,, | Performed by: OBSTETRICS & GYNECOLOGY

## 2019-01-04 PROCEDURE — 76830 TRANSVAGINAL US NON-OB: CPT | Mod: PBBFAC,PO

## 2019-01-04 PROCEDURE — 99999 PR PBB SHADOW E&M-EST. PATIENT-LVL IV: ICD-10-PCS | Mod: PBBFAC,,, | Performed by: OBSTETRICS & GYNECOLOGY

## 2019-01-04 PROCEDURE — 88175 CYTOPATH C/V AUTO FLUID REDO: CPT

## 2019-01-04 PROCEDURE — 58100 BIOPSY OF UTERUS LINING: CPT | Mod: S$PBB,,, | Performed by: OBSTETRICS & GYNECOLOGY

## 2019-01-04 PROCEDURE — 99214 OFFICE O/P EST MOD 30 MIN: CPT | Mod: PBBFAC,PO,25 | Performed by: OBSTETRICS & GYNECOLOGY

## 2019-01-04 PROCEDURE — 87491 CHLMYD TRACH DNA AMP PROBE: CPT

## 2019-01-04 PROCEDURE — 87624 HPV HI-RISK TYP POOLED RSLT: CPT

## 2019-01-04 PROCEDURE — 87510 GARDNER VAG DNA DIR PROBE: CPT

## 2019-01-04 PROCEDURE — 58100 PR BIOPSY OF UTERUS LINING: ICD-10-PCS | Mod: S$PBB,,, | Performed by: OBSTETRICS & GYNECOLOGY

## 2019-01-04 PROCEDURE — 99999 PR PBB SHADOW E&M-EST. PATIENT-LVL IV: CPT | Mod: PBBFAC,,, | Performed by: OBSTETRICS & GYNECOLOGY

## 2019-01-04 PROCEDURE — 58100 BIOPSY OF UTERUS LINING: CPT | Mod: PBBFAC,PO | Performed by: OBSTETRICS & GYNECOLOGY

## 2019-01-04 PROCEDURE — 88305 TISSUE EXAM BY PATHOLOGIST: CPT | Performed by: PATHOLOGY

## 2019-01-04 PROCEDURE — 88305 TISSUE SPECIMEN TO PATHOLOGY, OBSTETRICS/GYNECOLOGY: ICD-10-PCS | Mod: 26,,, | Performed by: PATHOLOGY

## 2019-01-04 RX ORDER — TRAMADOL HYDROCHLORIDE 50 MG/1
50 TABLET ORAL EVERY 6 HOURS PRN
Qty: 20 TABLET | Refills: 0 | Status: SHIPPED | OUTPATIENT
Start: 2019-01-04 | End: 2020-01-04

## 2019-01-04 NOTE — PROGRESS NOTES
"Chief Complaint   Patient presents with    Follow-up     u/s results for Fibroids       HISTORY OF PRESENT ILLNESS:   Josefina Martin is a 43 y.o. female  who presents for well woman exam and US results .  Patient's last menstrual period was 11/24/2018 (approximate)..  She was seen on 11/21/2018 in ER for lower abdominal pain and urinary frequency. She did not feel like the antibiotics for the UTI she was given helped and saw her PCP 11/28 and was changed to macrobid. That didn't help. She isn't having dysuria or frequency today but c/o constant uterine contraction pain. She was having constipation which is improved with colace. She was found to have a fibroid uterus on CT and a possible ovarian mass so was referred here for evaluation of that (of note had a similar mass on CT scan in 2014). Cycles are "regular but starting in Nov they have been coming ever 2 weeks, changing pad Q2-3 hours and more painful.      Past Medical History:   Diagnosis Date    CHF (congestive heart failure)     Diabetes mellitus     Hypertension     Stroke           Past Surgical History:   Procedure Laterality Date    CHOLECYSTECTOMY  2013    history of cholelithiasis    TUBAL LIGATION           Social History     Socioeconomic History    Marital status: Single     Spouse name: Not on file    Number of children: Not on file    Years of education: Not on file    Highest education level: Not on file   Social Needs    Financial resource strain: Not on file    Food insecurity - worry: Not on file    Food insecurity - inability: Not on file    Transportation needs - medical: Not on file    Transportation needs - non-medical: Not on file   Occupational History     Employer: [employer]   Tobacco Use    Smoking status: Current Every Day Smoker     Packs/day: 0.00     Years: 18.00     Pack years: 0.00     Types: Cigarettes    Smokeless tobacco: Never Used    Tobacco comment: 1 pack/week   Substance and Sexual Activity    " Alcohol use: Yes     Comment: social 1/month    Drug use: No    Sexual activity: Yes     Partners: Male     Birth control/protection: None, Surgical   Other Topics Concern    Not on file   Social History Narrative    Not on file       Family History   Problem Relation Age of Onset    Hypertension Mother     Lung cancer Mother     No Known Problems Father     No Known Problems Sister     No Known Problems Daughter     Diabetes Son 14        Takes 2 insulins and metformin use to be bigger wally    Stroke Maternal Uncle 48    Anuerysm Maternal Grandmother     No Known Problems Sister     No Known Problems Daughter     No Known Problems Son          OB History    Para Term  AB Living   5 4     1     SAB TAB Ectopic Multiple Live Births   1              # Outcome Date GA Lbr Flex/2nd Weight Sex Delivery Anes PTL Lv   5 SAB            4 Para            3 Para            2 Para            1 Para                   GYN HISTORY:  PAP History: Denies abnormal Paps  Infection History:Denies STDs. Denies PID.  Benign History: Denies uterine fibroids. Denies ovarian cysts. Denies endometriosis Denies other conditions.  Cancer History: Denies cervical cancer. Denies uterine cancer or hyperplasia. Denies ovarian cancer. Denies vulvar cancer or pre-cancer. Denies vaginal cancer or pre-cancer. Denies breast cancer. Denies colon cancer.  Cycle:  until 2018 when started coming Q2 weeks     ROS:  GENERAL: Denies weight gain or weight loss. Feeling well overall.   SKIN: Denies rash or lesions.   HEAD: Denies headache.   NODES: Denies enlarged lymph nodes.   CHEST: Denies shortness of breath at rest but does have some with ambulating.   ABDOMEN: No abdominal pain, constipation, diarrhea, nausea, vomiting or rectal bleeding.   URINARY: No frequency, dysuria, hematuria, or burning on urination.  REPRODUCTIVE: See HPI.   BREASTS: The patient denies pain, lumps, or nipple discharge.       BP (!) 130/90    Wt 80.7 kg (178 lb)   LMP 2018 (Approximate)   BMI 27.06 kg/m²      APPEARANCE: Well nourished, well developed, in no acute distress.  NECK: Neck symmetric without  thyromegaly.  NODES: No inguinal, cervical lymph node enlargement.  CHEST: expiratory wheeze in right lowe lung fields, CTA otherwise   HEART: Regular rate and rhythm, no murmurs, rubs or gallops.  ABDOMEN: Soft. No tenderness or masses. No hernias. No hepatosplenomegaly.   Pelvic: normal external genitalia, mildly enlarged uterus with good descent and mobility, large cervix with no discharge or masses, no adnexal masses appreciated,     TVUS: uterus 9.6x5.3x7.2 vol 191; anterior 1 cm fibroid, anterior 1 cm fibroid, posterior fibroid 1.6 cm and fundal fibroid 3.5 cm  Bilateral ovaries normal with no masses seen    Date:2019  Time:2:05 PM  Name of the procedure: Endometrial Biopsy  Indications: Josefina Martin is a 43 y.o. female  who presents today for endometrial biopsy secondary to AUB.  Patient's last menstrual period was 2018 (approximate)..    Patient consent: Risks/benefits of the procedure were discussed with the patient. Patient's questions were answered.  Consents signed.   TIME OUT completed.  Labs:   Office urine pregnancy test negative; Pap done today   Procedure: Speculum placed in vagina;  cervix swabbed with betadine x 2;   Utreus sounded to 9cm.   Endometrial pipelle advanced without difficulty x 2 passes and adequate tissue obtained.  Hemostasis achieved.    Complications: None  EBL: 2 cc  Disposition: Pt tolerated the procedure well.    A/P:   -Patient instructed to contact MD or report to emergency room for fever greater than 100.4F, vaginal bleeding greater than 2 pads/hour, severe abdominal pain not relieved with NSAIDs.      1. Encounter for annual routine gynecological examination    2. Abnormal uterine bleeding (AUB)    3. Pap smear for cervical cancer screening    4. Screening mammogram, encounter  for    5. Screening examination for STD (sexually transmitted disease)        Plan:  1. EMB and pap smear done today. We discussed medical vs surgical treatment. She desires hyst but would like be high risk 2/2 h/o MI with cardiac stents, 2 CVAs and several DVT in the past. She would be willing to try IUD in the meantime. We discussed of all the medical treatment would be the least exposure to hormones and could help with the pain and bleeding.  I also discussed with her uterine atery embolization but we discussed that can sometimes worsen the pain before it improves it. Will treat with tramadol while I am waiting for medical evaluation. Will discuss options and will send PCP/cardiology a message about surgery. Will also reach out to IR for evaluation. Will order IUD today.       F/u in 1-2 weeks

## 2019-01-05 LAB
CANDIDA RRNA VAG QL PROBE: NEGATIVE
G VAGINALIS RRNA GENITAL QL PROBE: POSITIVE
T VAGINALIS RRNA GENITAL QL PROBE: NEGATIVE

## 2019-01-07 ENCOUNTER — TELEPHONE (OUTPATIENT)
Dept: OBSTETRICS AND GYNECOLOGY | Facility: CLINIC | Age: 44
End: 2019-01-07

## 2019-01-07 LAB
C TRACH DNA SPEC QL NAA+PROBE: NOT DETECTED
N GONORRHOEA DNA SPEC QL NAA+PROBE: NOT DETECTED

## 2019-01-07 RX ORDER — METRONIDAZOLE 500 MG/1
500 TABLET ORAL EVERY 12 HOURS
Qty: 14 TABLET | Refills: 0 | Status: SHIPPED | OUTPATIENT
Start: 2019-01-07 | End: 2019-01-14

## 2019-01-07 NOTE — TELEPHONE ENCOUNTER
Please let patient know she tested negative for gonorrhea and chlamydia and yeast but + for a BV infection. This is not an STD but is a change in the normal bacterial segun in the vagina that can cause a discharge and an odor. I sent flagyl in to the pharmacy for her to take twice a day for 7 days. Please don't drink while taking the medication. It may give you a bad taste in your mouth or nausea, this is not an allergic reaction just a side effect of the medication. If it is intolerable we can call in a vaginal gel which can treat it but it can be more expensive depending on your insurance. Just let us know.

## 2019-01-07 NOTE — TELEPHONE ENCOUNTER
Informed patient of results and prescriptions were sent in to pharmacy. Patient voiced understanding. Notified patient to call office if there were any further questions.

## 2019-01-11 LAB
HPV HR 12 DNA CVX QL NAA+PROBE: NEGATIVE
HPV16 AG SPEC QL: NEGATIVE
HPV18 DNA SPEC QL NAA+PROBE: NEGATIVE

## 2019-01-11 NOTE — PROGRESS NOTES
Please let her know the biopsy we did was normal with no signs of precanerous lesion or cancer. Thanks

## 2019-01-14 ENCOUNTER — TELEPHONE (OUTPATIENT)
Dept: OBSTETRICS AND GYNECOLOGY | Facility: CLINIC | Age: 44
End: 2019-01-14

## 2019-01-14 DIAGNOSIS — N93.9 ABNORMAL UTERINE BLEEDING (AUB): Primary | ICD-10-CM

## 2019-01-14 RX ORDER — MEDROXYPROGESTERONE ACETATE 10 MG/1
10 TABLET ORAL DAILY
Qty: 10 TABLET | Refills: 0 | Status: SHIPPED | OUTPATIENT
Start: 2019-01-14 | End: 2019-06-07

## 2019-01-14 NOTE — TELEPHONE ENCOUNTER
Patient was contacted in regards to biopsy results. Patient reports that she is having heavy vaginal bleeding that began the day of her biopsy and still is ongoing. Patient is reporting that she soaks through an overnight maxi pad in an hour or less and that she is passing a lot of blood clots. Verbalized understanding. Patient was notified Dr. Frank would be sending in provera to her pharmacy to help with the bleeding. Patient verbalized understanding.

## 2019-01-14 NOTE — TELEPHONE ENCOUNTER
Called patient to  her know that I sent in provera for her to take one pill a day for 10 days. This should help slow the bleeding. We discussed that I would prefer for her to wait a few more days and see if the bleeding will stop on its own because I would prefer for her to not take any medications.  Sometimes after stopping it the bleeding will start up again for a day or so but should stop quickly after that.     I also put in an order to check her blood counts so [facility] so we can make sure isn't becoming anemic.

## 2019-01-15 NOTE — PROGRESS NOTES
Please send patient pap smear letter or call. Her pap was negative and negative for the HPV virus so since she has never had an abnormal pap smear the new recommendations are that she will not need another one for 5 years but we will still continue to see her for annuals. thanks.

## 2019-01-17 ENCOUNTER — TELEPHONE (OUTPATIENT)
Dept: OBSTETRICS AND GYNECOLOGY | Facility: CLINIC | Age: 44
End: 2019-01-17

## 2019-01-17 NOTE — TELEPHONE ENCOUNTER
----- Message from Agueda Frank MD sent at 1/15/2019 12:24 PM CST -----  Please send patient pap smear letter or call. Her pap was negative and negative for the HPV virus so since she has never had an abnormal pap smear the new recommendations are that she will not need another one for 5 years but we will still continue t  o see her for annuals. thanks.

## 2019-01-24 ENCOUNTER — TELEPHONE (OUTPATIENT)
Dept: OBSTETRICS AND GYNECOLOGY | Facility: CLINIC | Age: 44
End: 2019-01-24

## 2019-01-24 NOTE — TELEPHONE ENCOUNTER
Patient contacted and scheduled for mirena insertion on  2-1-19. Patient verbalized understanding.

## 2019-02-01 ENCOUNTER — PROCEDURE VISIT (OUTPATIENT)
Dept: OBSTETRICS AND GYNECOLOGY | Facility: CLINIC | Age: 44
End: 2019-02-01
Payer: MEDICAID

## 2019-02-01 VITALS
WEIGHT: 183.44 LBS | DIASTOLIC BLOOD PRESSURE: 100 MMHG | HEIGHT: 68 IN | HEART RATE: 73 BPM | SYSTOLIC BLOOD PRESSURE: 138 MMHG | BODY MASS INDEX: 27.8 KG/M2

## 2019-02-01 DIAGNOSIS — Z30.430 ENCOUNTER FOR IUD INSERTION: Primary | ICD-10-CM

## 2019-02-01 PROCEDURE — 58300 PR INSERT INTRAUTERINE DEVICE: ICD-10-PCS | Mod: S$PBB,,, | Performed by: OBSTETRICS & GYNECOLOGY

## 2019-02-01 PROCEDURE — 58300 INSERT INTRAUTERINE DEVICE: CPT | Mod: S$PBB,,, | Performed by: OBSTETRICS & GYNECOLOGY

## 2019-02-01 PROCEDURE — 58300 INSERT INTRAUTERINE DEVICE: CPT | Mod: PBBFAC,PO | Performed by: OBSTETRICS & GYNECOLOGY

## 2019-02-01 RX ORDER — LEVONORGESTREL 52 MG/1
1 INTRAUTERINE DEVICE INTRAUTERINE ONCE
COMMUNITY
Start: 2019-01-18

## 2019-02-01 NOTE — PROCEDURES
MIRENA INSERTION:  Date:2019  Time:12:26 PM  Name of the procedure: Mirena Insertion  Indications: Josefina Martin is a 43 y.o. female  who presents today for insertion of Mirena.  No LMP recorded..      PRE-Mirena INSERTION COUNSELING:  All contraceptive options were reviewed and the patient chooses Mirena.  Patients history was reviewed and there were no contraindications to Mirena.  The procedure and minimal risks of infection, bleeding, and uterine perforation at at  insertion have been discussed. Possible irregular menstrual bleeding pattern versus amenorrhea was explained. No protection against STDs discussed.    Consents: Risks/benefits of the procedure were discussed with the patient. Patient's questions were answered.  Consents signed.      Labs:    Office urine pregnancy test negative;       Procedure:   TIME OUT PERFORMED.  Speculum inserted and cervix visualized.  Cervix swabbed with betadine x 2; Single tooth tenaculum placed on anterior cervix. Endometrial pipelle inserted and uterus sounded to 9 cm. Mirena IUD then inserted using standard technique. IUD string cut about 4cm in length.  Tenaculum removed and hemostasis acheived. All instruments removed from the vagina.  Patient tolerated the procedure well.    Complications: none    EBL: min    Disposition: Pt tolerated the procedure well.    LOT number:[id number]  Exp date: 2021  Bill Product:     A/P:   -s/p successful insertion of Mirena IUD  -Motrin prn pain  -instructed to report nausea/vomiting/purulent discharge to MD  -instructed to use condoms for at least 1 week after insertion to avoid undesired pregnancy  -f/u 6-8 wks for IUD check    She would strongly desire hysterectomy if the mirena doesn't help. Discussed with her making an appt with her PCP to have pre-operative evaluation for hysterectomy. Discussed with her in my opinion she would be high risk for a cardiac event for a moderate risk procedure.

## 2019-02-27 ENCOUNTER — HOSPITAL ENCOUNTER (EMERGENCY)
Facility: HOSPITAL | Age: 44
Discharge: HOME | End: 2019-02-27
Attending: EMERGENCY MEDICINE
Payer: MEDICAID

## 2019-02-27 VITALS
RESPIRATION RATE: 18 BRPM | DIASTOLIC BLOOD PRESSURE: 92 MMHG | TEMPERATURE: 99 F | OXYGEN SATURATION: 99 % | BODY MASS INDEX: 26.66 KG/M2 | SYSTOLIC BLOOD PRESSURE: 162 MMHG | HEIGHT: 69 IN | HEART RATE: 70 BPM | WEIGHT: 180 LBS

## 2019-02-27 DIAGNOSIS — Z20.828 EXPOSURE TO THE FLU: ICD-10-CM

## 2019-02-27 DIAGNOSIS — R52 BODY ACHES: Primary | ICD-10-CM

## 2019-02-27 DIAGNOSIS — R05.9 COUGH: ICD-10-CM

## 2019-02-27 LAB
INFLUENZA A, MOLECULAR: NEGATIVE
INFLUENZA B, MOLECULAR: NEGATIVE
POCT GLUCOSE: 133 MG/DL (ref 70–110)
SPECIMEN SOURCE: NORMAL

## 2019-02-27 PROCEDURE — 87502 INFLUENZA DNA AMP PROBE: CPT

## 2019-02-27 PROCEDURE — 25000003 PHARM REV CODE 250: Performed by: NURSE PRACTITIONER

## 2019-02-27 PROCEDURE — 99283 EMERGENCY DEPT VISIT LOW MDM: CPT

## 2019-02-27 RX ORDER — ONDANSETRON 4 MG/1
4 TABLET, ORALLY DISINTEGRATING ORAL EVERY 8 HOURS PRN
Qty: 12 TABLET | Refills: 0 | Status: SHIPPED | OUTPATIENT
Start: 2019-02-27 | End: 2019-06-07

## 2019-02-27 RX ORDER — ACETAMINOPHEN 500 MG
1000 TABLET ORAL
Status: COMPLETED | OUTPATIENT
Start: 2019-02-27 | End: 2019-02-27

## 2019-02-27 RX ORDER — OSELTAMIVIR PHOSPHATE 75 MG/1
75 CAPSULE ORAL 2 TIMES DAILY
Qty: 10 CAPSULE | Refills: 0 | Status: SHIPPED | OUTPATIENT
Start: 2019-02-27 | End: 2019-03-04

## 2019-02-27 RX ADMIN — ACETAMINOPHEN 1000 MG: 500 TABLET ORAL at 09:02

## 2019-02-27 NOTE — ED NOTES
Pt c/o cough, body aches, and chills since yesterday. Family members have the flu, and daughter is also checking in with similar symptoms.

## 2019-02-27 NOTE — ED PROVIDER NOTES
Encounter Date: 2/27/2019       History     Chief Complaint   Patient presents with    Generalized Body Aches     c/o cough, body aches, and chills since yesterday. Family members have the flu     42 y/o female with history CHF, DM, HTN, presents to the ER with chief complaint of body aches, dry cough for 2 days.  She denies fever, nausea, vomiting,   She took theraflu last night with some improvement.  She denies chest pain or shortness breath.  Her daughter is in the ER with similar symptoms and was diagnosed with influenza.  She denies leg swelling. She denies sore throat, ear pain or any other complaints at this time.            Review of patient's allergies indicates:  No Known Allergies  Past Medical History:   Diagnosis Date    CHF (congestive heart failure)     Diabetes mellitus     Hypertension     Stroke      Past Surgical History:   Procedure Laterality Date    CHOLECYSTECTOMY  2013    history of cholelithiasis    TUBAL LIGATION         Social History     Tobacco Use    Smoking status: Current Every Day Smoker     Packs/day: 0.00     Years: 18.00     Pack years: 0.00     Types: Cigarettes    Smokeless tobacco: Never Used    Tobacco comment: 1 pack/week   Substance Use Topics    Alcohol use: Yes     Comment: social 1/month    Drug use: No     Review of Systems   Constitutional: Negative for chills and fever.   HENT: Positive for rhinorrhea. Negative for sore throat.    Eyes: Negative for visual disturbance.   Respiratory: Positive for cough. Negative for chest tightness, shortness of breath and wheezing.    Cardiovascular: Negative for chest pain, palpitations and leg swelling.   Gastrointestinal: Negative for nausea and vomiting.   Genitourinary: Negative for dysuria.   Musculoskeletal: Negative for back pain and joint swelling.   Skin: Negative for rash.   Neurological: Negative for dizziness, weakness and light-headedness.   Hematological: Does not bruise/bleed easily.    Psychiatric/Behavioral: Negative for confusion.       Physical Exam     Initial Vitals [02/27/19 0835]   BP Pulse Resp Temp SpO2   (!) 162/92 70 18 98.8 °F (37.1 °C) 99 %      MAP       --         Physical Exam    Nursing note and vitals reviewed.  Constitutional: She appears well-developed and well-nourished.   HENT:   Head: Atraumatic.   Nose: Rhinorrhea present.   Mouth/Throat: Oropharynx is clear and moist and mucous membranes are normal. No trismus in the jaw. No uvula swelling. No oropharyngeal exudate, posterior oropharyngeal edema, posterior oropharyngeal erythema or tonsillar abscesses.   Eyes: Conjunctivae and EOM are normal. Pupils are equal, round, and reactive to light.   Neck: Normal range of motion. Neck supple.   Cardiovascular: Normal rate, regular rhythm and intact distal pulses.   Pulmonary/Chest: Breath sounds normal. No respiratory distress. She has no wheezes. She has no rhonchi. She has no rales.   Abdominal: Soft. Bowel sounds are normal. There is no tenderness.   Neurological: She is alert and oriented to person, place, and time.   Skin: No rash noted.   Psychiatric: She has a normal mood and affect.         ED Course   Procedures  Labs Reviewed   POCT GLUCOSE - Abnormal; Notable for the following components:       Result Value    POCT Glucose 133 (*)     All other components within normal limits   INFLUENZA A & B BY MOLECULAR   POCT URINE PREGNANCY   POCT GLUCOSE MONITORING CONTINUOUS          Imaging Results    None                APC / Resident Notes:   Patient presents to the ER for evaluation of body aches, dry cough and nasal congestion for 2 days.  Patient says multiple family members have had the flu, including her daughter who is with her and tested positive for flu in the ED today.  Patient has not taken any medications today.  Flu swab is negative, but due to exposure to flu and patient with multiple comorbidities I will treat empirically with Tamiflu.  She is given Zofran to  use as needed for nausea and will take over-the-counter Coricidin HBP for symptomatic relief.  Patient's lungs are clear on exam, no wheezing, rales,  or rhonchi.  I do not suspect pneumonia.  I have offered chest x-ray, but the patient says she does not feel this is necessary at this time.  Patient has history of diabetes, hypertension and CHF.  Most recent EF was 55 on echo 10 months ago, most recent glucose was 107.  Patient is well controlled on meds.  She appears very well and is stable for discharge. She is given ER return precautions and advised to follow up with PCP within 1 week for ER follow-up exam.                 Clinical Impression:       ICD-10-CM ICD-9-CM   1. Body aches R52 780.96   2. Cough R05 786.2   3. Exposure to the flu Z20.828 V01.79                                GONZALEZ Beltrán  02/27/19 1200       GONZALEZ Beltrán  02/27/19 1201

## 2019-04-22 ENCOUNTER — TELEPHONE (OUTPATIENT)
Dept: OBSTETRICS AND GYNECOLOGY | Facility: CLINIC | Age: 44
End: 2019-04-22

## 2019-04-22 NOTE — TELEPHONE ENCOUNTER
----- Message from Gisella Clayton sent at 4/22/2019  2:20 PM CDT -----  Contact: Self [phone]  Patient is returning your call.  Please advise.

## 2019-04-22 NOTE — TELEPHONE ENCOUNTER
----- Message from Diana Root sent at 4/22/2019 11:38 AM CDT -----  Contact: [phone]  Type:  Sooner Apoointment Request    Caller is requesting a sooner appointment.  Caller declined first available appointment listed below.  Caller will not accept being placed on the waitlist and is requesting a message be sent to doctor.  Name of Caller: pt  When is the first available appointment? 4/30/19  Symptoms: discharge, itching  Would the patient rather a call back or a response via [facility]? Call back  Best Call Back Number: [phone]  Additional Information:   
Returned pt call. Pt did not answer and was unable to leave v/m due to mailbox being full.  
I will START or STAY ON the medications listed below when I get home from the hospital:    oxyCODONE-acetaminophen 5 mg-325 mg oral tablet  -- 1 tab(s) by mouth every 6 hours MDD:4  -- Caution federal law prohibits the transfer of this drug to any person other  than the person for whom it was prescribed.  May cause drowsiness.  Alcohol may intensify this effect.  Use care when operating dangerous machinery.  This prescription cannot be refilled.  This product contains acetaminophen.  Do not use  with any other product containing acetaminophen to prevent possible liver damage.  Using more of this medication than prescribed may cause serious breathing problems.    -- Indication: For severe pain

## 2019-04-23 ENCOUNTER — OFFICE VISIT (OUTPATIENT)
Dept: OBSTETRICS AND GYNECOLOGY | Facility: CLINIC | Age: 44
End: 2019-04-23
Payer: MEDICAID

## 2019-04-23 VITALS
WEIGHT: 184.19 LBS | SYSTOLIC BLOOD PRESSURE: 160 MMHG | BODY MASS INDEX: 27.28 KG/M2 | HEIGHT: 69 IN | DIASTOLIC BLOOD PRESSURE: 108 MMHG

## 2019-04-23 DIAGNOSIS — N89.8 VAGINAL DISCHARGE: Primary | ICD-10-CM

## 2019-04-23 DIAGNOSIS — L29.9 ITCHING: ICD-10-CM

## 2019-04-23 PROCEDURE — 99999 PR PBB SHADOW E&M-EST. PATIENT-LVL IV: CPT | Mod: PBBFAC,,, | Performed by: OBSTETRICS & GYNECOLOGY

## 2019-04-23 PROCEDURE — 99999 PR PBB SHADOW E&M-EST. PATIENT-LVL IV: ICD-10-PCS | Mod: PBBFAC,,, | Performed by: OBSTETRICS & GYNECOLOGY

## 2019-04-23 PROCEDURE — 99214 OFFICE O/P EST MOD 30 MIN: CPT | Mod: PBBFAC,PN | Performed by: OBSTETRICS & GYNECOLOGY

## 2019-04-23 PROCEDURE — 87510 GARDNER VAG DNA DIR PROBE: CPT

## 2019-04-23 PROCEDURE — 87480 CANDIDA DNA DIR PROBE: CPT

## 2019-04-23 PROCEDURE — 99213 PR OFFICE/OUTPT VISIT, EST, LEVL III, 20-29 MIN: ICD-10-PCS | Mod: S$PBB,,, | Performed by: OBSTETRICS & GYNECOLOGY

## 2019-04-23 PROCEDURE — 99213 OFFICE O/P EST LOW 20 MIN: CPT | Mod: S$PBB,,, | Performed by: OBSTETRICS & GYNECOLOGY

## 2019-04-23 RX ORDER — NYSTATIN 100000 U/G
CREAM TOPICAL 2 TIMES DAILY
Qty: 30 G | Refills: 0 | Status: SHIPPED | OUTPATIENT
Start: 2019-04-23 | End: 2019-06-07

## 2019-04-23 RX ORDER — TRIAMCINOLONE ACETONIDE 1 MG/G
CREAM TOPICAL 2 TIMES DAILY
Qty: 30 G | Refills: 0 | Status: SHIPPED | OUTPATIENT
Start: 2019-04-23 | End: 2019-06-07

## 2019-04-23 NOTE — PROGRESS NOTES
Chief Complaint   Patient presents with    Vaginal Discharge     vaginal itching       HISTORY OF PRESENT ILLNESS:   Josefina Martin is a 43 y.o. female  who presents for vaginal discharge with no odor.  No LMP recorded. Patient has had an implant..  She reports since the mirena her abdominal pain is gone and her periods are light. She also recently changed detergents and now is having itching in her groin, no rash.      Past Medical History:   Diagnosis Date    CHF (congestive heart failure)     Diabetes mellitus     Hypertension     Stroke           Past Surgical History:   Procedure Laterality Date    CHOLECYSTECTOMY  2013    history of cholelithiasis    TUBAL LIGATION           Social History     Socioeconomic History    Marital status: Single     Spouse name: Not on file    Number of children: Not on file    Years of education: Not on file    Highest education level: Not on file   Occupational History     Employer: [employer]   Social Needs    Financial resource strain: Not on file    Food insecurity:     Worry: Not on file     Inability: Not on file    Transportation needs:     Medical: Not on file     Non-medical: Not on file   Tobacco Use    Smoking status: Current Every Day Smoker     Packs/day: 0.00     Years: 18.00     Pack years: 0.00     Types: Cigarettes    Smokeless tobacco: Never Used    Tobacco comment: 1 pack/week   Substance and Sexual Activity    Alcohol use: Yes     Comment: social 1/month    Drug use: No    Sexual activity: Yes     Partners: Male     Birth control/protection: None, Surgical   Lifestyle    Physical activity:     Days per week: Not on file     Minutes per session: Not on file    Stress: Not on file   Relationships    Social connections:     Talks on phone: Not on file     Gets together: Not on file     Attends Jew service: Not on file     Active member of club or organization: Not on file     Attends meetings of clubs or organizations: Not on file     " Relationship status: Not on file   Other Topics Concern    Not on file   Social History Narrative    Not on file       Family History   Problem Relation Age of Onset    Hypertension Mother     Lung cancer Mother     No Known Problems Father     No Known Problems Sister     No Known Problems Daughter     Diabetes Son 14        Takes 2 insulins and metformin use to be bigger wally    Stroke Maternal Uncle 48    Anuerysm Maternal Grandmother     No Known Problems Sister     No Known Problems Daughter     No Known Problems Son          OB History    Para Term  AB Living   5 4     1     SAB TAB Ectopic Multiple Live Births   1              # Outcome Date GA Lbr Flex/2nd Weight Sex Delivery Anes PTL Lv   5 SAB            4 Para            3 Para            2 Para            1 Para                GYN HISTORY:  PAP History: Denies abnormal Paps  Infection History:Denies STDs. Denies PID.  Benign History: Denies uterine fibroids. Denies ovarian cysts. Denies endometriosis Denies other conditions.  Cancer History: Denies cervical cancer. Denies uterine cancer or hyperplasia. Denies ovarian cancer. Denies vulvar cancer or pre-cancer. Denies vaginal cancer or pre-cancer. Denies breast cancer. Denies colon cancer.  Cycle: / until 2018 when started coming Q2 weeks     ROS:  GENERAL: Denies weight gain or weight loss. Feeling well overall.   SKIN: Denies rash or lesions.   HEAD: Denies headache.   NODES: Denies enlarged lymph nodes.   CHEST: Denies shortness of breath at rest but does have some with ambulating.   ABDOMEN: No abdominal pain, constipation, diarrhea, nausea, vomiting or rectal bleeding.   URINARY: No frequency, dysuria, hematuria, or burning on urination.  REPRODUCTIVE: See HPI.   BREASTS: The patient denies pain, lumps, or nipple discharge.       BP (!) 160/108   Ht 5' 8.5" (1.74 m)   Wt 83.6 kg (184 lb 3.2 oz)   BMI 27.60 kg/m²      APPEARANCE: Well nourished, well developed, " in no acute distress.  NECK: Neck symmetric without  thyromegaly.  NODES: No inguinal, cervical lymph node enlargement.  CHEST: expiratory wheeze in right lowe lung fields, CTA otherwise   HEART: Regular rate and rhythm, no murmurs, rubs or gallops.  ABDOMEN: Soft. No tenderness or masses. No hernias. No hepatosplenomegaly.   Pelvic: normal external genitalia, no erythema but some excoriations on right groin, scant white vaginal discharge, mildly enlarged uterus with good descent and mobility, large cervix with no discharge or masses, IUD strings seen in place, no adnexal masses appreciated,     TVUS: uterus 9.6x5.3x7.2 vol 191; anterior 1 cm fibroid, anterior 1 cm fibroid, posterior fibroid 1.6 cm and fundal fibroid 3.5 cm  Bilateral ovaries normal with no masses seen      1. Vaginal discharge        Plan:  1. IUD in place, affirm done and she declines gc/ct. Discussed with her it could be a change in her vaginal discharge due to IUD being there. Likely contact dermatitis on crease of thigh. Will treat with mycolog and change back to regular soap.

## 2019-04-26 ENCOUNTER — TELEPHONE (OUTPATIENT)
Dept: OBSTETRICS AND GYNECOLOGY | Facility: CLINIC | Age: 44
End: 2019-04-26

## 2019-04-26 LAB
BACTERIAL VAGINOSIS DNA: POSITIVE
CANDIDA GLABRATA DNA: POSITIVE
CANDIDA KRUSEI DNA: NEGATIVE
CANDIDA RRNA VAG QL PROBE: NEGATIVE
T VAGINALIS RRNA GENITAL QL PROBE: POSITIVE

## 2019-04-26 RX ORDER — METRONIDAZOLE 500 MG/1
2000 TABLET ORAL ONCE
Qty: 4 TABLET | Refills: 0 | Status: SHIPPED | OUTPATIENT
Start: 2019-04-26 | End: 2019-04-26

## 2019-04-26 RX ORDER — FLUCONAZOLE 150 MG/1
150 TABLET ORAL
Qty: 2 TABLET | Refills: 0 | Status: SHIPPED | OUTPATIENT
Start: 2019-04-26 | End: 2019-04-30

## 2019-05-07 ENCOUNTER — HOSPITAL ENCOUNTER (EMERGENCY)
Facility: HOSPITAL | Age: 44
Discharge: HOME | End: 2019-05-07
Attending: EMERGENCY MEDICINE
Payer: MEDICAID

## 2019-05-07 VITALS
HEART RATE: 68 BPM | DIASTOLIC BLOOD PRESSURE: 103 MMHG | TEMPERATURE: 98 F | SYSTOLIC BLOOD PRESSURE: 184 MMHG | OXYGEN SATURATION: 98 % | BODY MASS INDEX: 27.25 KG/M2 | WEIGHT: 184 LBS | RESPIRATION RATE: 20 BRPM | HEIGHT: 69 IN

## 2019-05-07 DIAGNOSIS — R60.9 EDEMA, UNSPECIFIED TYPE: Primary | ICD-10-CM

## 2019-05-07 DIAGNOSIS — I10 ESSENTIAL HYPERTENSION: ICD-10-CM

## 2019-05-07 LAB
ALBUMIN SERPL BCP-MCNC: 3.6 G/DL (ref 3.5–5.2)
ALP SERPL-CCNC: 85 U/L (ref 55–135)
ALT SERPL W/O P-5'-P-CCNC: 16 U/L (ref 10–44)
ANION GAP SERPL CALC-SCNC: 6 MMOL/L (ref 8–16)
AST SERPL-CCNC: 20 U/L (ref 10–40)
BASOPHILS # BLD AUTO: 0.03 K/UL (ref 0–0.2)
BASOPHILS NFR BLD: 0.5 % (ref 0–1.9)
BILIRUB SERPL-MCNC: 0.4 MG/DL (ref 0.1–1)
BILIRUB UR QL STRIP: NEGATIVE
BNP SERPL-MCNC: 41 PG/ML (ref 0–99)
BUN SERPL-MCNC: 14 MG/DL (ref 6–20)
CALCIUM SERPL-MCNC: 9.1 MG/DL (ref 8.7–10.5)
CHLORIDE SERPL-SCNC: 108 MMOL/L (ref 95–110)
CLARITY UR: CLEAR
CO2 SERPL-SCNC: 25 MMOL/L (ref 23–29)
COLOR UR: YELLOW
CREAT SERPL-MCNC: 0.9 MG/DL (ref 0.5–1.4)
DIFFERENTIAL METHOD: NORMAL
EOSINOPHIL # BLD AUTO: 0.1 K/UL (ref 0–0.5)
EOSINOPHIL NFR BLD: 1.4 % (ref 0–8)
ERYTHROCYTE [DISTWIDTH] IN BLOOD BY AUTOMATED COUNT: 12.5 % (ref 11.5–14.5)
EST. GFR  (AFRICAN AMERICAN): >60 ML/MIN/1.73 M^2
EST. GFR  (NON AFRICAN AMERICAN): >60 ML/MIN/1.73 M^2
GLUCOSE SERPL-MCNC: 112 MG/DL (ref 70–110)
GLUCOSE UR QL STRIP: NEGATIVE
HCT VFR BLD AUTO: 41.5 % (ref 37–48.5)
HGB BLD-MCNC: 14 G/DL (ref 12–16)
HGB UR QL STRIP: ABNORMAL
KETONES UR QL STRIP: NEGATIVE
LEUKOCYTE ESTERASE UR QL STRIP: NEGATIVE
LYMPHOCYTES # BLD AUTO: 2.5 K/UL (ref 1–4.8)
LYMPHOCYTES NFR BLD: 44.4 % (ref 18–48)
MCH RBC QN AUTO: 30.8 PG (ref 27–31)
MCHC RBC AUTO-ENTMCNC: 33.7 G/DL (ref 32–36)
MCV RBC AUTO: 91 FL (ref 82–98)
MICROSCOPIC COMMENT: ABNORMAL
MONOCYTES # BLD AUTO: 0.5 K/UL (ref 0.3–1)
MONOCYTES NFR BLD: 8.8 % (ref 4–15)
NEUTROPHILS # BLD AUTO: 2.6 K/UL (ref 1.8–7.7)
NEUTROPHILS NFR BLD: 44.7 % (ref 38–73)
NITRITE UR QL STRIP: NEGATIVE
PH UR STRIP: 6 [PH] (ref 5–8)
PLATELET # BLD AUTO: 209 K/UL (ref 150–350)
PMV BLD AUTO: 11 FL (ref 9.2–12.9)
POTASSIUM SERPL-SCNC: 3.9 MMOL/L (ref 3.5–5.1)
PROT SERPL-MCNC: 6.6 G/DL (ref 6–8.4)
PROT UR QL STRIP: ABNORMAL
RBC # BLD AUTO: 4.55 M/UL (ref 4–5.4)
RBC #/AREA URNS HPF: 5 /HPF (ref 0–4)
SODIUM SERPL-SCNC: 139 MMOL/L (ref 136–145)
SP GR UR STRIP: >=1.03 (ref 1–1.03)
TROPONIN I SERPL DL<=0.01 NG/ML-MCNC: <0.006 NG/ML (ref 0–0.03)
TSH SERPL DL<=0.005 MIU/L-ACNC: 0.79 UIU/ML (ref 0.4–4)
URN SPEC COLLECT METH UR: ABNORMAL
UROBILINOGEN UR STRIP-ACNC: NEGATIVE EU/DL
WBC # BLD AUTO: 5.7 K/UL (ref 3.9–12.7)
WBC #/AREA URNS HPF: 3 /HPF (ref 0–5)

## 2019-05-07 PROCEDURE — 99283 EMERGENCY DEPT VISIT LOW MDM: CPT

## 2019-05-07 PROCEDURE — 81000 URINALYSIS NONAUTO W/SCOPE: CPT

## 2019-05-07 PROCEDURE — 83880 ASSAY OF NATRIURETIC PEPTIDE: CPT

## 2019-05-07 PROCEDURE — 85025 COMPLETE CBC W/AUTO DIFF WBC: CPT

## 2019-05-07 PROCEDURE — 84443 ASSAY THYROID STIM HORMONE: CPT

## 2019-05-07 PROCEDURE — 84484 ASSAY OF TROPONIN QUANT: CPT

## 2019-05-07 PROCEDURE — 80053 COMPREHEN METABOLIC PANEL: CPT

## 2019-05-07 NOTE — ED PROVIDER NOTES
"Encounter Date: 5/7/2019    SCRIBE #1 NOTE: I, Del Hills, am scribing for, and in the presence of,  Dr. Teagan Chen. I have scribed the entire note.       History     Chief Complaint   Patient presents with    Swelling     Reports had eaten pizza about 1 hour ago and soon after had started having swelling to LUE, R hand, and L foot. Also reports having a "film" over R eye.      43 year old female presents to the ED with complaint of extremity swelling. Patient reports shortly after eating a pizza 1 hour prior to arrival, she developed swelling to her LUE, right hand, and left foot. She admits to an episode diarrhea and some fatigue but denies SOB. Patient also complaining of a "film" over her right eye described as a "glare". She denies any known allergies. No other complaints at this time.    The history is provided by the patient.     Review of patient's allergies indicates:  No Known Allergies  Past Medical History:   Diagnosis Date    CHF (congestive heart failure)     Diabetes mellitus     Hypertension     Stroke      Past Surgical History:   Procedure Laterality Date    CHOLECYSTECTOMY  2013    history of cholelithiasis    TUBAL LIGATION       Family History   Problem Relation Age of Onset    Hypertension Mother     Lung cancer Mother     No Known Problems Father     No Known Problems Sister     No Known Problems Daughter     Diabetes Son 14        Takes 2 insulins and metformin use to be bigger wally    Stroke Maternal Uncle 48    Anuerysm Maternal Grandmother     No Known Problems Sister     No Known Problems Daughter     No Known Problems Son      Social History     Tobacco Use    Smoking status: Current Every Day Smoker     Packs/day: 0.00     Years: 18.00     Pack years: 0.00     Types: Cigarettes    Smokeless tobacco: Never Used    Tobacco comment: 1 pack/week   Substance Use Topics    Alcohol use: Yes     Comment: social 1/month    Drug use: No     Review of Systems "   Constitutional: Positive for fatigue. Negative for chills and fever.   HENT: Negative for congestion, rhinorrhea and sore throat.    Eyes: Positive for discharge. Negative for redness and visual disturbance.   Respiratory: Negative for cough, shortness of breath and wheezing.    Cardiovascular: Positive for leg swelling. Negative for chest pain and palpitations.   Gastrointestinal: Positive for diarrhea. Negative for abdominal pain, nausea and vomiting.   Genitourinary: Negative for dysuria and hematuria.   Musculoskeletal: Negative for back pain, myalgias and neck pain.        + right hand swelling  + left foot swelling   Skin: Negative for rash.   Neurological: Negative for dizziness, weakness and light-headedness.   Psychiatric/Behavioral: Negative for confusion.   All other systems reviewed and are negative.      Physical Exam     Initial Vitals [05/07/19 1629]   BP Pulse Resp Temp SpO2   (!) 186/115 88 16 98.3 °F (36.8 °C) 99 %      MAP       --         Physical Exam    Nursing note and vitals reviewed.  Constitutional: She appears well-developed and well-nourished. No distress.   HENT:   Head: Normocephalic and atraumatic.   Eyes: EOM are normal. Pupils are equal, round, and reactive to light.   Neck: Normal range of motion. Neck supple.   Cardiovascular: Normal rate, regular rhythm, normal heart sounds and intact distal pulses.   Pulmonary/Chest: Breath sounds normal. No respiratory distress. She has no wheezes.   Abdominal: Soft. She exhibits no distension. There is no tenderness.   Musculoskeletal: Normal range of motion. She exhibits no edema.   Neurological: She is alert and oriented to person, place, and time.   Skin: Skin is warm and dry.         ED Course   Procedures  Labs Reviewed   COMPREHENSIVE METABOLIC PANEL - Abnormal; Notable for the following components:       Result Value    Glucose 112 (*)     Anion Gap 6 (*)     All other components within normal limits   URINALYSIS, REFLEX TO URINE  CULTURE - Abnormal; Notable for the following components:    Specific Gravity, UA >=1.030 (*)     Protein, UA Trace (*)     Occult Blood UA 1+ (*)     All other components within normal limits    Narrative:     Preferred Collection Type->Urine, Clean Catch   URINALYSIS MICROSCOPIC - Abnormal; Notable for the following components:    RBC, UA 5 (*)     All other components within normal limits    Narrative:     Preferred Collection Type->Urine, Clean Catch   CBC W/ AUTO DIFFERENTIAL   B-TYPE NATRIURETIC PEPTIDE   TROPONIN I   TSH          Imaging Results    None          Medical Decision Making:   Clinical Tests:   Lab Tests: Ordered and Reviewed                      Clinical Impression:       ICD-10-CM ICD-9-CM   1. Edema, unspecified type R60.9 782.3   2. Essential hypertension I10 401.9                 I, Teagan Chen, personally performed the services described in this documentation. All medical record entries made by the scribe were at my direction and in my presence.  I have reviewed the chart and agree that the record reflects my personal performance and is accurate and complete. Teagan Chen M.D. 7:09 PM05/07/2019                 Teagan Chen MD  05/07/19 1903

## 2019-05-07 NOTE — ED NOTES
Patient stated that she has had one epidode of diarrhea and sudden heat flash with extreme tiredness. Patient stated that she ate today at pizza place and then had the episode right after eating. Vital signs stable will continue to monitor

## 2019-06-07 ENCOUNTER — OFFICE VISIT (OUTPATIENT)
Dept: FAMILY MEDICINE | Facility: HOSPITAL | Age: 44
End: 2019-06-07
Attending: FAMILY MEDICINE
Payer: MEDICAID

## 2019-06-07 ENCOUNTER — HOSPITAL ENCOUNTER (EMERGENCY)
Facility: HOSPITAL | Age: 44
Discharge: HOME | End: 2019-06-07
Attending: EMERGENCY MEDICINE
Payer: MEDICAID

## 2019-06-07 VITALS
HEART RATE: 78 BPM | SYSTOLIC BLOOD PRESSURE: 167 MMHG | DIASTOLIC BLOOD PRESSURE: 107 MMHG | TEMPERATURE: 98 F | BODY MASS INDEX: 27.43 KG/M2 | HEIGHT: 69 IN | RESPIRATION RATE: 22 BRPM | OXYGEN SATURATION: 100 % | WEIGHT: 185.19 LBS

## 2019-06-07 VITALS
HEART RATE: 71 BPM | SYSTOLIC BLOOD PRESSURE: 187 MMHG | HEIGHT: 69 IN | BODY MASS INDEX: 27.43 KG/M2 | WEIGHT: 185.19 LBS | DIASTOLIC BLOOD PRESSURE: 113 MMHG

## 2019-06-07 DIAGNOSIS — E11.9 TYPE 2 DIABETES MELLITUS WITHOUT COMPLICATION, WITHOUT LONG-TERM CURRENT USE OF INSULIN: ICD-10-CM

## 2019-06-07 DIAGNOSIS — I10 ESSENTIAL HYPERTENSION: ICD-10-CM

## 2019-06-07 DIAGNOSIS — I16.1 HYPERTENSIVE EMERGENCY: Primary | ICD-10-CM

## 2019-06-07 DIAGNOSIS — I10 HYPERTENSION, UNSPECIFIED TYPE: ICD-10-CM

## 2019-06-07 DIAGNOSIS — I10 HTN (HYPERTENSION), MALIGNANT: ICD-10-CM

## 2019-06-07 DIAGNOSIS — I10 HYPERTENSION: ICD-10-CM

## 2019-06-07 DIAGNOSIS — E78.5 HYPERLIPIDEMIA, UNSPECIFIED HYPERLIPIDEMIA TYPE: ICD-10-CM

## 2019-06-07 DIAGNOSIS — F32.A DEPRESSION, UNSPECIFIED DEPRESSION TYPE: ICD-10-CM

## 2019-06-07 LAB
ALBUMIN SERPL BCP-MCNC: 3.8 G/DL (ref 3.5–5.2)
ALP SERPL-CCNC: 83 U/L (ref 55–135)
ALT SERPL W/O P-5'-P-CCNC: 18 U/L (ref 10–44)
ANION GAP SERPL CALC-SCNC: 8 MMOL/L (ref 8–16)
AST SERPL-CCNC: 20 U/L (ref 10–40)
BASOPHILS # BLD AUTO: 0.01 K/UL (ref 0–0.2)
BASOPHILS NFR BLD: 0.2 % (ref 0–1.9)
BILIRUB SERPL-MCNC: 0.6 MG/DL (ref 0.1–1)
BUN SERPL-MCNC: 11 MG/DL (ref 6–20)
CALCIUM SERPL-MCNC: 9.8 MG/DL (ref 8.7–10.5)
CHLORIDE SERPL-SCNC: 105 MMOL/L (ref 95–110)
CO2 SERPL-SCNC: 25 MMOL/L (ref 23–29)
CREAT SERPL-MCNC: 0.9 MG/DL (ref 0.5–1.4)
DIFFERENTIAL METHOD: NORMAL
EOSINOPHIL # BLD AUTO: 0.1 K/UL (ref 0–0.5)
EOSINOPHIL NFR BLD: 1.8 % (ref 0–8)
ERYTHROCYTE [DISTWIDTH] IN BLOOD BY AUTOMATED COUNT: 12.6 % (ref 11.5–14.5)
EST. GFR  (AFRICAN AMERICAN): >60 ML/MIN/1.73 M^2
EST. GFR  (NON AFRICAN AMERICAN): >60 ML/MIN/1.73 M^2
GLUCOSE SERPL-MCNC: 105 MG/DL (ref 70–110)
HCT VFR BLD AUTO: 42.7 % (ref 37–48.5)
HGB BLD-MCNC: 14.4 G/DL (ref 12–16)
LYMPHOCYTES # BLD AUTO: 2 K/UL (ref 1–4.8)
LYMPHOCYTES NFR BLD: 43.4 % (ref 18–48)
MCH RBC QN AUTO: 30.5 PG (ref 27–31)
MCHC RBC AUTO-ENTMCNC: 33.7 G/DL (ref 32–36)
MCV RBC AUTO: 91 FL (ref 82–98)
MONOCYTES # BLD AUTO: 0.4 K/UL (ref 0.3–1)
MONOCYTES NFR BLD: 9.1 % (ref 4–15)
NEUTROPHILS # BLD AUTO: 2.1 K/UL (ref 1.8–7.7)
NEUTROPHILS NFR BLD: 45.3 % (ref 38–73)
PLATELET # BLD AUTO: 213 K/UL (ref 150–350)
PMV BLD AUTO: 11.2 FL (ref 9.2–12.9)
POTASSIUM SERPL-SCNC: 4.3 MMOL/L (ref 3.5–5.1)
PROT SERPL-MCNC: 7.2 G/DL (ref 6–8.4)
RBC # BLD AUTO: 4.72 M/UL (ref 4–5.4)
SODIUM SERPL-SCNC: 138 MMOL/L (ref 136–145)
WBC # BLD AUTO: 4.52 K/UL (ref 3.9–12.7)

## 2019-06-07 PROCEDURE — 93005 ELECTROCARDIOGRAM TRACING: CPT

## 2019-06-07 PROCEDURE — 85025 COMPLETE CBC W/AUTO DIFF WBC: CPT

## 2019-06-07 PROCEDURE — 93010 ELECTROCARDIOGRAM REPORT: CPT | Mod: ,,, | Performed by: STUDENT IN AN ORGANIZED HEALTH CARE EDUCATION/TRAINING PROGRAM

## 2019-06-07 PROCEDURE — 99214 OFFICE O/P EST MOD 30 MIN: CPT | Mod: 25 | Performed by: STUDENT IN AN ORGANIZED HEALTH CARE EDUCATION/TRAINING PROGRAM

## 2019-06-07 PROCEDURE — 93010 EKG 12-LEAD: ICD-10-PCS | Mod: ,,, | Performed by: STUDENT IN AN ORGANIZED HEALTH CARE EDUCATION/TRAINING PROGRAM

## 2019-06-07 PROCEDURE — 99284 EMERGENCY DEPT VISIT MOD MDM: CPT | Mod: 25,27

## 2019-06-07 PROCEDURE — 63600175 PHARM REV CODE 636 W HCPCS: Performed by: EMERGENCY MEDICINE

## 2019-06-07 PROCEDURE — 80053 COMPREHEN METABOLIC PANEL: CPT

## 2019-06-07 PROCEDURE — 96374 THER/PROPH/DIAG INJ IV PUSH: CPT

## 2019-06-07 RX ORDER — FUROSEMIDE 20 MG/1
20 TABLET ORAL DAILY
Qty: 60 TABLET | Refills: 0 | Status: SHIPPED | OUTPATIENT
Start: 2019-06-07 | End: 2020-01-13 | Stop reason: SDUPTHER

## 2019-06-07 RX ORDER — METFORMIN HYDROCHLORIDE 500 MG/1
500 TABLET, EXTENDED RELEASE ORAL 2 TIMES DAILY WITH MEALS
Qty: 60 TABLET | Refills: 0 | Status: SHIPPED | OUTPATIENT
Start: 2019-06-07 | End: 2020-01-13 | Stop reason: SDUPTHER

## 2019-06-07 RX ORDER — ATORVASTATIN CALCIUM 40 MG/1
40 TABLET, FILM COATED ORAL DAILY
Qty: 30 TABLET | Refills: 0 | Status: SHIPPED | OUTPATIENT
Start: 2019-06-07 | End: 2020-01-13 | Stop reason: SDUPTHER

## 2019-06-07 RX ORDER — CLOPIDOGREL BISULFATE 75 MG/1
75 TABLET ORAL DAILY
Qty: 30 TABLET | Refills: 0 | Status: SHIPPED | OUTPATIENT
Start: 2019-06-07 | End: 2020-01-13 | Stop reason: SDUPTHER

## 2019-06-07 RX ORDER — SPIRONOLACTONE 50 MG/1
50 TABLET, FILM COATED ORAL DAILY
Qty: 30 TABLET | Refills: 0 | Status: SHIPPED | OUTPATIENT
Start: 2019-06-07 | End: 2020-01-13 | Stop reason: SDUPTHER

## 2019-06-07 RX ORDER — HYDRALAZINE HYDROCHLORIDE 20 MG/ML
10 INJECTION INTRAMUSCULAR; INTRAVENOUS
Status: COMPLETED | OUTPATIENT
Start: 2019-06-07 | End: 2019-06-07

## 2019-06-07 RX ORDER — GABAPENTIN 100 MG/1
100 CAPSULE ORAL 3 TIMES DAILY
Qty: 90 CAPSULE | Refills: 0 | Status: SHIPPED | OUTPATIENT
Start: 2019-06-07 | End: 2020-01-13 | Stop reason: SDUPTHER

## 2019-06-07 RX ORDER — AMLODIPINE BESYLATE 10 MG/1
10 TABLET ORAL DAILY
Qty: 30 TABLET | Refills: 0 | Status: SHIPPED | OUTPATIENT
Start: 2019-06-07 | End: 2019-06-10 | Stop reason: ALTCHOICE

## 2019-06-07 RX ORDER — METRONIDAZOLE 500 MG/1
TABLET ORAL
Refills: 0 | COMMUNITY
Start: 2019-04-26 | End: 2019-06-07

## 2019-06-07 RX ORDER — LISINOPRIL 40 MG/1
40 TABLET ORAL DAILY
Qty: 30 TABLET | Refills: 0 | Status: SHIPPED | OUTPATIENT
Start: 2019-06-07 | End: 2020-01-13 | Stop reason: SDUPTHER

## 2019-06-07 RX ORDER — CHLORTHALIDONE 25 MG/1
25 TABLET ORAL DAILY
Qty: 30 TABLET | Refills: 0 | Status: SHIPPED | OUTPATIENT
Start: 2019-06-07 | End: 2019-07-02 | Stop reason: DRUGHIGH

## 2019-06-07 RX ORDER — CARVEDILOL 25 MG/1
25 TABLET ORAL 2 TIMES DAILY WITH MEALS
Qty: 60 TABLET | Refills: 0 | Status: SHIPPED | OUTPATIENT
Start: 2019-06-07 | End: 2020-01-13 | Stop reason: SDUPTHER

## 2019-06-07 RX ADMIN — HYDRALAZINE HYDROCHLORIDE 10 MG: 20 INJECTION, SOLUTION INTRAMUSCULAR; INTRAVENOUS at 11:06

## 2019-06-07 NOTE — ED NOTES
Pt states she was sent from PCP office for HTN. Pt states she is complaint with BP medication and took her medication this am. Pt states she has a slight L sided HA since yesterday. Pt denies any new weakness, numbness, difficulty walking or difficulty talking. Pt has slight R sided weakness from a previous stroke. Pt currently a/a/o x4. NAD noted. Respirations even, unlabored. Family at bedside. Will continue to monitor.

## 2019-06-07 NOTE — ED NOTES
Pt remains a/a/o x4. NAD noted. Respirations even, unlabored. Pt's daughter remains at bedside. Will continue to monitor.

## 2019-06-07 NOTE — PROGRESS NOTES
"Subjective:       Patient ID: Josefina Martin is a 44 y.o. female.    Chief Complaint: Headache and Medication Refill    Ms. Martin is a 45 y/o  female with a PMHx of CHF, CVA, DM, HTN, and HLD that presents to the clinic today complaining of headache and orthopnea that began 2 days ago. The headache has been episodic and is concentrated in the occipital region. She states that the pain is a 5/10 and is described as "tension." She has not taken anything for the pain. She endorses blurry vision and floaters but denies dizziness, fever, increase in urinary frequency, and chest pain. The orthopnea also began 2 days ago and has associated nausea when recumbent that is relieved when standing up. The nausea is not preceded by vomiting. She endorses having to sleep with 2+ pillows at night. Her in office BP reading today was 187/113 with all prescribed medications already taken this morning.     Review of Systems   Constitutional: Negative for activity change, appetite change, fatigue and unexpected weight change.   HENT: Negative for congestion, hearing loss, postnasal drip, rhinorrhea and sinus pain.    Eyes: Positive for photophobia and visual disturbance.   Respiratory: Positive for chest tightness and shortness of breath. Negative for apnea, cough, choking and wheezing.    Cardiovascular: Positive for chest pain, palpitations and leg swelling.   Gastrointestinal: Negative for abdominal distention, abdominal pain, constipation, diarrhea, nausea and vomiting.   Endocrine: Negative for cold intolerance, heat intolerance and polyuria.   Genitourinary: Negative for difficulty urinating, flank pain, frequency and urgency.   Musculoskeletal: Negative for arthralgias, back pain and myalgias.   Skin: Negative for rash.   Neurological: Positive for light-headedness and headaches. Negative for dizziness, weakness and numbness.       Objective:      Vitals:    06/07/19 0918   BP: (!) 187/113   Pulse: 71 "     Physical Exam   Constitutional: She is oriented to person, place, and time. She appears well-developed and well-nourished. No distress.   HENT:   Head: Normocephalic and atraumatic.   Mouth/Throat: Oropharynx is clear and moist. No oropharyngeal exudate.   Eyes: Pupils are equal, round, and reactive to light. Conjunctivae and EOM are normal. No scleral icterus.   Neck: Normal range of motion. Neck supple. No thyromegaly present.   Cardiovascular: Normal rate, regular rhythm, normal heart sounds and intact distal pulses. Exam reveals no gallop and no friction rub.   No murmur heard.  Pulmonary/Chest: Effort normal and breath sounds normal. No respiratory distress. She has no wheezes. She exhibits no tenderness.   Abdominal: Soft. Bowel sounds are normal. She exhibits no distension and no mass. There is no tenderness. No hernia.   Musculoskeletal: Normal range of motion. She exhibits no edema, tenderness or deformity.   Lymphadenopathy:     She has no cervical adenopathy.   Neurological: She is alert and oriented to person, place, and time. No cranial nerve deficit.   Skin: Skin is warm and dry. Capillary refill takes less than 2 seconds. She is not diaphoretic. No erythema.   Psychiatric: She has a normal mood and affect. Her behavior is normal.   Nursing note and vitals reviewed.      Assessment:       1. Hypertensive emergency    2. Essential hypertension        Plan:       Hypertensive emergency  -Sent to Emergency Room for further evaluation, will follow up after discharge   Essential hypertension      Follow up in about 1 week (around 6/14/2019), or patient sent to emergency room for further work up.

## 2019-06-07 NOTE — DISCHARGE INSTRUCTIONS
Additional instructions  Followup with your primary care physician within a week. Take all your medications as prescribed. Return to the emergency department if you have increasing pain, chest pain, difficulty breathing,  nonstop vomiting, or any other concerns. Be sure to drink plenty of fluids to stay hydrated. Get plenty of rest. Please refer to additional educational material for further instructions.

## 2019-06-07 NOTE — PLAN OF CARE
Patient seen by [facility] in ED after being sent from [facility] for concern for HTN Urgency. Patient improved in ED and will be discharged from ED as ED did not feel admit was warranted. Patient does not wish to be admitted at this time despite risk factors that may warrant admit. We discussed this in detail with the patient. Patient denies CP/SOB/Headache/N/V at this time. CMP/CBC unremarkable. EKG without acute changes.     Family Medicine asked if there are any medication changes that are needed at this time to this patient's HTN medication regimen. We recommend continuing all current medications and increasing lisinopril to 40mg daily. Patient has been scheduled to be seen in the Family Medicine clinic on Monday, Shan[place] 10. Discussed the plan with the patient and she agrees with the plan. Patient was given clear instructions to continue monitoring her blood pressure at home and if it continues to be elevated after taking her medications, that she is to return to the ED for further management.     Torie Main MD  PGY-1  [facility]  06/07/2019

## 2019-06-07 NOTE — ED PROVIDER NOTES
Encounter Date: 6/7/2019    SCRIBE #1 NOTE: I, Lenny Rodriguez, am scribing for, and in the presence of,  . I have scribed the entire note.       History     Chief Complaint   Patient presents with    Hypertension     Patient was sent to ED by [facility] clinic.  Patient was seen in clinic this morning with increase BP and mild headache for 2 days     CHIEF COMPLAINT: Patient presents with: high blood pressure    HISTORY OF PRESENT ILLNESS: Josefina Martin who is a 44 y.o. presents to the emergency department today with complaint of hypertension. Patient states for the past 3 months she has been under stress. Patient takes multiple medications, and took her last hypertension medication pill pta, she is now out however. Was going to her PCP for renewals when they found her BP to be high. She was sent to  ED.. Patient denies SOB, chest pain, fever, chills, V/D, urinary changes. She had a slight headache that has resolved.     ALLERGIES REVIEWED  MEDICATIONS REVIEWED  PMH/PSH/SOC/FH REVIEWED     The history is provided by the patient.    Nursing/Ancillary staff note reviewed.            Review of patient's allergies indicates:  No Known Allergies  Past Medical History:   Diagnosis Date    CHF (congestive heart failure)     Diabetes mellitus     Hypertension     Stroke      Past Surgical History:   Procedure Laterality Date    CHOLECYSTECTOMY  2013    history of cholelithiasis    TUBAL LIGATION       Family History   Problem Relation Age of Onset    Hypertension Mother     Lung cancer Mother     No Known Problems Father     No Known Problems Sister     No Known Problems Daughter     Diabetes Son 14        Takes 2 insulins and metformin use to be bigger wally    Stroke Maternal Uncle 48    Anuerysm Maternal Grandmother     No Known Problems Sister     No Known Problems Daughter     No Known Problems Son      Social History     Tobacco Use    Smoking status: Current Every Day Smoker     Packs/day:  0.15     Years: 18.00     Pack years: 2.70     Types: Cigarettes    Smokeless tobacco: Never Used    Tobacco comment: 1 pack/week   Substance Use Topics    Alcohol use: Yes     Comment: social 1/month    Drug use: No     Review of Systems   Constitutional: Negative for activity change, appetite change, chills, diaphoresis and fever.   HENT: Negative for congestion, drooling, ear pain, mouth sores, rhinorrhea, sinus pain, sore throat and trouble swallowing.    Eyes: Negative for pain and discharge.   Respiratory: Negative for cough, chest tightness, shortness of breath, wheezing and stridor.    Cardiovascular: Negative for chest pain, palpitations and leg swelling.   Gastrointestinal: Negative for abdominal distention, abdominal pain, blood in stool, constipation, diarrhea, nausea and vomiting.   Genitourinary: Negative for difficulty urinating, dysuria, flank pain, frequency, hematuria and urgency.   Musculoskeletal: Negative for arthralgias, back pain and myalgias.   Skin: Negative for pallor, rash and wound.   Neurological: Positive for headaches. Negative for dizziness, syncope, weakness, light-headedness and numbness.   All other systems reviewed and are negative.      Physical Exam     Initial Vitals [06/07/19 1025]   BP Pulse Resp Temp SpO2   -- 75 14 97.9 °F (36.6 °C) 100 %      MAP       --         Physical Exam    Nursing note and vitals reviewed.  Constitutional: She appears well-developed and well-nourished.   HENT:   Head: Normocephalic and atraumatic.   Right Ear: External ear normal.   Left Ear: External ear normal.   Nose: Nose normal.   Mouth/Throat: Oropharynx is clear and moist. Dentures: multiple. Dental caries present.   Eyes: Conjunctivae and EOM are normal. Pupils are equal, round, and reactive to light. No scleral icterus.   Neck: Normal range of motion. Neck supple. No JVD present.   Cardiovascular: Normal rate, regular rhythm, normal heart sounds and intact distal pulses. Exam reveals no  gallop and no friction rub.    No murmur heard.  Pulmonary/Chest: Breath sounds normal. No stridor. No respiratory distress. She has no wheezes. She exhibits no tenderness.   Abdominal: Soft. Bowel sounds are normal. She exhibits no distension and no mass. There is no tenderness. There is no rebound and no guarding.   Musculoskeletal: Normal range of motion. She exhibits no edema or tenderness.   Back is nontender to palpation.    Neurological: She is alert and oriented to person, place, and time. She has normal strength. No cranial nerve deficit.   Skin: Skin is warm and dry. Capillary refill takes less than 2 seconds. No rash noted. No pallor.   Psychiatric: She has a normal mood and affect. Thought content normal.         ED Course   Procedures  Labs Reviewed   CBC W/ AUTO DIFFERENTIAL   COMPREHENSIVE METABOLIC PANEL     EKG Readings: (Independently Interpreted)   65 bpm, NSR, L axis enlargement, No ST elevations, flipped T wave in III and AVF              Medical Decision Making:   History:   Old Medical Records: I decided to obtain old medical records.  Initial Assessment:   Sent from [facility] for hypertension. Patient admits to added stress, but is compliant with her medication.   Differential Diagnosis:   Worsening disease process, renal vessle stenosis, medication non-compliance  Independently Interpreted Test(s):   I have ordered and independently interpreted EKG Reading(s) - see prior notes  Clinical Tests:   Lab Tests: Ordered and Reviewed  Medical Tests: Ordered and Reviewed  ED Management:  This is a 3 yo who is on miltiple BP meds who was sent from  PCPs office for HTN. She is asymptomatic here in the ED. Her PCP has been by and would like ot increase her medications. I have given her her refills and she'll follow uo with her PCP. After taking into careful account the historical factors and physical exam findings of the patient's presentation today, in conjunction with the empirical and  objective data obtained on ED workup, no acute emergent medical condition requiring admission has been identified. The patient appears to be low risk for an emergent medical condition and I feel it is safe and appropriate at this time for the patient to be discharged to follow-up as detailed in their discharge instructions for reevaluation and possible continued outpatient workup and management. I have discussed the specifics of the workup with the patient and the patient has verbalized understanding of the details of the workup, the diagnosis, the treatment plan, and the need for outpatient follow-up.  Although the patient has no emergent etiology today this does not preclude the development of an emergent condition so in addition, I have advised the patient that they can return to the ED and/or activate EMS at any time with worsening of their symptoms, change of their symptoms, or with any other medical complaint.  The patient remained comfortable and stable during their visit in the ED.  Discharge and follow-up instructions discussed with the patient who expressed understanding and willingness to comply with my recommendations.     This medical record was prepared using voice dictation software. There may be phonetic errors.                       ED Course as of Jun 11 1217 Fri Jun 07, 2019   1348 [facility] has been down to see the pt and they would like the pt to increase her lisinopril to 40 mg daily from 20 mg. The pt is out of her medications. I will refill her medications for 1 month and FM will set her up for follow up appt in the next 2 weeks to continued her long term therapies.     [JA]      ED Course User Index  [JA] Ethan Stoner MD     Clinical Impression:       ICD-10-CM ICD-9-CM   1. Hypertension I10 401.9   2. HTN (hypertension), malignant I10 401.0   3. Hypertension, unspecified type I10 401.9   4. Hyperlipidemia, unspecified hyperlipidemia type E78.5 272.4   5. Essential hypertension I10  401.9   6. Type 2 diabetes mellitus without complication, without long-term current use of insulin E11.9 250.00   7. Depression, unspecified depression type F32.9 311                                  Ethan Stoner MD  06/11/19 1225

## 2019-06-07 NOTE — MEDICAL/APP STUDENT
"Subjective:       Patient ID: Josefina Martin is a 44 y.o. female.    Chief Complaint: Headache and Medication Refill    Ms. Martin is a 45 y/o  female with a PMHx of CHF, CVA, DM, HTN, and HLD that presents to the clinic today complaining of headache and orthopnea that began 2 days ago. The headache has been episodic and is concentrated in the occipital region. She states that the pain is a 5/10 and is described as "tension." She has not taken anything for the pain. She endorses blurry vision and floaters but denies dizziness, fever, increase in urinary frequency, and chest pain. The orthopnea also began 2 days ago and has associated nausea when recumbent that is relieved when standing up. The nausea is not preceded by vomiting. She endorses having to sleep with 2+ pillows at night. Her in office BP reading today was 187/113 with all prescribed medications already taken this morning.     Review of Systems   Constitutional: Positive for appetite change.   HENT: Negative.    Eyes: Positive for visual disturbance ("seeing stars").   Respiratory: Positive for shortness of breath. Negative for chest tightness and wheezing.    Cardiovascular: Positive for leg swelling. Negative for chest pain.   Gastrointestinal: Positive for nausea (when recumbent). Negative for constipation, diarrhea and vomiting.   Genitourinary: Negative for dysuria and frequency.   Skin: Negative.    Neurological: Negative for dizziness and weakness.   Psychiatric/Behavioral: Positive for sleep disturbance.   All other systems reviewed and are negative.      Objective:      Physical Exam   Constitutional: She is oriented to person, place, and time. No distress.   HENT:   Head: Normocephalic and atraumatic.   Eyes: Pupils are equal, round, and reactive to light. Conjunctivae and EOM are normal.   Cardiovascular: Normal rate, regular rhythm and normal heart sounds. Exam reveals no gallop and no friction rub.   No murmur " heard.  Pulmonary/Chest: Effort normal and breath sounds normal. No respiratory distress (orthopnea).   Neurological: She is alert and oriented to person, place, and time.   Skin: Skin is warm and dry. She is not diaphoretic.       Assessment:       No diagnosis found.    Plan:

## 2019-06-10 ENCOUNTER — OFFICE VISIT (OUTPATIENT)
Dept: FAMILY MEDICINE | Facility: HOSPITAL | Age: 44
End: 2019-06-10
Attending: SPECIALIST
Payer: MEDICAID

## 2019-06-10 VITALS
SYSTOLIC BLOOD PRESSURE: 142 MMHG | HEIGHT: 68 IN | DIASTOLIC BLOOD PRESSURE: 92 MMHG | BODY MASS INDEX: 27.8 KG/M2 | HEART RATE: 77 BPM | WEIGHT: 183.44 LBS

## 2019-06-10 DIAGNOSIS — I10 ESSENTIAL HYPERTENSION: Primary | ICD-10-CM

## 2019-06-10 DIAGNOSIS — F32.A DEPRESSION, UNSPECIFIED DEPRESSION TYPE: ICD-10-CM

## 2019-06-10 PROCEDURE — 99214 OFFICE O/P EST MOD 30 MIN: CPT | Performed by: FAMILY MEDICINE

## 2019-06-10 RX ORDER — SERTRALINE HYDROCHLORIDE 25 MG/1
25 TABLET, FILM COATED ORAL DAILY
Qty: 30 TABLET | Refills: 2 | Status: SHIPPED | OUTPATIENT
Start: 2019-06-10 | End: 2019-12-30 | Stop reason: ALTCHOICE

## 2019-06-10 RX ORDER — NIFEDIPINE 60 MG/1
60 TABLET, EXTENDED RELEASE ORAL DAILY
Qty: 30 TABLET | Refills: 11 | Status: SHIPPED | OUTPATIENT
Start: 2019-06-10 | End: 2020-01-13 | Stop reason: SDUPTHER

## 2019-06-17 NOTE — PROGRESS NOTES
Subjective:       Patient ID: Josefina Martin is a 44 y.o. female.    Chief Complaint: Hypertension    Patient is a 44 y.o. female who has a past medical history of CHF (congestive heart failure), Diabetes mellitus, Hypertension, and Stroke. presents today for medication refill of her hypertension medication and to restart Zoloft for depreion.  Negative for fever, headache, nausea, vomiting, chest pain, SOB, abdominal pain, dysuria, constipation, diarrhea.  Patient has no additional complaints.        Current Outpatient Medications on File Prior to Visit   Medication Sig Dispense Refill    aspirin 81 MG Chew Take 1 tablet (81 mg total) by mouth once daily. 90 tablet 3    atorvastatin (LIPITOR) 40 MG tablet Take 1 tablet (40 mg total) by mouth once daily. 30 tablet 0    carvedilol (COREG) 25 MG tablet Take 1 tablet (25 mg total) by mouth 2 (two) times daily with meals. 60 tablet 0    chlorthalidone (HYGROTEN) 25 MG Tab Take 1 tablet (25 mg total) by mouth once daily. 30 tablet 0    clopidogrel (PLAVIX) 75 mg tablet Take 1 tablet (75 mg total) by mouth once daily. 30 tablet 0    furosemide (LASIX) 20 MG tablet Take 1 tablet (20 mg total) by mouth once daily. 60 tablet 0    gabapentin (NEURONTIN) 100 MG capsule Take 1 capsule (100 mg total) by mouth 3 (three) times daily. 90 capsule 0    lisinopril (PRINIVIL,ZESTRIL) 40 MG tablet Take 1 tablet (40 mg total) by mouth once daily. 30 tablet 0    loratadine (CLARITIN) 10 mg tablet Take 10 mg by mouth daily as needed for Allergies.      metFORMIN (GLUCOPHAGE-XR) 500 MG 24 hr tablet Take 1 tablet (500 mg total) by mouth 2 (two) times daily with meals. 60 tablet 0    spironolactone (ALDACTONE) 50 MG tablet Take 1 tablet (50 mg total) by mouth once daily. 30 tablet 0    traMADol (ULTRAM) 50 mg tablet Take 1 tablet (50 mg total) by mouth every 6 (six) hours as needed for Pain. 20 tablet 0    MIRENA 20 mcg/24 hr (5 years) IUD       venlafaxine (EFFEXOR-XR) 37.5  MG 24 hr capsule Take 1 capsule (37.5 mg total) by mouth once daily. 90 capsule 1     No current facility-administered medications on file prior to visit.      Review of Systems   Constitutional: Negative for chills, diaphoresis, fatigue and fever.   HENT: Negative for congestion, ear pain, postnasal drip, rhinorrhea and sore throat.    Eyes: Negative for pain and discharge.   Respiratory: Negative for cough, shortness of breath and wheezing.    Cardiovascular: Negative for chest pain, palpitations and leg swelling.   Gastrointestinal: Negative for abdominal distention, abdominal pain, blood in stool, constipation, diarrhea, nausea and vomiting.   Endocrine: Negative for polydipsia and polyuria.   Genitourinary: Negative for dysuria and hematuria.   Musculoskeletal: Negative for arthralgias, back pain, joint swelling, myalgias and neck pain.   Skin: Negative.    Allergic/Immunologic: Negative.    Neurological: Negative for dizziness, weakness, light-headedness, numbness and headaches.   Hematological: Negative.    Psychiatric/Behavioral: Negative.        Objective:      Vitals:    06/10/19 1047   BP: (!) 142/92   Pulse: 77     Physical Exam   Constitutional: She is oriented to person, place, and time. She appears well-developed and well-nourished. No distress.   HENT:   Head: Normocephalic and atraumatic.   Right Ear: External ear normal.   Left Ear: External ear normal.   Nose: Nose normal.   Mouth/Throat: Oropharynx is clear and moist. No oropharyngeal exudate.   Eyes: Pupils are equal, round, and reactive to light. Conjunctivae and EOM are normal. Right eye exhibits no discharge. Left eye exhibits no discharge. No scleral icterus.   Neck: Normal range of motion. No JVD present. No thyromegaly present.   Cardiovascular: Normal rate, regular rhythm, normal heart sounds and intact distal pulses. Exam reveals no gallop and no friction rub.   No murmur heard.  Pulmonary/Chest: Effort normal and breath sounds normal.  No respiratory distress. She has no wheezes. She has no rales. She exhibits no tenderness.   Abdominal: Soft. Bowel sounds are normal. She exhibits no distension and no mass. There is no tenderness. There is no rebound and no guarding. No hernia.   Musculoskeletal: Normal range of motion.   Neurological: She is alert and oriented to person, place, and time. No cranial nerve deficit.   Skin: Skin is warm and dry. She is not diaphoretic.   Psychiatric: She has a normal mood and affect. Her behavior is normal.   Vitals reviewed.      Assessment:       1. Essential hypertension    2. Depression, unspecified depression type        Plan:       Essential hypertension  -     NIFEdipine (PROCARDIA-XL) 60 MG (OSM) 24 hr tablet; Take 1 tablet (60 mg total) by mouth once daily.  Dispense: 30 tablet; Refill: 11    Depression, unspecified depression type  -     sertraline (ZOLOFT) 25 MG tablet; Take 1 tablet (25 mg total) by mouth once daily.  Dispense: 30 tablet; Refill: 2      Follow up in about 2 weeks (around 6/24/2019) for bp recheck.

## 2019-07-02 ENCOUNTER — OFFICE VISIT (OUTPATIENT)
Dept: FAMILY MEDICINE | Facility: HOSPITAL | Age: 44
End: 2019-07-02
Attending: FAMILY MEDICINE
Payer: MEDICAID

## 2019-07-02 VITALS
HEART RATE: 80 BPM | WEIGHT: 186.5 LBS | SYSTOLIC BLOOD PRESSURE: 178 MMHG | BODY MASS INDEX: 28.26 KG/M2 | HEIGHT: 68 IN | DIASTOLIC BLOOD PRESSURE: 113 MMHG

## 2019-07-02 DIAGNOSIS — E11.9 TYPE 2 DIABETES MELLITUS WITHOUT COMPLICATION, WITHOUT LONG-TERM CURRENT USE OF INSULIN: Primary | ICD-10-CM

## 2019-07-02 DIAGNOSIS — Z12.39 SCREENING FOR BREAST CANCER: ICD-10-CM

## 2019-07-02 DIAGNOSIS — I10 HYPERTENSION, UNSPECIFIED TYPE: ICD-10-CM

## 2019-07-02 PROCEDURE — 99214 OFFICE O/P EST MOD 30 MIN: CPT | Performed by: STUDENT IN AN ORGANIZED HEALTH CARE EDUCATION/TRAINING PROGRAM

## 2019-07-02 RX ORDER — CLONIDINE HYDROCHLORIDE 0.1 MG/1
TABLET ORAL
Qty: 30 TABLET | Refills: 0 | Status: SHIPPED | OUTPATIENT
Start: 2019-07-02 | End: 2019-07-23 | Stop reason: SDUPTHER

## 2019-07-02 RX ORDER — CHLORTHALIDONE 50 MG/1
50 TABLET ORAL DAILY
Qty: 30 TABLET | Refills: 1 | Status: SHIPPED | OUTPATIENT
Start: 2019-07-02 | End: 2020-01-13 | Stop reason: SDUPTHER

## 2019-07-02 NOTE — PROGRESS NOTES
Subjective:       Patient ID: Josefina Martin is a 44 y.o. female.    Chief Complaint: Follow-up and Headache    Patient is a 43 yo female pmhx of HTN, CHF, MI with 4 stents place, 3-4 strokes, T2DM presenting today for BP recheck.  She states she was evaluated about 1 month prior with changes in her antihypertensives, unable to recall changes.  For past 2 days she had been having headaches, no change in vision.  States her headaches are minimally improved with ibuprofen.      Review of Systems   Constitutional: Negative for appetite change and fever.   HENT: Negative for congestion and sore throat.    Respiratory: Negative for cough, chest tightness and shortness of breath.    Cardiovascular: Negative for chest pain and palpitations.   Gastrointestinal: Negative for abdominal pain, diarrhea and nausea.   Neurological: Positive for headaches. Negative for dizziness and weakness.       Objective:      Vitals:    07/02/19 1610   BP: (!) 178/113   Pulse: 80     Physical Exam   Constitutional: She is oriented to person, place, and time. She appears well-developed and well-nourished. No distress.   HENT:   Head: Normocephalic and atraumatic.   Mouth/Throat: No oropharyngeal exudate.   Eyes: Pupils are equal, round, and reactive to light. EOM are normal. No scleral icterus.   Neck: Normal range of motion.   Cardiovascular: Normal rate, regular rhythm and normal heart sounds.   No murmur heard.  Pulmonary/Chest: Effort normal. No respiratory distress.   Abdominal: Soft. She exhibits no distension.   Musculoskeletal: Normal range of motion. She exhibits no edema.   Neurological: She is alert and oriented to person, place, and time. No cranial nerve deficit. She exhibits normal muscle tone. Coordination normal.   Skin: Skin is warm and dry. Capillary refill takes less than 2 seconds. She is not diaphoretic.   Nursing note and vitals reviewed.      Assessment:       1. Type 2 diabetes mellitus without complication,  "without long-term current use of insulin    2. Screening for breast cancer    3. Hypertension, unspecified type        Plan:       Type 2 diabetes mellitus without complication, without long-term current use of insulin  -     Lipid panel; Future; Expected date: 07/02/2019  -     Hemoglobin A1c; Future; Expected date: 07/02/2019    Screening for breast cancer  -     Mammo Digital Screening Bilateral With CAD; Future; Expected date: 07/02/2019    Hypertension, unspecified type  -      Renal Artery Stenosis Hyperten Comp; Future; Expected date: 07/02/2019  -     chlorthalidone (HYGROTEN) 50 MG Tab; Take 1 tablet (50 mg total) by mouth once daily.  Dispense: 30 tablet; Refill: 1        -     cloNIDine (CATAPRES) 0.1 MG tablet; Take 1 tablet by mouth when BP elevated >160/100 or       when having headaches due to elevated BP  Dispense: 30 tablet; Refill: 0    Discussed concern with patient's symptoms.  Recommended she present to ED to rule-out hypertensive emergency.  Patient states she was sent from clinic to ED once before and states "they did nothing" and does not want to go today.      Recommend patient follow-up for BP recheck in 2 weeks.        "

## 2019-07-03 NOTE — PROGRESS NOTES
I reviewed the note and discussed with Dr. Sanford. We talked about management of her uncontrolled BP

## 2019-07-23 ENCOUNTER — OFFICE VISIT (OUTPATIENT)
Dept: FAMILY MEDICINE | Facility: HOSPITAL | Age: 44
End: 2019-07-23
Attending: SPECIALIST
Payer: MEDICAID

## 2019-07-23 VITALS
HEIGHT: 68 IN | WEIGHT: 183.63 LBS | HEART RATE: 74 BPM | SYSTOLIC BLOOD PRESSURE: 155 MMHG | DIASTOLIC BLOOD PRESSURE: 104 MMHG | BODY MASS INDEX: 27.83 KG/M2

## 2019-07-23 DIAGNOSIS — R60.0 LOWER EXTREMITY EDEMA: Primary | ICD-10-CM

## 2019-07-23 DIAGNOSIS — E78.5 HYPERLIPIDEMIA, UNSPECIFIED HYPERLIPIDEMIA TYPE: ICD-10-CM

## 2019-07-23 DIAGNOSIS — I10 HTN (HYPERTENSION), MALIGNANT: ICD-10-CM

## 2019-07-23 PROCEDURE — 99213 OFFICE O/P EST LOW 20 MIN: CPT | Performed by: STUDENT IN AN ORGANIZED HEALTH CARE EDUCATION/TRAINING PROGRAM

## 2019-07-23 RX ORDER — CLONIDINE HYDROCHLORIDE 0.2 MG/1
0.2 TABLET ORAL 2 TIMES DAILY
Qty: 60 TABLET | Refills: 11 | Status: SHIPPED | OUTPATIENT
Start: 2019-07-23 | End: 2020-01-13 | Stop reason: SDUPTHER

## 2019-07-23 RX ORDER — CLONIDINE HYDROCHLORIDE 0.1 MG/1
0.1 TABLET ORAL ONCE
Qty: 30 TABLET | Refills: 11 | Status: SHIPPED | OUTPATIENT
Start: 2019-07-23 | End: 2019-07-23 | Stop reason: DRUGHIGH

## 2019-07-23 NOTE — PROGRESS NOTES
Subjective:       Patient ID: Josefina Martin is a 44 y.o. female.    Chief Complaint: Follow-up    Patient is a 45 yo female pmhx of HTN, CHF, MI with 4 stents place, 3-4 strokes, T2DM presenting today for BP follow-up. Started on clonidine 0.1 mg at last visit.  Takes every night.  's at home.  Denies further headaches due to elevated BP.  Patient also has concern of left knee pain, started 2 weeks prior.  Denies injuries or trauma.  Swelling comes and goes.  Taking aleve and tylenol with no improvement.  Denies previous episode.  Some redness of the knee, no skin breakdown.      Review of Systems   Constitutional: Negative for appetite change and fever.   HENT: Negative for congestion and sore throat.    Respiratory: Negative for cough, chest tightness and shortness of breath.    Cardiovascular: Positive for leg swelling. Negative for chest pain and palpitations.   Gastrointestinal: Negative for abdominal pain, diarrhea and nausea.   Genitourinary: Negative for dysuria.   Musculoskeletal: Positive for joint swelling. Negative for neck pain and neck stiffness.   Skin: Negative for color change and wound.   Neurological: Negative for dizziness, weakness and headaches.   Psychiatric/Behavioral: Negative for agitation and behavioral problems.       Objective:      Vitals:    07/23/19 0946   BP: (!) 155/104   Pulse: 74     Physical Exam   Constitutional: She is oriented to person, place, and time. She appears well-developed and well-nourished. No distress.   HENT:   Head: Normocephalic and atraumatic.   Mouth/Throat: No oropharyngeal exudate.   Eyes: Pupils are equal, round, and reactive to light. EOM are normal. No scleral icterus.   Neck: Normal range of motion.   Cardiovascular: Normal rate, regular rhythm and normal heart sounds.   No murmur heard.  Pulmonary/Chest: Effort normal. No respiratory distress.   Abdominal: Soft. She exhibits no distension.   Musculoskeletal: Normal range of motion. She  exhibits edema and tenderness.   Neurological: She is alert and oriented to person, place, and time. No cranial nerve deficit. She exhibits normal muscle tone. Coordination normal.   Skin: Skin is warm and dry. Capillary refill takes less than 2 seconds. She is not diaphoretic.   Nursing note and vitals reviewed.      Assessment:       1. Lower extremity edema    2. Hyperlipidemia, unspecified hyperlipidemia type    3. HTN (hypertension), malignant        Plan:       Lower extremity edema  -     US Lower Extremity Veins Bilateral; Future; Expected date: 07/23/2019    Hyperlipidemia, unspecified hyperlipidemia type    HTN  - increased clonidine to 0.2mg daily      Follow-up in 2 weeks

## 2019-08-02 ENCOUNTER — HOSPITAL ENCOUNTER (OUTPATIENT)
Dept: RADIOLOGY | Facility: HOSPITAL | Age: 44
Discharge: HOME | End: 2019-08-02
Attending: STUDENT IN AN ORGANIZED HEALTH CARE EDUCATION/TRAINING PROGRAM
Payer: MEDICAID

## 2019-08-02 DIAGNOSIS — I10 HYPERTENSION, UNSPECIFIED TYPE: ICD-10-CM

## 2019-08-02 DIAGNOSIS — Z12.39 SCREENING FOR BREAST CANCER: ICD-10-CM

## 2019-08-02 DIAGNOSIS — R60.0 LOWER EXTREMITY EDEMA: ICD-10-CM

## 2019-08-02 PROCEDURE — 93970 EXTREMITY STUDY: CPT | Mod: TC,PO

## 2019-08-02 PROCEDURE — 93975 VASCULAR STUDY: CPT | Mod: TC,PO

## 2019-08-02 PROCEDURE — 77067 SCR MAMMO BI INCL CAD: CPT | Mod: TC,PO

## 2019-10-24 ENCOUNTER — HOSPITAL ENCOUNTER (EMERGENCY)
Facility: HOSPITAL | Age: 44
Discharge: HOME | End: 2019-10-24
Attending: EMERGENCY MEDICINE
Payer: MEDICAID

## 2019-10-24 VITALS
TEMPERATURE: 98 F | OXYGEN SATURATION: 98 % | SYSTOLIC BLOOD PRESSURE: 140 MMHG | DIASTOLIC BLOOD PRESSURE: 89 MMHG | HEART RATE: 74 BPM | WEIGHT: 196 LBS | HEIGHT: 68 IN | RESPIRATION RATE: 18 BRPM | BODY MASS INDEX: 29.7 KG/M2

## 2019-10-24 DIAGNOSIS — R07.9 CHEST PAIN: ICD-10-CM

## 2019-10-24 DIAGNOSIS — R06.02 SHORTNESS OF BREATH: ICD-10-CM

## 2019-10-24 LAB
ALBUMIN SERPL BCP-MCNC: 3.9 G/DL (ref 3.5–5.2)
ALP SERPL-CCNC: 94 U/L (ref 55–135)
ALT SERPL W/O P-5'-P-CCNC: 25 U/L (ref 10–44)
ANION GAP SERPL CALC-SCNC: 10 MMOL/L (ref 8–16)
AST SERPL-CCNC: 26 U/L (ref 10–40)
B-HCG UR QL: NEGATIVE
BACTERIA #/AREA URNS HPF: ABNORMAL /HPF
BASOPHILS # BLD AUTO: 0.02 K/UL (ref 0–0.2)
BASOPHILS NFR BLD: 0.3 % (ref 0–1.9)
BILIRUB SERPL-MCNC: 0.8 MG/DL (ref 0.1–1)
BILIRUB UR QL STRIP: NEGATIVE
BNP SERPL-MCNC: <10 PG/ML (ref 0–99)
BUN SERPL-MCNC: 12 MG/DL (ref 6–20)
CALCIUM SERPL-MCNC: 9.4 MG/DL (ref 8.7–10.5)
CHLORIDE SERPL-SCNC: 104 MMOL/L (ref 95–110)
CLARITY UR: CLEAR
CO2 SERPL-SCNC: 24 MMOL/L (ref 23–29)
COLOR UR: YELLOW
CREAT SERPL-MCNC: 1.4 MG/DL (ref 0.5–1.4)
CTP QC/QA: YES
DIFFERENTIAL METHOD: NORMAL
EOSINOPHIL # BLD AUTO: 0.1 K/UL (ref 0–0.5)
EOSINOPHIL NFR BLD: 0.9 % (ref 0–8)
ERYTHROCYTE [DISTWIDTH] IN BLOOD BY AUTOMATED COUNT: 12.8 % (ref 11.5–14.5)
EST. GFR  (AFRICAN AMERICAN): 52 ML/MIN/1.73 M^2
EST. GFR  (NON AFRICAN AMERICAN): 45 ML/MIN/1.73 M^2
GLUCOSE SERPL-MCNC: 140 MG/DL (ref 70–110)
GLUCOSE UR QL STRIP: NEGATIVE
HCT VFR BLD AUTO: 41.9 % (ref 37–48.5)
HGB BLD-MCNC: 14.1 G/DL (ref 12–16)
HGB UR QL STRIP: NEGATIVE
KETONES UR QL STRIP: NEGATIVE
LEUKOCYTE ESTERASE UR QL STRIP: ABNORMAL
LIPASE SERPL-CCNC: 27 U/L (ref 4–60)
LYMPHOCYTES # BLD AUTO: 2.7 K/UL (ref 1–4.8)
LYMPHOCYTES NFR BLD: 45.8 % (ref 18–48)
MCH RBC QN AUTO: 30.9 PG (ref 27–31)
MCHC RBC AUTO-ENTMCNC: 33.7 G/DL (ref 32–36)
MCV RBC AUTO: 92 FL (ref 82–98)
MICROSCOPIC COMMENT: ABNORMAL
MONOCYTES # BLD AUTO: 0.5 K/UL (ref 0.3–1)
MONOCYTES NFR BLD: 8.4 % (ref 4–15)
NEUTROPHILS # BLD AUTO: 2.6 K/UL (ref 1.8–7.7)
NEUTROPHILS NFR BLD: 44.6 % (ref 38–73)
NITRITE UR QL STRIP: NEGATIVE
PH UR STRIP: 6 [PH] (ref 5–8)
PLATELET # BLD AUTO: 228 K/UL (ref 150–350)
PMV BLD AUTO: 10.8 FL (ref 9.2–12.9)
POTASSIUM SERPL-SCNC: 3.8 MMOL/L (ref 3.5–5.1)
PROT SERPL-MCNC: 6.9 G/DL (ref 6–8.4)
PROT UR QL STRIP: NEGATIVE
RBC # BLD AUTO: 4.57 M/UL (ref 4–5.4)
RBC #/AREA URNS HPF: 1 /HPF (ref 0–4)
SODIUM SERPL-SCNC: 138 MMOL/L (ref 136–145)
SP GR UR STRIP: 1.01 (ref 1–1.03)
TROPONIN I SERPL DL<=0.01 NG/ML-MCNC: <0.006 NG/ML (ref 0–0.03)
URN SPEC COLLECT METH UR: ABNORMAL
UROBILINOGEN UR STRIP-ACNC: NEGATIVE EU/DL
WBC # BLD AUTO: 5.83 K/UL (ref 3.9–12.7)
WBC #/AREA URNS HPF: 7 /HPF (ref 0–5)
YEAST URNS QL MICRO: ABNORMAL

## 2019-10-24 PROCEDURE — 93010 EKG 12-LEAD: ICD-10-PCS | Mod: ,,, | Performed by: INTERNAL MEDICINE

## 2019-10-24 PROCEDURE — 85025 COMPLETE CBC W/AUTO DIFF WBC: CPT

## 2019-10-24 PROCEDURE — 81000 URINALYSIS NONAUTO W/SCOPE: CPT

## 2019-10-24 PROCEDURE — 83880 ASSAY OF NATRIURETIC PEPTIDE: CPT

## 2019-10-24 PROCEDURE — 84484 ASSAY OF TROPONIN QUANT: CPT

## 2019-10-24 PROCEDURE — 81025 URINE PREGNANCY TEST: CPT | Performed by: EMERGENCY MEDICINE

## 2019-10-24 PROCEDURE — 93005 ELECTROCARDIOGRAM TRACING: CPT

## 2019-10-24 PROCEDURE — 83690 ASSAY OF LIPASE: CPT

## 2019-10-24 PROCEDURE — 80053 COMPREHEN METABOLIC PANEL: CPT

## 2019-10-24 PROCEDURE — 93010 ELECTROCARDIOGRAM REPORT: CPT | Mod: ,,, | Performed by: INTERNAL MEDICINE

## 2019-10-24 PROCEDURE — 99285 EMERGENCY DEPT VISIT HI MDM: CPT | Mod: 25

## 2019-10-24 NOTE — ED NOTES
44 year old female presents to ed cc of right shoulder pain with radiation to right back x 1 day patient denies fall or trauma to shoulder patient complains of sob has hx of chf. Patient awake alert ox4 in no acute respiratory distress patient ambulatory to ed bed with steady gait.

## 2019-10-24 NOTE — ED PROVIDER NOTES
Encounter Date: 10/24/2019    SCRIBE #1 NOTE: I, Shannon Grant, am scribing for, and in the presence of,  Dr. LeFort. I have scribed the entire note.       History     Chief Complaint   Patient presents with    Shoulder Pain     Pt reports nausea and vomiting x 3 days. Today starting having right shoulder pain that radiates to right upper back. Pt has hx of CHF. Pt reports she began having more trouble with shortness of breath last night when she would lay down.      Time seen by provider: 5:50 PM    This is a 44 y.o. female who presents with complaint of right shoulder pain.  The pain began 4 days ago and radiates into her back.  It is described as a sharp, intermittent cramping.  She does not report anything that may have caused the pain, and she has not experienced this pain before.    The history is provided by the patient.     Review of patient's allergies indicates:  No Known Allergies  Past Medical History:   Diagnosis Date    CHF (congestive heart failure)     Diabetes mellitus     Hypertension     Stroke      Past Surgical History:   Procedure Laterality Date    CHOLECYSTECTOMY  2013    history of cholelithiasis    TUBAL LIGATION       Family History   Problem Relation Age of Onset    Hypertension Mother     Lung cancer Mother     No Known Problems Father     No Known Problems Sister     No Known Problems Daughter     Diabetes Son 14        Takes 2 insulins and metformin use to be bigger wally    Stroke Maternal Uncle 48    Anuerysm Maternal Grandmother     Breast cancer Maternal Grandmother     No Known Problems Sister     No Known Problems Daughter     No Known Problems Son     Breast cancer Maternal Aunt     Breast cancer Maternal Aunt      Social History     Tobacco Use    Smoking status: Current Every Day Smoker     Packs/day: 0.15     Years: 18.00     Pack years: 2.70     Types: Cigarettes    Smokeless tobacco: Never Used    Tobacco comment: 1 pack/week   Substance Use Topics     Alcohol use: Yes     Comment: social 1/month    Drug use: No     Review of Systems   Constitutional: Negative for chills and fever.   HENT: Negative for ear pain and sore throat.    Eyes: Negative for redness.   Respiratory: Negative for shortness of breath.    Cardiovascular: Negative for chest pain.   Gastrointestinal: Negative for abdominal pain, diarrhea and vomiting.   Genitourinary: Negative for dysuria.   Musculoskeletal: Negative for back pain.        Right shoulder pain   Skin: Negative for rash.   Neurological: Negative for headaches.       Physical Exam     Initial Vitals [10/24/19 1550]   BP Pulse Resp Temp SpO2   122/79 74 20 98.1 °F (36.7 °C) 97 %      MAP       --         Physical Exam    Nursing note and vitals reviewed.  Constitutional: She appears well-developed and well-nourished. She is not diaphoretic. No distress.   HENT:   Head: Normocephalic and atraumatic.   Mouth/Throat: Oropharynx is clear and moist.   Eyes: Conjunctivae and EOM are normal. Pupils are equal, round, and reactive to light.   Neck: Normal range of motion. Neck supple.   Cardiovascular: Normal rate, regular rhythm and normal heart sounds. Exam reveals no gallop and no friction rub.    No murmur heard.  Pulmonary/Chest: Breath sounds normal. She has no wheezes. She has no rhonchi. She has no rales.   Abdominal: Soft. Bowel sounds are normal. There is no tenderness. There is no rebound and no guarding.   Musculoskeletal: Normal range of motion. She exhibits tenderness (Right AC joint). She exhibits no edema.   Lymphadenopathy:     She has no cervical adenopathy.   Neurological: She is alert and oriented to person, place, and time. She has normal strength.   Skin: Skin is warm and dry. Capillary refill takes less than 2 seconds. No rash noted.         ED Course   Procedures  Labs Reviewed   COMPREHENSIVE METABOLIC PANEL - Abnormal; Notable for the following components:       Result Value    Glucose 140 (*)     eGFR if   American 52 (*)     eGFR if non  45 (*)     All other components within normal limits   URINALYSIS, REFLEX TO URINE CULTURE - Abnormal; Notable for the following components:    Leukocytes, UA 1+ (*)     All other components within normal limits    Narrative:     Preferred Collection Type->Urine, Clean Catch   URINALYSIS MICROSCOPIC - Abnormal; Notable for the following components:    WBC, UA 7 (*)     Bacteria Few (*)     All other components within normal limits    Narrative:     Preferred Collection Type->Urine, Clean Catch   CBC W/ AUTO DIFFERENTIAL   TROPONIN I   B-TYPE NATRIURETIC PEPTIDE   LIPASE   POCT URINE PREGNANCY          Imaging Results          X-Ray Chest AP Portable (Final result)  Result time 10/24/19 16:43:57    Final result by Derick Guerrero MD (10/24/19 16:43:57)                 Impression:      No acute abnormality.      Electronically signed by: Derick Guerrero MD  Date:    10/24/2019  Time:    16:43             Narrative:    EXAMINATION:  XR CHEST AP PORTABLE    CLINICAL HISTORY:  Shortness of breath    TECHNIQUE:  Single frontal view of the chest was performed.    COMPARISON:  08/04/2017    FINDINGS:  The lungs are clear, with normal appearance of pulmonary vasculature and no pleural effusion or pneumothorax.    The cardiac silhouette is normal in size. The hilar and mediastinal contours are unremarkable.    Bones are intact.                               US Abdomen Limited (Final result)  Result time 10/24/19 16:38:18    Final result by Derick Guerrero MD (10/24/19 16:38:18)                 Impression:      No significant sonographic abnormality.      Electronically signed by: Derick Guerrero MD  Date:    10/24/2019  Time:    16:38             Narrative:    EXAMINATION:  US ABDOMEN LIMITED    CLINICAL HISTORY:  RUQ pain;    TECHNIQUE:  Limited ultrasound of the right upper quadrant of the abdomen was performed.    COMPARISON:  None.    FINDINGS:  Liver: Normal in  size, measuring 15.8 cm. Homogeneous echotexture. No focal hepatic lesions.    Gallbladder: The gallbladder is surgically absent.    Biliary system: The common duct is not dilated, measuring 4 mm.  No intrahepatic ductal dilatation.    Spleen: Normal in size and echotexture, measuring 10.6 x 3.5 cm.    Miscellaneous: No upper abdominal ascites.                                 Medical Decision Making:   History:   Old Medical Records: I decided to obtain old medical records.  Old Records Summarized: records from clinic visits.       <> Summary of Records: Extensive medical history of prior MI, CHF, diabetes.  Patient states she currently smokes  Differential Diagnosis:   Differential Diagnosis includes, but is not limited to:  ACS/MI, PE, aortic dissection, pneumothorax, cardiac tamponade, pericarditis/myocarditis, pneumonia, infection/abscess, lung mass, trauma/fracture, costochondritis/pleurisy, MSK pain/contusion, GERD, biliary disease, pancreatitis, anemia    Independently Interpreted Test(s):   I have ordered and independently interpreted X-rays - see prior notes.  I have ordered and independently interpreted EKG Reading(s) - see prior notes  Clinical Tests:   Lab Tests: Ordered and Reviewed  Radiological Study: Ordered and Reviewed  Medical Tests: Reviewed and Ordered  ED Management:  44-year-old female presents with right shoulder pain which has been constant for 1 day.  On exam the pain is localized to the right AC joint.  The tenderness is reproducible.  Patient does report heavy lifting related to her job.  ED workup for right shoulder pain/atypical chest pain was unremarkable including chest x-ray right upper quadrant ultrasound and blood work.  Felt to be musculoskeletal in nature.  Patient agreed to enroll in the smoking cessation program after dropping out last year.  We discussed use of Tylenol for pain. We discussed importance of continued follow-up with Cardiology, as well as indications for ED  return.                      Clinical Impression:       ICD-10-CM ICD-9-CM   1. Shortness of breath R06.02 786.05   2. Chest pain R07.9 786.50         Disposition:   Disposition: Discharged  Condition: Stable           Scribe attestation I, Dr. Guy Lefort, personally performed the services described in this documentation. All medical record entries made by the scribe were at my direction and in my presence. I have reviewed the chart and agree that the record reflects my personal performance and is accurate and complete. Guy Lefort, MD.  7:59 PM 10/24/2019               Guy J. Lefort, MD  10/24/19 2004

## 2019-10-25 NOTE — ED NOTES
Pt sitting up on stretcher, NAD, no complaints at this time. # 20 to right forearm patent intact. Awaiting MD disposition.

## 2019-10-29 ENCOUNTER — OFFICE VISIT (OUTPATIENT)
Dept: OBSTETRICS AND GYNECOLOGY | Facility: CLINIC | Age: 44
End: 2019-10-29
Payer: MEDICAID

## 2019-10-29 VITALS
SYSTOLIC BLOOD PRESSURE: 150 MMHG | WEIGHT: 180.63 LBS | DIASTOLIC BLOOD PRESSURE: 98 MMHG | BODY MASS INDEX: 27.46 KG/M2

## 2019-10-29 DIAGNOSIS — N89.8 VAGINAL DISCHARGE: Primary | ICD-10-CM

## 2019-10-29 PROCEDURE — 99212 PR OFFICE/OUTPT VISIT, EST, LEVL II, 10-19 MIN: ICD-10-PCS | Mod: S$PBB,,, | Performed by: OBSTETRICS & GYNECOLOGY

## 2019-10-29 PROCEDURE — 99999 PR PBB SHADOW E&M-EST. PATIENT-LVL III: ICD-10-PCS | Mod: PBBFAC,,, | Performed by: OBSTETRICS & GYNECOLOGY

## 2019-10-29 PROCEDURE — 99999 PR PBB SHADOW E&M-EST. PATIENT-LVL III: CPT | Mod: PBBFAC,,, | Performed by: OBSTETRICS & GYNECOLOGY

## 2019-10-29 PROCEDURE — 99212 OFFICE O/P EST SF 10 MIN: CPT | Mod: S$PBB,,, | Performed by: OBSTETRICS & GYNECOLOGY

## 2019-10-29 PROCEDURE — 87481 CANDIDA DNA AMP PROBE: CPT | Mod: 59

## 2019-10-29 PROCEDURE — 99213 OFFICE O/P EST LOW 20 MIN: CPT | Mod: PBBFAC,PN | Performed by: OBSTETRICS & GYNECOLOGY

## 2019-10-29 PROCEDURE — 87491 CHLMYD TRACH DNA AMP PROBE: CPT

## 2019-10-29 PROCEDURE — 87801 DETECT AGNT MULT DNA AMPLI: CPT

## 2019-10-29 NOTE — PROGRESS NOTES
Chief Complaint   Patient presents with    Consult       HISTORY OF PRESENT ILLNESS:   Josefina Martin is a 44 y.o. female  who presents for vaginal discharge and irritation with no odor x2 week.  No LMP recorded (lmp unknown). Patient has had an implant..  She reports since the mirena her abdominal pain is gone and her periods are light.      Past Medical History:   Diagnosis Date    CHF (congestive heart failure)     Diabetes mellitus     Hypertension     Stroke           Past Surgical History:   Procedure Laterality Date    CHOLECYSTECTOMY  2013    history of cholelithiasis    TUBAL LIGATION           Social History     Socioeconomic History    Marital status: Single     Spouse name: Not on file    Number of children: Not on file    Years of education: Not on file    Highest education level: Not on file   Occupational History     Employer: [employer]   Social Needs    Financial resource strain: Not on file    Food insecurity:     Worry: Not on file     Inability: Not on file    Transportation needs:     Medical: Not on file     Non-medical: Not on file   Tobacco Use    Smoking status: Current Every Day Smoker     Packs/day: 0.15     Years: 18.00     Pack years: 2.70     Types: Cigarettes    Smokeless tobacco: Never Used    Tobacco comment: 1 pack/week   Substance and Sexual Activity    Alcohol use: Yes     Comment: social 1/month    Drug use: No    Sexual activity: Yes     Partners: Male     Birth control/protection: None, Surgical   Lifestyle    Physical activity:     Days per week: Not on file     Minutes per session: Not on file    Stress: Not on file   Relationships    Social connections:     Talks on phone: Not on file     Gets together: Not on file     Attends Mosque service: Not on file     Active member of club or organization: Not on file     Attends meetings of clubs or organizations: Not on file     Relationship status: Not on file   Other Topics Concern    Not on file    Social History Narrative    Not on file       Family History   Problem Relation Age of Onset    Hypertension Mother     Lung cancer Mother     No Known Problems Father     No Known Problems Sister     No Known Problems Daughter     Diabetes Son 14        Takes 2 insulins and metformin use to be bigger wally    Stroke Maternal Uncle 48    Anuerysm Maternal Grandmother     Breast cancer Maternal Grandmother     No Known Problems Sister     No Known Problems Daughter     No Known Problems Son     Breast cancer Maternal Aunt     Breast cancer Maternal Aunt          OB History    Para Term  AB Living   5 4 4   1     SAB TAB Ectopic Multiple Live Births   1              # Outcome Date GA Lbr Flex/2nd Weight Sex Delivery Anes PTL Lv   5 SAB            4 Term            3 Term            2 Term            1 Term                GYN HISTORY:  PAP History: Denies abnormal Paps  Infection History:Denies STDs. Denies PID.  Benign History: Denies uterine fibroids. Denies ovarian cysts. Denies endometriosis Denies other conditions.  Cancer History: Denies cervical cancer. Denies uterine cancer or hyperplasia. Denies ovarian cancer. Denies vulvar cancer or pre-cancer. Denies vaginal cancer or pre-cancer. Denies breast cancer. Denies colon cancer.  Cycle:  until 2018 when started coming Q2 weeks     ROS:  GENERAL: Denies weight gain or weight loss. Feeling well overall.   SKIN: Denies rash or lesions.   HEAD: Denies headache.   NODES: Denies enlarged lymph nodes.   CHEST: Denies shortness of breath at rest but does have some with ambulating.   ABDOMEN: No abdominal pain, constipation, diarrhea, nausea, vomiting or rectal bleeding.   URINARY: No frequency, dysuria, hematuria, or burning on urination.  REPRODUCTIVE: See HPI.   BREASTS: The patient denies pain, lumps, or nipple discharge.       BP (!) 150/98   Wt 81.9 kg (180 lb 9.6 oz)   LMP  (LMP Unknown)   BMI 27.46 kg/m²      APPEARANCE:  Well nourished, well developed, in no acute distress.  NECK: Neck symmetric without  thyromegaly.  NODES: No inguinal, cervical lymph node enlargement.  CHEST: expiratory wheeze in right lowe lung fields, CTA otherwise   HEART: Regular rate and rhythm, no murmurs, rubs or gallops.  ABDOMEN: Soft. No tenderness or masses. No hernias. No hepatosplenomegaly.   Pelvic: normal external genitalia, no erythema, scant yellow vaginal discharge, mildly enlarged uterus with good descent and mobility, large cervix with no discharge or masses, IUD strings seen in place, no adnexal masses appreciated,     TVUS: uterus 9.6x5.3x7.2 vol 191; anterior 1 cm fibroid, anterior 1 cm fibroid, posterior fibroid 1.6 cm and fundal fibroid 3.5 cm  Bilateral ovaries normal with no masses seen      1. Vaginal discharge        Plan:  1. IUD in place, affirm and gc/ct done today.

## 2019-10-31 ENCOUNTER — PATIENT MESSAGE (OUTPATIENT)
Dept: OBSTETRICS AND GYNECOLOGY | Facility: CLINIC | Age: 44
End: 2019-10-31

## 2019-10-31 LAB
BACTERIAL VAGINOSIS DNA: POSITIVE
C TRACH DNA SPEC QL NAA+PROBE: NOT DETECTED
CANDIDA GLABRATA DNA: NEGATIVE
CANDIDA KRUSEI DNA: NEGATIVE
CANDIDA RRNA VAG QL PROBE: NEGATIVE
N GONORRHOEA DNA SPEC QL NAA+PROBE: NOT DETECTED
T VAGINALIS RRNA GENITAL QL PROBE: NEGATIVE

## 2019-10-31 RX ORDER — METRONIDAZOLE 500 MG/1
500 TABLET ORAL EVERY 12 HOURS
Qty: 14 TABLET | Refills: 0 | Status: SHIPPED | OUTPATIENT
Start: 2019-10-31 | End: 2019-11-07

## 2019-12-30 ENCOUNTER — HOSPITAL ENCOUNTER (EMERGENCY)
Facility: HOSPITAL | Age: 44
Discharge: HOME | End: 2019-12-30
Attending: EMERGENCY MEDICINE
Payer: MEDICAID

## 2019-12-30 VITALS
WEIGHT: 197 LBS | SYSTOLIC BLOOD PRESSURE: 148 MMHG | HEART RATE: 60 BPM | HEIGHT: 68 IN | RESPIRATION RATE: 20 BRPM | TEMPERATURE: 98 F | DIASTOLIC BLOOD PRESSURE: 83 MMHG | OXYGEN SATURATION: 98 % | BODY MASS INDEX: 29.86 KG/M2

## 2019-12-30 DIAGNOSIS — I10 ESSENTIAL HYPERTENSION: ICD-10-CM

## 2019-12-30 DIAGNOSIS — N30.01 ACUTE CYSTITIS WITH HEMATURIA: ICD-10-CM

## 2019-12-30 DIAGNOSIS — Z91.148 H/O MEDICATION NONCOMPLIANCE: ICD-10-CM

## 2019-12-30 DIAGNOSIS — R10.9 ABDOMINAL CRAMPING: Primary | ICD-10-CM

## 2019-12-30 LAB
ALBUMIN SERPL BCP-MCNC: 4.1 G/DL (ref 3.5–5.2)
ALP SERPL-CCNC: 100 U/L (ref 38–126)
ALT SERPL W/O P-5'-P-CCNC: 26 U/L (ref 10–44)
ANION GAP SERPL CALC-SCNC: 5 MMOL/L (ref 8–16)
AST SERPL-CCNC: 26 U/L (ref 15–46)
B-HCG UR QL: NEGATIVE
BACTERIA #/AREA URNS AUTO: ABNORMAL /HPF
BASOPHILS # BLD AUTO: 0.05 K/UL (ref 0–0.2)
BASOPHILS NFR BLD: 0.7 % (ref 0–1.9)
BILIRUB SERPL-MCNC: 0.4 MG/DL (ref 0.1–1)
BILIRUB UR QL STRIP: NEGATIVE
BUN SERPL-MCNC: 17 MG/DL (ref 7–17)
CALCIUM SERPL-MCNC: 9.4 MG/DL (ref 8.7–10.5)
CHLORIDE SERPL-SCNC: 103 MMOL/L (ref 95–110)
CLARITY UR REFRACT.AUTO: CLEAR
CO2 SERPL-SCNC: 29 MMOL/L (ref 23–29)
COLOR UR AUTO: YELLOW
CREAT SERPL-MCNC: 0.92 MG/DL (ref 0.5–1.4)
DIFFERENTIAL METHOD: NORMAL
EOSINOPHIL # BLD AUTO: 0.1 K/UL (ref 0–0.5)
EOSINOPHIL NFR BLD: 1.2 % (ref 0–8)
ERYTHROCYTE [DISTWIDTH] IN BLOOD BY AUTOMATED COUNT: 12.8 % (ref 11.5–14.5)
EST. GFR  (AFRICAN AMERICAN): >60 ML/MIN/1.73 M^2
EST. GFR  (NON AFRICAN AMERICAN): >60 ML/MIN/1.73 M^2
GLUCOSE SERPL-MCNC: 142 MG/DL (ref 70–110)
GLUCOSE UR QL STRIP: NEGATIVE
HCT VFR BLD AUTO: 45 % (ref 37–48.5)
HGB BLD-MCNC: 15.1 G/DL (ref 12–16)
HGB UR QL STRIP: ABNORMAL
HYALINE CASTS UR QL AUTO: 0 /LPF
IMM GRANULOCYTES # BLD AUTO: 0.02 K/UL (ref 0–0.04)
IMM GRANULOCYTES NFR BLD AUTO: 0.3 % (ref 0–0.5)
KETONES UR QL STRIP: NEGATIVE
LEUKOCYTE ESTERASE UR QL STRIP: ABNORMAL
LIPASE SERPL-CCNC: 153 U/L (ref 23–300)
LYMPHOCYTES # BLD AUTO: 2.9 K/UL (ref 1–4.8)
LYMPHOCYTES NFR BLD: 39.5 % (ref 18–48)
MCH RBC QN AUTO: 31 PG (ref 27–31)
MCHC RBC AUTO-ENTMCNC: 33.6 G/DL (ref 32–36)
MCV RBC AUTO: 92 FL (ref 82–98)
MICROSCOPIC COMMENT: ABNORMAL
MONOCYTES # BLD AUTO: 0.6 K/UL (ref 0.3–1)
MONOCYTES NFR BLD: 7.7 % (ref 4–15)
NEUTROPHILS # BLD AUTO: 3.8 K/UL (ref 1.8–7.7)
NEUTROPHILS NFR BLD: 50.6 % (ref 38–73)
NITRITE UR QL STRIP: NEGATIVE
NRBC BLD-RTO: 0 /100 WBC
PH UR STRIP: 7 [PH] (ref 5–8)
PLATELET # BLD AUTO: 239 K/UL (ref 150–350)
PMV BLD AUTO: 10.8 FL (ref 9.2–12.9)
POTASSIUM SERPL-SCNC: 3.5 MMOL/L (ref 3.5–5.1)
PROT SERPL-MCNC: 7.6 G/DL (ref 6–8.4)
PROT UR QL STRIP: ABNORMAL
RBC # BLD AUTO: 4.87 M/UL (ref 4–5.4)
RBC #/AREA URNS AUTO: 2 /HPF (ref 0–4)
SODIUM SERPL-SCNC: 137 MMOL/L (ref 136–145)
SP GR UR STRIP: 1.02 (ref 1–1.03)
SQUAMOUS #/AREA URNS AUTO: 5 /HPF
URN SPEC COLLECT METH UR: ABNORMAL
UROBILINOGEN UR STRIP-ACNC: 1 EU/DL
WBC # BLD AUTO: 7.44 K/UL (ref 3.9–12.7)
WBC #/AREA URNS AUTO: 15 /HPF (ref 0–5)

## 2019-12-30 PROCEDURE — 96375 TX/PRO/DX INJ NEW DRUG ADDON: CPT | Mod: 59,ER

## 2019-12-30 PROCEDURE — 83690 ASSAY OF LIPASE: CPT | Mod: ER

## 2019-12-30 PROCEDURE — 96372 THER/PROPH/DIAG INJ SC/IM: CPT | Mod: ER

## 2019-12-30 PROCEDURE — 81025 URINE PREGNANCY TEST: CPT | Mod: ER

## 2019-12-30 PROCEDURE — 81000 URINALYSIS NONAUTO W/SCOPE: CPT | Mod: ER

## 2019-12-30 PROCEDURE — 87086 URINE CULTURE/COLONY COUNT: CPT | Mod: ER

## 2019-12-30 PROCEDURE — 87088 URINE BACTERIA CULTURE: CPT | Mod: ER

## 2019-12-30 PROCEDURE — 25000003 PHARM REV CODE 250: Mod: ER | Performed by: PHYSICIAN ASSISTANT

## 2019-12-30 PROCEDURE — 96374 THER/PROPH/DIAG INJ IV PUSH: CPT | Mod: ER

## 2019-12-30 PROCEDURE — 85025 COMPLETE CBC W/AUTO DIFF WBC: CPT | Mod: ER

## 2019-12-30 PROCEDURE — 99284 EMERGENCY DEPT VISIT MOD MDM: CPT | Mod: 25,ER

## 2019-12-30 PROCEDURE — 63600175 PHARM REV CODE 636 W HCPCS: Mod: ER | Performed by: PHYSICIAN ASSISTANT

## 2019-12-30 PROCEDURE — 80053 COMPREHEN METABOLIC PANEL: CPT | Mod: ER

## 2019-12-30 RX ORDER — ONDANSETRON 2 MG/ML
4 INJECTION INTRAMUSCULAR; INTRAVENOUS
Status: COMPLETED | OUTPATIENT
Start: 2019-12-30 | End: 2019-12-30

## 2019-12-30 RX ORDER — DICYCLOMINE HYDROCHLORIDE 20 MG/1
20 TABLET ORAL 2 TIMES DAILY
Qty: 30 TABLET | Refills: 2 | Status: SHIPPED | OUTPATIENT
Start: 2019-12-30 | End: 2020-01-29

## 2019-12-30 RX ORDER — NITROFURANTOIN 25; 75 MG/1; MG/1
100 CAPSULE ORAL 2 TIMES DAILY
Qty: 10 CAPSULE | Refills: 0 | Status: SHIPPED | OUTPATIENT
Start: 2019-12-30 | End: 2020-01-04

## 2019-12-30 RX ORDER — CLONIDINE HYDROCHLORIDE 0.1 MG/1
0.1 TABLET ORAL
Status: COMPLETED | OUTPATIENT
Start: 2019-12-30 | End: 2019-12-30

## 2019-12-30 RX ORDER — CLONIDINE HYDROCHLORIDE 0.1 MG/1
0.2 TABLET ORAL
Status: DISCONTINUED | OUTPATIENT
Start: 2019-12-30 | End: 2019-12-30

## 2019-12-30 RX ORDER — KETOROLAC TROMETHAMINE 30 MG/ML
15 INJECTION, SOLUTION INTRAMUSCULAR; INTRAVENOUS
Status: COMPLETED | OUTPATIENT
Start: 2019-12-30 | End: 2019-12-30

## 2019-12-30 RX ORDER — DICYCLOMINE HYDROCHLORIDE 10 MG/ML
20 INJECTION INTRAMUSCULAR
Status: COMPLETED | OUTPATIENT
Start: 2019-12-30 | End: 2019-12-30

## 2019-12-30 RX ORDER — ONDANSETRON 4 MG/1
4 TABLET, FILM COATED ORAL EVERY 6 HOURS
Qty: 12 TABLET | Refills: 0 | Status: SHIPPED | OUTPATIENT
Start: 2019-12-30 | End: 2020-01-13 | Stop reason: SDUPTHER

## 2019-12-30 RX ADMIN — CLONIDINE HYDROCHLORIDE 0.1 MG: 0.1 TABLET ORAL at 09:12

## 2019-12-30 RX ADMIN — KETOROLAC TROMETHAMINE 15 MG: 30 INJECTION, SOLUTION INTRAMUSCULAR at 09:12

## 2019-12-30 RX ADMIN — ONDANSETRON 4 MG: 2 INJECTION INTRAMUSCULAR; INTRAVENOUS at 09:12

## 2019-12-30 RX ADMIN — DICYCLOMINE HYDROCHLORIDE 20 MG: 20 INJECTION, SOLUTION INTRAMUSCULAR at 10:12

## 2019-12-31 NOTE — ED NOTES
Reports to ED c c/o abd cramping to lower abd. Reports a few months ago she had fibroids and was placed on a mirena. States symptoms were relief. However, within last 2 weeks pain has came back. Denies dysuria, discharge, or odor. Also reports hx of HTN. States she has not take BP medications within 2-3 weeks. Reports to taken them the last 3 days. Reports nausea. Denies vomiting and diarrhea.

## 2019-12-31 NOTE — ED PROVIDER NOTES
Encounter Date: 12/30/2019       History     Chief Complaint   Patient presents with    Abdominal Pain     Patient reports lower abdominal pain x2 weeks with mild nausea, reports the pain is off on.      Patient is a 44-year-old female who presents to the ED with intermittent lower abdominal pain x2 weeks with associated mild nausea.  Has had a history of lower abdominal pain in the past and diagnosed with fibroids but after her Mirena implant her lower abdominal pain went away.  States pain is similar to that she has experienced in the past.  Denies any urinary symptoms.  Exacerbated by movement better with rest.  Has not taken any medication.            Review of patient's allergies indicates:  No Known Allergies  Past Medical History:   Diagnosis Date    CHF (congestive heart failure)     Diabetes mellitus     Hypertension     MI (myocardial infarction)     Stroke      Past Surgical History:   Procedure Laterality Date    CHOLECYSTECTOMY  2013    history of cholelithiasis    TUBAL LIGATION       Family History   Problem Relation Age of Onset    Hypertension Mother     Lung cancer Mother     No Known Problems Father     No Known Problems Sister     No Known Problems Daughter     Diabetes Son 14        Takes 2 insulins and metformin use to be bigger wally    Stroke Maternal Uncle 48    Anuerysm Maternal Grandmother     Breast cancer Maternal Grandmother     No Known Problems Sister     No Known Problems Daughter     No Known Problems Son     Breast cancer Maternal Aunt     Breast cancer Maternal Aunt      Social History     Tobacco Use    Smoking status: Current Every Day Smoker     Packs/day: 0.15     Years: 18.00     Pack years: 2.70     Types: Cigarettes    Smokeless tobacco: Never Used    Tobacco comment: 1 pack/week   Substance Use Topics    Alcohol use: Yes     Comment: social 1/month    Drug use: No     Review of Systems   Constitutional: Negative for diaphoresis, fatigue and fever.         14 Point ROS completed and negative with the exception of HPI and Below.   HENT: Negative for congestion, ear pain and sore throat.    Eyes: Negative for discharge and itching.   Respiratory: Negative for cough, chest tightness, shortness of breath and wheezing.    Cardiovascular: Negative for chest pain, palpitations and leg swelling.   Gastrointestinal: Positive for abdominal pain and nausea. Negative for abdominal distention and vomiting.   Genitourinary: Negative for dysuria, flank pain and frequency.   Musculoskeletal: Negative for back pain, neck pain and neck stiffness.   Skin: Negative for pallor, rash and wound.   Neurological: Negative for dizziness, syncope, facial asymmetry, weakness and headaches.   Hematological: Does not bruise/bleed easily.   Psychiatric/Behavioral: Negative for agitation, behavioral problems and confusion.   All other systems reviewed and are negative.      Physical Exam     Initial Vitals [12/30/19 2103]   BP Pulse Resp Temp SpO2   (!) 225/112 64 18 97.9 °F (36.6 °C) 98 %      MAP       --         Physical Exam    Constitutional: She appears well-developed and well-nourished.   HENT:   Head: Normocephalic and atraumatic.   Right Ear: External ear normal.   Left Ear: External ear normal.   Nose: Nose normal.   Mouth/Throat: Oropharynx is clear and moist.   Eyes: Conjunctivae and EOM are normal. Pupils are equal, round, and reactive to light. Right eye exhibits no discharge. Left eye exhibits no discharge. No scleral icterus.   Neck: Normal range of motion. Neck supple. No thyromegaly present. No tracheal deviation present. No JVD present.   Cardiovascular: Normal rate, regular rhythm, normal heart sounds and intact distal pulses. Exam reveals no gallop and no friction rub.    No murmur heard.  Pulmonary/Chest: Breath sounds normal. No stridor. No respiratory distress. She has no wheezes. She has no rhonchi. She has no rales. She exhibits no tenderness.   Abdominal: Soft.  Bowel sounds are normal. She exhibits no distension and no mass. There is tenderness. There is no rebound and no guarding.   Lower abdominal tenderness   Genitourinary:   Genitourinary Comments: Suprapubic tenderness   Musculoskeletal: Normal range of motion. She exhibits no edema or tenderness.   Lymphadenopathy:     She has no cervical adenopathy.   Neurological: She is alert and oriented to person, place, and time. She has normal strength and normal reflexes. She displays normal reflexes. No cranial nerve deficit or sensory deficit.   Skin: Skin is warm and dry. Capillary refill takes less than 2 seconds. No rash and no abscess noted. No erythema. No pallor.   Psychiatric: She has a normal mood and affect. Her behavior is normal. Thought content normal.         ED Course   Procedures  Labs Reviewed   URINALYSIS, REFLEX TO URINE CULTURE   PREGNANCY TEST, URINE RAPID   CBC W/ AUTO DIFFERENTIAL   COMPREHENSIVE METABOLIC PANEL   LIPASE          Imaging Results    None          Medical Decision Making:   Initial Assessment:   Patient is a 44-year-old female who presents to the ED with intermittent lower abdominal pain x2 weeks with associated mild nausea.  Has had a history of lower abdominal pain in the past and diagnosed with fibroids but after her Mirena implant her lower abdominal pain went away.  States pain is similar to that she has experienced in the past.  Denies any urinary symptoms.  Exacerbated by movement better with rest.  Has not taken any medication.  Also the patient is noted hypertensive on presentation.  States that she is noncompliant at times on her blood pressure medication.  She did take her blood pressure medication for the last 3 days but did take it this morning.    Lower abdominal tenderness to palpation no rebound tenderness. Suprapubic tenderness is noted. Bowel sounds are present all 4 quadrants.  No masses or organs felt.  Patient states that her pain is more of an intermittent cramping  as opposed to a constant pain.  Differential Diagnosis:   Acute cystitis, constipation, obstruction, diverticulitis, fibroids  ED Management:  Patient lab work unremarkable.  Attending also reviewed with me.  Patient's symptoms alleviated with the treatment provided here in the ED.  Patient states she feels much better and is ready to be discharged home.  Patient feels comfortable caring for the condition at home with the treatment plan outlined here in the ED.  Abdominal cramping subsided prior to discharge. UTI will be treated with outpatient antibiotics.  I will also give the patient a prescription for been till to take at home for continued symptom relief.  Patient verbalizes understanding of the need also to take her antihypertensives on a regular basis.  Blood pressure has come down nicely with the treatment outlined here in the ED.  Patient verbalized understanding of all discharge instructions and denies any questions.    At discharge the patient is smiling, happy, feeling better and ambulatory without difficulty.  Patient understands the need to return to the ED for any continued or worsening symptoms or if she runs fever.                   ED Course as of Dec 30 2319   Mon Dec 30, 2019   2227 Lipase [KD]   2227 Comp. Metabolic Panel(!) [KD]   2227 CBC W/ AUTO DIFFERENTIAL [KD]   2227 Blood pressure much improved with medication provided.  Patient states that her abdominal cramping is much improved with medication provided here in the ED.  Will give a dose of bentyl as well.   Urinalysis Microscopic(!) [KD]      ED Course User Index  [KD] Aristeo Arechiga PA-C                Clinical Impression:       ICD-10-CM ICD-9-CM   1. Abdominal cramping R10.9 789.00   2. Essential hypertension I10 401.9   3. H/O medication noncompliance Z91.14 V15.81   4. Acute cystitis with hematuria N30.01 595.0                             Aristeo Arechiga PA-C  12/30/19 8556       Aristeo Arechiga PA-C  12/30/19 2326

## 2020-01-01 LAB — BACTERIA UR CULT: ABNORMAL

## 2020-01-13 ENCOUNTER — OFFICE VISIT (OUTPATIENT)
Dept: FAMILY MEDICINE | Facility: HOSPITAL | Age: 45
End: 2020-01-13
Attending: FAMILY MEDICINE
Payer: MEDICAID

## 2020-01-13 VITALS
HEART RATE: 86 BPM | WEIGHT: 177.69 LBS | BODY MASS INDEX: 26.93 KG/M2 | SYSTOLIC BLOOD PRESSURE: 158 MMHG | DIASTOLIC BLOOD PRESSURE: 102 MMHG | HEIGHT: 68 IN

## 2020-01-13 DIAGNOSIS — F32.A DEPRESSION, UNSPECIFIED DEPRESSION TYPE: ICD-10-CM

## 2020-01-13 DIAGNOSIS — I10 ESSENTIAL HYPERTENSION: ICD-10-CM

## 2020-01-13 DIAGNOSIS — I50.9 CONGESTIVE HEART FAILURE, UNSPECIFIED HF CHRONICITY, UNSPECIFIED HEART FAILURE TYPE: ICD-10-CM

## 2020-01-13 DIAGNOSIS — I10 HYPERTENSION, UNSPECIFIED TYPE: ICD-10-CM

## 2020-01-13 DIAGNOSIS — E11.9 TYPE 2 DIABETES MELLITUS WITHOUT COMPLICATION, WITHOUT LONG-TERM CURRENT USE OF INSULIN: ICD-10-CM

## 2020-01-13 DIAGNOSIS — E78.5 HYPERLIPIDEMIA, UNSPECIFIED HYPERLIPIDEMIA TYPE: ICD-10-CM

## 2020-01-13 PROCEDURE — 99213 OFFICE O/P EST LOW 20 MIN: CPT | Performed by: STUDENT IN AN ORGANIZED HEALTH CARE EDUCATION/TRAINING PROGRAM

## 2020-01-13 RX ORDER — SPIRONOLACTONE 50 MG/1
50 TABLET, FILM COATED ORAL DAILY
Qty: 90 TABLET | Refills: 3 | Status: SHIPPED | OUTPATIENT
Start: 2020-01-13 | End: 2020-01-13 | Stop reason: SDUPTHER

## 2020-01-13 RX ORDER — CHLORTHALIDONE 50 MG/1
50 TABLET ORAL DAILY
Qty: 90 TABLET | Refills: 3 | Status: SHIPPED | OUTPATIENT
Start: 2020-01-13 | End: 2020-10-19

## 2020-01-13 RX ORDER — CARVEDILOL 25 MG/1
25 TABLET ORAL 2 TIMES DAILY WITH MEALS
Qty: 180 TABLET | Refills: 3 | Status: SHIPPED | OUTPATIENT
Start: 2020-01-13 | End: 2020-01-13 | Stop reason: SDUPTHER

## 2020-01-13 RX ORDER — CLOPIDOGREL BISULFATE 75 MG/1
75 TABLET ORAL DAILY
Qty: 90 TABLET | Refills: 3 | Status: SHIPPED | OUTPATIENT
Start: 2020-01-13 | End: 2021-04-07 | Stop reason: SDUPTHER

## 2020-01-13 RX ORDER — METFORMIN HYDROCHLORIDE 500 MG/1
500 TABLET, EXTENDED RELEASE ORAL 2 TIMES DAILY WITH MEALS
Qty: 180 TABLET | Refills: 3 | Status: SHIPPED | OUTPATIENT
Start: 2020-01-13 | End: 2022-05-12 | Stop reason: SDUPTHER

## 2020-01-13 RX ORDER — SPIRONOLACTONE 50 MG/1
50 TABLET, FILM COATED ORAL DAILY
Qty: 90 TABLET | Refills: 3 | Status: SHIPPED | OUTPATIENT
Start: 2020-01-13 | End: 2021-02-05 | Stop reason: SDUPTHER

## 2020-01-13 RX ORDER — VENLAFAXINE HYDROCHLORIDE 37.5 MG/1
37.5 CAPSULE, EXTENDED RELEASE ORAL DAILY
Qty: 90 CAPSULE | Refills: 3 | Status: SHIPPED | OUTPATIENT
Start: 2020-01-13 | End: 2021-04-07 | Stop reason: SDUPTHER

## 2020-01-13 RX ORDER — FUROSEMIDE 20 MG/1
20 TABLET ORAL DAILY
Qty: 90 TABLET | Refills: 3 | Status: SHIPPED | OUTPATIENT
Start: 2020-01-13 | End: 2020-01-13 | Stop reason: SDUPTHER

## 2020-01-13 RX ORDER — NIFEDIPINE 60 MG/1
60 TABLET, EXTENDED RELEASE ORAL DAILY
Qty: 90 TABLET | Refills: 3 | Status: SHIPPED | OUTPATIENT
Start: 2020-01-13 | End: 2020-10-19

## 2020-01-13 RX ORDER — VENLAFAXINE HYDROCHLORIDE 37.5 MG/1
37.5 CAPSULE, EXTENDED RELEASE ORAL DAILY
Qty: 90 CAPSULE | Refills: 3 | Status: SHIPPED | OUTPATIENT
Start: 2020-01-13 | End: 2020-01-13 | Stop reason: SDUPTHER

## 2020-01-13 RX ORDER — LISINOPRIL 40 MG/1
40 TABLET ORAL DAILY
Qty: 90 TABLET | Refills: 3 | Status: SHIPPED | OUTPATIENT
Start: 2020-01-13 | End: 2020-01-13 | Stop reason: SDUPTHER

## 2020-01-13 RX ORDER — ONDANSETRON 4 MG/1
4 TABLET, FILM COATED ORAL EVERY 6 HOURS
Qty: 30 TABLET | Refills: 1 | Status: SHIPPED | OUTPATIENT
Start: 2020-01-13 | End: 2022-09-03

## 2020-01-13 RX ORDER — METFORMIN HYDROCHLORIDE 500 MG/1
500 TABLET, EXTENDED RELEASE ORAL 2 TIMES DAILY WITH MEALS
Qty: 180 TABLET | Refills: 3 | Status: SHIPPED | OUTPATIENT
Start: 2020-01-13 | End: 2020-01-13 | Stop reason: SDUPTHER

## 2020-01-13 RX ORDER — LISINOPRIL 40 MG/1
40 TABLET ORAL DAILY
Qty: 90 TABLET | Refills: 3 | Status: SHIPPED | OUTPATIENT
Start: 2020-01-13 | End: 2021-01-22 | Stop reason: SDUPTHER

## 2020-01-13 RX ORDER — CLONIDINE HYDROCHLORIDE 0.2 MG/1
0.2 TABLET ORAL 2 TIMES DAILY
Qty: 180 TABLET | Refills: 3 | Status: SHIPPED | OUTPATIENT
Start: 2020-01-13 | End: 2021-04-07

## 2020-01-13 RX ORDER — ATORVASTATIN CALCIUM 40 MG/1
40 TABLET, FILM COATED ORAL DAILY
Qty: 30 TABLET | Refills: 3 | Status: SHIPPED | OUTPATIENT
Start: 2020-01-13 | End: 2020-01-13 | Stop reason: SDUPTHER

## 2020-01-13 RX ORDER — CARVEDILOL 25 MG/1
25 TABLET ORAL 2 TIMES DAILY WITH MEALS
Qty: 180 TABLET | Refills: 3 | Status: SHIPPED | OUTPATIENT
Start: 2020-01-13 | End: 2020-08-20

## 2020-01-13 RX ORDER — CHLORTHALIDONE 50 MG/1
50 TABLET ORAL DAILY
Qty: 90 TABLET | Refills: 3 | Status: SHIPPED | OUTPATIENT
Start: 2020-01-13 | End: 2020-01-13 | Stop reason: SDUPTHER

## 2020-01-13 RX ORDER — CLONIDINE HYDROCHLORIDE 0.2 MG/1
0.2 TABLET ORAL 2 TIMES DAILY
Qty: 180 TABLET | Refills: 3 | Status: SHIPPED | OUTPATIENT
Start: 2020-01-13 | End: 2020-01-13 | Stop reason: SDUPTHER

## 2020-01-13 RX ORDER — GABAPENTIN 100 MG/1
100 CAPSULE ORAL DAILY
Qty: 90 CAPSULE | Refills: 3 | Status: SHIPPED | OUTPATIENT
Start: 2020-01-13 | End: 2023-09-20

## 2020-01-13 RX ORDER — ATORVASTATIN CALCIUM 40 MG/1
40 TABLET, FILM COATED ORAL DAILY
Qty: 90 TABLET | Refills: 3 | Status: SHIPPED | OUTPATIENT
Start: 2020-01-13 | End: 2021-04-07 | Stop reason: SDUPTHER

## 2020-01-13 RX ORDER — FUROSEMIDE 20 MG/1
20 TABLET ORAL DAILY
Qty: 90 TABLET | Refills: 3 | Status: SHIPPED | OUTPATIENT
Start: 2020-01-13 | End: 2021-01-22 | Stop reason: SDUPTHER

## 2020-01-13 RX ORDER — NIFEDIPINE 60 MG/1
60 TABLET, EXTENDED RELEASE ORAL DAILY
Qty: 90 TABLET | Refills: 3 | Status: SHIPPED | OUTPATIENT
Start: 2020-01-13 | End: 2020-01-13 | Stop reason: SDUPTHER

## 2020-01-13 RX ORDER — CLOPIDOGREL BISULFATE 75 MG/1
75 TABLET ORAL DAILY
Qty: 90 TABLET | Refills: 3 | Status: SHIPPED | OUTPATIENT
Start: 2020-01-13 | End: 2020-01-13 | Stop reason: SDUPTHER

## 2020-01-13 RX ORDER — NAPROXEN SODIUM 220 MG/1
81 TABLET, FILM COATED ORAL DAILY
Qty: 90 TABLET | Refills: 3 | Status: SHIPPED | OUTPATIENT
Start: 2020-01-13 | End: 2021-05-05 | Stop reason: SDUPTHER

## 2020-01-13 RX ORDER — GABAPENTIN 100 MG/1
100 CAPSULE ORAL DAILY
Qty: 90 CAPSULE | Refills: 3 | Status: SHIPPED | OUTPATIENT
Start: 2020-01-13 | End: 2020-01-13 | Stop reason: SDUPTHER

## 2020-01-13 NOTE — PROGRESS NOTES
Subjective:       Patient ID: Josefina Martin is a 44 y.o. female.    Chief Complaint: Medication refill    Patient is a 45 yo female pmhx of HTN, CHF, MI with 4 stents place, 3-4 strokes, T2DM presenting today for urgent care evaluation due to out of medications.  She states she has been out of everything for approximately the past 2 weeks.      Of note, she also reports ongoing crampy pain.  States it has been present over the past year.  Follow-up by ob/gyn for uterine fibroid and had IUD placed.  Patient states she will follow-up with ob/gyn for concern.      Review of Systems   Constitutional: Negative for appetite change and fever.   HENT: Negative for congestion and sore throat.    Respiratory: Negative for cough, chest tightness and shortness of breath.    Cardiovascular: Negative for chest pain and palpitations.   Gastrointestinal: Positive for nausea. Negative for abdominal pain and diarrhea.   Genitourinary: Positive for pelvic pain.   Neurological: Negative for dizziness, weakness and headaches.       Objective:      Vitals:    01/13/20 1450   BP: (!) 158/102   Pulse: 86     Physical Exam   Constitutional: She is oriented to person, place, and time. She appears well-developed and well-nourished. No distress.   HENT:   Head: Normocephalic and atraumatic.   Cardiovascular: Normal rate, regular rhythm and normal heart sounds.   No murmur heard.  Pulmonary/Chest: Effort normal. No respiratory distress.   Musculoskeletal: Normal range of motion. She exhibits no edema.   Neurological: She is alert and oriented to person, place, and time. No cranial nerve deficit. She exhibits normal muscle tone. Coordination normal.   Skin: She is not diaphoretic.   Nursing note and vitals reviewed.      Assessment:       1. Congestive heart failure, unspecified HF chronicity, unspecified heart failure type    2. Hyperlipidemia, unspecified hyperlipidemia type    3. Hypertension, unspecified type    4. Essential  hypertension    5. Type 2 diabetes mellitus without complication, without long-term current use of insulin    6. Depression, unspecified depression type        Plan:       Congestive heart failure, unspecified HF chronicity, unspecified heart failure type  -     aspirin 81 MG Chew; Take 1 tablet (81 mg total) by mouth once daily. for 21 days  Dispense: 90 tablet; Refill: 3    Hyperlipidemia, unspecified hyperlipidemia type  -     Discontinue: atorvastatin (LIPITOR) 40 MG tablet; Take 1 tablet (40 mg total) by mouth once daily.  Dispense: 30 tablet; Refill: 3  -     atorvastatin (LIPITOR) 40 MG tablet; Take 1 tablet (40 mg total) by mouth once daily.  Dispense: 90 tablet; Refill: 3    Hypertension, unspecified type  -     Discontinue: carvedilol (COREG) 25 MG tablet; Take 1 tablet (25 mg total) by mouth 2 (two) times daily with meals.  Dispense: 180 tablet; Refill: 3  -     Discontinue: furosemide (LASIX) 20 MG tablet; Take 1 tablet (20 mg total) by mouth once daily.  Dispense: 90 tablet; Refill: 3  -     carvedilol (COREG) 25 MG tablet; Take 1 tablet (25 mg total) by mouth 2 (two) times daily with meals.  Dispense: 180 tablet; Refill: 3  -     furosemide (LASIX) 20 MG tablet; Take 1 tablet (20 mg total) by mouth once daily.  Dispense: 90 tablet; Refill: 3    Essential hypertension  -     Discontinue: furosemide (LASIX) 20 MG tablet; Take 1 tablet (20 mg total) by mouth once daily.  Dispense: 90 tablet; Refill: 3  -     Discontinue: NIFEdipine (PROCARDIA-XL) 60 MG (OSM) 24 hr tablet; Take 1 tablet (60 mg total) by mouth once daily.  Dispense: 90 tablet; Refill: 3  -     furosemide (LASIX) 20 MG tablet; Take 1 tablet (20 mg total) by mouth once daily.  Dispense: 90 tablet; Refill: 3  -     NIFEdipine (PROCARDIA-XL) 60 MG (OSM) 24 hr tablet; Take 1 tablet (60 mg total) by mouth once daily.  Dispense: 90 tablet; Refill: 3    Type 2 diabetes mellitus without complication, without long-term current use of insulin  -      Discontinue: metFORMIN (GLUCOPHAGE-XR) 500 MG 24 hr tablet; Take 1 tablet (500 mg total) by mouth 2 (two) times daily with meals.  Dispense: 180 tablet; Refill: 3  -     metFORMIN (GLUCOPHAGE-XR) 500 MG 24 hr tablet; Take 1 tablet (500 mg total) by mouth 2 (two) times daily with meals.  Dispense: 180 tablet; Refill: 3    Depression, unspecified depression type  -     Discontinue: venlafaxine (EFFEXOR-XR) 37.5 MG 24 hr capsule; Take 1 capsule (37.5 mg total) by mouth once daily.  Dispense: 90 capsule; Refill: 3  -     venlafaxine (EFFEXOR-XR) 37.5 MG 24 hr capsule; Take 1 capsule (37.5 mg total) by mouth once daily.  Dispense: 90 capsule; Refill: 3    Other orders  -     Discontinue: chlorthalidone (HYGROTEN) 50 MG Tab; Take 1 tablet (50 mg total) by mouth once daily.  Dispense: 90 tablet; Refill: 3  -     Discontinue: cloNIDine (CATAPRES) 0.2 MG tablet; Take 1 tablet (0.2 mg total) by mouth 2 (two) times daily.  Dispense: 180 tablet; Refill: 3  -     Discontinue: clopidogrel (PLAVIX) 75 mg tablet; Take 1 tablet (75 mg total) by mouth once daily.  Dispense: 90 tablet; Refill: 3  -     Discontinue: gabapentin (NEURONTIN) 100 MG capsule; Take 1 capsule (100 mg total) by mouth once daily.  Dispense: 90 capsule; Refill: 3  -     Discontinue: lisinopril (PRINIVIL,ZESTRIL) 40 MG tablet; Take 1 tablet (40 mg total) by mouth once daily.  Dispense: 90 tablet; Refill: 3  -     ondansetron (ZOFRAN) 4 MG tablet; Take 1 tablet (4 mg total) by mouth every 6 (six) hours.  Dispense: 30 tablet; Refill: 1  -     Discontinue: spironolactone (ALDACTONE) 50 MG tablet; Take 1 tablet (50 mg total) by mouth once daily.  Dispense: 90 tablet; Refill: 3  -     chlorthalidone (HYGROTEN) 50 MG Tab; Take 1 tablet (50 mg total) by mouth once daily.  Dispense: 90 tablet; Refill: 3  -     cloNIDine (CATAPRES) 0.2 MG tablet; Take 1 tablet (0.2 mg total) by mouth 2 (two) times daily.  Dispense: 180 tablet; Refill: 3  -     clopidogrel (PLAVIX) 75 mg  tablet; Take 1 tablet (75 mg total) by mouth once daily.  Dispense: 90 tablet; Refill: 3  -     gabapentin (NEURONTIN) 100 MG capsule; Take 1 capsule (100 mg total) by mouth once daily.  Dispense: 90 capsule; Refill: 3  -     lisinopril (PRINIVIL,ZESTRIL) 40 MG tablet; Take 1 tablet (40 mg total) by mouth once daily.  Dispense: 90 tablet; Refill: 3  -     spironolactone (ALDACTONE) 50 MG tablet; Take 1 tablet (50 mg total) by mouth once daily.  Dispense: 90 tablet; Refill: 3      Follow up in about 4 weeks (around 2/10/2020).

## 2020-01-23 ENCOUNTER — OFFICE VISIT (OUTPATIENT)
Dept: OBSTETRICS AND GYNECOLOGY | Facility: CLINIC | Age: 45
End: 2020-01-23
Payer: MEDICAID

## 2020-01-23 VITALS
DIASTOLIC BLOOD PRESSURE: 100 MMHG | BODY MASS INDEX: 26.86 KG/M2 | HEIGHT: 68 IN | SYSTOLIC BLOOD PRESSURE: 160 MMHG | WEIGHT: 177.25 LBS

## 2020-01-23 DIAGNOSIS — R10.2 CHRONIC FEMALE PELVIC PAIN: Primary | ICD-10-CM

## 2020-01-23 DIAGNOSIS — D25.9 UTERINE LEIOMYOMA, UNSPECIFIED LOCATION: ICD-10-CM

## 2020-01-23 DIAGNOSIS — G89.29 CHRONIC FEMALE PELVIC PAIN: Primary | ICD-10-CM

## 2020-01-23 PROCEDURE — 99213 OFFICE O/P EST LOW 20 MIN: CPT | Mod: PBBFAC,PO | Performed by: OBSTETRICS & GYNECOLOGY

## 2020-01-23 PROCEDURE — 99213 OFFICE O/P EST LOW 20 MIN: CPT | Mod: S$PBB,,, | Performed by: OBSTETRICS & GYNECOLOGY

## 2020-01-23 PROCEDURE — 99213 PR OFFICE/OUTPT VISIT, EST, LEVL III, 20-29 MIN: ICD-10-PCS | Mod: S$PBB,,, | Performed by: OBSTETRICS & GYNECOLOGY

## 2020-01-23 PROCEDURE — 99999 PR PBB SHADOW E&M-EST. PATIENT-LVL III: ICD-10-PCS | Mod: PBBFAC,,, | Performed by: OBSTETRICS & GYNECOLOGY

## 2020-01-23 PROCEDURE — 99999 PR PBB SHADOW E&M-EST. PATIENT-LVL III: CPT | Mod: PBBFAC,,, | Performed by: OBSTETRICS & GYNECOLOGY

## 2020-01-23 RX ORDER — IBUPROFEN 800 MG/1
800 TABLET ORAL EVERY 6 HOURS PRN
Qty: 30 TABLET | Refills: 2 | Status: ON HOLD | OUTPATIENT
Start: 2020-01-23 | End: 2020-10-21 | Stop reason: HOSPADM

## 2020-01-23 NOTE — PROGRESS NOTES
Chief Complaint   Patient presents with    Abdominal Pain     fibroids       HISTORY OF PRESENT ILLNESS:   Josefina Martin is a 44 y.o. female  who presents for pelvic pain. She reports the IUD is working well and she isn't have a cycle but over the past few months have noticed worsening abdominal pain. She reports she was treated for a UTI but didn't resolve the pain. She reports the pain is like contractions or cramping. No discharge. She denies changes in bowel or bladder issues.       Past Medical History:   Diagnosis Date    CHF (congestive heart failure)     Diabetes mellitus     Hypertension     MI (myocardial infarction)     Stroke           Past Surgical History:   Procedure Laterality Date    CHOLECYSTECTOMY  2013    history of cholelithiasis    TUBAL LIGATION           Social History     Socioeconomic History    Marital status: Single     Spouse name: Not on file    Number of children: Not on file    Years of education: Not on file    Highest education level: Not on file   Occupational History     Employer: [employer]   Social Needs    Financial resource strain: Not on file    Food insecurity:     Worry: Not on file     Inability: Not on file    Transportation needs:     Medical: Not on file     Non-medical: Not on file   Tobacco Use    Smoking status: Current Every Day Smoker     Packs/day: 0.15     Years: 18.00     Pack years: 2.70     Types: Cigarettes    Smokeless tobacco: Never Used    Tobacco comment: 1 pack/week   Substance and Sexual Activity    Alcohol use: Yes     Comment: social 1/month    Drug use: No    Sexual activity: Yes     Partners: Male     Birth control/protection: None, Surgical   Lifestyle    Physical activity:     Days per week: Not on file     Minutes per session: Not on file    Stress: Not on file   Relationships    Social connections:     Talks on phone: Not on file     Gets together: Not on file     Attends Muslim service: Not on file     Active  member of club or organization: Not on file     Attends meetings of clubs or organizations: Not on file     Relationship status: Not on file   Other Topics Concern    Not on file   Social History Narrative    Not on file       Family History   Problem Relation Age of Onset    Hypertension Mother     Lung cancer Mother     No Known Problems Father     No Known Problems Sister     No Known Problems Daughter     Diabetes Son 14        Takes 2 insulins and metformin use to be bigger wally    Stroke Maternal Uncle 48    Anuerysm Maternal Grandmother     Breast cancer Maternal Grandmother     No Known Problems Sister     No Known Problems Daughter     No Known Problems Son     Breast cancer Maternal Aunt     Breast cancer Maternal Aunt          OB History    Para Term  AB Living   5 4 4   1     SAB TAB Ectopic Multiple Live Births   1              # Outcome Date GA Lbr Flex/2nd Weight Sex Delivery Anes PTL Lv   5 SAB            4 Term            3 Term            2 Term            1 Term                GYN HISTORY:  PAP History: Denies abnormal Paps  Infection History:Denies STDs. Denies PID.  Benign History: Denies uterine fibroids. Denies ovarian cysts. Denies endometriosis Denies other conditions.  Cancer History: Denies cervical cancer. Denies uterine cancer or hyperplasia. Denies ovarian cancer. Denies vulvar cancer or pre-cancer. Denies vaginal cancer or pre-cancer. Denies breast cancer. Denies colon cancer.  Cycle: / until 2018 when started coming Q2 weeks   IUd in place    ROS:  GENERAL: Denies weight gain or weight loss. Feeling well overall.   SKIN: Denies rash or lesions.   HEAD: Denies headache.   NODES: Denies enlarged lymph nodes.   CHEST: Denies shortness of breath at rest but does have some with ambulating.   ABDOMEN: No abdominal pain, constipation, diarrhea, nausea, vomiting or rectal bleeding.   URINARY: No frequency, dysuria, hematuria, or burning on  "urination.  REPRODUCTIVE: See HPI.   BREASTS: The patient denies pain, lumps, or nipple discharge.       BP (!) 160/100   Ht 5' 8" (1.727 m)   Wt 80.4 kg (177 lb 4 oz)   BMI 26.95 kg/m²      APPEARANCE: Well nourished, well developed, in no acute distress.  NECK: Neck symmetric without  thyromegaly.  NODES: No inguinal, cervical lymph node enlargement.  CHEST: expiratory wheeze in right lowe lung fields, CTA otherwise   HEART: Regular rate and rhythm, no murmurs, rubs or gallops.  ABDOMEN: Soft. No tenderness or masses. No hernias. No hepatosplenomegaly.   Pelvic: normal external genitalia, no erythema, scant yellow vaginal discharge, mildly enlarged uterus with good descent and mobility, large cervix with no discharge or masses, IUD strings seen in place, no adnexal masses appreciated,     TVUS: uterus 9.6x5.3x7.2 vol 191; anterior 1 cm fibroid, anterior 1 cm fibroid, posterior fibroid 1.6 cm and fundal fibroid 3.5 cm  Bilateral ovaries normal with no masses seen      1. Chronic female pelvic pain    2. Uterine leiomyoma, unspecified location        Plan:  1. IUD in place,orderd TVUS and will try ibuprofen. Will see back after US and go over results.         "

## 2020-08-16 ENCOUNTER — HOSPITAL ENCOUNTER (EMERGENCY)
Facility: HOSPITAL | Age: 45
Discharge: HOME | End: 2020-08-16
Attending: EMERGENCY MEDICINE
Payer: MEDICAID

## 2020-08-16 VITALS
TEMPERATURE: 100 F | WEIGHT: 189 LBS | OXYGEN SATURATION: 99 % | RESPIRATION RATE: 20 BRPM | DIASTOLIC BLOOD PRESSURE: 106 MMHG | HEART RATE: 76 BPM | BODY MASS INDEX: 28.64 KG/M2 | SYSTOLIC BLOOD PRESSURE: 155 MMHG | HEIGHT: 68 IN

## 2020-08-16 DIAGNOSIS — I10 UNCONTROLLED HYPERTENSION: ICD-10-CM

## 2020-08-16 DIAGNOSIS — R29.898 WEAKNESS OF RIGHT UPPER EXTREMITY: Primary | ICD-10-CM

## 2020-08-16 DIAGNOSIS — R53.1 WEAKNESS: ICD-10-CM

## 2020-08-16 DIAGNOSIS — R20.2 PARESTHESIA OF RIGHT UPPER EXTREMITY: ICD-10-CM

## 2020-08-16 LAB
ALBUMIN SERPL BCP-MCNC: 4.1 G/DL (ref 3.5–5.2)
ALP SERPL-CCNC: 97 U/L (ref 55–135)
ALT SERPL W/O P-5'-P-CCNC: 21 U/L (ref 10–44)
ANION GAP SERPL CALC-SCNC: 10 MMOL/L (ref 8–16)
AST SERPL-CCNC: 20 U/L (ref 10–40)
B-HCG UR QL: NEGATIVE
BACTERIA #/AREA URNS HPF: NORMAL /HPF
BASOPHILS # BLD AUTO: 0.04 K/UL (ref 0–0.2)
BASOPHILS NFR BLD: 0.6 % (ref 0–1.9)
BILIRUB SERPL-MCNC: 0.5 MG/DL (ref 0.1–1)
BILIRUB UR QL STRIP: NEGATIVE
BUN SERPL-MCNC: 17 MG/DL (ref 6–20)
CALCIUM SERPL-MCNC: 9.4 MG/DL (ref 8.7–10.5)
CHLORIDE SERPL-SCNC: 102 MMOL/L (ref 95–110)
CLARITY UR: ABNORMAL
CO2 SERPL-SCNC: 26 MMOL/L (ref 23–29)
COLOR UR: ABNORMAL
CREAT SERPL-MCNC: 1 MG/DL (ref 0.5–1.4)
CTP QC/QA: YES
DIFFERENTIAL METHOD: NORMAL
EOSINOPHIL # BLD AUTO: 0.1 K/UL (ref 0–0.5)
EOSINOPHIL NFR BLD: 1.6 % (ref 0–8)
ERYTHROCYTE [DISTWIDTH] IN BLOOD BY AUTOMATED COUNT: 12.4 % (ref 11.5–14.5)
EST. GFR  (AFRICAN AMERICAN): >60 ML/MIN/1.73 M^2
EST. GFR  (NON AFRICAN AMERICAN): >60 ML/MIN/1.73 M^2
GLUCOSE SERPL-MCNC: 102 MG/DL (ref 70–110)
GLUCOSE UR QL STRIP: NEGATIVE
HCT VFR BLD AUTO: 44.2 % (ref 37–48.5)
HGB BLD-MCNC: 15 G/DL (ref 12–16)
HGB UR QL STRIP: ABNORMAL
HYALINE CASTS #/AREA URNS LPF: 0 /LPF
IMM GRANULOCYTES # BLD AUTO: 0.01 K/UL (ref 0–0.04)
IMM GRANULOCYTES NFR BLD AUTO: 0.1 % (ref 0–0.5)
INR PPP: 1 (ref 0.8–1.2)
KETONES UR QL STRIP: NEGATIVE
LEUKOCYTE ESTERASE UR QL STRIP: ABNORMAL
LYMPHOCYTES # BLD AUTO: 3 K/UL (ref 1–4.8)
LYMPHOCYTES NFR BLD: 45.2 % (ref 18–48)
MCH RBC QN AUTO: 30.7 PG (ref 27–31)
MCHC RBC AUTO-ENTMCNC: 33.9 G/DL (ref 32–36)
MCV RBC AUTO: 90 FL (ref 82–98)
MICROSCOPIC COMMENT: NORMAL
MONOCYTES # BLD AUTO: 0.6 K/UL (ref 0.3–1)
MONOCYTES NFR BLD: 8.5 % (ref 4–15)
NEUTROPHILS # BLD AUTO: 3 K/UL (ref 1.8–7.7)
NEUTROPHILS NFR BLD: 44 % (ref 38–73)
NITRITE UR QL STRIP: NEGATIVE
NRBC BLD-RTO: 0 /100 WBC
PH UR STRIP: 6 [PH] (ref 5–8)
PLATELET # BLD AUTO: 213 K/UL (ref 150–350)
PMV BLD AUTO: 11.1 FL (ref 9.2–12.9)
POTASSIUM SERPL-SCNC: 3.7 MMOL/L (ref 3.5–5.1)
PROT SERPL-MCNC: 7.3 G/DL (ref 6–8.4)
PROT UR QL STRIP: ABNORMAL
PROTHROMBIN TIME: 10.5 SEC (ref 9–12.5)
RBC # BLD AUTO: 4.89 M/UL (ref 4–5.4)
RBC #/AREA URNS HPF: 2 /HPF (ref 0–4)
SODIUM SERPL-SCNC: 138 MMOL/L (ref 136–145)
SP GR UR STRIP: >=1.03 (ref 1–1.03)
TROPONIN I SERPL DL<=0.01 NG/ML-MCNC: <0.006 NG/ML (ref 0–0.03)
URN SPEC COLLECT METH UR: ABNORMAL
UROBILINOGEN UR STRIP-ACNC: NEGATIVE EU/DL
WBC # BLD AUTO: 6.73 K/UL (ref 3.9–12.7)
WBC #/AREA URNS HPF: 3 /HPF (ref 0–5)

## 2020-08-16 PROCEDURE — 85610 PROTHROMBIN TIME: CPT

## 2020-08-16 PROCEDURE — 93010 EKG 12-LEAD: ICD-10-PCS | Mod: ,,, | Performed by: INTERNAL MEDICINE

## 2020-08-16 PROCEDURE — 80053 COMPREHEN METABOLIC PANEL: CPT

## 2020-08-16 PROCEDURE — 99285 EMERGENCY DEPT VISIT HI MDM: CPT | Mod: 25

## 2020-08-16 PROCEDURE — 81000 URINALYSIS NONAUTO W/SCOPE: CPT

## 2020-08-16 PROCEDURE — 93010 ELECTROCARDIOGRAM REPORT: CPT | Mod: ,,, | Performed by: INTERNAL MEDICINE

## 2020-08-16 PROCEDURE — 84484 ASSAY OF TROPONIN QUANT: CPT

## 2020-08-16 PROCEDURE — 96374 THER/PROPH/DIAG INJ IV PUSH: CPT

## 2020-08-16 PROCEDURE — 25000003 PHARM REV CODE 250: Performed by: EMERGENCY MEDICINE

## 2020-08-16 PROCEDURE — 93005 ELECTROCARDIOGRAM TRACING: CPT

## 2020-08-16 PROCEDURE — 81025 URINE PREGNANCY TEST: CPT | Performed by: EMERGENCY MEDICINE

## 2020-08-16 PROCEDURE — 85025 COMPLETE CBC W/AUTO DIFF WBC: CPT

## 2020-08-16 PROCEDURE — 63600175 PHARM REV CODE 636 W HCPCS: Performed by: EMERGENCY MEDICINE

## 2020-08-16 RX ORDER — CLONIDINE HYDROCHLORIDE 0.1 MG/1
0.1 TABLET ORAL
Status: COMPLETED | OUTPATIENT
Start: 2020-08-16 | End: 2020-08-16

## 2020-08-16 RX ORDER — HYDRALAZINE HYDROCHLORIDE 20 MG/ML
10 INJECTION INTRAMUSCULAR; INTRAVENOUS
Status: COMPLETED | OUTPATIENT
Start: 2020-08-16 | End: 2020-08-16

## 2020-08-16 RX ADMIN — HYDRALAZINE HYDROCHLORIDE 10 MG: 20 INJECTION INTRAMUSCULAR; INTRAVENOUS at 04:08

## 2020-08-16 RX ADMIN — CLONIDINE HYDROCHLORIDE 0.1 MG: 0.1 TABLET ORAL at 04:08

## 2020-08-16 NOTE — ED NOTES
Assumed care of pt: Pt c/o intermittent right arm numbness and weakness that began Friday. Pt reports her daughter handed her a drink and she dropped it. Numbness/weakness lasted about 3 minutes, with blurred peripheral vision in right eye. Pt reports another occurrence of right arm weakness/numbness that occurred yesterday and this morning. Pt states right arm still feels weak and her fingertips are feel like pins and needles. Pt denies vision changes at this time. Pt denies facial numbness, changes in speech, leg numbness/weakness, chest pain, dizziness, and SOB. Pt is hypertensive on arrival. Speech is clear and fluent. Face is symmetrical, no droop noted. Pt ambulatory w/ steady gait. Pt is AAOx3. Respirations even and unlabored. Skin is warm and dry. Pt on continuous cardiac monitoring, continuous pulse ox, and automatic BP cuff. CB within reach.

## 2020-08-16 NOTE — ED NOTES
Pt reports symptoms have resolved. Provided teach regarding importance of taking all prescribed medications, monitoring BP at home, and returning to ED if symptoms return. Pt verbalized understanding.

## 2020-08-16 NOTE — PLAN OF CARE
[facility] Plan of Care Note    Spoke with ED staff at 5:45 PM regarding this patient:    Ms. Martin is a 44 y/o F with CHF, DM, HTN, prior MI and CVA (2016 and 2017) who presented to the ED w/ complaints of fluctuating RUE numbness/weakness since 8/14 PM. The patient notes some mild residual vision disturbance from prior occipital CVA, and on ED examination has mild 4/5 weakness in RUE. Of note, the patient presented acutely hypertensive and has multiple prior episodes of severe hypertension with poor medication compliance. Her BP was 226/128 on arrival and has since improved with administration of her home BP medication and a dose of IV hydralazine and is in the 140s systolic.     CT Head w/o contrast - 8/16/20  Reviewed w/ evidence of prior R basal ganglia punctate infarct and L occipital lobe chronic infarct w/o evidence of acute intracranial hemorrhage or subacute infarct in comparison to prior.     Recommendations:   -ABCD2 score = 6 - high risk TIA  -Stroke risk factors: HTN, DM, HF, and prior CVA  -Patient with transient fluctuating symptoms in setting of poorly managed risk factors concerning for TIA vs hypertensive emergency with fluctuating neurologic deficits  -Given high risk TIA w/ above ABCD2 score would recommend observation  -Imaging: MRI Brain w/o contrast, CTA head and neck, TTE  -Labs: A1c, TSH and Lipid panel  -Recommend DAPT x 21 days and single agent indefinitely  -Recommend Atorvastatin 40 mg daily    Discussed recommendations with staff ED physician. Patient not interested in admission at this time and understands the risks associated with leaving including the potential for stroke. Her symptoms have resolved since presentation and the patient was counseled on stroke symptoms and strict return precautions.     Follow up to be arranged as an outpatient per patient's wishes.     Kash Berrios MD  [facility] PGY III

## 2020-08-16 NOTE — ED PROVIDER NOTES
Encounter Date: 8/16/2020       History     Chief Complaint   Patient presents with    Numbness     pt states on fri evening began having intermitent numbness to right arm, denies any trauma, sensation in tact currently      The patient is a 45-year-old female with a past medical history of CHF, diabetes, poorly-controlled hypertension, MI, and stroke (chart review reveals CVA in 2016 and 2017) who presents to the ED complaining of right upper extremity numbness and weakness that has been intermittent since Friday night.  She reports having similar symptoms when being diagnosed with her stroke.  She endorses some residual visual changes in her right eye associated with previous stroke, but denies other chronic neuro deficits.  Patient denies fever, chills, chest pain, shortness of breath, nausea, vomiting, and other complaints.  She reports compliance with all prescribed medications.  Recently changed her medication regimen to taking at night rather than in the morning.  No treatment attempted prior to arrival.    The history is provided by the patient.     Review of patient's allergies indicates:  No Known Allergies  Past Medical History:   Diagnosis Date    CHF (congestive heart failure)     Diabetes mellitus     Hypertension     MI (myocardial infarction)     Stroke      Past Surgical History:   Procedure Laterality Date    CHOLECYSTECTOMY  2013    history of cholelithiasis    TUBAL LIGATION       Family History   Problem Relation Age of Onset    Hypertension Mother     Lung cancer Mother     No Known Problems Father     No Known Problems Sister     No Known Problems Daughter     Diabetes Son 14        Takes 2 insulins and metformin use to be bigger wally    Stroke Maternal Uncle 48    Anuerysm Maternal Grandmother     Breast cancer Maternal Grandmother     No Known Problems Sister     No Known Problems Daughter     No Known Problems Son     Breast cancer Maternal Aunt     Breast cancer  Maternal Aunt      Social History     Tobacco Use    Smoking status: Current Every Day Smoker     Packs/day: 0.15     Years: 18.00     Pack years: 2.70     Types: Cigarettes    Smokeless tobacco: Never Used    Tobacco comment: 1 pack/week   Substance Use Topics    Alcohol use: Yes     Comment: social 1/month    Drug use: No     Review of Systems   Constitutional: Negative for fever.   HENT: Negative for sore throat.    Respiratory: Negative for shortness of breath.    Cardiovascular: Negative for chest pain.   Gastrointestinal: Negative for nausea.   Genitourinary: Negative for dysuria.   Musculoskeletal: Negative for back pain.   Skin: Negative for rash.   Neurological: Positive for weakness and numbness.   Hematological: Does not bruise/bleed easily.       Physical Exam     Initial Vitals [08/16/20 1453]   BP Pulse Resp Temp SpO2   (!) 226/128 78 17 99.5 °F (37.5 °C) 99 %      MAP       --         Physical Exam    Nursing note and vitals reviewed.  Constitutional: She appears well-developed and well-nourished.  Non-toxic appearance. No distress.   The patient is alert, oriented, and speaking in complete sentences.  No apparent distress.  She is nontoxic-appearing.   HENT:   Head: Normocephalic and atraumatic.   Right Ear: Hearing and external ear normal.   Left Ear: Hearing and external ear normal.   Nose: Nose abnormal.   Mouth/Throat: Uvula is midline, oropharynx is clear and moist and mucous membranes are normal. No tonsillar abscesses.   No perceptible facial asymmetry   Eyes: Conjunctivae and EOM are normal. Pupils are equal, round, and reactive to light. Right eye exhibits normal extraocular motion. Left eye exhibits normal extraocular motion. Right pupil is round and reactive. Left pupil is round and reactive. Pupils are equal.   Slightly limited peripheral vision noted on right which appears to be baseline   Neck: Full passive range of motion without pain. Neck supple. No neck rigidity.   No  meningismus   Cardiovascular: Normal rate, regular rhythm, normal heart sounds and normal pulses. Exam reveals no gallop and no friction rub.    No murmur heard.  Pulses:       Radial pulses are 2+ on the right side and 2+ on the left side.        Dorsalis pedis pulses are 2+ on the right side and 2+ on the left side.   Pulmonary/Chest: Effort normal and breath sounds normal. No respiratory distress. She has no decreased breath sounds. She has no wheezes. She has no rhonchi. She has no rales.   Abdominal: Soft. Bowel sounds are normal. She exhibits no distension. There is no abdominal tenderness.   Musculoskeletal: Normal range of motion.   Neurological: She is alert and oriented to person, place, and time. No cranial nerve deficit or sensory deficit. She exhibits normal muscle tone. She displays no seizure activity. Gait normal. GCS eye subscore is 4. GCS verbal subscore is 5. GCS motor subscore is 6.   No sensory deficits.  Muscular strength to right upper extremity is 3/5.  Strength to left upper extremity is 5/5.  Strength to lower extremities is 5/5.  No pronator drift.  Finger to nose, heel to shin, and alternating hands are all within normal limits.  Negative Romberg.  Walks on toes with ease.   Skin: Skin is warm, dry and intact. Capillary refill takes less than 2 seconds. No rash noted.   Psychiatric: She has a normal mood and affect. Her speech is normal and behavior is normal. Judgment and thought content normal. Cognition and memory are normal.         ED Course   Procedures  Labs Reviewed   CBC W/ AUTO DIFFERENTIAL   COMPREHENSIVE METABOLIC PANEL   PROTIME-INR   TROPONIN I   URINALYSIS          Imaging Results          CT Head Without Contrast (In process)                  Medical Decision Making:   History:   Old Medical Records: I decided to obtain old medical records.  Clinical Tests:   Lab Tests: Ordered and Reviewed  Radiological Study: Ordered and Reviewed  Medical Tests: Ordered and Reviewed  ED  Management:  This is an emergent evaluation of a 45-year-old female who presents to the ED for further evaluation of intermittent right upper extremity numbness and weakness since Friday.    Physical exam is notable for mild weakness to the right upper extremity.  There is also decreased peripheral vision noted on right which appears to be chronic and related to her previous stroke.  No other focal neurologic deficits on exam.    Patient was extremely hypertensive upon presentation.  One dose of clonidine ordered.    ECG demonstrates normal sinus rhythm.  No STEMI.    I independently reviewed and interpreted labs which are unrevealing.  Labs are essentially within normal limits.  Troponin negative.    CT of head is negative for acute abnormalities.    Patient is still hypertensive following clonidine.  One dose of IV hydralazine was ordered.  Patient reports feeling significant improvement after blood pressure responded to these medications.    Case discussed with Dr. Thorne from Neurology.  He believes that the patient's symptoms are likely associated with the spikes in her blood pressure.  Based on her comorbid conditions, he made the recommendation for her to be admitted for further monitoring with possible follow-up with MRI.  This plan was discussed with the patient at length, however she wishes to go home at this time.  Repeat neurologic exam demonstrates improvement.  Right upper extremity is back to full strength.  She wishes to go home and follow-up with neurology on an outpatient basis.  Risks of discharge versus admission were discussed with the patient and she verbalized understanding.  She affirms that she will return immediately to the emergency department with any new, worsening, or concerning symptoms.  She also verbalized that she will be compliant with all home medications, particularly her Plavix and aspirin.  She will begin to monitor her blood pressure at home and follow-up with her PCP regarding  blood pressure management.  She will also call the Neurology Department tomorrow to make an appointment for neurology follow-up which will likely include an outpatient MRI.  All questions answered.  Case discussed with supervising physician who agrees with the plan.    Additional MDM:     NIH Stroke Scale:   Interval = baseline (upon arrival/admit)  Level of consciousness = 0 - alert  LOC questions = 0 - answers both correctly  LOC commands = 0 - performs both correctly  Best gaze = 0 - normal  Visual = 1 - partial hemianopia  Facial palsy = 0 - normal  Motor left arm =  0 - no drift  Motor right arm =  0 - no drift  Motor left leg = 0 - no drift  Motor right leg =  0 - no drift  Limb ataxia = 0 - absent  Sensory = 0 - normal  Best language = 0 - no aphasia  Dysarthria = 0 - normal articulation  Extinction and inattention = 0 - no neglect  NIH Stroke Scale Total = 1           Attending Attestation:     Physician Attestation Statement for NP/PA:   I discussed this assessment and plan of this patient with the NP/PA, but I did not personally examine the patient. The face to face encounter was performed by the NP/PA.                                Clinical Impression:       ICD-10-CM ICD-9-CM   1. Weakness of right upper extremity  R29.898 729.89   2. Weakness  R53.1 780.79   3. Paresthesia of right upper extremity  R20.2 782.0   4. Uncontrolled hypertension  I10 401.9                                Leonie Stoner NP  08/16/20 1927       Isauro Hooker MD  08/24/20 0603

## 2020-08-16 NOTE — DISCHARGE INSTRUCTIONS
Please make a list of your home medications to carry with you.  It is very important for you to take all of your prescribed medications especially your blood pressure medication, your aspirin, and your Plavix.  It would also be beneficial to monitor your pressure at home and follow-up with your primary care provider this week to see if your medications need to be modified.  Return to the emergency room immediately for any new, worsening, or concerning symptoms.    Thank you for allowing me to care for you today.  I hope our treatment plan will make you feel better in the next few days.  In order for me to take better care of my future patients and improve our Emergency Department, I would appreciate if you can provide us with feedback.  In the next few days, you may receive a survey in the mail.  If you do, it would mean a great deal to me if you would please take the time to complete it.    Thank you and I hope you feel better.  Leonie Stoner NP

## 2020-08-20 ENCOUNTER — OFFICE VISIT (OUTPATIENT)
Dept: FAMILY MEDICINE | Facility: HOSPITAL | Age: 45
End: 2020-08-20
Attending: FAMILY MEDICINE
Payer: MEDICAID

## 2020-08-20 VITALS
DIASTOLIC BLOOD PRESSURE: 115 MMHG | HEART RATE: 88 BPM | WEIGHT: 180.31 LBS | OXYGEN SATURATION: 99 % | SYSTOLIC BLOOD PRESSURE: 169 MMHG | BODY MASS INDEX: 27.33 KG/M2 | HEIGHT: 68 IN

## 2020-08-20 DIAGNOSIS — I20.89 STABLE ANGINA: ICD-10-CM

## 2020-08-20 DIAGNOSIS — I10 ESSENTIAL HYPERTENSION: ICD-10-CM

## 2020-08-20 DIAGNOSIS — I50.32 CHRONIC DIASTOLIC HEART FAILURE: ICD-10-CM

## 2020-08-20 DIAGNOSIS — E78.5 HYPERLIPIDEMIA, UNSPECIFIED HYPERLIPIDEMIA TYPE: ICD-10-CM

## 2020-08-20 DIAGNOSIS — R53.1 ACUTE RIGHT-SIDED WEAKNESS: Primary | ICD-10-CM

## 2020-08-20 DIAGNOSIS — E11.9 TYPE 2 DIABETES MELLITUS WITHOUT COMPLICATION, WITHOUT LONG-TERM CURRENT USE OF INSULIN: ICD-10-CM

## 2020-08-20 DIAGNOSIS — I63.9 CEREBROVASCULAR ACCIDENT (CVA), UNSPECIFIED MECHANISM: ICD-10-CM

## 2020-08-20 PROCEDURE — 99215 OFFICE O/P EST HI 40 MIN: CPT | Performed by: STUDENT IN AN ORGANIZED HEALTH CARE EDUCATION/TRAINING PROGRAM

## 2020-08-20 RX ORDER — CARVEDILOL 12.5 MG/1
12.5 TABLET ORAL 2 TIMES DAILY WITH MEALS
Qty: 60 TABLET | Refills: 11 | Status: SHIPPED | OUTPATIENT
Start: 2020-08-20 | End: 2020-10-19

## 2020-08-20 NOTE — PATIENT INSTRUCTIONS
Keep taking these medications:  Atorvastatin/lipitor once a day  Aspirin 81 once a day  Lisinopril 40mg  Plavix/clopidogril once a day  Metformin twice a day    Start taking this medicine from the ones you have:  Furosemide/lasix 20mg once a day    Start taking this medicine that's coming from your pharmacy  Carvedilol/Coreg 12.5mg twice a day

## 2020-08-20 NOTE — PROGRESS NOTES
Subjective:       Patient ID: Josefina Martin is a 45 y.o. female.    Chief Complaint: Follow-up    Ms. Josefina Martin is a 45-year-old female PMHx CHF, DMT2, HTN, MI, and CVA presenting as a follow-up for a recent ED visit at [facility].  Patient presented for right arm weakness and numbness with right-sided visual changes. Patient's /128 at presentation.  Patient was given 1 dose of clonidine and 1 dose of IV hydralazine with improvement in blood pressure.  CT of the head negative for acute abnormalities. NIH stroke scale = 1.  Neurology recommended a follow-up MRI but patient wished to go home and receive the MRI outpatient.    Today, patient's blood pressure is elevated 183/106.  For the past 4 days patient has been taking aspirin 81 mg, Plavix 75 mg, atorvastatin 40 mg, lisinopril 40 mg, and metformin.  Patient says her visual symptoms have improved as well as right arm numbness. Patient does report residual weakness in the right upper extremity.     Patient also has a significant cardiovascular history, as per patient, had a reported MI that required multiple stent placement. Patient reports she has not seen a cardiologist in 3-4 years.  Patient also reports exertional angina as well as orthopnea. Reports she uses 2 pillows at night under her back to sleep.    Review of Systems   Respiratory: Positive for shortness of breath. Negative for cough.         Orthopnea   Cardiovascular: Positive for chest pain (with excertion). Negative for palpitations and leg swelling.   Gastrointestinal: Positive for nausea. Negative for abdominal distention, abdominal pain, constipation, diarrhea and vomiting.   Neurological: Positive for numbness (right hand/finger). Negative for dizziness and headaches.       Objective:      Vitals:    08/20/20 1418   BP: (!) 169/115   Pulse: 88     Physical Exam  Constitutional:       General: She is not in acute distress.     Appearance: Normal appearance. She  is not toxic-appearing.   HENT:      Head: Normocephalic and atraumatic.   Eyes:      Extraocular Movements: Extraocular movements intact.      Pupils: Pupils are equal, round, and reactive to light.   Neck:      Musculoskeletal: Normal range of motion.   Cardiovascular:      Rate and Rhythm: Normal rate and regular rhythm.      Pulses: Normal pulses.      Heart sounds: No murmur. No friction rub. No gallop.    Pulmonary:      Effort: Pulmonary effort is normal.      Breath sounds: No wheezing, rhonchi or rales.      Comments: Diminished breath sounds b/l  Abdominal:      General: Abdomen is flat. There is no distension.      Palpations: Abdomen is soft.      Tenderness: There is no abdominal tenderness. There is no guarding.   Musculoskeletal: Normal range of motion.      Right lower leg: No edema.      Left lower leg: No edema.      Comments: Right shoulder abduction and flexion 4/5, right elbow flexion/extension 4/5. Left upper extremity 5/5.     Skin:     General: Skin is warm and dry.      Capillary Refill: Capillary refill takes less than 2 seconds.   Neurological:      Mental Status: She is alert and oriented to person, place, and time.      Comments: CN II-XII intact. Focal deficit present in right upper extremity.          Assessment:       1. Acute right-sided weakness    2. Essential hypertension    3. Cerebrovascular accident (CVA), unspecified mechanism    4. Chronic diastolic heart failure    5. Hyperlipidemia, unspecified hyperlipidemia type    6. Type 2 diabetes mellitus without complication, without long-term current use of insulin    7. Stable angina        Plan:       Ms Josefina Martin 45-year-old female with uncontrolled hypertension recently presented to the ED due to hypertensive emergency.  Patient's reported right-sided weakness could be possibly from CVA as patient reports remaining residual weakness in right upper extremity following ED visit 4 days ago. Patient was not compliant with  her blood pressure medications due to adverse side effects, most significantly nausea. Patient's blood pressure will be addressed with continuing lisinopril 40 mg and starting carvedilol 12.5 mg BID and furosemide 20mg daily. At follow-up appointment patient to receive BMP and A1C. Will add on future blood pressure medications to reach BP goal <130/<80.  Advised patient the acuity of her high blood pressure as this may lead to a debilitating stroke in the future.    Cerebrovascular accident (CVA), unspecified mechanism  - MRI Brain Without Contrast; Future; Expected date: 08/20/2020  - ambulatory referral to neurology  - continue dual anti-platelet therapy with aspirin 81 mg and Plavix 75 mg daily.  - continue atorvastatin 40 mg    Essential hypertension  - carvediloL (COREG) 12.5 MG tablet; Take 1 tablet (12.5 mg total) by mouth 2 (two) times daily with meals.  Dispense: 60 tablet; Refill: 11  - furosemide 20 mg daily  - continue lisinopril 40 mg daily    Chronic diastolic heart failure  - carvediloL (COREG) 12.5 MG tablet; Take 1 tablet (12.5 mg total) by mouth 2 (two) times daily with meals.  Dispense: 60 tablet; Refill: 11  - furosemide 20 mg daily    Hyperlipidemia  - atorvastatin 40 mg     Type 2 diabetes mellitus without complication, without long-term current use of insulin  - continue Metformin 500 mg daily    Stable angina  - carvediloL (COREG) 12.5 MG tablet; Take 1 tablet (12.5 mg total) by mouth 2 (two) times daily with meals.  Dispense: 60 tablet; Refill: 11  - patient will benefit from ambulatory outpatient cardiology referral    Follow up in about 15 days (around 9/4/2020).     Margarito Ennis DO  [facility] PGY-1  08/20/2020

## 2020-09-01 ENCOUNTER — OFFICE VISIT (OUTPATIENT)
Dept: OBSTETRICS AND GYNECOLOGY | Facility: CLINIC | Age: 45
End: 2020-09-01
Payer: MEDICAID

## 2020-09-01 VITALS
BODY MASS INDEX: 28.01 KG/M2 | WEIGHT: 184.19 LBS | SYSTOLIC BLOOD PRESSURE: 170 MMHG | DIASTOLIC BLOOD PRESSURE: 110 MMHG

## 2020-09-01 DIAGNOSIS — N89.8 VAGINAL IRRITATION: ICD-10-CM

## 2020-09-01 DIAGNOSIS — N76.5 VAGINAL ULCER: ICD-10-CM

## 2020-09-01 DIAGNOSIS — Z01.419 ENCOUNTER FOR ANNUAL ROUTINE GYNECOLOGICAL EXAMINATION: Primary | ICD-10-CM

## 2020-09-01 DIAGNOSIS — N93.9 ABNORMAL UTERINE BLEEDING (AUB): ICD-10-CM

## 2020-09-01 DIAGNOSIS — Z97.5 IUD (INTRAUTERINE DEVICE) IN PLACE: ICD-10-CM

## 2020-09-01 DIAGNOSIS — N64.59 INVERSION OF NIPPLE: ICD-10-CM

## 2020-09-01 PROCEDURE — 99396 PREV VISIT EST AGE 40-64: CPT | Mod: S$PBB,,, | Performed by: OBSTETRICS & GYNECOLOGY

## 2020-09-01 PROCEDURE — 99213 OFFICE O/P EST LOW 20 MIN: CPT | Mod: PBBFAC,PN | Performed by: OBSTETRICS & GYNECOLOGY

## 2020-09-01 PROCEDURE — 99999 PR PBB SHADOW E&M-EST. PATIENT-LVL III: ICD-10-PCS | Mod: PBBFAC,,, | Performed by: OBSTETRICS & GYNECOLOGY

## 2020-09-01 PROCEDURE — 99999 PR PBB SHADOW E&M-EST. PATIENT-LVL III: CPT | Mod: PBBFAC,,, | Performed by: OBSTETRICS & GYNECOLOGY

## 2020-09-01 PROCEDURE — 99396 PR PREVENTIVE VISIT,EST,40-64: ICD-10-PCS | Mod: S$PBB,,, | Performed by: OBSTETRICS & GYNECOLOGY

## 2020-09-01 RX ORDER — TRIAMCINOLONE ACETONIDE 1 MG/G
OINTMENT TOPICAL 2 TIMES DAILY
Qty: 30 G | Refills: 0 | Status: SHIPPED | OUTPATIENT
Start: 2020-09-01 | End: 2021-06-14 | Stop reason: ALTCHOICE

## 2020-09-01 RX ORDER — MUPIROCIN 20 MG/G
OINTMENT TOPICAL 3 TIMES DAILY
Qty: 30 G | Refills: 0 | Status: SHIPPED | OUTPATIENT
Start: 2020-09-01 | End: 2021-06-14 | Stop reason: ALTCHOICE

## 2020-09-01 NOTE — PROGRESS NOTES
Chief Complaint   Patient presents with    Annual Exam       HISTORY OF PRESENT ILLNESS:   Josefina Martin is a 45 y.o. female  who presents for annual exam. Had seen for pelvic pain in January and was going to do US but hasn't done yet but reports it is stable and improving. She c/o rash with blisters on bilateral thighs after using guerin. x3 days. No h/o herpes outbreak. Has been itching and irritated.       Past Medical History:   Diagnosis Date    CHF (congestive heart failure)     Diabetes mellitus     Hypertension     MI (myocardial infarction)     Stroke           Past Surgical History:   Procedure Laterality Date    CHOLECYSTECTOMY  2013    history of cholelithiasis    TUBAL LIGATION           Social History     Socioeconomic History    Marital status: Single     Spouse name: Not on file    Number of children: Not on file    Years of education: Not on file    Highest education level: Not on file   Occupational History     Employer: [employer]   Social Needs    Financial resource strain: Not on file    Food insecurity     Worry: Not on file     Inability: Not on file    Transportation needs     Medical: Not on file     Non-medical: Not on file   Tobacco Use    Smoking status: Current Every Day Smoker     Packs/day: 0.15     Years: 18.00     Pack years: 2.70     Types: Cigarettes    Smokeless tobacco: Never Used    Tobacco comment: 1 pack/week   Substance and Sexual Activity    Alcohol use: Yes     Comment: social 1/month    Drug use: No    Sexual activity: Yes     Partners: Male     Birth control/protection: None, Surgical   Lifestyle    Physical activity     Days per week: Not on file     Minutes per session: Not on file    Stress: Not on file   Relationships    Social connections     Talks on phone: Not on file     Gets together: Not on file     Attends Mandaeism service: Not on file     Active member of club or organization: Not on file     Attends meetings of clubs or  organizations: Not on file     Relationship status: Not on file   Other Topics Concern    Not on file   Social History Narrative    Not on file       Family History   Problem Relation Age of Onset    Hypertension Mother     Lung cancer Mother     No Known Problems Father     No Known Problems Sister     No Known Problems Daughter     Diabetes Son 14        Takes 2 insulins and metformin use to be bigger wally    Stroke Maternal Uncle 48    Anuerysm Maternal Grandmother     Breast cancer Maternal Grandmother     No Known Problems Sister     No Known Problems Daughter     No Known Problems Son     Breast cancer Maternal Aunt     Breast cancer Maternal Aunt          OB History    Para Term  AB Living   5 4 4   1     SAB TAB Ectopic Multiple Live Births   1              # Outcome Date GA Lbr Flex/2nd Weight Sex Delivery Anes PTL Lv   5 SAB            4 Term            3 Term            2 Term            1 Term                GYN HISTORY:  PAP History: Denies abnormal Paps; 2019 NILM/HPV-  Infection History:Denies STDs. Denies PID.  Benign History: Denies uterine fibroids. Denies ovarian cysts. Denies endometriosis Denies other conditions.  Cancer History: Denies cervical cancer. Denies uterine cancer or hyperplasia. Denies ovarian cancer. Denies vulvar cancer or pre-cancer. Denies vaginal cancer or pre-cancer. Denies breast cancer. Denies colon cancer.  Cycle:  until 2018 when started coming Q2 weeks   Had BTL   IUd in place 2019 for AUB    ROS:  GENERAL: Denies weight gain or weight loss. Feeling well overall.   SKIN: Denies rash or lesions.   HEAD: Denies headache.   NODES: Denies enlarged lymph nodes.   CHEST: Denies shortness of breath at rest but does have some with ambulating.   ABDOMEN: No abdominal pain, constipation, diarrhea, nausea, vomiting or rectal bleeding.   URINARY: No frequency, dysuria, hematuria, or burning on urination.  REPRODUCTIVE: See HPI.   BREASTS:  The patient denies pain, lumps, or nipple discharge.       BP (!) 170/110   Wt 83.6 kg (184 lb 3.2 oz)   BMI 28.01 kg/m²      APPEARANCE: Well nourished, well developed, in no acute distress.  NECK: Neck symmetric without  thyromegaly.  NODES: No inguinal, cervical lymph node enlargement.  CHEST: expiratory wheeze in right lowe lung fields, CTA otherwise   HEART: Regular rate and rhythm, no murmurs, rubs or gallops.  ABDOMEN: Soft. No tenderness or masses. No hernias. No hepatosplenomegaly.  Breasts:examined in 2 positions, normal appearing breast with right nipple inverted and left nipple normal appearing, fullness under right nipple but no obvious mass, no other masses appreciated in either breast, no rashes, no lymphadenopathy   Pelvic: on bilateral thighs where meets groin has raised plaque with several blisters/pustules over area   Vulva:  normal external genitalia, no erythema,   Vagina: no vaginal discharge  Cervix: IUD strings seen, no CMT   Uterus: mildly enlarged uterus, about 10 weeks, more globular on right side, with good descent and mobility, large cervix with no discharge or masses,  Adnexa: no adnexal masses appreciated    12/2018 TVUS: uterus 9.6x5.3x7.2 vol 191; anterior 1 cm fibroid, anterior 1 cm fibroid, posterior fibroid 1.6 cm and fundal fibroid 3.5 cm  Bilateral ovaries normal with no masses seen      1. Encounter for annual routine gynecological examination    2. IUD (intrauterine device) in place    3. Abnormal uterine bleeding (AUB)    4. Vaginal irritation    5. Vaginal ulcer    6. Inversion of nipple        Plan:  1. MMG ordered  2. Pap smear up to date and next needed 2024  3. Likely contact dermitis, HSV swab done and will treat with bactroban and triamcinolone  4. Will do US if pelvic pain worsens  5. Nipple wasn't inverted at last exam and she reports this is new within the past few months, will get diagnostic mmg and if normal will refer to breast surgery for evaluation.

## 2020-09-02 ENCOUNTER — LAB VISIT (OUTPATIENT)
Dept: LAB | Facility: HOSPITAL | Age: 45
End: 2020-09-02
Attending: OBSTETRICS & GYNECOLOGY
Payer: MEDICAID

## 2020-09-02 DIAGNOSIS — N89.8 VAGINAL IRRITATION: ICD-10-CM

## 2020-09-02 DIAGNOSIS — N76.5 VAGINAL ULCER: ICD-10-CM

## 2020-09-02 PROCEDURE — 87529 HSV DNA AMP PROBE: CPT

## 2020-09-04 LAB
HSV1 DNA SPEC QL NAA+PROBE: NEGATIVE
HSV2 DNA SPEC QL NAA+PROBE: NEGATIVE
SPECIMEN SOURCE: NORMAL

## 2020-09-08 ENCOUNTER — PATIENT MESSAGE (OUTPATIENT)
Dept: OBSTETRICS AND GYNECOLOGY | Facility: CLINIC | Age: 45
End: 2020-09-08

## 2020-09-09 RX ORDER — TRIAMCINOLONE ACETONIDE 1 MG/G
CREAM TOPICAL 2 TIMES DAILY
Qty: 30 G | Refills: 0 | Status: SHIPPED | OUTPATIENT
Start: 2020-09-09 | End: 2021-06-14 | Stop reason: ALTCHOICE

## 2020-09-09 RX ORDER — NYSTATIN 100000 U/G
CREAM TOPICAL 2 TIMES DAILY
Qty: 30 G | Refills: 0 | Status: SHIPPED | OUTPATIENT
Start: 2020-09-09 | End: 2021-06-14 | Stop reason: ALTCHOICE

## 2020-09-14 ENCOUNTER — TELEPHONE (OUTPATIENT)
Dept: OBSTETRICS AND GYNECOLOGY | Facility: CLINIC | Age: 45
End: 2020-09-14

## 2020-09-14 NOTE — TELEPHONE ENCOUNTER
Left message stating the [facility] location will be closed tomorrow and to give us a call if she will like to come in tomorrow in [place]

## 2020-09-26 NOTE — PROGRESS NOTES
I assume primary medical responsibility for this patient. I have reviewed the history, physical, and assessement & treatment plan with the resident and agree that the care is reasonable and necessary. This service has been performed by a resident with the presence of a teaching physician for the key parts of the history/exam. If necessary, an addendum of additional findings or evaluation beyond the resident documentation will be noted below.    Recent stroke, opted to complete work up as outpatient. Also w/ HFpEF & poorly controlled HTN, adding meds for HTN, will also need close follow up    Lucinda Marsh MD

## 2020-10-02 ENCOUNTER — TELEPHONE (OUTPATIENT)
Dept: GASTROENTEROLOGY | Facility: CLINIC | Age: 45
End: 2020-10-02

## 2020-10-02 ENCOUNTER — OFFICE VISIT (OUTPATIENT)
Dept: OBSTETRICS AND GYNECOLOGY | Facility: CLINIC | Age: 45
End: 2020-10-02
Payer: MEDICAID

## 2020-10-02 ENCOUNTER — HOSPITAL ENCOUNTER (EMERGENCY)
Facility: HOSPITAL | Age: 45
Discharge: HOME | End: 2020-10-02
Attending: EMERGENCY MEDICINE
Payer: MEDICAID

## 2020-10-02 VITALS
SYSTOLIC BLOOD PRESSURE: 122 MMHG | BODY MASS INDEX: 27.46 KG/M2 | DIASTOLIC BLOOD PRESSURE: 84 MMHG | HEIGHT: 68 IN | WEIGHT: 181.19 LBS

## 2020-10-02 VITALS
HEART RATE: 60 BPM | HEIGHT: 68 IN | RESPIRATION RATE: 20 BRPM | OXYGEN SATURATION: 100 % | WEIGHT: 180 LBS | DIASTOLIC BLOOD PRESSURE: 104 MMHG | TEMPERATURE: 98 F | SYSTOLIC BLOOD PRESSURE: 171 MMHG | BODY MASS INDEX: 27.28 KG/M2

## 2020-10-02 DIAGNOSIS — K92.2 GASTROINTESTINAL HEMORRHAGE, UNSPECIFIED GASTROINTESTINAL HEMORRHAGE TYPE: Primary | ICD-10-CM

## 2020-10-02 DIAGNOSIS — K92.1 MELENA: Primary | ICD-10-CM

## 2020-10-02 DIAGNOSIS — R10.9 ABDOMINAL PAIN, UNSPECIFIED ABDOMINAL LOCATION: ICD-10-CM

## 2020-10-02 DIAGNOSIS — K92.0 HEMATEMESIS WITH NAUSEA: ICD-10-CM

## 2020-10-02 LAB
ABO GROUP BLD: NORMAL
ALBUMIN SERPL BCP-MCNC: 4.1 G/DL (ref 3.5–5.2)
ALP SERPL-CCNC: 84 U/L (ref 55–135)
ALT SERPL W/O P-5'-P-CCNC: 19 U/L (ref 10–44)
ANION GAP SERPL CALC-SCNC: 8 MMOL/L (ref 8–16)
AST SERPL-CCNC: 18 U/L (ref 10–40)
BASOPHILS # BLD AUTO: 0.04 K/UL (ref 0–0.2)
BASOPHILS NFR BLD: 0.5 % (ref 0–1.9)
BILIRUB SERPL-MCNC: 0.5 MG/DL (ref 0.1–1)
BLD GP AB SCN CELLS X3 SERPL QL: NORMAL
BUN SERPL-MCNC: 28 MG/DL (ref 6–20)
CALCIUM SERPL-MCNC: 9.1 MG/DL (ref 8.7–10.5)
CHLORIDE SERPL-SCNC: 106 MMOL/L (ref 95–110)
CO2 SERPL-SCNC: 25 MMOL/L (ref 23–29)
CREAT SERPL-MCNC: 0.9 MG/DL (ref 0.5–1.4)
DIFFERENTIAL METHOD: ABNORMAL
EOSINOPHIL # BLD AUTO: 0 K/UL (ref 0–0.5)
EOSINOPHIL NFR BLD: 0.5 % (ref 0–8)
ERYTHROCYTE [DISTWIDTH] IN BLOOD BY AUTOMATED COUNT: 12.8 % (ref 11.5–14.5)
EST. GFR  (AFRICAN AMERICAN): >60 ML/MIN/1.73 M^2
EST. GFR  (NON AFRICAN AMERICAN): >60 ML/MIN/1.73 M^2
GLUCOSE SERPL-MCNC: 109 MG/DL (ref 70–110)
HCT VFR BLD AUTO: 38.6 % (ref 37–48.5)
HGB BLD-MCNC: 13.1 G/DL (ref 12–16)
IMM GRANULOCYTES # BLD AUTO: 0.02 K/UL (ref 0–0.04)
IMM GRANULOCYTES NFR BLD AUTO: 0.2 % (ref 0–0.5)
INR PPP: 1 (ref 0.8–1.2)
LYMPHOCYTES # BLD AUTO: 2.8 K/UL (ref 1–4.8)
LYMPHOCYTES NFR BLD: 34 % (ref 18–48)
MCH RBC QN AUTO: 31.3 PG (ref 27–31)
MCHC RBC AUTO-ENTMCNC: 33.9 G/DL (ref 32–36)
MCV RBC AUTO: 92 FL (ref 82–98)
MONOCYTES # BLD AUTO: 0.5 K/UL (ref 0.3–1)
MONOCYTES NFR BLD: 5.8 % (ref 4–15)
NEUTROPHILS # BLD AUTO: 4.8 K/UL (ref 1.8–7.7)
NEUTROPHILS NFR BLD: 59 % (ref 38–73)
NRBC BLD-RTO: 0 /100 WBC
PLATELET # BLD AUTO: 263 K/UL (ref 150–350)
PMV BLD AUTO: 10.7 FL (ref 9.2–12.9)
POTASSIUM SERPL-SCNC: 3.9 MMOL/L (ref 3.5–5.1)
PROT SERPL-MCNC: 7.4 G/DL (ref 6–8.4)
PROTHROMBIN TIME: 10.7 SEC (ref 9–12.5)
RBC # BLD AUTO: 4.18 M/UL (ref 4–5.4)
RH BLD: NORMAL
SODIUM SERPL-SCNC: 139 MMOL/L (ref 136–145)
WBC # BLD AUTO: 8.1 K/UL (ref 3.9–12.7)

## 2020-10-02 PROCEDURE — 99999 PR PBB SHADOW E&M-EST. PATIENT-LVL IV: CPT | Mod: PBBFAC,,, | Performed by: OBSTETRICS & GYNECOLOGY

## 2020-10-02 PROCEDURE — 86901 BLOOD TYPING SEROLOGIC RH(D): CPT

## 2020-10-02 PROCEDURE — 85610 PROTHROMBIN TIME: CPT

## 2020-10-02 PROCEDURE — 99999 PR PBB SHADOW E&M-EST. PATIENT-LVL IV: ICD-10-PCS | Mod: PBBFAC,,, | Performed by: OBSTETRICS & GYNECOLOGY

## 2020-10-02 PROCEDURE — 25000003 PHARM REV CODE 250: Performed by: EMERGENCY MEDICINE

## 2020-10-02 PROCEDURE — 86850 RBC ANTIBODY SCREEN: CPT

## 2020-10-02 PROCEDURE — 99214 OFFICE O/P EST MOD 30 MIN: CPT | Mod: PBBFAC,25,PO | Performed by: OBSTETRICS & GYNECOLOGY

## 2020-10-02 PROCEDURE — 99283 EMERGENCY DEPT VISIT LOW MDM: CPT | Mod: 25,27

## 2020-10-02 PROCEDURE — 80053 COMPREHEN METABOLIC PANEL: CPT

## 2020-10-02 PROCEDURE — 99213 OFFICE O/P EST LOW 20 MIN: CPT | Mod: S$PBB,,, | Performed by: OBSTETRICS & GYNECOLOGY

## 2020-10-02 PROCEDURE — 86900 BLOOD TYPING SEROLOGIC ABO: CPT

## 2020-10-02 PROCEDURE — 99213 PR OFFICE/OUTPT VISIT, EST, LEVL III, 20-29 MIN: ICD-10-PCS | Mod: S$PBB,,, | Performed by: OBSTETRICS & GYNECOLOGY

## 2020-10-02 PROCEDURE — 85025 COMPLETE CBC W/AUTO DIFF WBC: CPT

## 2020-10-02 RX ORDER — PANTOPRAZOLE SODIUM 40 MG/1
40 TABLET, DELAYED RELEASE ORAL 2 TIMES DAILY
Qty: 60 TABLET | Refills: 0 | Status: ON HOLD | OUTPATIENT
Start: 2020-10-02 | End: 2020-10-21 | Stop reason: SDUPTHER

## 2020-10-02 RX ORDER — ONDANSETRON 4 MG/1
4 TABLET, ORALLY DISINTEGRATING ORAL
Status: COMPLETED | OUTPATIENT
Start: 2020-10-02 | End: 2020-10-02

## 2020-10-02 RX ADMIN — ONDANSETRON 4 MG: 4 TABLET, ORALLY DISINTEGRATING ORAL at 12:10

## 2020-10-02 NOTE — PROGRESS NOTES
Chief Complaint   Patient presents with    Gynecologic Exam     last night she was vomitting up black and her stools are black and stomach pain        HPI:   Josefina Martin 45 y.o.  is here for significant abdominal pain that started last night where she couldn't sleep. She has thrown up numerous times dark black chuncky material and has been having diarrhea with the same black appearance. She at [employer] yesterday but no one else is sick. She reports she took zofran which has helped but still very nauseated.       No LMP recorded. Patient has had an implant.     Past Medical History:   Diagnosis Date    CHF (congestive heart failure)     Diabetes mellitus     Hypertension     MI (myocardial infarction)     Stroke        Past Surgical History:   Procedure Laterality Date    CHOLECYSTECTOMY  2013    history of cholelithiasis    TUBAL LIGATION         Family History   Problem Relation Age of Onset    Hypertension Mother     Lung cancer Mother     No Known Problems Father     No Known Problems Sister     No Known Problems Daughter     Diabetes Son 14        Takes 2 insulins and metformin use to be bigger wally    Stroke Maternal Uncle 48    Anuerysm Maternal Grandmother     Breast cancer Maternal Grandmother     No Known Problems Sister     No Known Problems Daughter     No Known Problems Son     Breast cancer Maternal Aunt     Breast cancer Maternal Aunt        Social History     Socioeconomic History    Marital status: Single     Spouse name: Not on file    Number of children: Not on file    Years of education: Not on file    Highest education level: Not on file   Occupational History     Employer: [employer]   Social Needs    Financial resource strain: Not on file    Food insecurity     Worry: Not on file     Inability: Not on file    Transportation needs     Medical: Not on file     Non-medical: Not on file   Tobacco Use    Smoking status: Current Every Day Smoker     Packs/day: 0.15  "    Years: 18.00     Pack years: 2.70     Types: Cigarettes    Smokeless tobacco: Never Used    Tobacco comment: 1 pack/week   Substance and Sexual Activity    Alcohol use: Yes     Comment: social 1/month    Drug use: No    Sexual activity: Yes     Partners: Male     Birth control/protection: None, Surgical   Lifestyle    Physical activity     Days per week: Not on file     Minutes per session: Not on file    Stress: Not on file   Relationships    Social connections     Talks on phone: Not on file     Gets together: Not on file     Attends Quaker service: Not on file     Active member of club or organization: Not on file     Attends meetings of clubs or organizations: Not on file     Relationship status: Not on file   Other Topics Concern    Not on file   Social History Narrative    Not on file       OB History        5    Para   4    Term   4            AB   1    Living           SAB   1    TAB        Ectopic        Multiple        Live Births                          ROS:  GENERAL: Denies weight gain or weight loss. Feeling well overall.   SKIN: Denies rash or lesions.   HEAD: Denies headache.   CHEST: Denies chest pain   RESPIRATORY: Denies shortness of breath  ABDOMEN: No abdominal pain, constipation, diarrhea, nausea, vomiting or rectal bleeding.   URINARY: No frequency, dysuria, hematuria, or burning on urination.  REPRODUCTIVE: See HPI.   All other ROS negative     PE:   /84   Ht 5' 8" (1.727 m)   Wt 82.2 kg (181 lb 3.5 oz)   BMI 27.55 kg/m²     APPEARANCE: Well nourished, well developed, in no acute distress.  NEUROLOGIC: orientated to person, place and time, normal mood and affect   NECK: no masses, tracheal position normal, thyroid not enlarged, no masses   SKIN: no rashes or lesions  RESPIRATORY: normal respiratory effort, no use of accessory muscles   CARDIOVASCULAR: RRR, no murmurs, extremities normal with no edema    ABDOMEN: Soft. moderately tender but no rebound or " guarding with no masses masses. No hernias. No hepatosplenomegaly noted.     Declined rectal/pelvic exam    1. Melena    2. Hematemesis with nausea    3. Abdominal pain, unspecified abdominal location        Plan:    1. Melena: discussed with her possible food poisoning but can't rule out more serious things would recommend further work up at ER or urgent care. She would like to go to ER.

## 2020-10-02 NOTE — ED NOTES
Pt to ED with c/o vomiting black emesis and of black stool since last night. Pt states she also has a history of fibroids so she called her GYN for recommendations. Pt reports pain is localized to lower abd. She was sent by Dr. Frank for evaluation.     Patient identifiers for Josefina Martin verified by spelling and stated name on armband along with .     APPEARANCE: Alert, oriented and in no acute distress.  CARDIAC: Normal rate and rhythm, no murmur heard.   PERIPHERAL VASCULAR: peripheral pulses present. Normal cap refill. No edema. Warm to touch.    RESPIRATORY:Normal rate and effort, breath sounds clear bilaterally throughout chest. Respirations are equal and unlabored no obvious signs of distress.  GASTRO: + lower abd tenderness  MUSC: Full ROM. No bony tenderness or soft tissue tenderness. No obvious deformity.  SKIN: Skin is warm and dry, normal skin turgor, mucous membranes moist.  MENTAL STATUS: awake, alert and aware of environment.    Patient verbalized understanding of status and plan of care. Patient changed into hospital gown.  Patient side rails are up x 2, bed is low and locked, call light is in reach.  Cardiac monitor (alarms on, set, and audible), pulse oximeter, and automatic blood pressure cuff applied.   Will continue to monitor.

## 2020-10-02 NOTE — TELEPHONE ENCOUNTER
----- Message from Lilly Brown sent at 10/2/2020  3:08 PM CDT -----  Contact: pt, [phone]  New patient has an ED follow up appointment scheduled on 10/12 but was told to follow up ASAP and requests to be seen sooner if possible. Please advise.

## 2020-10-02 NOTE — FIRST PROVIDER EVALUATION
Emergency Department TeleTriage Encounter Note      CHIEF COMPLAINT    Chief Complaint   Patient presents with    GI Bleeding     c/o vomiting black emesis and of black stool since last night. sent by Dr. Frank       VITAL SIGNS   Initial Vitals [10/02/20 1058]   BP Pulse Resp Temp SpO2   (!) 159/105 91 20 98.5 °F (36.9 °C) --      MAP       --            ALLERGIES    Review of patient's allergies indicates:  No Known Allergies    PROVIDER TRIAGE NOTE  This is a teletriage evaluation of a 45 y.o. female presenting to the ED with c/o lower abdominal pain, vomiting, and black stools since last night. Initial orders will be placed and care will be transferred to an alternate provider when patient is roomed for a full evaluation. Any additional orders and the final disposition will be determined by that provider.         ORDERS  Labs Reviewed - No data to display    ED Orders (720h ago, onward)    Start Ordered     Status Ordering Provider    10/02/20 1110 10/02/20 1110  Saline lock IV (18 ga. or larger)  Once      Ordered ABBEY SANCHEZ    10/02/20 1110 10/02/20 1110  2nd Saline lock IV (18 ga. or larger)  Once      Ordered ABBEY SANCHEZ    10/02/20 1110 10/02/20 1110  NPO (Ice Chips)  Once      Ordered ABBEY SANCHEZ    10/02/20 1110 10/02/20 1110  CBC auto differential  STAT      Ordered ABBEY SANCHEZ    10/02/20 1110 10/02/20 1110  Comprehensive metabolic panel  STAT      Ordered ABBEY SANCHEZ    10/02/20 1110 10/02/20 1110  Protime-INR  STAT      Ordered ABBEY SANCHEZ    10/02/20 1110 10/02/20 1110  Type & Screen  STAT      Ordered ABBEY SANCHEZ    10/02/20 1110 10/02/20 1110  Vital signs  Once      Ordered ABBEY SANCHEZ    10/02/20 1110 10/02/20 1110  Cardiac Monitoring - Adult  Continuous     Comments: Notify Physician If:    Ordered ABBEY SANCHEZ    10/02/20 1110 10/02/20 1110  Pulse Oximetry Continuous  Continuous      Ordered ABBEY SANCHEZ            Virtual Visit Note: The  provider triage portion of this emergency department evaluation and documentation was performed via [employer], a HIPAA-compliant telemedicine application, in concert with a tele-presenter in the room. A face to face patient evaluation with one of my colleagues will occur once the patient is placed in an emergency department room.      DISCLAIMER: This note was prepared with [employer] voice recognition transcription software. Garbled syntax, mangled pronouns, and other bizarre constructions may be attributed to that software system.

## 2020-10-08 ENCOUNTER — OFFICE VISIT (OUTPATIENT)
Dept: GASTROENTEROLOGY | Facility: CLINIC | Age: 45
End: 2020-10-08
Payer: MEDICAID

## 2020-10-08 VITALS
HEART RATE: 88 BPM | OXYGEN SATURATION: 98 % | WEIGHT: 181.81 LBS | RESPIRATION RATE: 16 BRPM | SYSTOLIC BLOOD PRESSURE: 132 MMHG | DIASTOLIC BLOOD PRESSURE: 80 MMHG | TEMPERATURE: 98 F | BODY MASS INDEX: 27.56 KG/M2 | HEIGHT: 68 IN

## 2020-10-08 DIAGNOSIS — K92.0 HEMATEMESIS, PRESENCE OF NAUSEA NOT SPECIFIED: ICD-10-CM

## 2020-10-08 DIAGNOSIS — Z01.818 PREOPERATIVE EXAMINATION: ICD-10-CM

## 2020-10-08 DIAGNOSIS — R10.13 EPIGASTRIC PAIN: Primary | ICD-10-CM

## 2020-10-08 DIAGNOSIS — K92.1 MELENA: ICD-10-CM

## 2020-10-08 PROBLEM — I63.9 CEREBROVASCULAR ACCIDENT (CVA): Status: RESOLVED | Noted: 2017-03-24 | Resolved: 2020-10-08

## 2020-10-08 PROCEDURE — 99203 OFFICE O/P NEW LOW 30 MIN: CPT | Mod: S$PBB,,, | Performed by: NURSE PRACTITIONER

## 2020-10-08 PROCEDURE — 99999 PR PBB SHADOW E&M-EST. PATIENT-LVL V: CPT | Mod: PBBFAC,,, | Performed by: NURSE PRACTITIONER

## 2020-10-08 PROCEDURE — 99215 OFFICE O/P EST HI 40 MIN: CPT | Mod: PBBFAC,PO | Performed by: NURSE PRACTITIONER

## 2020-10-08 PROCEDURE — 99999 PR PBB SHADOW E&M-EST. PATIENT-LVL V: ICD-10-PCS | Mod: PBBFAC,,, | Performed by: NURSE PRACTITIONER

## 2020-10-08 PROCEDURE — 99203 PR OFFICE/OUTPT VISIT, NEW, LEVL III, 30-44 MIN: ICD-10-PCS | Mod: S$PBB,,, | Performed by: NURSE PRACTITIONER

## 2020-10-08 RX ORDER — DICYCLOMINE HYDROCHLORIDE 20 MG/1
20 TABLET ORAL 3 TIMES DAILY PRN
Qty: 60 TABLET | Refills: 2 | Status: SHIPPED | OUTPATIENT
Start: 2020-10-08 | End: 2021-05-25 | Stop reason: SDUPTHER

## 2020-10-08 NOTE — PROGRESS NOTES
Subjective:       Patient ID: Josefina Martin is a 45 y.o. female.    Chief Complaint: Melena (black stools x 1 week) and Hematemesis (1 day all day last week)    46 y/o female with hypertension, diabetes mellitus, and hx of CVA and MI referred by ED physician for GI bleed. Patient was seen in ED one week ago for one episode of hematemesis and melena. CBC and CMP were normal, no imaging done, and patient was discharged with rx for pantoprazole BID. Patient denies hematemesis and melena since hospital discharge, but epigastric pain has persisted. States she does not have an appetite and only able to tolerate soups and liquids. Denies fever, fatigue, heartburn, reflux, dysphagia, constipation or diarrhea. No known FH stomach or colon cancer.       Past Medical History:   Diagnosis Date    Cerebrovascular accident (CVA) 3/24/2017    CHF (congestive heart failure)     Diabetes mellitus     Hypertension     MI (myocardial infarction)     Stroke        Past Surgical History:   Procedure Laterality Date    CHOLECYSTECTOMY  2013    history of cholelithiasis    TUBAL LIGATION         Family History   Problem Relation Age of Onset    Hypertension Mother     Lung cancer Mother     No Known Problems Father     No Known Problems Sister     No Known Problems Daughter     Diabetes Son 14        Takes 2 insulins and metformin use to be bigger wally    Stroke Maternal Uncle 48    Anuerysm Maternal Grandmother     Breast cancer Maternal Grandmother     No Known Problems Sister     No Known Problems Daughter     No Known Problems Son     Breast cancer Maternal Aunt     Breast cancer Maternal Aunt        Social History     Socioeconomic History    Marital status: Single     Spouse name: Not on file    Number of children: Not on file    Years of education: Not on file    Highest education level: Not on file   Occupational History     Employer: [employer]   Social Needs    Financial resource strain: Not on file  "   Food insecurity     Worry: Not on file     Inability: Not on file    Transportation needs     Medical: Not on file     Non-medical: Not on file   Tobacco Use    Smoking status: Current Every Day Smoker     Packs/day: 0.15     Years: 18.00     Pack years: 2.70     Types: Cigarettes    Smokeless tobacco: Never Used    Tobacco comment: 1 pack/week   Substance and Sexual Activity    Alcohol use: Yes     Comment: social 1/month    Drug use: No    Sexual activity: Yes     Partners: Male     Birth control/protection: None, Surgical   Lifestyle    Physical activity     Days per week: Not on file     Minutes per session: Not on file    Stress: Not on file   Relationships    Social connections     Talks on phone: Not on file     Gets together: Not on file     Attends Sikh service: Not on file     Active member of club or organization: Not on file     Attends meetings of clubs or organizations: Not on file     Relationship status: Not on file   Other Topics Concern    Not on file   Social History Narrative    Not on file       Review of Systems   Constitutional: Positive for appetite change. Negative for fatigue, fever and unexpected weight change.   HENT: Negative for sneezing and trouble swallowing.    Respiratory: Negative for cough and shortness of breath.    Cardiovascular: Negative for chest pain.   Gastrointestinal: Positive for abdominal pain.   Genitourinary: Negative for dysuria.   Musculoskeletal: Negative for myalgias.   Neurological: Negative for dizziness and weakness.   Hematological: Negative for adenopathy.   Psychiatric/Behavioral: Negative for dysphoric mood.         Objective:     Vitals:    10/08/20 1112   BP: 132/80   BP Location: Right arm   Patient Position: Sitting   BP Method: X-Large (Manual)   Pulse: 88   Resp: 16   Temp: 98 °F (36.7 °C)   TempSrc: Oral   SpO2: 98%   Weight: 82.5 kg (181 lb 12.8 oz)   Height: 5' 8" (1.727 m)          Physical Exam  Constitutional:       " Appearance: Normal appearance. She is well-developed.   HENT:      Head: Normocephalic.   Eyes:      Conjunctiva/sclera: Conjunctivae normal.      Pupils: Pupils are equal, round, and reactive to light.   Neck:      Musculoskeletal: Normal range of motion and neck supple.   Cardiovascular:      Rate and Rhythm: Normal rate and regular rhythm.   Pulmonary:      Effort: Pulmonary effort is normal.      Breath sounds: Normal breath sounds.   Abdominal:      General: Bowel sounds are normal.      Palpations: Abdomen is soft.      Tenderness: There is no abdominal tenderness.   Musculoskeletal: Normal range of motion.   Skin:     General: Skin is warm and dry.   Neurological:      Mental Status: She is alert and oriented to person, place, and time.   Psychiatric:         Mood and Affect: Mood normal.         Behavior: Behavior normal.       Lab Results   Component Value Date    WBC 8.10 10/02/2020    HGB 13.1 10/02/2020    HCT 38.6 10/02/2020    MCV 92 10/02/2020     10/02/2020     BMP  Lab Results   Component Value Date     10/02/2020    K 3.9 10/02/2020     10/02/2020    CO2 25 10/02/2020    BUN 28 (H) 10/02/2020    CREATININE 0.9 10/02/2020    CALCIUM 9.1 10/02/2020    ANIONGAP 8 10/02/2020    ESTGFRAFRICA >60 10/02/2020    EGFRNONAA >60 10/02/2020           Assessment:         ICD-10-CM ICD-9-CM   1. Epigastric pain  R10.13 789.06   2. Melena  K92.1 578.1   3. Hematemesis, presence of nausea not specified  K92.0 578.0   4. Preoperative examination  Z01.818 V72.84       Plan:       Epigastric pain  -     Case request GI: EGD (ESOPHAGOGASTRODUODENOSCOPY)  -      Abdomen Complete; Future; Expected date: 10/08/2020  -     dicyclomine (BENTYL) 20 mg tablet; Take 1 tablet (20 mg total) by mouth 3 (three) times daily as needed.  Dispense: 60 tablet; Refill: 2    Melena; Hematemesis, presence of nausea not specified  -     Case request GI: EGD (ESOPHAGOGASTRODUODENOSCOPY)    Preoperative examination  -      COVID-19 Routine Screening; Future; Expected date: 10/08/2020    Will obtain clearance to hold Plavix prior to procedure-request sent to PCP, Dr. Ennis.  Follow up if symptoms worsen or fail to improve.     Patient's Medications   New Prescriptions    DICYCLOMINE (BENTYL) 20 MG TABLET    Take 1 tablet (20 mg total) by mouth 3 (three) times daily as needed.   Previous Medications    ASPIRIN 81 MG CHEW    Take 1 tablet (81 mg total) by mouth once daily. for 21 days    ATORVASTATIN (LIPITOR) 40 MG TABLET    Take 1 tablet (40 mg total) by mouth once daily.    CARVEDILOL (COREG) 12.5 MG TABLET    Take 1 tablet (12.5 mg total) by mouth 2 (two) times daily with meals.    CHLORTHALIDONE (HYGROTEN) 50 MG TAB    Take 1 tablet (50 mg total) by mouth once daily.    CLONIDINE (CATAPRES) 0.2 MG TABLET    Take 1 tablet (0.2 mg total) by mouth 2 (two) times daily.    CLOPIDOGREL (PLAVIX) 75 MG TABLET    Take 1 tablet (75 mg total) by mouth once daily.    FUROSEMIDE (LASIX) 20 MG TABLET    Take 1 tablet (20 mg total) by mouth once daily.    GABAPENTIN (NEURONTIN) 100 MG CAPSULE    Take 1 capsule (100 mg total) by mouth once daily.    IBUPROFEN (ADVIL,MOTRIN) 800 MG TABLET    Take 1 tablet (800 mg total) by mouth every 6 (six) hours as needed for Pain.    LISINOPRIL (PRINIVIL,ZESTRIL) 40 MG TABLET    Take 1 tablet (40 mg total) by mouth once daily.    LORATADINE (CLARITIN) 10 MG TABLET    Take 10 mg by mouth daily as needed for Allergies.    METFORMIN (GLUCOPHAGE-XR) 500 MG 24 HR TABLET    Take 1 tablet (500 mg total) by mouth 2 (two) times daily with meals.    MIRENA 20 MCG/24 HR (5 YEARS) IUD        MUPIROCIN (BACTROBAN) 2 % OINTMENT    Apply topically 3 (three) times daily.    NIFEDIPINE (PROCARDIA-XL) 60 MG (OSM) 24 HR TABLET    Take 1 tablet (60 mg total) by mouth once daily.    NYSTATIN (MYCOSTATIN) CREAM    Apply topically 2 (two) times daily.    ONDANSETRON (ZOFRAN) 4 MG TABLET    Take 1 tablet (4 mg total) by mouth every 6  (six) hours.    PANTOPRAZOLE (PROTONIX) 40 MG TABLET    Take 1 tablet (40 mg total) by mouth 2 (two) times daily.    SPIRONOLACTONE (ALDACTONE) 50 MG TABLET    Take 1 tablet (50 mg total) by mouth once daily.    TRIAMCINOLONE ACETONIDE 0.1% (KENALOG) 0.1 % CREAM    Apply topically 2 (two) times daily.    TRIAMCINOLONE ACETONIDE 0.1% (KENALOG) 0.1 % OINTMENT    Apply topically 2 (two) times daily.    VENLAFAXINE (EFFEXOR-XR) 37.5 MG 24 HR CAPSULE    Take 1 capsule (37.5 mg total) by mouth once daily.   Modified Medications    No medications on file   Discontinued Medications    No medications on file

## 2020-10-08 NOTE — PATIENT INSTRUCTIONS
EGD Prep Instructions    [facility]  [address] in [facility] [phone]  Endoscopy Lab [phone]    You are scheduled for an EGD with Dr. Blackwell on 10/21/20 at [facility].  You will enter through the [facility] and check in at Same Day Surgery.    Nothing to eat or drink after midnight before the procedure.  You MAY brush your teeth.    You MAY take your blood pressure, heart, and seizure medication on the morning of the procedure, with a SIP of water.  Hold ALL other medications until after the procedure.    If you are on blood thinners THAT YOU HAVE BEEN INSTRUCTED TO HOLD BY YOUR DOCTOR FOR THIS PROCEDURE, then do NOT take this the morning of your EGD.  Do NOT stop these medications on your own, they must be approved to be held by your doctor.  Your EGD can NOT be done if you are on these medications.  Examples of blood thinners include: Coumadin, Aggrenox, Plavix, Pradaxa, Reapro, Pletal, Xarelto, Ticagrelor, Brilinta, Eliquis, and high dose aspirin (325 mg).  You do not have to stop baby aspirin 81 mg.    You will receive a call 2-3 days before your EGD to tell you the time to arrive.  If you have not received a call by the day before your procedure, call the Endoscopy Lab at [phone].

## 2020-10-09 ENCOUNTER — TELEPHONE (OUTPATIENT)
Dept: GASTROENTEROLOGY | Facility: CLINIC | Age: 45
End: 2020-10-09

## 2020-10-09 NOTE — TELEPHONE ENCOUNTER
Left message for patient to call the office.   Tissue Cultured Epidermal Autograft Text: The defect edges were debeveled with a #15 scalpel blade.  Given the location of the defect, shape of the defect and the proximity to free margins a tissue cultured epidermal autograft was deemed most appropriate.  The graft was then trimmed to fit the size of the defect.  The graft was then placed in the primary defect and oriented appropriately.

## 2020-10-19 ENCOUNTER — LAB VISIT (OUTPATIENT)
Dept: FAMILY MEDICINE | Facility: CLINIC | Age: 45
End: 2020-10-19
Payer: MEDICAID

## 2020-10-19 DIAGNOSIS — Z01.818 PREOPERATIVE EXAMINATION: ICD-10-CM

## 2020-10-19 LAB — SARS-COV-2 RNA RESP QL NAA+PROBE: NOT DETECTED

## 2020-10-19 PROCEDURE — U0003 INFECTIOUS AGENT DETECTION BY NUCLEIC ACID (DNA OR RNA); SEVERE ACUTE RESPIRATORY SYNDROME CORONAVIRUS 2 (SARS-COV-2) (CORONAVIRUS DISEASE [COVID-19]), AMPLIFIED PROBE TECHNIQUE, MAKING USE OF HIGH THROUGHPUT TECHNOLOGIES AS DESCRIBED BY CMS-2020-01-R: HCPCS

## 2020-10-21 PROBLEM — R10.13 EPIGASTRIC PAIN: Status: ACTIVE | Noted: 2020-10-21

## 2020-10-22 ENCOUNTER — TELEPHONE (OUTPATIENT)
Dept: GASTROENTEROLOGY | Facility: CLINIC | Age: 45
End: 2020-10-22

## 2020-10-22 ENCOUNTER — PATIENT MESSAGE (OUTPATIENT)
Dept: GASTROENTEROLOGY | Facility: CLINIC | Age: 45
End: 2020-10-22

## 2020-10-22 NOTE — TELEPHONE ENCOUNTER
10/22/2020 Spoke with patient informed her HP Positive, went over all medications ordered for patient yesterday and today. Patient understand. vf/ma

## 2020-10-22 NOTE — TELEPHONE ENCOUNTER
----- Message from Asha Blackwell MD sent at 10/22/2020 10:02 AM CDT -----  Serum HP is positive, meds sent to her pharmacy, please instruct how to take properly.  Cont with plan from EGD op note

## 2021-01-22 ENCOUNTER — LAB VISIT (OUTPATIENT)
Dept: LAB | Facility: HOSPITAL | Age: 46
End: 2021-01-22
Attending: SPECIALIST
Payer: MEDICAID

## 2021-01-22 ENCOUNTER — OFFICE VISIT (OUTPATIENT)
Dept: FAMILY MEDICINE | Facility: HOSPITAL | Age: 46
End: 2021-01-22
Attending: SPECIALIST
Payer: MEDICAID

## 2021-01-22 VITALS
SYSTOLIC BLOOD PRESSURE: 180 MMHG | WEIGHT: 186.31 LBS | HEIGHT: 68 IN | HEART RATE: 80 BPM | BODY MASS INDEX: 28.24 KG/M2 | DIASTOLIC BLOOD PRESSURE: 98 MMHG

## 2021-01-22 DIAGNOSIS — I50.32 CHRONIC DIASTOLIC HEART FAILURE: ICD-10-CM

## 2021-01-22 DIAGNOSIS — I10 ESSENTIAL HYPERTENSION: Primary | ICD-10-CM

## 2021-01-22 DIAGNOSIS — B35.4 TINEA CORPORIS: ICD-10-CM

## 2021-01-22 DIAGNOSIS — I10 ESSENTIAL HYPERTENSION: ICD-10-CM

## 2021-01-22 LAB
BASOPHILS # BLD AUTO: 0.04 K/UL (ref 0–0.2)
BASOPHILS NFR BLD: 0.6 % (ref 0–1.9)
DIFFERENTIAL METHOD: ABNORMAL
EOSINOPHIL # BLD AUTO: 0.1 K/UL (ref 0–0.5)
EOSINOPHIL NFR BLD: 1.4 % (ref 0–8)
ERYTHROCYTE [DISTWIDTH] IN BLOOD BY AUTOMATED COUNT: 12.8 % (ref 11.5–14.5)
HCT VFR BLD AUTO: 45.4 % (ref 37–48.5)
HGB BLD-MCNC: 15.2 G/DL (ref 12–16)
IMM GRANULOCYTES # BLD AUTO: 0.01 K/UL (ref 0–0.04)
IMM GRANULOCYTES NFR BLD AUTO: 0.2 % (ref 0–0.5)
LYMPHOCYTES # BLD AUTO: 3.3 K/UL (ref 1–4.8)
LYMPHOCYTES NFR BLD: 50.2 % (ref 18–48)
MCH RBC QN AUTO: 30 PG (ref 27–31)
MCHC RBC AUTO-ENTMCNC: 33.5 G/DL (ref 32–36)
MCV RBC AUTO: 90 FL (ref 82–98)
MONOCYTES # BLD AUTO: 0.6 K/UL (ref 0.3–1)
MONOCYTES NFR BLD: 8.7 % (ref 4–15)
NEUTROPHILS # BLD AUTO: 2.5 K/UL (ref 1.8–7.7)
NEUTROPHILS NFR BLD: 38.9 % (ref 38–73)
NRBC BLD-RTO: 0 /100 WBC
PLATELET # BLD AUTO: 221 K/UL (ref 150–350)
PMV BLD AUTO: 11.1 FL (ref 9.2–12.9)
RBC # BLD AUTO: 5.06 M/UL (ref 4–5.4)
WBC # BLD AUTO: 6.47 K/UL (ref 3.9–12.7)

## 2021-01-22 PROCEDURE — 36415 COLL VENOUS BLD VENIPUNCTURE: CPT

## 2021-01-22 PROCEDURE — 85025 COMPLETE CBC W/AUTO DIFF WBC: CPT

## 2021-01-22 PROCEDURE — 99214 OFFICE O/P EST MOD 30 MIN: CPT | Performed by: STUDENT IN AN ORGANIZED HEALTH CARE EDUCATION/TRAINING PROGRAM

## 2021-01-22 RX ORDER — LISINOPRIL 40 MG/1
40 TABLET ORAL DAILY
Qty: 90 TABLET | Refills: 3 | Status: SHIPPED | OUTPATIENT
Start: 2021-01-22 | End: 2022-07-08 | Stop reason: SDUPTHER

## 2021-01-22 RX ORDER — FUROSEMIDE 20 MG/1
20 TABLET ORAL DAILY
Qty: 90 TABLET | Refills: 3 | Status: SHIPPED | OUTPATIENT
Start: 2021-01-22 | End: 2022-02-18

## 2021-01-22 RX ORDER — CLOTRIMAZOLE 1 %
CREAM (GRAM) TOPICAL 2 TIMES DAILY
Qty: 113 G | Refills: 1 | Status: SHIPPED | OUTPATIENT
Start: 2021-01-22 | End: 2021-04-07 | Stop reason: SDUPTHER

## 2021-01-22 RX ORDER — CARVEDILOL 12.5 MG/1
12.5 TABLET ORAL 2 TIMES DAILY WITH MEALS
Qty: 60 TABLET | Refills: 11 | Status: SHIPPED | OUTPATIENT
Start: 2021-01-22 | End: 2021-04-07 | Stop reason: SDUPTHER

## 2021-02-05 ENCOUNTER — OFFICE VISIT (OUTPATIENT)
Dept: FAMILY MEDICINE | Facility: HOSPITAL | Age: 46
End: 2021-02-05
Attending: FAMILY MEDICINE
Payer: MEDICAID

## 2021-02-05 VITALS
HEIGHT: 68 IN | SYSTOLIC BLOOD PRESSURE: 194 MMHG | DIASTOLIC BLOOD PRESSURE: 110 MMHG | HEART RATE: 76 BPM | BODY MASS INDEX: 28.33 KG/M2

## 2021-02-05 DIAGNOSIS — I10 UNCONTROLLED HYPERTENSION: Primary | ICD-10-CM

## 2021-02-05 DIAGNOSIS — Z86.73 HISTORY OF CVA (CEREBROVASCULAR ACCIDENT): ICD-10-CM

## 2021-02-05 DIAGNOSIS — I50.32 CHRONIC DIASTOLIC HEART FAILURE: ICD-10-CM

## 2021-02-05 PROCEDURE — 99213 OFFICE O/P EST LOW 20 MIN: CPT | Performed by: STUDENT IN AN ORGANIZED HEALTH CARE EDUCATION/TRAINING PROGRAM

## 2021-02-05 RX ORDER — FLUTICASONE PROPIONATE 50 MCG
SPRAY, SUSPENSION (ML) NASAL
COMMUNITY
Start: 2020-11-07 | End: 2022-09-03

## 2021-02-05 RX ORDER — SPIRONOLACTONE 50 MG/1
50 TABLET, FILM COATED ORAL DAILY
Qty: 30 TABLET | Refills: 2 | Status: SHIPPED | OUTPATIENT
Start: 2021-02-05 | End: 2021-04-07 | Stop reason: SDUPTHER

## 2021-02-05 RX ORDER — NIFEDIPINE 60 MG/1
TABLET, EXTENDED RELEASE ORAL
COMMUNITY
Start: 2020-12-08 | End: 2021-04-07 | Stop reason: SDUPTHER

## 2021-02-05 RX ORDER — CHLORTHALIDONE 50 MG/1
TABLET ORAL
COMMUNITY
Start: 2020-12-08 | End: 2021-04-07

## 2021-02-10 ENCOUNTER — LAB VISIT (OUTPATIENT)
Dept: LAB | Facility: HOSPITAL | Age: 46
End: 2021-02-10
Attending: STUDENT IN AN ORGANIZED HEALTH CARE EDUCATION/TRAINING PROGRAM
Payer: MEDICAID

## 2021-02-10 DIAGNOSIS — I10 UNCONTROLLED HYPERTENSION: ICD-10-CM

## 2021-02-10 LAB
ALBUMIN SERPL BCP-MCNC: 4 G/DL (ref 3.5–5.2)
ALP SERPL-CCNC: 96 U/L (ref 55–135)
ALT SERPL W/O P-5'-P-CCNC: 22 U/L (ref 10–44)
ANION GAP SERPL CALC-SCNC: 7 MMOL/L (ref 8–16)
AST SERPL-CCNC: 23 U/L (ref 10–40)
BILIRUB SERPL-MCNC: 0.4 MG/DL (ref 0.1–1)
BUN SERPL-MCNC: 17 MG/DL (ref 6–20)
CALCIUM SERPL-MCNC: 9.1 MG/DL (ref 8.7–10.5)
CHLORIDE SERPL-SCNC: 108 MMOL/L (ref 95–110)
CO2 SERPL-SCNC: 26 MMOL/L (ref 23–29)
CREAT SERPL-MCNC: 1 MG/DL (ref 0.5–1.4)
EST. GFR  (AFRICAN AMERICAN): >60 ML/MIN/1.73 M^2
EST. GFR  (NON AFRICAN AMERICAN): >60 ML/MIN/1.73 M^2
GLUCOSE SERPL-MCNC: 117 MG/DL (ref 70–110)
POTASSIUM SERPL-SCNC: 4.1 MMOL/L (ref 3.5–5.1)
PROT SERPL-MCNC: 7.2 G/DL (ref 6–8.4)
SODIUM SERPL-SCNC: 141 MMOL/L (ref 136–145)

## 2021-02-10 PROCEDURE — 80053 COMPREHEN METABOLIC PANEL: CPT

## 2021-02-10 PROCEDURE — 36415 COLL VENOUS BLD VENIPUNCTURE: CPT

## 2021-04-07 ENCOUNTER — OFFICE VISIT (OUTPATIENT)
Dept: FAMILY MEDICINE | Facility: HOSPITAL | Age: 46
End: 2021-04-07
Attending: FAMILY MEDICINE
Payer: MEDICAID

## 2021-04-07 VITALS
SYSTOLIC BLOOD PRESSURE: 192 MMHG | DIASTOLIC BLOOD PRESSURE: 118 MMHG | HEIGHT: 68 IN | HEART RATE: 80 BPM | WEIGHT: 186.06 LBS | BODY MASS INDEX: 28.2 KG/M2

## 2021-04-07 DIAGNOSIS — E78.5 HYPERLIPIDEMIA, UNSPECIFIED HYPERLIPIDEMIA TYPE: ICD-10-CM

## 2021-04-07 DIAGNOSIS — B35.4 TINEA CORPORIS: ICD-10-CM

## 2021-04-07 DIAGNOSIS — E11.9 TYPE 2 DIABETES MELLITUS WITHOUT COMPLICATION, WITHOUT LONG-TERM CURRENT USE OF INSULIN: ICD-10-CM

## 2021-04-07 DIAGNOSIS — I10 UNCONTROLLED HYPERTENSION: ICD-10-CM

## 2021-04-07 DIAGNOSIS — I63.9 CEREBROVASCULAR ACCIDENT (CVA), UNSPECIFIED MECHANISM: ICD-10-CM

## 2021-04-07 DIAGNOSIS — F32.A DEPRESSION, UNSPECIFIED DEPRESSION TYPE: ICD-10-CM

## 2021-04-07 DIAGNOSIS — I10 ESSENTIAL HYPERTENSION: Primary | ICD-10-CM

## 2021-04-07 PROCEDURE — 99214 OFFICE O/P EST MOD 30 MIN: CPT | Performed by: STUDENT IN AN ORGANIZED HEALTH CARE EDUCATION/TRAINING PROGRAM

## 2021-04-07 RX ORDER — NIFEDIPINE 60 MG/1
60 TABLET, EXTENDED RELEASE ORAL DAILY
Qty: 90 TABLET | Refills: 3 | Status: SHIPPED | OUTPATIENT
Start: 2021-04-07 | End: 2021-07-28 | Stop reason: DRUGHIGH

## 2021-04-07 RX ORDER — CLOTRIMAZOLE 1 %
CREAM (GRAM) TOPICAL 2 TIMES DAILY
Qty: 113 G | Refills: 1 | Status: SHIPPED | OUTPATIENT
Start: 2021-04-07 | End: 2021-06-14 | Stop reason: ALTCHOICE

## 2021-04-07 RX ORDER — ATORVASTATIN CALCIUM 40 MG/1
40 TABLET, FILM COATED ORAL DAILY
Qty: 90 TABLET | Refills: 3 | Status: SHIPPED | OUTPATIENT
Start: 2021-04-07 | End: 2022-02-18 | Stop reason: SDUPTHER

## 2021-04-07 RX ORDER — VENLAFAXINE HYDROCHLORIDE 37.5 MG/1
37.5 CAPSULE, EXTENDED RELEASE ORAL DAILY
Qty: 90 CAPSULE | Refills: 3 | Status: SHIPPED | OUTPATIENT
Start: 2021-04-07 | End: 2023-09-20

## 2021-04-07 RX ORDER — CLOPIDOGREL BISULFATE 75 MG/1
75 TABLET ORAL DAILY
Qty: 90 TABLET | Refills: 3 | Status: SHIPPED | OUTPATIENT
Start: 2021-04-07 | End: 2022-02-18

## 2021-04-07 RX ORDER — SPIRONOLACTONE 50 MG/1
50 TABLET, FILM COATED ORAL DAILY
Qty: 90 TABLET | Refills: 3 | Status: SHIPPED | OUTPATIENT
Start: 2021-04-07 | End: 2022-09-03

## 2021-04-07 RX ORDER — CARVEDILOL 12.5 MG/1
12.5 TABLET ORAL 2 TIMES DAILY WITH MEALS
Qty: 180 TABLET | Refills: 3 | Status: SHIPPED | OUTPATIENT
Start: 2021-04-07 | End: 2022-02-18 | Stop reason: SDUPTHER

## 2021-04-18 DIAGNOSIS — E78.5 HYPERLIPIDEMIA, UNSPECIFIED HYPERLIPIDEMIA TYPE: Primary | ICD-10-CM

## 2021-04-18 DIAGNOSIS — R10.13 EPIGASTRIC PAIN: ICD-10-CM

## 2021-04-18 DIAGNOSIS — E11.9 TYPE 2 DIABETES MELLITUS WITHOUT COMPLICATION, WITHOUT LONG-TERM CURRENT USE OF INSULIN: ICD-10-CM

## 2021-04-18 DIAGNOSIS — Z86.73 HISTORY OF CVA (CEREBROVASCULAR ACCIDENT): ICD-10-CM

## 2021-05-23 ENCOUNTER — PATIENT MESSAGE (OUTPATIENT)
Dept: GASTROENTEROLOGY | Facility: CLINIC | Age: 46
End: 2021-05-23

## 2021-05-23 ENCOUNTER — NURSE TRIAGE (OUTPATIENT)
Dept: ADMINISTRATIVE | Facility: CLINIC | Age: 46
End: 2021-05-23

## 2021-05-25 ENCOUNTER — OFFICE VISIT (OUTPATIENT)
Dept: GASTROENTEROLOGY | Facility: CLINIC | Age: 46
End: 2021-05-25
Payer: MEDICAID

## 2021-05-25 ENCOUNTER — TELEPHONE (OUTPATIENT)
Dept: GASTROENTEROLOGY | Facility: CLINIC | Age: 46
End: 2021-05-25

## 2021-05-25 VITALS
SYSTOLIC BLOOD PRESSURE: 150 MMHG | RESPIRATION RATE: 16 BRPM | HEIGHT: 68 IN | HEART RATE: 69 BPM | DIASTOLIC BLOOD PRESSURE: 80 MMHG | BODY MASS INDEX: 27.1 KG/M2 | OXYGEN SATURATION: 99 % | WEIGHT: 178.81 LBS

## 2021-05-25 DIAGNOSIS — Z01.818 PREOPERATIVE EXAMINATION: ICD-10-CM

## 2021-05-25 DIAGNOSIS — R10.13 EPIGASTRIC PAIN: Primary | ICD-10-CM

## 2021-05-25 DIAGNOSIS — K59.04 CHRONIC IDIOPATHIC CONSTIPATION: ICD-10-CM

## 2021-05-25 DIAGNOSIS — Z87.11 HISTORY OF GASTRIC ULCER: ICD-10-CM

## 2021-05-25 PROCEDURE — 99999 PR PBB SHADOW E&M-EST. PATIENT-LVL V: CPT | Mod: PBBFAC,,, | Performed by: NURSE PRACTITIONER

## 2021-05-25 PROCEDURE — 99214 OFFICE O/P EST MOD 30 MIN: CPT | Mod: S$PBB,,, | Performed by: NURSE PRACTITIONER

## 2021-05-25 PROCEDURE — 99214 PR OFFICE/OUTPT VISIT, EST, LEVL IV, 30-39 MIN: ICD-10-PCS | Mod: S$PBB,,, | Performed by: NURSE PRACTITIONER

## 2021-05-25 PROCEDURE — 99999 PR PBB SHADOW E&M-EST. PATIENT-LVL V: ICD-10-PCS | Mod: PBBFAC,,, | Performed by: NURSE PRACTITIONER

## 2021-05-25 PROCEDURE — 99215 OFFICE O/P EST HI 40 MIN: CPT | Mod: PBBFAC,PO | Performed by: NURSE PRACTITIONER

## 2021-05-25 RX ORDER — PANTOPRAZOLE SODIUM 40 MG/1
40 TABLET, DELAYED RELEASE ORAL
Qty: 30 TABLET | Refills: 5 | Status: SHIPPED | OUTPATIENT
Start: 2021-05-25 | End: 2022-02-18 | Stop reason: SDUPTHER

## 2021-05-25 RX ORDER — DICYCLOMINE HYDROCHLORIDE 20 MG/1
20 TABLET ORAL 3 TIMES DAILY PRN
Qty: 60 TABLET | Refills: 5 | Status: SHIPPED | OUTPATIENT
Start: 2021-05-25 | End: 2022-02-18 | Stop reason: SDUPTHER

## 2021-05-26 PROBLEM — Z87.11 HISTORY OF GASTRIC ULCER: Status: ACTIVE | Noted: 2021-05-26

## 2021-05-26 RX ORDER — POLYETHYLENE GLYCOL 3350 17 G/17G
17 POWDER, FOR SOLUTION ORAL DAILY
Qty: 510 G | Refills: 11 | Status: SHIPPED | OUTPATIENT
Start: 2021-05-26 | End: 2021-07-28 | Stop reason: SDUPTHER

## 2021-05-27 ENCOUNTER — TELEPHONE (OUTPATIENT)
Dept: GASTROENTEROLOGY | Facility: CLINIC | Age: 46
End: 2021-05-27

## 2021-06-07 ENCOUNTER — PATIENT MESSAGE (OUTPATIENT)
Dept: GASTROENTEROLOGY | Facility: CLINIC | Age: 46
End: 2021-06-07

## 2021-06-08 ENCOUNTER — PATIENT MESSAGE (OUTPATIENT)
Dept: FAMILY MEDICINE | Facility: HOSPITAL | Age: 46
End: 2021-06-08

## 2021-06-08 DIAGNOSIS — I10 UNCONTROLLED HYPERTENSION: ICD-10-CM

## 2021-06-08 DIAGNOSIS — Z86.73 HISTORY OF CVA (CEREBROVASCULAR ACCIDENT): Primary | ICD-10-CM

## 2021-06-08 DIAGNOSIS — I25.10 CORONARY ARTERY DISEASE, ANGINA PRESENCE UNSPECIFIED, UNSPECIFIED VESSEL OR LESION TYPE, UNSPECIFIED WHETHER NATIVE OR TRANSPLANTED HEART: ICD-10-CM

## 2021-06-08 DIAGNOSIS — I50.32 CHRONIC DIASTOLIC HEART FAILURE: ICD-10-CM

## 2021-06-10 ENCOUNTER — PATIENT MESSAGE (OUTPATIENT)
Dept: FAMILY MEDICINE | Facility: HOSPITAL | Age: 46
End: 2021-06-10

## 2021-06-10 ENCOUNTER — TELEPHONE (OUTPATIENT)
Dept: GASTROENTEROLOGY | Facility: CLINIC | Age: 46
End: 2021-06-10

## 2021-06-12 ENCOUNTER — LAB VISIT (OUTPATIENT)
Dept: FAMILY MEDICINE | Facility: CLINIC | Age: 46
End: 2021-06-12
Payer: MEDICAID

## 2021-06-12 DIAGNOSIS — Z01.818 PREOPERATIVE EXAMINATION: ICD-10-CM

## 2021-06-12 LAB — SARS-COV-2 RNA RESP QL NAA+PROBE: NOT DETECTED

## 2021-06-12 PROCEDURE — U0005 INFEC AGEN DETEC AMPLI PROBE: HCPCS | Performed by: NURSE PRACTITIONER

## 2021-06-12 PROCEDURE — U0003 INFECTIOUS AGENT DETECTION BY NUCLEIC ACID (DNA OR RNA); SEVERE ACUTE RESPIRATORY SYNDROME CORONAVIRUS 2 (SARS-COV-2) (CORONAVIRUS DISEASE [COVID-19]), AMPLIFIED PROBE TECHNIQUE, MAKING USE OF HIGH THROUGHPUT TECHNOLOGIES AS DESCRIBED BY CMS-2020-01-R: HCPCS | Performed by: NURSE PRACTITIONER

## 2021-06-21 ENCOUNTER — TELEPHONE (OUTPATIENT)
Dept: GASTROENTEROLOGY | Facility: CLINIC | Age: 46
End: 2021-06-21

## 2021-06-21 DIAGNOSIS — B96.81 HELICOBACTER PYLORI GASTRITIS: Primary | ICD-10-CM

## 2021-06-21 DIAGNOSIS — K29.70 HELICOBACTER PYLORI GASTRITIS: Primary | ICD-10-CM

## 2021-06-21 RX ORDER — DOXYCYCLINE HYCLATE 100 MG
100 TABLET ORAL 2 TIMES DAILY
Qty: 28 TABLET | Refills: 0 | Status: SHIPPED | OUTPATIENT
Start: 2021-06-21 | End: 2021-07-05

## 2021-06-21 RX ORDER — BISMUTH SUBSALICYLATE 262 MG/1
2 TABLET ORAL
Qty: 112 TABLET | Refills: 0 | Status: SHIPPED | OUTPATIENT
Start: 2021-06-21 | End: 2021-07-05

## 2021-06-21 RX ORDER — METRONIDAZOLE 500 MG/1
500 TABLET ORAL 3 TIMES DAILY
Qty: 42 TABLET | Refills: 0 | Status: SHIPPED | OUTPATIENT
Start: 2021-06-21 | End: 2021-07-05

## 2021-07-28 ENCOUNTER — OFFICE VISIT (OUTPATIENT)
Dept: FAMILY MEDICINE | Facility: HOSPITAL | Age: 46
End: 2021-07-28
Payer: MEDICAID

## 2021-07-28 VITALS
HEART RATE: 75 BPM | DIASTOLIC BLOOD PRESSURE: 104 MMHG | WEIGHT: 182.31 LBS | HEIGHT: 68 IN | BODY MASS INDEX: 27.63 KG/M2 | SYSTOLIC BLOOD PRESSURE: 173 MMHG

## 2021-07-28 DIAGNOSIS — E11.69 TYPE 2 DIABETES MELLITUS WITH OTHER SPECIFIED COMPLICATION, WITHOUT LONG-TERM CURRENT USE OF INSULIN: ICD-10-CM

## 2021-07-28 DIAGNOSIS — I10 ESSENTIAL HYPERTENSION: Primary | ICD-10-CM

## 2021-07-28 DIAGNOSIS — B35.4 TINEA CORPORIS: ICD-10-CM

## 2021-07-28 DIAGNOSIS — K59.00 CONSTIPATION, UNSPECIFIED CONSTIPATION TYPE: ICD-10-CM

## 2021-07-28 PROCEDURE — 99214 OFFICE O/P EST MOD 30 MIN: CPT | Performed by: STUDENT IN AN ORGANIZED HEALTH CARE EDUCATION/TRAINING PROGRAM

## 2021-07-28 RX ORDER — NYSTATIN 100000 [USP'U]/G
POWDER TOPICAL 2 TIMES DAILY
Qty: 60 G | Refills: 2 | Status: SHIPPED | OUTPATIENT
Start: 2021-07-28 | End: 2024-01-09

## 2021-07-28 RX ORDER — NIFEDIPINE 90 MG/1
90 TABLET, EXTENDED RELEASE ORAL DAILY
Qty: 90 TABLET | Refills: 3 | Status: SHIPPED | OUTPATIENT
Start: 2021-07-28 | End: 2022-02-18 | Stop reason: SDUPTHER

## 2021-07-28 RX ORDER — POLYETHYLENE GLYCOL 3350 17 G/17G
17 POWDER, FOR SOLUTION ORAL DAILY
Qty: 100 EACH | Refills: 2 | Status: SHIPPED | OUTPATIENT
Start: 2021-07-28 | End: 2022-07-23

## 2021-07-28 RX ORDER — FLUCONAZOLE 200 MG/1
200 TABLET ORAL WEEKLY
Qty: 4 TABLET | Refills: 0 | Status: SHIPPED | OUTPATIENT
Start: 2021-07-28 | End: 2021-08-19

## 2021-08-05 ENCOUNTER — LAB VISIT (OUTPATIENT)
Dept: LAB | Facility: HOSPITAL | Age: 46
End: 2021-08-05
Attending: STUDENT IN AN ORGANIZED HEALTH CARE EDUCATION/TRAINING PROGRAM
Payer: MEDICAID

## 2021-08-05 DIAGNOSIS — E11.69 TYPE 2 DIABETES MELLITUS WITH OTHER SPECIFIED COMPLICATION, WITHOUT LONG-TERM CURRENT USE OF INSULIN: ICD-10-CM

## 2021-08-05 DIAGNOSIS — E11.9 TYPE 2 DIABETES MELLITUS WITHOUT COMPLICATION, WITHOUT LONG-TERM CURRENT USE OF INSULIN: ICD-10-CM

## 2021-08-05 LAB
ALBUMIN/CREAT UR: 53.2 UG/MG (ref 0–30)
ALBUMIN/CREAT UR: 53.2 UG/MG (ref 0–30)
CREAT UR-MCNC: 237 MG/DL (ref 15–325)
CREAT UR-MCNC: 237 MG/DL (ref 15–325)
MICROALBUMIN UR DL<=1MG/L-MCNC: 126 UG/ML
MICROALBUMIN UR DL<=1MG/L-MCNC: 126 UG/ML

## 2021-08-05 PROCEDURE — 82570 ASSAY OF URINE CREATININE: CPT | Performed by: STUDENT IN AN ORGANIZED HEALTH CARE EDUCATION/TRAINING PROGRAM

## 2021-08-05 PROCEDURE — 82043 UR ALBUMIN QUANTITATIVE: CPT | Performed by: STUDENT IN AN ORGANIZED HEALTH CARE EDUCATION/TRAINING PROGRAM

## 2021-08-17 ENCOUNTER — OFFICE VISIT (OUTPATIENT)
Dept: CARDIOLOGY | Facility: CLINIC | Age: 46
End: 2021-08-17
Payer: MEDICAID

## 2021-08-17 ENCOUNTER — PATIENT MESSAGE (OUTPATIENT)
Dept: CARDIOLOGY | Facility: CLINIC | Age: 46
End: 2021-08-17

## 2021-08-17 VITALS
HEIGHT: 68 IN | OXYGEN SATURATION: 98 % | DIASTOLIC BLOOD PRESSURE: 82 MMHG | HEART RATE: 67 BPM | SYSTOLIC BLOOD PRESSURE: 148 MMHG | WEIGHT: 178.31 LBS | BODY MASS INDEX: 27.02 KG/M2

## 2021-08-17 DIAGNOSIS — Z91.89 AT RISK FOR SLEEP APNEA: ICD-10-CM

## 2021-08-17 DIAGNOSIS — Z72.0 TOBACCO ABUSE: ICD-10-CM

## 2021-08-17 DIAGNOSIS — I50.32 CHRONIC DIASTOLIC HEART FAILURE: ICD-10-CM

## 2021-08-17 DIAGNOSIS — I25.118 CORONARY ARTERY DISEASE OF NATIVE ARTERY OF NATIVE HEART WITH STABLE ANGINA PECTORIS: ICD-10-CM

## 2021-08-17 DIAGNOSIS — I10 ESSENTIAL HYPERTENSION: ICD-10-CM

## 2021-08-17 DIAGNOSIS — I25.10 CORONARY ARTERY DISEASE, ANGINA PRESENCE UNSPECIFIED, UNSPECIFIED VESSEL OR LESION TYPE, UNSPECIFIED WHETHER NATIVE OR TRANSPLANTED HEART: ICD-10-CM

## 2021-08-17 DIAGNOSIS — E11.9 TYPE 2 DIABETES MELLITUS WITHOUT COMPLICATION, WITHOUT LONG-TERM CURRENT USE OF INSULIN: Primary | ICD-10-CM

## 2021-08-17 DIAGNOSIS — E66.3 OVERWEIGHT (BMI 25.0-29.9): ICD-10-CM

## 2021-08-17 DIAGNOSIS — E78.2 MIXED HYPERLIPIDEMIA: ICD-10-CM

## 2021-08-17 DIAGNOSIS — I10 UNCONTROLLED HYPERTENSION: ICD-10-CM

## 2021-08-17 PROCEDURE — 93005 ELECTROCARDIOGRAM TRACING: CPT | Mod: PBBFAC,PN | Performed by: INTERNAL MEDICINE

## 2021-08-17 PROCEDURE — 99999 PR PBB SHADOW E&M-EST. PATIENT-LVL V: ICD-10-PCS | Mod: PBBFAC,,, | Performed by: INTERNAL MEDICINE

## 2021-08-17 PROCEDURE — 99204 OFFICE O/P NEW MOD 45 MIN: CPT | Mod: 25,S$PBB,, | Performed by: INTERNAL MEDICINE

## 2021-08-17 PROCEDURE — 99215 OFFICE O/P EST HI 40 MIN: CPT | Mod: PBBFAC,PN | Performed by: INTERNAL MEDICINE

## 2021-08-17 PROCEDURE — 99204 PR OFFICE/OUTPT VISIT, NEW, LEVL IV, 45-59 MIN: ICD-10-PCS | Mod: 25,S$PBB,, | Performed by: INTERNAL MEDICINE

## 2021-08-17 PROCEDURE — 93010 EKG 12-LEAD: ICD-10-PCS | Mod: S$PBB,,, | Performed by: INTERNAL MEDICINE

## 2021-08-17 PROCEDURE — 93010 ELECTROCARDIOGRAM REPORT: CPT | Mod: S$PBB,,, | Performed by: INTERNAL MEDICINE

## 2021-08-17 PROCEDURE — 99999 PR PBB SHADOW E&M-EST. PATIENT-LVL V: CPT | Mod: PBBFAC,,, | Performed by: INTERNAL MEDICINE

## 2021-08-19 ENCOUNTER — PATIENT MESSAGE (OUTPATIENT)
Dept: OBSTETRICS AND GYNECOLOGY | Facility: CLINIC | Age: 46
End: 2021-08-19

## 2021-08-20 ENCOUNTER — PATIENT MESSAGE (OUTPATIENT)
Dept: ADMINISTRATIVE | Facility: OTHER | Age: 46
End: 2021-08-20

## 2021-08-24 NOTE — PROGRESS NOTES
I assume primary medical responsibility for this patient, I have reviewed the case history, findings, diagnosis and treatment plan with the resident and agree that the care is reasonable and necessary. This service has been performed by a resident without the presence of a teaching physician under the primary care exception.  See below addendum for my evaluation and additional findings.       Drysol Pregnancy And Lactation Text: This medication is considered safe during pregnancy and breast feeding.

## 2021-09-14 ENCOUNTER — TELEPHONE (OUTPATIENT)
Dept: SMOKING CESSATION | Facility: CLINIC | Age: 46
End: 2021-09-14

## 2021-09-21 ENCOUNTER — TELEPHONE (OUTPATIENT)
Dept: NEUROLOGY | Facility: CLINIC | Age: 46
End: 2021-09-21

## 2021-11-05 ENCOUNTER — TELEPHONE (OUTPATIENT)
Dept: SLEEP MEDICINE | Facility: CLINIC | Age: 46
End: 2021-11-05
Payer: MEDICAID

## 2021-11-05 ENCOUNTER — PATIENT MESSAGE (OUTPATIENT)
Dept: CARDIOLOGY | Facility: CLINIC | Age: 46
End: 2021-11-05
Payer: MEDICAID

## 2021-11-18 ENCOUNTER — TELEPHONE (OUTPATIENT)
Dept: CARDIOLOGY | Facility: CLINIC | Age: 46
End: 2021-11-18
Payer: MEDICAID

## 2022-01-03 ENCOUNTER — TELEPHONE (OUTPATIENT)
Dept: CARDIOLOGY | Facility: CLINIC | Age: 47
End: 2022-01-03
Payer: MEDICAID

## 2022-01-03 NOTE — TELEPHONE ENCOUNTER
Called to get patient rescheduled for office visit after her stress test since it had not been completed. [id number]

## 2022-01-07 ENCOUNTER — HOSPITAL ENCOUNTER (OUTPATIENT)
Dept: CARDIOLOGY | Facility: HOSPITAL | Age: 47
Discharge: HOME | End: 2022-01-07
Attending: INTERNAL MEDICINE
Payer: MEDICAID

## 2022-01-07 ENCOUNTER — PATIENT MESSAGE (OUTPATIENT)
Dept: CARDIOLOGY | Facility: CLINIC | Age: 47
End: 2022-01-07

## 2022-01-07 VITALS — WEIGHT: 178 LBS | HEIGHT: 68 IN | BODY MASS INDEX: 26.98 KG/M2

## 2022-01-07 DIAGNOSIS — I25.118 CORONARY ARTERY DISEASE OF NATIVE ARTERY OF NATIVE HEART WITH STABLE ANGINA PECTORIS: ICD-10-CM

## 2022-01-07 LAB
AORTIC ROOT ANNULUS: 3.15 CM
ASCENDING AORTA: 3.36 CM
AV INDEX (PROSTH): 0.63
AV MEAN GRADIENT: 4 MMHG
AV PEAK GRADIENT: 7 MMHG
AV VALVE AREA: 2.69 CM2
AV VELOCITY RATIO: 0.53
BSA FOR ECHO PROCEDURE: 1.97 M2
CV ECHO LV RWT: 0.58 CM
CV STRESS BASE HR: 83 BPM
DIASTOLIC BLOOD PRESSURE: 112 MMHG
DOP CALC AO PEAK VEL: 1.3 M/S
DOP CALC AO VTI: 24.91 CM
DOP CALC LVOT AREA: 4.3 CM2
DOP CALC LVOT DIAMETER: 2.34 CM
DOP CALC LVOT PEAK VEL: 0.69 M/S
DOP CALC LVOT STROKE VOLUME: 67.1 CM3
DOP CALC MV VTI: 12.25 CM
DOP CALCLVOT PEAK VEL VTI: 15.61 CM
E WAVE DECELERATION TIME: 151.46 MSEC
E/A RATIO: 0.57
E/E' RATIO: 7.8 M/S
ECHO LV POSTERIOR WALL: 1.38 CM (ref 0.6–1.1)
EJECTION FRACTION: 55 %
FRACTIONAL SHORTENING: 32 % (ref 28–44)
INTERVENTRICULAR SEPTUM: 1.37 CM (ref 0.6–1.1)
LA MAJOR: 4.19 CM
LA MINOR: 4.77 CM
LA WIDTH: 3.63 CM
LEFT ATRIUM SIZE: 4.57 CM
LEFT ATRIUM VOLUME INDEX MOD: 17.9 ML/M2
LEFT ATRIUM VOLUME INDEX: 32.3 ML/M2
LEFT ATRIUM VOLUME MOD: 34.97 CM3
LEFT ATRIUM VOLUME: 62.91 CM3
LEFT INTERNAL DIMENSION IN SYSTOLE: 3.24 CM (ref 2.1–4)
LEFT VENTRICLE DIASTOLIC VOLUME INDEX: 53.32 ML/M2
LEFT VENTRICLE DIASTOLIC VOLUME: 103.98 ML
LEFT VENTRICLE MASS INDEX: 134 G/M2
LEFT VENTRICLE SYSTOLIC VOLUME INDEX: 21.7 ML/M2
LEFT VENTRICLE SYSTOLIC VOLUME: 42.36 ML
LEFT VENTRICULAR INTERNAL DIMENSION IN DIASTOLE: 4.73 CM (ref 3.5–6)
LEFT VENTRICULAR MASS: 260.75 G
LV LATERAL E/E' RATIO: 6.5 M/S
LV SEPTAL E/E' RATIO: 9.75 M/S
MV MEAN GRADIENT: 0 MMHG
MV PEAK A VEL: 0.69 M/S
MV PEAK E VEL: 0.39 M/S
MV PEAK GRADIENT: 2 MMHG
MV STENOSIS PRESSURE HALF TIME: 43.92 MS
MV VALVE AREA BY CONTINUITY EQUATION: 5.48 CM2
MV VALVE AREA P 1/2 METHOD: 5.01 CM2
OHS CV CPX 1 MINUTE RECOVERY HEART RATE: 130 BPM
OHS CV CPX 85 PERCENT MAX PREDICTED HEART RATE MALE: 140
OHS CV CPX ESTIMATED METS: 7
OHS CV CPX MAX PREDICTED HEART RATE: 165
OHS CV CPX PATIENT IS FEMALE: 1
OHS CV CPX PATIENT IS MALE: 0
OHS CV CPX PEAK DIASTOLIC BLOOD PRESSURE: 120 MMHG
OHS CV CPX PEAK HEAR RATE: 162 BPM
OHS CV CPX PEAK RATE PRESSURE PRODUCT: ABNORMAL
OHS CV CPX PEAK SYSTOLIC BLOOD PRESSURE: 224 MMHG
OHS CV CPX PERCENT MAX PREDICTED HEART RATE ACHIEVED: 98
OHS CV CPX RATE PRESSURE PRODUCT PRESENTING: ABNORMAL
PISA TR MAX VEL: 2.54 M/S
PV PEAK VELOCITY: 0.72 CM/S
RA MAJOR: 4.9 CM
RA PRESSURE: 3 MMHG
RA WIDTH: 4.36 CM
RIGHT VENTRICULAR END-DIASTOLIC DIMENSION: 3.34 CM
RV TISSUE DOPPLER FREE WALL SYSTOLIC VELOCITY 1 (APICAL 4 CHAMBER VIEW): 9.57 CM/S
STJ: 2.99 CM
STRESS ANGINA INDEX: 0
STRESS DUKE TREADMILL SCORE: 1
STRESS ECHO POST EXERCISE DUR MIN: 6 MINUTES
STRESS ECHO POST EXERCISE DUR SEC: 0 SECONDS
STRESS ST DEPRESSION: 1 MM
SYSTOLIC BLOOD PRESSURE: 190 MMHG
TDI LATERAL: 0.06 M/S
TDI SEPTAL: 0.04 M/S
TDI: 0.05 M/S
TR MAX PG: 26 MMHG
TRICUSPID ANNULAR PLANE SYSTOLIC EXCURSION: 1.7 CM
TV REST PULMONARY ARTERY PRESSURE: 29 MMHG

## 2022-01-07 PROCEDURE — 93325 STRESS ECHO (CUPID ONLY): ICD-10-PCS | Mod: 26,,, | Performed by: INTERNAL MEDICINE

## 2022-01-07 PROCEDURE — 93320 DOPPLER ECHO COMPLETE: CPT | Mod: 26,,, | Performed by: INTERNAL MEDICINE

## 2022-01-07 PROCEDURE — 93351 STRESS TTE COMPLETE: CPT

## 2022-01-07 PROCEDURE — 93351 STRESS ECHO (CUPID ONLY): ICD-10-PCS | Mod: 26,,, | Performed by: INTERNAL MEDICINE

## 2022-01-07 PROCEDURE — 93320 STRESS ECHO (CUPID ONLY): ICD-10-PCS | Mod: 26,,, | Performed by: INTERNAL MEDICINE

## 2022-01-07 PROCEDURE — 93351 STRESS TTE COMPLETE: CPT | Mod: 26,,, | Performed by: INTERNAL MEDICINE

## 2022-01-07 PROCEDURE — 93325 DOPPLER ECHO COLOR FLOW MAPG: CPT | Mod: 26,,, | Performed by: INTERNAL MEDICINE

## 2022-02-17 ENCOUNTER — OFFICE VISIT (OUTPATIENT)
Dept: FAMILY MEDICINE | Facility: HOSPITAL | Age: 47
End: 2022-02-17
Attending: FAMILY MEDICINE
Payer: MEDICAID

## 2022-02-17 ENCOUNTER — HOSPITAL ENCOUNTER (EMERGENCY)
Facility: HOSPITAL | Age: 47
Discharge: LEFT BEFORE DISCHARGE | End: 2022-02-17
Payer: MEDICAID

## 2022-02-17 VITALS
WEIGHT: 180.56 LBS | HEIGHT: 68 IN | SYSTOLIC BLOOD PRESSURE: 209 MMHG | DIASTOLIC BLOOD PRESSURE: 134 MMHG | HEART RATE: 83 BPM | BODY MASS INDEX: 27.36 KG/M2

## 2022-02-17 VITALS
WEIGHT: 182 LBS | OXYGEN SATURATION: 98 % | HEART RATE: 78 BPM | DIASTOLIC BLOOD PRESSURE: 129 MMHG | RESPIRATION RATE: 20 BRPM | BODY MASS INDEX: 27.67 KG/M2 | TEMPERATURE: 98 F | SYSTOLIC BLOOD PRESSURE: 227 MMHG

## 2022-02-17 DIAGNOSIS — R03.0 ELEVATED BLOOD PRESSURE READING: ICD-10-CM

## 2022-02-17 DIAGNOSIS — E78.5 HYPERLIPIDEMIA, UNSPECIFIED HYPERLIPIDEMIA TYPE: ICD-10-CM

## 2022-02-17 DIAGNOSIS — I16.1 HYPERTENSIVE EMERGENCY: Primary | ICD-10-CM

## 2022-02-17 DIAGNOSIS — E11.69 TYPE 2 DIABETES MELLITUS WITH OTHER SPECIFIED COMPLICATION, WITHOUT LONG-TERM CURRENT USE OF INSULIN: ICD-10-CM

## 2022-02-17 DIAGNOSIS — Z86.73 HISTORY OF CVA (CEREBROVASCULAR ACCIDENT): ICD-10-CM

## 2022-02-17 DIAGNOSIS — I10 ESSENTIAL HYPERTENSION: ICD-10-CM

## 2022-02-17 DIAGNOSIS — I50.32 CHRONIC DIASTOLIC HEART FAILURE: ICD-10-CM

## 2022-02-17 PROCEDURE — 99215 OFFICE O/P EST HI 40 MIN: CPT | Performed by: STUDENT IN AN ORGANIZED HEALTH CARE EDUCATION/TRAINING PROGRAM

## 2022-02-17 PROCEDURE — 99900041 HC LEFT WITHOUT BEING SEEN- EMERGENCY

## 2022-02-17 NOTE — FIRST PROVIDER EVALUATION
Emergency Department TeleTriage Encounter Note      CHIEF COMPLAINT    Chief Complaint   Patient presents with    Hypertension     Pt was sent to ed d/t HTN. Pt states she takes multiple, BP meds and took 3 out of the 6 this am. Denies HA but c/o blurry vision from right eye which started yesterday at 0900.        VITAL SIGNS   Initial Vitals [02/17/22 1547]   BP Pulse Resp Temp SpO2   (S) (!) 227/129 78 20 97.8 °F (36.6 °C) 98 %      MAP       --            ALLERGIES    Review of patient's allergies indicates:  No Known Allergies    PROVIDER TRIAGE NOTE  This is a teletriage evaluation of a 46 y.o. female presenting to the ED complaining of elevated BP. Pt has pmhx of HTN, has taken only 3 of 6 prescription meds today. Denies CP, SOB, and headache.    Initial orders will be placed and care will be transferred to an alternate provider when patient is roomed for a full evaluation. Any additional orders and the final disposition will be determined by that provider.           ORDERS  Labs Reviewed - No data to display    ED Orders (720h ago, onward)    Start Ordered     Status Ordering Provider    02/17/22 1646 02/17/22 1645  EKG 12-lead  Once         Ordered SKYLAR SINGH            Virtual Visit Note: The provider triage portion of this emergency department evaluation and documentation was performed via [employer], a HIPAA-compliant telemedicine application, in concert with a tele-presenter in the room. A face to face patient evaluation with one of my colleagues will occur once the patient is placed in an emergency department room.      DISCLAIMER: This note was prepared with [employer] voice recognition transcription software. Garbled syntax, mangled pronouns, and other bizarre constructions may be attributed to that software system.

## 2022-02-17 NOTE — PROGRESS NOTES
"Progress Note  \[facility]\"" Family Medicine      Chief Complaint:   Chief Complaint   Patient presents with    Abdominal Pain        Subjective:    Josefina Martin is a 46 y.o.female with a pertinent history of uncontrolled hypertension, diabetes and HLD who is here for abdominal pain. Reports it radiates across her abdomen and has been present for years.      Of note, she has had multiple office visits with uncontrolled bp and states she takes her meds and has taken them today. Clinic notes state patient has admitted to being not compliant in the past. Patient reports a glaze present in her right visual field, making it difficult to see.  States this started yesterday, and she previously had this when she had a stroke.  However her vision did return after 2 weeks last time. She denies CP, SOB, headaches or other symptoms/     Review of Systems    Review of Systems   Constitutional: Negative for chills, fever and weight loss.   Eyes: Positive for blurred vision.   Respiratory: Negative for cough, hemoptysis, sputum production and shortness of breath.    Cardiovascular: Negative for chest pain, palpitations, orthopnea, claudication and leg swelling.   Gastrointestinal: Negative for abdominal pain, blood in stool, constipation, diarrhea, heartburn, melena, nausea and vomiting.   Genitourinary: Negative for dysuria, hematuria and urgency.   Skin: Negative for itching and rash.   Neurological: Negative for dizziness, tingling, weakness and headaches.   Psychiatric/Behavioral: Negative for depression, substance abuse and suicidal ideas. The patient is not nervous/anxious and does not have insomnia.       The patient slept pressure stool  Past medical, past surgical, social, and family history reviewed.    Objective:    BP (!) 209/134 (BP Location: Left arm)   Pulse 83   Ht 5' 8" (1.727 m)   Wt 81.9 kg (180 lb 8.9 oz)   BMI 27.45 kg/m²     Physical Exam:  Physical Exam  Vitals and nursing note reviewed.   Constitutional:  "      General: She is not in acute distress.     Appearance: She is not diaphoretic.   HENT:      Head: Normocephalic and atraumatic.   Cardiovascular:      Rate and Rhythm: Normal rate and regular rhythm.      Pulses: Intact distal pulses.      Heart sounds: No murmur heard.  No friction rub. No gallop.    Pulmonary:      Effort: Pulmonary effort is normal. No respiratory distress.      Breath sounds: Normal breath sounds. No wheezing or rales.   Chest:      Chest wall: No tenderness.   Abdominal:      General: Bowel sounds are normal. There is no distension.      Palpations: Abdomen is soft.      Tenderness: There is no abdominal tenderness.   Skin:     General: Skin is warm and dry.      Findings: No erythema.   Neurological:      Mental Status: She is alert and oriented to person, place, and time.   Psychiatric:         Mood and Affect: Mood and affect normal.         Cognition and Memory: Memory normal.         Judgment: Judgment normal.         Laboratory:    Reviewed labs and imaging.    In office EKG, NSR. No ST changes noted. See media tab for image.   Assessment Plan    1. Hypertensive emergency  -patient with blurry vision of her right eye and extremely elevated pressure. Patient taken to the emergency room for further evaluation. Discussed with patient the importance of antihypertensive therapy and why it is important to keep bp under control. Pt states she has had a stroke and MI, so she knows.     2. Type 2 diabetes mellitus with other specified complication, without long-term current use of insulin    3. Essential hypertension      4. Chronic diastolic heart failure      5. Hyperlipidemia, unspecified hyperlipidemia type      6. History of CVA (cerebrovascular accident)      Patient taken to the emergency room with clinic staff. Emergency room and inpatient team notified.     Follow Up:  after emergency eval, needs to f/u with PCP    The patient's diagnosis and medications were discussed.    I will  review labs and notify patient with results either by mail or contact by phone.      02/17/2022  Waqas Hercules M.D.  [facility] Family Medicine PGY-3    *This note was dictated using the [employer] Direct word recognition program. There are word recognition mistakes that are occasionally missed on review.

## 2022-02-18 ENCOUNTER — OFFICE VISIT (OUTPATIENT)
Dept: CARDIOLOGY | Facility: CLINIC | Age: 47
End: 2022-02-18
Payer: MEDICAID

## 2022-02-18 ENCOUNTER — OFFICE VISIT (OUTPATIENT)
Dept: GASTROENTEROLOGY | Facility: CLINIC | Age: 47
End: 2022-02-18
Payer: MEDICAID

## 2022-02-18 VITALS
DIASTOLIC BLOOD PRESSURE: 114 MMHG | SYSTOLIC BLOOD PRESSURE: 173 MMHG | HEIGHT: 68 IN | WEIGHT: 179.31 LBS | BODY MASS INDEX: 27.17 KG/M2 | HEART RATE: 78 BPM

## 2022-02-18 VITALS
DIASTOLIC BLOOD PRESSURE: 84 MMHG | WEIGHT: 178 LBS | SYSTOLIC BLOOD PRESSURE: 176 MMHG | HEIGHT: 68 IN | BODY MASS INDEX: 26.98 KG/M2 | OXYGEN SATURATION: 98 % | HEART RATE: 79 BPM

## 2022-02-18 DIAGNOSIS — Z91.89 AT RISK FOR SLEEP APNEA: ICD-10-CM

## 2022-02-18 DIAGNOSIS — I25.10 CORONARY ARTERY DISEASE INVOLVING NATIVE CORONARY ARTERY OF NATIVE HEART WITHOUT ANGINA PECTORIS: ICD-10-CM

## 2022-02-18 DIAGNOSIS — E66.3 OVERWEIGHT (BMI 25.0-29.9): ICD-10-CM

## 2022-02-18 DIAGNOSIS — E11.9 TYPE 2 DIABETES MELLITUS WITHOUT COMPLICATION, WITHOUT LONG-TERM CURRENT USE OF INSULIN: ICD-10-CM

## 2022-02-18 DIAGNOSIS — I50.9 CONGESTIVE HEART FAILURE, UNSPECIFIED HF CHRONICITY, UNSPECIFIED HEART FAILURE TYPE: ICD-10-CM

## 2022-02-18 DIAGNOSIS — I10 ESSENTIAL HYPERTENSION: ICD-10-CM

## 2022-02-18 DIAGNOSIS — R10.84 GENERALIZED ABDOMINAL PAIN: ICD-10-CM

## 2022-02-18 DIAGNOSIS — I50.32 CHRONIC DIASTOLIC HEART FAILURE: ICD-10-CM

## 2022-02-18 DIAGNOSIS — K59.04 CHRONIC IDIOPATHIC CONSTIPATION: Primary | ICD-10-CM

## 2022-02-18 DIAGNOSIS — Z87.11 HISTORY OF GASTRIC ULCER: ICD-10-CM

## 2022-02-18 DIAGNOSIS — Z72.0 TOBACCO ABUSE: ICD-10-CM

## 2022-02-18 DIAGNOSIS — E78.2 MIXED HYPERLIPIDEMIA: ICD-10-CM

## 2022-02-18 DIAGNOSIS — I10 UNCONTROLLED HYPERTENSION: ICD-10-CM

## 2022-02-18 PROCEDURE — 99999 PR PBB SHADOW E&M-EST. PATIENT-LVL III: CPT | Mod: PBBFAC,,, | Performed by: INTERNAL MEDICINE

## 2022-02-18 PROCEDURE — 1159F MED LIST DOCD IN RCRD: CPT | Mod: CPTII,,, | Performed by: INTERNAL MEDICINE

## 2022-02-18 PROCEDURE — 1159F PR MEDICATION LIST DOCUMENTED IN MEDICAL RECORD: ICD-10-PCS | Mod: CPTII,,, | Performed by: NURSE PRACTITIONER

## 2022-02-18 PROCEDURE — 99999 PR PBB SHADOW E&M-EST. PATIENT-LVL V: CPT | Mod: PBBFAC,,, | Performed by: NURSE PRACTITIONER

## 2022-02-18 PROCEDURE — 99999 PR PBB SHADOW E&M-EST. PATIENT-LVL V: ICD-10-PCS | Mod: PBBFAC,,, | Performed by: NURSE PRACTITIONER

## 2022-02-18 PROCEDURE — 1159F MED LIST DOCD IN RCRD: CPT | Mod: CPTII,,, | Performed by: NURSE PRACTITIONER

## 2022-02-18 PROCEDURE — 1160F RVW MEDS BY RX/DR IN RCRD: CPT | Mod: CPTII,,, | Performed by: INTERNAL MEDICINE

## 2022-02-18 PROCEDURE — 3077F PR MOST RECENT SYSTOLIC BLOOD PRESSURE >= 140 MM HG: ICD-10-PCS | Mod: CPTII,,, | Performed by: INTERNAL MEDICINE

## 2022-02-18 PROCEDURE — 3008F BODY MASS INDEX DOCD: CPT | Mod: CPTII,,, | Performed by: INTERNAL MEDICINE

## 2022-02-18 PROCEDURE — 3080F PR MOST RECENT DIASTOLIC BLOOD PRESSURE >= 90 MM HG: ICD-10-PCS | Mod: CPTII,,, | Performed by: NURSE PRACTITIONER

## 2022-02-18 PROCEDURE — 99999 PR PBB SHADOW E&M-EST. PATIENT-LVL III: ICD-10-PCS | Mod: PBBFAC,,, | Performed by: INTERNAL MEDICINE

## 2022-02-18 PROCEDURE — 1159F PR MEDICATION LIST DOCUMENTED IN MEDICAL RECORD: ICD-10-PCS | Mod: CPTII,,, | Performed by: INTERNAL MEDICINE

## 2022-02-18 PROCEDURE — 3008F PR BODY MASS INDEX (BMI) DOCUMENTED: ICD-10-PCS | Mod: CPTII,,, | Performed by: INTERNAL MEDICINE

## 2022-02-18 PROCEDURE — 99215 OFFICE O/P EST HI 40 MIN: CPT | Mod: PBBFAC,PO | Performed by: NURSE PRACTITIONER

## 2022-02-18 PROCEDURE — 3008F PR BODY MASS INDEX (BMI) DOCUMENTED: ICD-10-PCS | Mod: CPTII,,, | Performed by: NURSE PRACTITIONER

## 2022-02-18 PROCEDURE — 3077F SYST BP >= 140 MM HG: CPT | Mod: CPTII,,, | Performed by: NURSE PRACTITIONER

## 2022-02-18 PROCEDURE — 1160F RVW MEDS BY RX/DR IN RCRD: CPT | Mod: CPTII,,, | Performed by: NURSE PRACTITIONER

## 2022-02-18 PROCEDURE — 99214 OFFICE O/P EST MOD 30 MIN: CPT | Mod: S$PBB,,, | Performed by: NURSE PRACTITIONER

## 2022-02-18 PROCEDURE — 99213 OFFICE O/P EST LOW 20 MIN: CPT | Mod: PBBFAC,27,PN | Performed by: INTERNAL MEDICINE

## 2022-02-18 PROCEDURE — 3080F DIAST BP >= 90 MM HG: CPT | Mod: CPTII,,, | Performed by: NURSE PRACTITIONER

## 2022-02-18 PROCEDURE — 99214 PR OFFICE/OUTPT VISIT, EST, LEVL IV, 30-39 MIN: ICD-10-PCS | Mod: S$PBB,,, | Performed by: INTERNAL MEDICINE

## 2022-02-18 PROCEDURE — 99214 OFFICE O/P EST MOD 30 MIN: CPT | Mod: S$PBB,,, | Performed by: INTERNAL MEDICINE

## 2022-02-18 PROCEDURE — 3008F BODY MASS INDEX DOCD: CPT | Mod: CPTII,,, | Performed by: NURSE PRACTITIONER

## 2022-02-18 PROCEDURE — 1160F PR REVIEW ALL MEDS BY PRESCRIBER/CLIN PHARMACIST DOCUMENTED: ICD-10-PCS | Mod: CPTII,,, | Performed by: NURSE PRACTITIONER

## 2022-02-18 PROCEDURE — 3077F PR MOST RECENT SYSTOLIC BLOOD PRESSURE >= 140 MM HG: ICD-10-PCS | Mod: CPTII,,, | Performed by: NURSE PRACTITIONER

## 2022-02-18 PROCEDURE — 3079F DIAST BP 80-89 MM HG: CPT | Mod: CPTII,,, | Performed by: INTERNAL MEDICINE

## 2022-02-18 PROCEDURE — 3079F PR MOST RECENT DIASTOLIC BLOOD PRESSURE 80-89 MM HG: ICD-10-PCS | Mod: CPTII,,, | Performed by: INTERNAL MEDICINE

## 2022-02-18 PROCEDURE — 1160F PR REVIEW ALL MEDS BY PRESCRIBER/CLIN PHARMACIST DOCUMENTED: ICD-10-PCS | Mod: CPTII,,, | Performed by: INTERNAL MEDICINE

## 2022-02-18 PROCEDURE — 3077F SYST BP >= 140 MM HG: CPT | Mod: CPTII,,, | Performed by: INTERNAL MEDICINE

## 2022-02-18 PROCEDURE — 99214 PR OFFICE/OUTPT VISIT, EST, LEVL IV, 30-39 MIN: ICD-10-PCS | Mod: S$PBB,,, | Performed by: NURSE PRACTITIONER

## 2022-02-18 RX ORDER — CHLORTHALIDONE 25 MG/1
25 TABLET ORAL DAILY
Qty: 30 TABLET | Refills: 11 | Status: SHIPPED | OUTPATIENT
Start: 2022-02-18 | End: 2023-03-22 | Stop reason: SDUPTHER

## 2022-02-18 RX ORDER — NAPROXEN SODIUM 220 MG/1
81 TABLET, FILM COATED ORAL DAILY
Qty: 90 TABLET | Refills: 3 | Status: SHIPPED | OUTPATIENT
Start: 2022-02-18 | End: 2022-05-12 | Stop reason: SDUPTHER

## 2022-02-18 RX ORDER — DICYCLOMINE HYDROCHLORIDE 20 MG/1
20 TABLET ORAL 3 TIMES DAILY PRN
Qty: 60 TABLET | Refills: 5 | Status: SHIPPED | OUTPATIENT
Start: 2022-02-18 | End: 2022-07-14 | Stop reason: ALTCHOICE

## 2022-02-18 RX ORDER — CARVEDILOL 12.5 MG/1
12.5 TABLET ORAL 2 TIMES DAILY WITH MEALS
Qty: 180 TABLET | Refills: 3 | Status: SHIPPED | OUTPATIENT
Start: 2022-02-18 | End: 2022-07-08 | Stop reason: DRUGHIGH

## 2022-02-18 RX ORDER — ATORVASTATIN CALCIUM 40 MG/1
40 TABLET, FILM COATED ORAL DAILY
Qty: 90 TABLET | Refills: 3 | Status: SHIPPED | OUTPATIENT
Start: 2022-02-18 | End: 2023-09-20

## 2022-02-18 RX ORDER — DICYCLOMINE HYDROCHLORIDE 20 MG/1
20 TABLET ORAL 3 TIMES DAILY PRN
Qty: 60 TABLET | Refills: 5 | Status: SHIPPED | OUTPATIENT
Start: 2022-02-18 | End: 2022-02-18 | Stop reason: SDUPTHER

## 2022-02-18 RX ORDER — PANTOPRAZOLE SODIUM 40 MG/1
40 TABLET, DELAYED RELEASE ORAL
Qty: 30 TABLET | Refills: 5 | Status: SHIPPED | OUTPATIENT
Start: 2022-02-18 | End: 2022-09-03

## 2022-02-18 RX ORDER — NIFEDIPINE 90 MG/1
90 TABLET, EXTENDED RELEASE ORAL DAILY
Qty: 90 TABLET | Refills: 3 | Status: SHIPPED | OUTPATIENT
Start: 2022-02-18 | End: 2022-07-08 | Stop reason: SDUPTHER

## 2022-02-18 NOTE — PATIENT INSTRUCTIONS
- Take Dulcolax 1 tablet with 1-2 bottles of magnesium citrate for colon cleanse. Drink the second bottle of mag citrate with 2 hours of first bottle  - Start Linzess 1 capsule daily. May increase to 2 capsules daily after 1 week if no improvement.

## 2022-02-18 NOTE — ASSESSMENT & PLAN NOTE
Uncontrolled.  Goal BP < 130/80.  Compliant w/ meds.    - resume carvedilol and add chlorthalidone to regimen  - d/c furosemide   - check BMP in 1 week  - enroll in digital HTN prgm  - risk factor and lifestyle modifications
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
## 2022-02-18 NOTE — ASSESSMENT & PLAN NOTE
Pt desires to quit and is aware of health complications a/w smoking.    - cessation counseling provided  - cessation course referral placed

## 2022-02-18 NOTE — ASSESSMENT & PLAN NOTE
SABA negative for ischemia. S/p PCI for NSTEMI in 2009.  - continue medical therapy  - risk factor and lifestyle modification

## 2022-02-18 NOTE — ASSESSMENT & PLAN NOTE
Near goal.  Goal LDL < 70, last LDL 74 8/5/2021.  Compliant w/ statin therapy.    - continue medical therapy  - risk factor and lifestyle modifications

## 2022-02-18 NOTE — PROGRESS NOTES
Subjective:       Patient ID: Josefina Martin is a 46 y.o. female.    Chief Complaint: Constipation and Abdominal Pain    44 y/o female with hypertension, diabetes mellitus, and hx of CVA, MI and gastric ulcer presents to clinic for follow up with c/o abdominal pain and constipation.    Constipation  This is a recurrent problem. The problem has been waxing and waning since onset. Her stool frequency is 2 to 3 times per week. The stool is described as firm and pellet like. The patient is not on a high fiber diet. She does not exercise regularly. There has been adequate water intake. Associated symptoms include abdominal pain and bloating. Pertinent negatives include no fever, hemorrhoids or rectal pain. She has tried stool softeners and laxatives for the symptoms. The treatment provided mild relief.       Past Medical History:   Diagnosis Date    Cerebrovascular accident (CVA) 3/24/2017    CHF (congestive heart failure)     Diabetes mellitus     Hypertension     MI (myocardial infarction)     Stroke        Past Surgical History:   Procedure Laterality Date    CHOLECYSTECTOMY  2013    history of cholelithiasis    ESOPHAGOGASTRODUODENOSCOPY N/A 10/21/2020    Procedure: EGD (ESOPHAGOGASTRODUODENOSCOPY);  Surgeon: Asha Blackwell MD;  Location: [facility];  Service: Endoscopy;  Laterality: N/A;    ESOPHAGOGASTRODUODENOSCOPY N/A 6/15/2021    Procedure: EGD (ESOPHAGOGASTRODUODENOSCOPY);  Surgeon: Asha Blackwell MD;  Location: [facility];  Service: Endoscopy;  Laterality: N/A;    TUBAL LIGATION         Family History   Problem Relation Age of Onset    Hypertension Mother     Lung cancer Mother     No Known Problems Father     No Known Problems Sister     No Known Problems Daughter     Diabetes Son 14        Takes 2 insulins and metformin use to be bigger wally    Stroke Maternal Uncle 48    Anuerysm Maternal Grandmother     Breast cancer Maternal Grandmother     No Known Problems Sister     No  "Known Problems Daughter     No Known Problems Son     Breast cancer Maternal Aunt     Breast cancer Maternal Aunt        Social History     Socioeconomic History    Marital status: Single   Occupational History     Employer: [employer]   Tobacco Use    Smoking status: Current Every Day Smoker     Packs/day: 0.15     Years: 18.00     Pack years: 2.70     Types: Cigarettes    Smokeless tobacco: Never Used    Tobacco comment: 1 pack/week   Substance and Sexual Activity    Alcohol use: Yes     Comment: social 1/month    Drug use: No    Sexual activity: Yes     Partners: Male     Birth control/protection: None, Surgical       Review of Systems   Constitutional: Negative for appetite change, fatigue, fever and unexpected weight change.   HENT: Negative for trouble swallowing.    Respiratory: Negative for cough and shortness of breath.    Cardiovascular: Negative for chest pain.   Gastrointestinal: Positive for abdominal pain, bloating and constipation. Negative for hemorrhoids and rectal pain.   Genitourinary: Negative for dysuria.   Musculoskeletal: Negative for myalgias.   Neurological: Negative for dizziness and weakness.   Hematological: Negative for adenopathy.   Psychiatric/Behavioral: Negative for dysphoric mood.         Objective:     Vitals:    02/18/22 1131   BP: (!) 173/114   Pulse: 78   Weight: 81.3 kg (179 lb 4.8 oz)   Height: 5' 8" (1.727 m)          Physical Exam  Constitutional:       General: She is not in acute distress.     Appearance: Normal appearance. She is not ill-appearing.   HENT:      Head: Normocephalic.   Eyes:      Conjunctiva/sclera: Conjunctivae normal.      Pupils: Pupils are equal, round, and reactive to light.   Pulmonary:      Effort: Pulmonary effort is normal. No respiratory distress.   Musculoskeletal:         General: Normal range of motion.   Skin:     General: Skin is warm and dry.      Coloration: Skin is not jaundiced or pale.   Neurological:      Mental Status: She is " alert and oriented to person, place, and time.   Psychiatric:         Mood and Affect: Mood normal.         Behavior: Behavior normal.               Assessment:         ICD-10-CM ICD-9-CM   1. Chronic idiopathic constipation  K59.04 564.00   2. Generalized abdominal pain  R10.84 789.07   3. History of gastric ulcer  Z87.11 V12.79       Plan:       Chronic idiopathic constipation  -     linaCLOtide (LINZESS) 72 mcg Cap capsule; Take 1 capsule (72 mcg total) by mouth once daily.  Dispense: 30 capsule; Refill: 2    Generalized abdominal pain  -     dicyclomine (BENTYL) 20 mg tablet; Take 1 tablet (20 mg total) by mouth 3 (three) times daily as needed (abdominal pain).  Dispense: 60 tablet; Refill: 5    History of gastric ulcer  -     pantoprazole (PROTONIX) 40 MG tablet; Take 1 tablet (40 mg total) by mouth before breakfast.  Dispense: 30 tablet; Refill: 5      Follow up if symptoms worsen or fail to improve.     Patient's Medications   New Prescriptions    LINACLOTIDE (LINZESS) 72 MCG CAP CAPSULE    Take 1 capsule (72 mcg total) by mouth once daily.   Previous Medications    ASPIRIN 81 MG CHEW    Take 1 tablet (81 mg total) by mouth once daily.    ATORVASTATIN (LIPITOR) 40 MG TABLET    Take 1 tablet (40 mg total) by mouth once daily.    CARVEDILOL (COREG) 12.5 MG TABLET    Take 1 tablet (12.5 mg total) by mouth 2 (two) times daily with meals.    CLOPIDOGREL (PLAVIX) 75 MG TABLET    Take 1 tablet (75 mg total) by mouth once daily.    FLUTICASONE PROPIONATE (FLONASE) 50 MCG/ACTUATION NASAL SPRAY    PLACE 2 SPRAYS IN THE NOSTRILS D    FUROSEMIDE (LASIX) 20 MG TABLET    Take 1 tablet (20 mg total) by mouth once daily.    GABAPENTIN (NEURONTIN) 100 MG CAPSULE    Take 1 capsule (100 mg total) by mouth once daily.    LISINOPRIL (PRINIVIL,ZESTRIL) 40 MG TABLET    Take 1 tablet (40 mg total) by mouth once daily.    LORATADINE (CLARITIN) 10 MG TABLET    Take 10 mg by mouth daily as needed for Allergies.    METFORMIN  (GLUCOPHAGE-XR) 500 MG 24 HR TABLET    Take 1 tablet (500 mg total) by mouth 2 (two) times daily with meals.    MIRENA 20 MCG/24 HR (5 YEARS) IUD        NIFEDIPINE (PROCARDIA-XL) 90 MG (OSM) 24 HR TABLET    Take 1 tablet (90 mg total) by mouth once daily.    NYSTATIN (MYCOSTATIN) POWDER    Apply topically 2 (two) times daily.    ONDANSETRON (ZOFRAN) 4 MG TABLET    Take 1 tablet (4 mg total) by mouth every 6 (six) hours.    POLYETHYLENE GLYCOL (GLYCOLAX) 17 GRAM PWPK    Take 17 g by mouth once daily.    SPIRONOLACTONE (ALDACTONE) 50 MG TABLET    Take 1 tablet (50 mg total) by mouth once daily.    VENLAFAXINE (EFFEXOR-XR) 37.5 MG 24 HR CAPSULE    Take 1 capsule (37.5 mg total) by mouth once daily.   Modified Medications    Modified Medication Previous Medication    DICYCLOMINE (BENTYL) 20 MG TABLET dicyclomine (BENTYL) 20 mg tablet       Take 1 tablet (20 mg total) by mouth 3 (three) times daily as needed (abdominal pain).    Take 1 tablet (20 mg total) by mouth 3 (three) times daily as needed (abdominal pain).    PANTOPRAZOLE (PROTONIX) 40 MG TABLET pantoprazole (PROTONIX) 40 MG tablet       Take 1 tablet (40 mg total) by mouth before breakfast.    Take 1 tablet (40 mg total) by mouth before breakfast.   Discontinued Medications    No medications on file

## 2022-02-18 NOTE — PATIENT INSTRUCTIONS
Patient Education       Chlorthalidone (klsolange ESQUEDA i done)   Brand Names: US Thalitone   Brand Names: Savi APO-Chlorthalidone [DSC]   What is this drug used for?   · It is used to treat high blood pressure.  · It is used to get rid of extra fluid.  · It may be given to you for other reasons. Talk with the doctor.    What do I need to tell my doctor BEFORE I take this drug?   · If you are allergic to this drug; any part of this drug; or any other drugs, foods, or substances. Tell your doctor about the allergy and what signs you had.  · If you have a sulfa allergy.  · If you are not able to pass urine.  · If you are breast-feeding. Do not breast-feed while you take this drug.  This is not a list of all drugs or health problems that interact with this drug.  Tell your doctor and pharmacist about all of your drugs (prescription or OTC, natural products, vitamins) and health problems. You must check to make sure that it is safe for you to take this drug with all of your drugs and health problems. Do not start, stop, or change the dose of any drug without checking with your doctor.  What are some things I need to know or do while I take this drug?   · Tell all of your health care providers that you take this drug. This includes your doctors, nurses, pharmacists, and dentists.  · Avoid driving and doing other tasks or actions that call for you to be alert until you see how this drug affects you.  · To lower the chance of feeling dizzy or passing out, rise slowly if you have been sitting or lying down. Be careful going up and down stairs.  · Check your blood pressure as you have been told.  · This drug may cause high cholesterol and triglyceride levels. Talk with the doctor.  · Have blood work checked as you have been told by the doctor. Talk with the doctor.  · This drug may affect certain lab tests. Tell all of your health care providers and lab workers that you take this drug.  · Talk with your doctor before you use  alcohol, marijuana or other forms of cannabis, or prescription or OTC drugs that may slow your actions.  · If you have high blood sugar (diabetes), you will need to watch your blood sugar closely. Tell your doctor if you get signs of high blood sugar like confusion, feeling sleepy, more thirst, more hungry, passing urine more often, flushing, fast breathing, or breath that smells like fruit.  · If you are taking this drug and have high blood pressure, talk with your doctor before using OTC products that may raise blood pressure. These include cough or cold drugs, diet pills, stimulants, non-steroidal anti-inflammatory drugs (NSAIDs) like ibuprofen or naproxen, and some natural products or aids.  · This drug is a strong fluid-lowering drug (diuretic). Sometimes too much water and electrolytes (like potassium) in the blood may be lost. This can lead to severe health problems. Your doctor will follow you closely to change the dose to match your body's needs.  · Watch for gout attacks.  · If you are on a low-salt or salt-free diet, talk with your doctor.  · This drug may make you sunburn more easily. Use care if you will be in the sun. Tell your doctor if you sunburn easily while taking this drug.  · Tell your doctor if you are pregnant or plan on getting pregnant. You will need to talk about the benefits and risks of using this drug while you are pregnant.    What are some side effects that I need to call my doctor about right away?   WARNING/CAUTION: Even though it may be rare, some people may have very bad and sometimes deadly side effects when taking a drug. Tell your doctor or get medical help right away if you have any of the following signs or symptoms that may be related to a very bad side effect:  · Signs of an allergic reaction, like rash; hives; itching; red, swollen, blistered, or peeling skin with or without fever; wheezing; tightness in the chest or throat; trouble breathing, swallowing, or talking;  unusual hoarseness; or swelling of the mouth, face, lips, tongue, or throat.  · Signs of fluid and electrolyte problems like mood changes, confusion, muscle pain or weakness, a heartbeat that does not feel normal, very bad dizziness or passing out, fast heartbeat, more thirst, seizures, feeling very tired or weak, not hungry, unable to pass urine or change in the amount of urine produced, dry mouth, dry eyes, or very bad upset stomach or throwing up.  · Signs of kidney problems like unable to pass urine, change in how much urine is passed, blood in the urine, or a big weight gain.  · Signs of a pancreas problem (pancreatitis) like very bad stomach pain, very bad back pain, or very bad upset stomach or throwing up.  · A burning, numbness, or tingling feeling that is not normal.  · Not able to get or keep an erection.  · Restlessness.  · Yellow skin or eyes.  · Change in eyesight.  · Fever, chills, or sore throat; any unexplained bruising or bleeding; or feeling very tired or weak.  What are some other side effects of this drug?   All drugs may cause side effects. However, many people have no side effects or only have minor side effects. Call your doctor or get medical help if any of these side effects or any other side effects bother you or do not go away:  · Feeling dizzy, tired, or weak.  · Headache.  · Constipation, diarrhea, upset stomach, throwing up, or feeling less hungry.  · Stomach cramps.  These are not all of the side effects that may occur. If you have questions about side effects, call your doctor. Call your doctor for medical advice about side effects.  You may report side effects to your national health agency.  You may report side effects to the FDA at 1-622.300.1727. You may also report side effects at https://www.fda.gov/medwatch.  How is this drug best taken?   Use this drug as ordered by your doctor. Read all information given to you. Follow all instructions closely.  · Take this drug with  food.  · This drug may cause you to pass urine more often. To keep from having sleep problems, try not to take too close to bedtime.  · Keep taking this drug as you have been told by your doctor or other health care provider, even if you feel well.  What do I do if I miss a dose?   · Take a missed dose as soon as you think about it.  · If it is close to the time for your next dose, skip the missed dose and go back to your normal time.  · Do not take 2 doses at the same time or extra doses.    How do I store and/or throw out this drug?   · Store at room temperature in a dry place. Do not store in a bathroom.  · Keep all drugs in a safe place. Keep all drugs out of the reach of children and pets.  · Throw away unused or  drugs. Do not flush down a toilet or pour down a drain unless you are told to do so. Check with your pharmacist if you have questions about the best way to throw out drugs. There may be drug take-back programs in your area.    General drug facts   · If your symptoms or health problems do not get better or if they become worse, call your doctor.  · Do not share your drugs with others and do not take anyone else's drugs.  · Some drugs may have another patient information leaflet. If you have any questions about this drug, please talk with your doctor, nurse, pharmacist, or other health care provider.  · Some drugs may have another patient information leaflet. Check with your pharmacist. If you have any questions about this drug, please talk with your doctor, nurse, pharmacist, or other health care provider.  · If you think there has been an overdose, call your poison control center or get medical care right away. Be ready to tell or show what was taken, how much, and when it happened.    Consumer Information Use and Disclaimer   This generalized information is a limited summary of diagnosis, treatment, and/or medication information. It is not meant to be comprehensive and should be used as a tool  to help the user understand and/or assess potential diagnostic and treatment options. It does NOT include all information about conditions, treatments, medications, side effects, or risks that may apply to a specific patient. It is not intended to be medical advice or a substitute for the medical advice, diagnosis, or treatment of a health care provider based on the health care provider's examination and assessment of a patient's specific and unique circumstances. Patients must speak with a health care provider for complete information about their health, medical questions, and treatment options, including any risks or benefits regarding use of medications. This information does not endorse any treatments or medications as safe, effective, or approved for treating a specific patient. Up[employer], Inc[employer] and its affiliates disclaim any warranty or liability relating to this information or the use thereof. The use of this information is governed by the Terms of Use, available at https://www.[employer].com/en/solutions/lexicomp/about/mandy.  Last Reviewed Date   2021-07-13  Copyright   © 2021 [employer]Date, Inc[employer] and its affiliates and/or licensors. All rights reserved.

## 2022-03-17 ENCOUNTER — CLINICAL SUPPORT (OUTPATIENT)
Dept: SMOKING CESSATION | Facility: CLINIC | Age: 47
End: 2022-03-17
Payer: COMMERCIAL

## 2022-03-17 DIAGNOSIS — F17.210 CIGARETTE SMOKER: Primary | ICD-10-CM

## 2022-03-17 PROCEDURE — 99999 PR PBB SHADOW E&M-EST. PATIENT-LVL III: CPT | Mod: PBBFAC,,,

## 2022-03-17 PROCEDURE — 99404 PREV MED CNSL INDIV APPRX 60: CPT | Mod: S$GLB,,,

## 2022-03-17 PROCEDURE — 99404 PR PREVENT COUNSEL,INDIV,60 MIN: ICD-10-PCS | Mod: S$GLB,,,

## 2022-03-17 PROCEDURE — 99999 PR PBB SHADOW E&M-EST. PATIENT-LVL III: ICD-10-PCS | Mod: PBBFAC,,,

## 2022-03-17 RX ORDER — MICONAZOLE NITRATE 2 %
2 CREAM (GRAM) TOPICAL
Qty: 100 EACH | Refills: 0 | Status: SHIPPED | OUTPATIENT
Start: 2022-03-17 | End: 2022-09-03

## 2022-03-17 RX ORDER — IBUPROFEN 200 MG
1 TABLET ORAL DAILY
Qty: 14 PATCH | Refills: 0 | Status: SHIPPED | OUTPATIENT
Start: 2022-03-17 | End: 2022-09-03

## 2022-03-17 NOTE — PROGRESS NOTES
CESD of 7 is perceived as no mental distress or depression at this time. FTND of 4 Indicates a moderate  level  of tobacco/nicotine dependency. Exhaled carbon monoxide level was 7 ppm per [employer]. Patient smoking 4-5 cpd  more on days off. The patient will begin the prescribed tobacco cessation medication regimen of 21 mg  Patches and 2 mg gum.

## 2022-04-06 ENCOUNTER — PATIENT MESSAGE (OUTPATIENT)
Dept: CARDIOLOGY | Facility: CLINIC | Age: 47
End: 2022-04-06
Payer: MEDICAID

## 2022-04-13 ENCOUNTER — TELEPHONE (OUTPATIENT)
Dept: SMOKING CESSATION | Facility: CLINIC | Age: 47
End: 2022-04-13
Payer: MEDICAID

## 2022-04-13 NOTE — TELEPHONE ENCOUNTER
Attempted to call patient for their smoking cessation appointment. Left a message with my contact information to reschedule the appointment  Juju Ewing [phone].

## 2022-04-14 ENCOUNTER — LAB VISIT (OUTPATIENT)
Dept: LAB | Facility: HOSPITAL | Age: 47
End: 2022-04-14
Attending: INTERNAL MEDICINE
Payer: MEDICAID

## 2022-04-14 ENCOUNTER — OFFICE VISIT (OUTPATIENT)
Dept: FAMILY MEDICINE | Facility: HOSPITAL | Age: 47
End: 2022-04-14
Attending: STUDENT IN AN ORGANIZED HEALTH CARE EDUCATION/TRAINING PROGRAM
Payer: MEDICAID

## 2022-04-14 VITALS
HEART RATE: 67 BPM | BODY MASS INDEX: 27.76 KG/M2 | DIASTOLIC BLOOD PRESSURE: 101 MMHG | HEIGHT: 68 IN | WEIGHT: 183.19 LBS | SYSTOLIC BLOOD PRESSURE: 172 MMHG
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
## 2022-04-14 DIAGNOSIS — E78.2 MIXED HYPERLIPIDEMIA: ICD-10-CM

## 2022-04-14 DIAGNOSIS — B37.89 CANDIDA RASH OF GROIN: ICD-10-CM

## 2022-04-14 DIAGNOSIS — E11.9 TYPE 2 DIABETES MELLITUS WITHOUT COMPLICATION, WITHOUT LONG-TERM CURRENT USE OF INSULIN: ICD-10-CM

## 2022-04-14 DIAGNOSIS — Z12.31 ENCOUNTER FOR SCREENING MAMMOGRAM FOR MALIGNANT NEOPLASM OF BREAST: ICD-10-CM

## 2022-04-14 DIAGNOSIS — S40.862A INSECT BITE OF LEFT UPPER ARM, INITIAL ENCOUNTER: ICD-10-CM

## 2022-04-14 DIAGNOSIS — I10 ESSENTIAL HYPERTENSION: ICD-10-CM

## 2022-04-14 DIAGNOSIS — I10 ESSENTIAL HYPERTENSION: Primary | ICD-10-CM

## 2022-04-14 LAB
ALBUMIN SERPL BCP-MCNC: 4.2 G/DL (ref 3.5–5.2)
ALP SERPL-CCNC: 115 U/L (ref 55–135)
ALT SERPL W/O P-5'-P-CCNC: 20 U/L (ref 10–44)
ANION GAP SERPL CALC-SCNC: 13 MMOL/L (ref 8–16)
ANION GAP SERPL CALC-SCNC: 13 MMOL/L (ref 8–16)
AST SERPL-CCNC: 21 U/L (ref 10–40)
BASOPHILS # BLD AUTO: 0.03 K/UL (ref 0–0.2)
BASOPHILS NFR BLD: 0.5 % (ref 0–1.9)
BILIRUB SERPL-MCNC: 0.5 MG/DL (ref 0.1–1)
BUN SERPL-MCNC: 16 MG/DL (ref 6–20)
BUN SERPL-MCNC: 16 MG/DL (ref 6–20)
CALCIUM SERPL-MCNC: 9.5 MG/DL (ref 8.7–10.5)
CALCIUM SERPL-MCNC: 9.5 MG/DL (ref 8.7–10.5)
CHLORIDE SERPL-SCNC: 102 MMOL/L (ref 95–110)
CHLORIDE SERPL-SCNC: 102 MMOL/L (ref 95–110)
CHOLEST SERPL-MCNC: 151 MG/DL (ref 120–199)
CHOLEST/HDLC SERPL: 4.1 {RATIO} (ref 2–5)
CO2 SERPL-SCNC: 23 MMOL/L (ref 23–29)
CO2 SERPL-SCNC: 23 MMOL/L (ref 23–29)
CREAT SERPL-MCNC: 1 MG/DL (ref 0.5–1.4)
CREAT SERPL-MCNC: 1 MG/DL (ref 0.5–1.4)
DIFFERENTIAL METHOD: NORMAL
EOSINOPHIL # BLD AUTO: 0.2 K/UL (ref 0–0.5)
EOSINOPHIL NFR BLD: 2.4 % (ref 0–8)
ERYTHROCYTE [DISTWIDTH] IN BLOOD BY AUTOMATED COUNT: 13 % (ref 11.5–14.5)
EST. GFR  (AFRICAN AMERICAN): >60 ML/MIN/1.73 M^2
EST. GFR  (AFRICAN AMERICAN): >60 ML/MIN/1.73 M^2
EST. GFR  (NON AFRICAN AMERICAN): >60 ML/MIN/1.73 M^2
EST. GFR  (NON AFRICAN AMERICAN): >60 ML/MIN/1.73 M^2
ESTIMATED AVG GLUCOSE: 146 MG/DL (ref 68–131)
GLUCOSE SERPL-MCNC: 143 MG/DL (ref 70–110)
GLUCOSE SERPL-MCNC: 143 MG/DL (ref 70–110)
HBA1C MFR BLD: 6.7 % (ref 4–5.6)
HCT VFR BLD AUTO: 43.2 % (ref 37–48.5)
HDLC SERPL-MCNC: 37 MG/DL (ref 40–75)
HDLC SERPL: 24.5 % (ref 20–50)
HGB BLD-MCNC: 14.5 G/DL (ref 12–16)
IMM GRANULOCYTES # BLD AUTO: 0.02 K/UL (ref 0–0.04)
IMM GRANULOCYTES NFR BLD AUTO: 0.3 % (ref 0–0.5)
LDLC SERPL CALC-MCNC: 77.2 MG/DL (ref 63–159)
LYMPHOCYTES # BLD AUTO: 2.4 K/UL (ref 1–4.8)
LYMPHOCYTES NFR BLD: 38.4 % (ref 18–48)
MCH RBC QN AUTO: 30.8 PG (ref 27–31)
MCHC RBC AUTO-ENTMCNC: 33.6 G/DL (ref 32–36)
MCV RBC AUTO: 92 FL (ref 82–98)
MONOCYTES # BLD AUTO: 0.5 K/UL (ref 0.3–1)
MONOCYTES NFR BLD: 8.3 % (ref 4–15)
NEUTROPHILS # BLD AUTO: 3.1 K/UL (ref 1.8–7.7)
NEUTROPHILS NFR BLD: 50.1 % (ref 38–73)
NONHDLC SERPL-MCNC: 114 MG/DL
NRBC BLD-RTO: 0 /100 WBC
PLATELET # BLD AUTO: 222 K/UL (ref 150–450)
PMV BLD AUTO: 11.5 FL (ref 9.2–12.9)
POTASSIUM SERPL-SCNC: 3.6 MMOL/L (ref 3.5–5.1)
POTASSIUM SERPL-SCNC: 3.6 MMOL/L (ref 3.5–5.1)
PROT SERPL-MCNC: 7.7 G/DL (ref 6–8.4)
RBC # BLD AUTO: 4.71 M/UL (ref 4–5.4)
SODIUM SERPL-SCNC: 138 MMOL/L (ref 136–145)
SODIUM SERPL-SCNC: 138 MMOL/L (ref 136–145)
TRIGL SERPL-MCNC: 184 MG/DL (ref 30–150)
WBC # BLD AUTO: 6.25 K/UL (ref 3.9–12.7)

## 2022-04-14 PROCEDURE — 99215 OFFICE O/P EST HI 40 MIN: CPT | Performed by: STUDENT IN AN ORGANIZED HEALTH CARE EDUCATION/TRAINING PROGRAM

## 2022-04-14 PROCEDURE — 83036 HEMOGLOBIN GLYCOSYLATED A1C: CPT | Performed by: STUDENT IN AN ORGANIZED HEALTH CARE EDUCATION/TRAINING PROGRAM

## 2022-04-14 PROCEDURE — 85025 COMPLETE CBC W/AUTO DIFF WBC: CPT | Performed by: STUDENT IN AN ORGANIZED HEALTH CARE EDUCATION/TRAINING PROGRAM

## 2022-04-14 PROCEDURE — 80061 LIPID PANEL: CPT | Performed by: STUDENT IN AN ORGANIZED HEALTH CARE EDUCATION/TRAINING PROGRAM

## 2022-04-14 PROCEDURE — 36415 COLL VENOUS BLD VENIPUNCTURE: CPT | Performed by: STUDENT IN AN ORGANIZED HEALTH CARE EDUCATION/TRAINING PROGRAM

## 2022-04-14 PROCEDURE — 80053 COMPREHEN METABOLIC PANEL: CPT | Performed by: STUDENT IN AN ORGANIZED HEALTH CARE EDUCATION/TRAINING PROGRAM

## 2022-04-14 NOTE — PROGRESS NOTES
"Progress Note  [facility]      Chief Complaint: hypertension     Subjective:    Josefina Martin is a 46 y.o.female with a pertinent history of uncontrolled hypertension who is following up for     HTN  -reports she took all of her meds today  -ate salty turkey necks today  -does have visual defects in BL eyes after her previous htn emergency that she left the ED without being evaluated  -denies headache, acute visual changes or associated symptoms  -has appt w/opthalmology next week    Rash on left upper arm  -noticed it 3 days ago  -tender to palpation  -denies pruritus  -is not worsening or changing      Chronic pelvic rash  -states it has been present for over a year  -received antifungal powder and antibiotics by ob/gyn and other doctors  -still bothers her  -has not changed soaps, detergent etc        Review of Systems    Review of Systems   Constitutional: Negative for chills, fever and weight loss.   Eyes: Negative for blurred vision.   Respiratory: Negative for cough, hemoptysis, sputum production and shortness of breath.    Cardiovascular: Negative for chest pain, palpitations, orthopnea, claudication and leg swelling.   Gastrointestinal: Negative for abdominal pain, blood in stool, constipation, diarrhea, heartburn, melena, nausea and vomiting.   Genitourinary: Negative for dysuria, hematuria and urgency.   Skin: Positive for rash. Negative for itching.   Neurological: Negative for dizziness, tingling, weakness and headaches.   Psychiatric/Behavioral: Negative for depression, substance abuse and suicidal ideas. The patient is not nervous/anxious and does not have insomnia.         Past medical, past surgical, social, and family history reviewed.    Objective:    BP (!) 172/101 (BP Location: Right arm, Patient Position: Sitting, BP Method: Medium (Automatic))   Pulse 67   Ht 5' 8" (1.727 m)   Wt 83.1 kg (183 lb 3.2 oz)   BMI 27.86 kg/m²     Physical Exam:  Physical Exam  Vitals and nursing " note reviewed. Exam conducted with a chaperone present.   Constitutional:       General: She is not in acute distress.     Appearance: She is not diaphoretic.   HENT:      Head: Normocephalic and atraumatic.   Cardiovascular:      Rate and Rhythm: Normal rate and regular rhythm.      Heart sounds: No murmur heard.    No friction rub. No gallop.   Pulmonary:      Effort: Pulmonary effort is normal. No respiratory distress.      Breath sounds: Normal breath sounds. No wheezing or rales.   Chest:      Chest wall: No tenderness.   Abdominal:      General: Bowel sounds are normal. There is no distension.      Palpations: Abdomen is soft.      Tenderness: There is no abdominal tenderness.   Genitourinary:     Comments: Rash noted in left inguinal area w/skin breakdown. Not warm to touch. No signs of infection.   Skin:     General: Skin is warm and dry.      Findings: No erythema.   Neurological:      Mental Status: She is alert and oriented to person, place, and time.   Psychiatric:         Mood and Affect: Mood and affect normal.         Cognition and Memory: Memory normal.         Judgment: Judgment normal.               Laboratory:    Reviewed labs and imaging.      Assessment Plan    1. Essential hypertension  Uncontrolled. Pt aware of risks of uncontrolled bp and risk of stroke, MI and even death. She is now more compliant with meds. Will follow up in 2 weeks with amb bp log.   - Comprehensive Metabolic Panel; Future    2. Mixed hyperlipidemia  - Lipid Panel; Future    3. Type 2 diabetes mellitus without complication, without long-term current use of insulin    - CBC Auto Differential; Future  - Hemoglobin A1C; Future  - Microalbumin/creatinine urine ratio; Future    4. Encounter for screening mammogram for malignant neoplasm of breast  - Mammo Digital Screening Bilat; Future    5. Candida rash of groin  Advised diaper rash cream and continue antifungal cream.    6. Insect bite of left upper arm, initial  encounter  Advised to put cold compresses and can use OTC steroid cream for pruritus.         Follow Up:  2 weeks, strict return precautions given    The patient's diagnosis and medications were discussed.    I will review labs and notify patient with results either by mail or contact by phone.      04/14/2022  Waqas Hercules M.D.  [facility] PGY-3    *This note was dictated using the M*Modal Fluency Direct word recognition program. There are word recognition mistakes that are occasionally missed on review.

## 2022-04-18 NOTE — PROGRESS NOTES
I assume primary medical responsibility for this patient. I have reviewed the history, physical, and assessement & treatment plan with the resident and agree that the care is reasonable and necessary. This service has been performed by a resident without the presence of a teaching physician under the primary care exception. If necessary, an addendum of additional findings or evaluation beyond the resident documentation will be noted below.    Needs further lab evaluation, orders placed.     Torie Main MD    [facility]

## 2022-05-12 ENCOUNTER — OFFICE VISIT (OUTPATIENT)
Dept: FAMILY MEDICINE | Facility: HOSPITAL | Age: 47
End: 2022-05-12
Attending: FAMILY MEDICINE
Payer: MEDICAID

## 2022-05-12 VITALS
HEIGHT: 68 IN | HEART RATE: 72 BPM | BODY MASS INDEX: 27.87 KG/M2 | SYSTOLIC BLOOD PRESSURE: 192 MMHG | WEIGHT: 183.88 LBS | DIASTOLIC BLOOD PRESSURE: 102 MMHG

## 2022-05-12 DIAGNOSIS — E87.6 HYPOKALEMIA: ICD-10-CM

## 2022-05-12 DIAGNOSIS — Z86.73 HISTORY OF CVA (CEREBROVASCULAR ACCIDENT): ICD-10-CM

## 2022-05-12 DIAGNOSIS — I10 ESSENTIAL HYPERTENSION: Primary | ICD-10-CM

## 2022-05-12 DIAGNOSIS — I25.10 CORONARY ARTERY DISEASE INVOLVING NATIVE CORONARY ARTERY OF NATIVE HEART WITHOUT ANGINA PECTORIS: ICD-10-CM

## 2022-05-12 DIAGNOSIS — E11.9 TYPE 2 DIABETES MELLITUS WITHOUT COMPLICATION, WITHOUT LONG-TERM CURRENT USE OF INSULIN: ICD-10-CM

## 2022-05-12 PROCEDURE — 99214 OFFICE O/P EST MOD 30 MIN: CPT | Performed by: STUDENT IN AN ORGANIZED HEALTH CARE EDUCATION/TRAINING PROGRAM

## 2022-05-12 RX ORDER — NAPROXEN SODIUM 220 MG/1
81 TABLET, FILM COATED ORAL DAILY
Qty: 90 TABLET | Refills: 3 | Status: SHIPPED | OUTPATIENT
Start: 2022-05-12 | End: 2022-09-03

## 2022-05-12 RX ORDER — CLOPIDOGREL BISULFATE 75 MG/1
75 TABLET ORAL DAILY
Qty: 90 TABLET | Refills: 3 | Status: SHIPPED | OUTPATIENT
Start: 2022-05-12 | End: 2023-05-12

## 2022-05-12 RX ORDER — METFORMIN HYDROCHLORIDE 500 MG/1
500 TABLET, EXTENDED RELEASE ORAL 2 TIMES DAILY WITH MEALS
Qty: 180 TABLET | Refills: 3 | Status: SHIPPED | OUTPATIENT
Start: 2022-05-12 | End: 2023-09-20

## 2022-05-13 NOTE — PROGRESS NOTES
Subjective:       Patient ID: Josefina Martin is a 46 y.o. female.    Chief Complaint: Hypertension and Spasms (Left leg)    Josefina Martin is a 46-year-old female PMHx  CHF, DMT2, HTN, MI, and CVA presenting for management of her chronic medical conditions and with acute complaints.    Endorses left lower extremity muscle spasms. Occurring now for few weeks in duration. Denies any weakness, numbness, back pain, or extremity swelling.  Endorses occurs mostly at night.    Uncontrolled hypertension - current medications include carvedilol 12.5 mg twice daily, chlorthalidone 25 mg daily, lisinopril 40 mg daily, and nifedipine 90 mg daily. Not checking blood pressure at home. In office blood pressure 192/102 and repeat 187/111. Endorses forgetting to take all of her blood pressure medications this morning. Denies any headache, blurry vision, slurred speech, or extremity weakness.    Tobacco use - in smoking sensation program. Smokes around 1 pack of cigarettes per week. Overall doing well with limiting tobacco use.     History of CVA - was on dual anti-platelet therapy (aspirin and Plavix) for over 3 years in duration.  Previous history of CVA on low-dose aspirin. Has not followed up with Neurology for evaluation. Currently not taking any aspirin or Plavix. History of gastric ulcer on Protonix 40 mg daily.     Review of Systems   Constitutional: Negative for chills and fever.   HENT: Negative for congestion.    Eyes: Negative for visual disturbance.   Respiratory: Negative for shortness of breath.    Cardiovascular: Negative for chest pain.   Gastrointestinal: Negative for constipation and diarrhea.   Genitourinary: Negative for difficulty urinating.   Musculoskeletal: Negative for back pain.        Left lower extremity muscle spasms   Skin: Negative for rash.   Neurological: Negative for facial asymmetry, speech difficulty, weakness and headaches.   Psychiatric/Behavioral: Negative for dysphoric mood and sleep  disturbance. The patient is not nervous/anxious.        Objective:      Vitals:    05/12/22 1439   BP: (!) 192/102   Pulse: 72     Physical Exam  Vitals reviewed.   Constitutional:       General: She is not in acute distress.     Appearance: She is not ill-appearing.   Cardiovascular:      Rate and Rhythm: Normal rate and regular rhythm.      Heart sounds: No murmur heard.    No friction rub. No gallop.   Pulmonary:      Breath sounds: No wheezing, rhonchi or rales.      Comments: Clear to auscultation bilaterally  Abdominal:      Tenderness: There is no guarding.   Musculoskeletal:         General: Normal range of motion.      Right lower leg: No edema.      Left lower leg: No edema.   Neurological:      General: No focal deficit present.      Mental Status: She is alert and oriented to person, place, and time.   Psychiatric:         Mood and Affect: Mood normal.         Behavior: Behavior normal.         Assessment:       1. Essential hypertension    2. Coronary artery disease s/p multiple stent placement    3. History of CVA (cerebrovascular accident)    4. Hypokalemia    5. Type 2 diabetes mellitus without complication, without long-term current use of insulin        Plan:       Josefina was seen today for hypertension and spasms.    Diagnoses and all orders for this visit:    Essential hypertension        -      Continue carvedilol 12.5 mg twice daily, chlorthalidone 25 mg daily, lisinopril 40 mg daily, and nifedipine 90 mg daily.  Patient still with persistent noncompliance with medications. Stressed importance of taking medications daily as known history of CVA and CAD.     Coronary artery disease s/p multiple stent placement  -     aspirin 81 MG Chew; Take 1 tablet (81 mg total) by mouth once daily.  -     clopidogreL (PLAVIX) 75 mg tablet; Take 1 tablet (75 mg total) by mouth once daily.    History of CVA (cerebrovascular accident)  -     aspirin 81 MG Chew; Take 1 tablet (81 mg total) by mouth once  daily.  -     clopidogreL (PLAVIX) 75 mg tablet; Take 1 tablet (75 mg total) by mouth once daily.    Hypokalemia  -     Basic Metabolic Panel; Future    Type 2 diabetes mellitus without complication, without long-term current use of insulin  -     metFORMIN (GLUCOPHAGE-XR) 500 MG ER 24hr tablet; Take 1 tablet (500 mg total) by mouth 2 (two) times daily with meals.    Etiology of muscle spasms likely 2/2 hypokalemia. Advised patient to increase potassium rich foods in diet. Will check a BMP today. Patient also with previous CVA on low-dose aspirin.  Neurology recommended aspirin and Plavix daily given increased risk and given CVA on anti-platelet therapy. Patient has been on dual anti-platelet therapy (ASA + Plavix) for 3 years in duration but for the past few months has not been taking these medications. Patient with known history of nonbleeding gastric ulcer seen on EGD in October 2020 and history of H pylori s/p treatment. No recent recurrence and patient currently taking Protonix daily. Wlll continue dual anti-platelet therapy for now but will need Neurology follow-up to evaluate thrombotic risk vs bleeding risk. Will need to decrease risk factors such as uncontrolled hypertension and tobacco use.    Advised to follow-up in 2 weeks for BP check    Margarito Ennis DO  [facility], PGY-2  05/12/2022

## 2022-07-08 ENCOUNTER — OFFICE VISIT (OUTPATIENT)
Dept: FAMILY MEDICINE | Facility: HOSPITAL | Age: 47
End: 2022-07-08
Attending: FAMILY MEDICINE
Payer: MEDICAID

## 2022-07-08 VITALS
HEART RATE: 98 BPM | BODY MASS INDEX: 27 KG/M2 | WEIGHT: 178.13 LBS | SYSTOLIC BLOOD PRESSURE: 178 MMHG | HEIGHT: 68 IN | DIASTOLIC BLOOD PRESSURE: 116 MMHG

## 2022-07-08 DIAGNOSIS — B37.2 CANDIDAL INTERTRIGO: ICD-10-CM

## 2022-07-08 DIAGNOSIS — I10 ESSENTIAL HYPERTENSION: Primary | ICD-10-CM

## 2022-07-08 PROCEDURE — 99215 OFFICE O/P EST HI 40 MIN: CPT | Performed by: STUDENT IN AN ORGANIZED HEALTH CARE EDUCATION/TRAINING PROGRAM

## 2022-07-08 RX ORDER — CARVEDILOL 25 MG/1
25 TABLET ORAL 2 TIMES DAILY WITH MEALS
Qty: 60 TABLET | Refills: 11 | Status: SHIPPED | OUTPATIENT
Start: 2022-07-08 | End: 2022-09-03 | Stop reason: SDUPTHER

## 2022-07-08 RX ORDER — LISINOPRIL 40 MG/1
40 TABLET ORAL DAILY
Qty: 90 TABLET | Refills: 3 | Status: SHIPPED | OUTPATIENT
Start: 2022-07-08 | End: 2022-09-03 | Stop reason: SDUPTHER

## 2022-07-08 RX ORDER — TERBINAFINE HYDROCHLORIDE 250 MG/1
250 TABLET ORAL DAILY
Qty: 14 TABLET | Refills: 0 | Status: SHIPPED | OUTPATIENT
Start: 2022-07-08 | End: 2022-07-08 | Stop reason: ALTCHOICE

## 2022-07-08 RX ORDER — FLUCONAZOLE 150 MG/1
150 TABLET ORAL DAILY
Qty: 30 TABLET | Refills: 0 | Status: SHIPPED | OUTPATIENT
Start: 2022-07-08 | End: 2022-07-14 | Stop reason: ALTCHOICE

## 2022-07-08 RX ORDER — NIFEDIPINE 90 MG/1
90 TABLET, EXTENDED RELEASE ORAL DAILY
Qty: 90 TABLET | Refills: 3 | Status: SHIPPED | OUTPATIENT
Start: 2022-07-08 | End: 2022-07-14 | Stop reason: SDUPTHER

## 2022-07-08 NOTE — PROGRESS NOTES
Subjective:       Patient ID: Josefina Martin is a 47 y.o. female.    Chief Complaint: Follow-up (htn), Medication Refill, and Rash (Left side of leg )    Josefina Martin is a 47-year-old female CHF, DMT2, HTN, MI, and CVA presenting for management of her hypertension and persitent rash on her left inner thigh.    Rash - occurring now on and off for close to a year in duration. Provided topical clotrimazole cream in the past in our clinic.  Also provided nystatin powder by OBGYN. Endorses worsening of the rash recently over the past few weeks. Rash is itchy, located on her left inner thigh.     Uncontrolled hypertension -  current medications include carvedilol 12.5 mg twice daily, lisinopril 40 mg daily, chlorthalidone 25 mg daily, and nifedipine 90 mg daily.  Patient only taking carvedilol and lisinopril.  Is not checking blood pressure at home.  Denies any headache or vision changes.  Endorses she cut back on tobacco use, smokes around 3 cigarettes per day.        Review of Systems   Constitutional: Negative for chills and fever.   Eyes: Negative for visual disturbance.   Respiratory: Negative for shortness of breath.    Cardiovascular: Negative for chest pain.   Gastrointestinal: Negative for constipation and diarrhea.   Genitourinary: Negative for dysuria, frequency and vaginal discharge.   Musculoskeletal: Negative for back pain.   Skin: Positive for rash.   Neurological: Negative for headaches.   Psychiatric/Behavioral: The patient is not nervous/anxious.        Objective:      Vitals:    07/08/22 1036   BP: (!) 178/116   Pulse: 98     Physical Exam  Vitals reviewed. Exam conducted with a chaperone present.   Constitutional:       General: She is not in acute distress.     Appearance: She is not ill-appearing.   Cardiovascular:      Rate and Rhythm: Normal rate and regular rhythm.      Heart sounds: No murmur heard.    No friction rub. No gallop.   Pulmonary:      Breath sounds: No wheezing, rhonchi  or rales.      Comments: Clear to auscultation bilaterally  Abdominal:      Tenderness: There is no guarding.   Genitourinary:     Comments: Reddish-brown, shiny patch overlying left skin fold in groin.    Musculoskeletal:         General: Normal range of motion.      Right lower leg: No edema.      Left lower leg: No edema.   Neurological:      General: No focal deficit present.      Mental Status: She is alert and oriented to person, place, and time.   Psychiatric:         Mood and Affect: Mood normal.         Behavior: Behavior normal.         Assessment:       1. Essential hypertension    2. Candidal intertrigo        Plan:       Essential hypertension  -     lisinopriL (PRINIVIL,ZESTRIL) 40 MG tablet; Take 1 tablet (40 mg total) by mouth once daily.  Dispense: 90 tablet; Refill: 3  -     carvediloL (COREG) 25 MG tablet; Take 1 tablet (25 mg total) by mouth 2 (two) times daily with meals.  Dispense: 60 tablet; Refill: 11  -     NIFEdipine (PROCARDIA-XL) 90 MG (OSM) 24 hr tablet; Take 1 tablet (90 mg total) by mouth once daily.  Dispense: 90 tablet; Refill: 3    Candidal intertrigo  -     fluconazole (DIFLUCAN) 150 MG Tab; Take 1 tablet (150 mg total) by mouth once daily.  Dispense: 30 tablet; Refill: 0    Will try a one-month course of fluconazole for candidal intertrigo. Advised to keep the area dry as moisture will make the rash worse.   For hypertension, advised to record BP daily within BP log. Will increase dose of carvedilol from 12.5 mg BID to 25 mg BID. Continue lisinopril 40 mg daily and nifedipine 90 mg daily.     Follow-up in 2 weeks for BP check    Margarito Ennis DO  [facility], PGY-2  07/08/2022

## 2022-07-13 DIAGNOSIS — B37.2 CANDIDAL INTERTRIGO: Primary | ICD-10-CM

## 2022-07-13 RX ORDER — KETOCONAZOLE 20 MG/G
CREAM TOPICAL DAILY
Qty: 15 G | Refills: 0 | Status: SHIPPED | OUTPATIENT
Start: 2022-07-13 | End: 2022-07-27

## 2022-07-14 ENCOUNTER — OFFICE VISIT (OUTPATIENT)
Dept: FAMILY MEDICINE | Facility: HOSPITAL | Age: 47
End: 2022-07-14
Attending: FAMILY MEDICINE
Payer: MEDICAID

## 2022-07-14 VITALS
BODY MASS INDEX: 27.9 KG/M2 | HEIGHT: 68 IN | HEART RATE: 71 BPM | WEIGHT: 184.06 LBS | DIASTOLIC BLOOD PRESSURE: 112 MMHG | SYSTOLIC BLOOD PRESSURE: 183 MMHG

## 2022-07-14 DIAGNOSIS — I10 ESSENTIAL HYPERTENSION: ICD-10-CM

## 2022-07-14 DIAGNOSIS — H60.503 ACUTE OTITIS EXTERNA OF BOTH EARS, UNSPECIFIED TYPE: Primary | ICD-10-CM

## 2022-07-14 PROCEDURE — 99215 OFFICE O/P EST HI 40 MIN: CPT | Performed by: STUDENT IN AN ORGANIZED HEALTH CARE EDUCATION/TRAINING PROGRAM

## 2022-07-14 RX ORDER — NIFEDIPINE 60 MG/1
60 TABLET, EXTENDED RELEASE ORAL DAILY
Qty: 90 TABLET | Refills: 3 | Status: SHIPPED | OUTPATIENT
Start: 2022-07-14 | End: 2022-09-03

## 2022-07-14 RX ORDER — AMOXICILLIN AND CLAVULANATE POTASSIUM 875; 125 MG/1; MG/1
1 TABLET, FILM COATED ORAL EVERY 12 HOURS
Qty: 10 TABLET | Refills: 0 | Status: CANCELLED | OUTPATIENT
Start: 2022-07-14 | End: 2022-07-19

## 2022-07-14 RX ORDER — CIPROFLOXACIN AND DEXAMETHASONE 3; 1 MG/ML; MG/ML
4 SUSPENSION/ DROPS AURICULAR (OTIC) 2 TIMES DAILY
Qty: 7.5 ML | Refills: 0 | Status: SHIPPED | OUTPATIENT
Start: 2022-07-14 | End: 2022-07-21

## 2022-07-14 NOTE — PROGRESS NOTES
[facility]  History & Physical    SUBJECTIVE:     Chief Complaint:   Chief Complaint   Patient presents with    Ear Drainage     Both      Otalgia     left         History of Present Illness:  47 y.o. female who  has a past medical history of Cerebrovascular accident (CVA) (3/24/2017), CHF (congestive heart failure), Diabetes mellitus, Hypertension, MI (myocardial infarction), and Stroke. presents to clinic today as an urgent visit for ear drainage and pain. Started one week ago on the left ear. Denies fever. Admits to mild clear drainage.  States she has never had anything like this before.  Denies any water exposure.  States she has used Q-tips without improvement in symptoms.  Denies any hearing loss.  Has not tried any interventions for this.  States that the discomfort is slowly beginning in her right ear as well.    Additionally, patient was seen approximately 1 week ago for hypertension management, at which point she was prescribed Procardia.  Patient states that she was unable to get the Procardia and is requesting the prescription to be recent to her pharmacy.  Denies any lightheadedness, headache, vision changes.  Blood pressure today 183/112.    Allergies:  Review of patient's allergies indicates:  No Known Allergies    Home Medications:  Current Outpatient Medications on File Prior to Visit   Medication Sig    atorvastatin (LIPITOR) 40 MG tablet Take 1 tablet (40 mg total) by mouth once daily.    carvediloL (COREG) 25 MG tablet Take 1 tablet (25 mg total) by mouth 2 (two) times daily with meals.    chlorthalidone (HYGROTEN) 25 MG Tab Take 1 tablet (25 mg total) by mouth once daily.    clopidogreL (PLAVIX) 75 mg tablet Take 1 tablet (75 mg total) by mouth once daily.    fluticasone propionate (FLONASE) 50 mcg/actuation nasal spray PLACE 2 SPRAYS IN THE NOSTRILS D    gabapentin (NEURONTIN) 100 MG capsule Take 1 capsule (100 mg total) by mouth once daily.    ketoconazole (NIZORAL) 2 %  cream Apply topically once daily. Apply to affected area for 2 weeks for 14 days    lisinopriL (PRINIVIL,ZESTRIL) 40 MG tablet Take 1 tablet (40 mg total) by mouth once daily.    loratadine (CLARITIN) 10 mg tablet Take 10 mg by mouth daily as needed for Allergies.    metFORMIN (GLUCOPHAGE-XR) 500 MG ER 24hr tablet Take 1 tablet (500 mg total) by mouth 2 (two) times daily with meals.    MIRENA 20 mcg/24 hr (5 years) IUD     nicotine (NICODERM CQ) 21 mg/24 hr Place 1 patch onto the skin once daily.    nicotine, polacrilex, (NICORETTE) 2 mg Gum Take 1 each (2 mg total) by mouth as needed.    nystatin (MYCOSTATIN) powder Apply topically 2 (two) times daily.    ondansetron (ZOFRAN) 4 MG tablet Take 1 tablet (4 mg total) by mouth every 6 (six) hours.    pantoprazole (PROTONIX) 40 MG tablet Take 1 tablet (40 mg total) by mouth before breakfast.    polyethylene glycol (GLYCOLAX) 17 gram PwPk Take 17 g by mouth once daily.    [DISCONTINUED] NIFEdipine (PROCARDIA-XL) 90 MG (OSM) 24 hr tablet Take 1 tablet (90 mg total) by mouth once daily.    aspirin 81 MG Chew Take 1 tablet (81 mg total) by mouth once daily.    spironolactone (ALDACTONE) 50 MG tablet Take 1 tablet (50 mg total) by mouth once daily.    venlafaxine (EFFEXOR-XR) 37.5 MG 24 hr capsule Take 1 capsule (37.5 mg total) by mouth once daily.    [DISCONTINUED] dicyclomine (BENTYL) 20 mg tablet Take 1 tablet (20 mg total) by mouth 3 (three) times daily as needed (abdominal pain). (Patient not taking: No sig reported)    [DISCONTINUED] fluconazole (DIFLUCAN) 150 MG Tab Take 1 tablet (150 mg total) by mouth once daily. (Patient not taking: Reported on 7/14/2022)    [DISCONTINUED] linaCLOtide (LINZESS) 72 mcg Cap capsule Take 1 capsule (72 mcg total) by mouth once daily. (Patient not taking: No sig reported)     No current facility-administered medications on file prior to visit.       Past Medical History:   Diagnosis Date    Cerebrovascular accident  (CVA) 3/24/2017    CHF (congestive heart failure)     Diabetes mellitus     Hypertension     MI (myocardial infarction)     Stroke      Past Surgical History:   Procedure Laterality Date    CHOLECYSTECTOMY  2013    history of cholelithiasis    ESOPHAGOGASTRODUODENOSCOPY N/A 10/21/2020    Procedure: EGD (ESOPHAGOGASTRODUODENOSCOPY);  Surgeon: Asha Blackwell MD;  Location: [facility];  Service: Endoscopy;  Laterality: N/A;    ESOPHAGOGASTRODUODENOSCOPY N/A 6/15/2021    Procedure: EGD (ESOPHAGOGASTRODUODENOSCOPY);  Surgeon: Asha Blackwell MD;  Location: [facility];  Service: Endoscopy;  Laterality: N/A;    TUBAL LIGATION       Social History     Tobacco Use    Smoking status: Current Every Day Smoker     Packs/day: 0.15     Years: 18.00     Pack years: 2.70     Types: Cigarettes    Smokeless tobacco: Never Used    Tobacco comment: 1 pack/week   Substance Use Topics    Alcohol use: Yes     Comment: social 1/month    Drug use: No        Review of Systems   Constitutional: Negative for chills and fever.   HENT: Positive for ear discharge (clear, scant) and ear pain.    Respiratory: Negative for cough and shortness of breath.    Cardiovascular: Negative for chest pain and leg swelling.   Gastrointestinal: Negative for abdominal pain, constipation, diarrhea, nausea and vomiting.   Genitourinary: Negative for dysuria.   Musculoskeletal: Negative for myalgias.        OBJECTIVE:     Vital Signs:  Pulse: 71 (07/14/22 1521)  BP: (!) 183/112 (07/14/22 1521)    Physical Exam  Constitutional:       General: She is not in acute distress.     Appearance: Normal appearance. She is not ill-appearing or diaphoretic.   HENT:      Head: Normocephalic and atraumatic.      Right Ear: External ear normal. There is no impacted cerumen.      Left Ear: External ear normal. There is no impacted cerumen.      Ears:      Comments: Tenderness during examination of left ear; b/l ear canals lined with white film; TM appears  normal b/l; no discharge seen on exam     Nose: Nose normal.   Eyes:      Extraocular Movements: Extraocular movements intact.   Cardiovascular:      Rate and Rhythm: Normal rate and regular rhythm.      Pulses: Normal pulses.      Heart sounds: Normal heart sounds.   Pulmonary:      Effort: Pulmonary effort is normal. No respiratory distress.      Breath sounds: Normal breath sounds.   Abdominal:      Tenderness: There is no guarding.   Musculoskeletal:         General: Normal range of motion.   Skin:     General: Skin is warm and dry.   Neurological:      Mental Status: She is alert and oriented to person, place, and time.   Psychiatric:         Mood and Affect: Mood normal.         Behavior: Behavior normal.           A/P:  Josefina was seen today for ear drainage and otalgia.    Diagnoses and all orders for this visit:    Acute otitis externa of both ears, unspecified type  -     ciprofloxacin-dexamethasone 0.3-0.1% (CIPRODEX) 0.3-0.1 % DrpS; Place 4 drops into both ears 2 (two) times daily. for 7 days    Essential hypertension  -     NIFEdipine (PROCARDIA-XL) 60 MG (OSM) 24 hr tablet; Take 1 tablet (60 mg total) by mouth once daily.      Patient with presentation consistent with otitis externa. Will treat with ciprodex drops. If symptoms fail to resolve will consider systemic Abx treatment such as augmentin.     Follow up in about 1 week (around 7/21/2022) for as scheduled with regular PCP for BP Rx management and re-check of ears.      Frank Abebe DO  [facility], PGY-3  07/14/2022

## 2022-07-14 NOTE — PROGRESS NOTES
I assume primary medical responsibility for this patient. I have reviewed the history, physical, and assessement & treatment plan with the resident and agree that the care is reasonable and necessary. This service has been performed by a resident without the presence of a teaching physician under the primary care exception. If necessary, an addendum of additional findings or evaluation beyond the resident documentation will be noted below.     Marcela Webster MD

## 2022-07-17 ENCOUNTER — HOSPITAL ENCOUNTER (EMERGENCY)
Facility: HOSPITAL | Age: 47
Discharge: HOME | End: 2022-07-17
Attending: EMERGENCY MEDICINE
Payer: MEDICAID

## 2022-07-17 VITALS
SYSTOLIC BLOOD PRESSURE: 175 MMHG | OXYGEN SATURATION: 97 % | DIASTOLIC BLOOD PRESSURE: 111 MMHG | RESPIRATION RATE: 19 BRPM | TEMPERATURE: 98 F | BODY MASS INDEX: 27.89 KG/M2 | HEART RATE: 63 BPM | HEIGHT: 68 IN | WEIGHT: 184 LBS

## 2022-07-17 DIAGNOSIS — R29.898 RIGHT LEG WEAKNESS: ICD-10-CM

## 2022-07-17 DIAGNOSIS — I10 ESSENTIAL HYPERTENSION: Primary | ICD-10-CM

## 2022-07-17 PROCEDURE — 99284 EMERGENCY DEPT VISIT MOD MDM: CPT | Mod: 25

## 2022-07-17 NOTE — Clinical Note
"Josefina Scottjuan Martin was seen and treated in our emergency department on 7/17/2022.  She may return to work on 07/19/2022.       If you have any questions or concerns, please don't hesitate to call.      Jono Ibarra MD"

## 2022-07-17 NOTE — ED PROVIDER NOTES
"NAME:  Josefina Martin  CSN:     [id number]  MRN:    [id number]  ADMIT DATE: 7/17/2022        eMERGENCY dEPARTMENT eNCOUnter    CHIEF COMPLAINT    Chief Complaint   Patient presents with    Extremity Weakness     Pt presents to ED today c/o right leg weakness x 2 days pt reports increase of walking while at work states " feels like my leg is giving out on me "       HPI      Josefina Martin is a 47 y.o. female who presents to the ED for evaluation of right leg weakness.  Patient reports that she has been experiencing weakness to the right leg for the past 2 days.  Patient reports the leg feels heavy and that is sort of dragging slightly when she walks.  She denies any back pain or numbness of the leg.  Patient complains of an associated visual deficit to the right eye.  She reports that the lower aspect of her visual field is slightly blurry.  She denies any headache, vomiting or neck stiffness.  She has history of strokes in the past.  Patient reports that she also recently took a new job that is a little bit more physically demanding so she suspects it may also be fatigued from overuse          ALLERGIES    Review of patient's allergies indicates:  No Known Allergies    PAST MEDICAL HISTORY  Past Medical History:   Diagnosis Date    Cerebrovascular accident (CVA) 3/24/2017    CHF (congestive heart failure)     Diabetes mellitus     Hypertension     MI (myocardial infarction)     Stroke        SURGICAL HISTORY    Past Surgical History:   Procedure Laterality Date    CHOLECYSTECTOMY  2013    history of cholelithiasis    ESOPHAGOGASTRODUODENOSCOPY N/A 10/21/2020    Procedure: EGD (ESOPHAGOGASTRODUODENOSCOPY);  Surgeon: Asha Blackwell MD;  Location: [facility];  Service: Endoscopy;  Laterality: N/A;    ESOPHAGOGASTRODUODENOSCOPY N/A 6/15/2021    Procedure: EGD (ESOPHAGOGASTRODUODENOSCOPY);  Surgeon: Asha Blackwell MD;  Location: [facility];  Service: Endoscopy;  Laterality: N/A;    TUBAL LIGATION " "        SOCIAL HISTORY    Social History     Socioeconomic History    Marital status: Single   Occupational History     Employer: [employer]   Tobacco Use    Smoking status: Current Every Day Smoker     Packs/day: 0.15     Years: 18.00     Pack years: 2.70     Types: Cigarettes    Smokeless tobacco: Never Used    Tobacco comment: 1 pack/week   Substance and Sexual Activity    Alcohol use: Yes     Comment: social 1/month    Drug use: No    Sexual activity: Yes     Partners: Male     Birth control/protection: None, Surgical       FAMILY HISTORY    Family History   Problem Relation Age of Onset    Hypertension Mother     Lung cancer Mother     No Known Problems Father     No Known Problems Sister     No Known Problems Daughter     Diabetes Son 14        Takes 2 insulins and metformin use to be bigger wally    Stroke Maternal Uncle 48    Anuerysm Maternal Grandmother     Breast cancer Maternal Grandmother     No Known Problems Sister     No Known Problems Daughter     No Known Problems Son     Breast cancer Maternal Aunt     Breast cancer Maternal Aunt        REVIEW OF SYSTEMS   ROS  All Systems otherwise negative except as noted in the History of Present Illness.        PHYSICAL EXAM    Reviewed Triage Note  VITAL SIGNS:   ED Triage Vitals [07/17/22 0856]   Enc Vitals Group      BP (!) 164/102      Pulse 64      Resp 19      Temp 98.4 °F (36.9 °C)      Temp src Oral      SpO2 98 %      Weight 184 lb      Height 5' 8"      Head Circumference       Peak Flow       Pain Score       Pain Loc       Pain Edu?       Excl. in GC?        Patient Vitals for the past 24 hrs:   BP Temp Temp src Pulse Resp SpO2 Height Weight   07/17/22 0856 (!) 164/102 98.4 °F (36.9 °C) Oral 64 19 98 % 5' 8" (1.727 m) 83.5 kg (184 lb)           Physical Exam    Constitutional:  Well-developed, well-nourished. No acute distress  HENT:  Normocephalic, atraumatic.  Eyes:  EOMI. Conjunctiva normal without discharge.   Neck: Normal range " of motion.No stridor. No meningismus.   Respiratory:  Normal breath sounds bilaterally.  No respiratory distress, retractions, or conversational dyspnea. No wheezing. No rhonchi. No rales.   Cardiovascular:  Normal heart rate. Normal rhythm. No pitting lower extremity edema.   GI:  Abdomen soft, non-distended, non-tender. Normal bowel sounds. No guarding, rigidity or rebound.    : No CVA tenderness.   Musculoskeletal:  No gross deformity or limited range of motion of all major joints. No palpable bony deformity. No tenderness to palpation.  Integument:  Warm and dry. No rash.  Neurologic:  Cn 2-12 appear intact, no facial droop, slight weakness to RLE compared to LLE, slight right leg limp with ambulation  Psychiatric:  Affect normal. Mood normal.         LABS  Pertinent labs reviewed. (See chart for details)   Labs Reviewed - No data to display      RADIOLOGY    Imaging Results          CT Head Without Contrast (Final result)  Result time 07/17/22 11:24:11    Final result by Derick Fisher MD (07/17/22 11:24:11)                 Impression:      1. No acute intracranial findings.  2. No significant change relative to prior head CT performed 08/16/2020.      Electronically signed by: Derick Fisher  Date:    07/17/2022  Time:    11:24             Narrative:    EXAMINATION:  CT HEAD WITHOUT CONTRAST    CLINICAL HISTORY:  Neuro deficit, acute, stroke suspected;    TECHNIQUE:  Low dose axial images were obtained through the head.  Coronal and sagittal reformations were also performed. Contrast was not administered.    COMPARISON:  CT head performed 08/16/2020.    FINDINGS:  Blood: No acute intracranial hemorrhage.    Parenchyma: No definite loss of gray-white differentiation to suggest acute or subacute transcortical infarct. Redemonstrated left parietal and occipital lobe encephalomalacia gliosis.  Remote lacunar type infarct involving the region the right basal ganglia, corona radiata, and centrum semiovale.   Elsewhere, there appears to be a generalized pattern age-related parenchymal volume loss.  Nonspecific areas of white matter hypoattenuation could relate to sequela of chronic small vessel ischemic disease.  Question remote lacunar type infarcts involving the clint.    Ventricles/Extra-axial spaces: No abnormal extra-axial fluid collection. Basal cisterns are patent.    Vessels: Vascular calcifications.    Orbits: Unremarkable.    Scalp: Unremarkable.    Skull: There are no depressed skull fractures or destructive bone lesions.    Sinuses and mastoids: Scattered relatively minor paranasal sinus mucosal thickening.    Other findings: None                                PROCEDURES    Procedures      EKG     Interpreted by ERP:          ED COURSE & MEDICAL DECISION MAKING    Pertinent & Imaging studies reviewed. (See chart for details and specific orders.)        Medications - No data to display       CT of the head is negative after 3 days of right leg weakness.  I discussed her case with [facility] family Medicine and they will see the patient tomorrow and obtain an outpatient MRI       DISPOSITION  Patient discharged in stable condition at No discharge date for patient encounter.      DISCHARGE INSTRUCTIONS & MEDS    @DISCHARGEMEDSLIST(<NOROUTINE> error)@      Current Discharge Medication List              FINAL IMPRESSION    1. Essential hypertension    2. Right leg weakness              Blood Pressure Follow-Up Advised  Patient advised to follow up with PCP within 3-5 days for blood pressure re-check if blood pressure is equal to or greater than 120/80.         Critical care time spent with this patient (not including separately billable items) was  0 minutes.     DISCLAIMER: This note was prepared with Dragon NaturallySpeaking voice recognition transcription software. Garbled syntax, mangled pronouns, and other bizarre constructions may be attributed to that software system.      Jono Ibarra MD  07/17/2022  10:42  AM          Jono Ibarra MD  07/17/22 9303

## 2022-07-28 DIAGNOSIS — I63.9 CEREBRAL VASCULAR ACCIDENT: Primary | ICD-10-CM

## 2022-08-04 ENCOUNTER — CLINICAL SUPPORT (OUTPATIENT)
Dept: REHABILITATION | Facility: HOSPITAL | Age: 47
End: 2022-08-04
Payer: MEDICAID

## 2022-08-04 DIAGNOSIS — R29.898 WEAKNESS OF RIGHT LOWER EXTREMITY: ICD-10-CM

## 2022-08-04 DIAGNOSIS — M62.81 MUSCLE WEAKNESS: ICD-10-CM

## 2022-08-04 DIAGNOSIS — R26.9 GAIT ABNORMALITY: ICD-10-CM

## 2022-08-04 PROCEDURE — 97165 OT EVAL LOW COMPLEX 30 MIN: CPT | Mod: PN

## 2022-08-04 PROCEDURE — 97162 PT EVAL MOD COMPLEX 30 MIN: CPT | Mod: PN

## 2022-08-04 NOTE — PLAN OF CARE
[facility] Occupational Therapy  Initial Neurological Evaluation     Date: 8/4/2022  Patient: Josefina Martin  Chart Number: [id number]    Therapy Diagnosis:   Encounter Diagnosis   Name Primary?    Muscle weakness      Physician: Donny Cerrato MD    Physician Orders: OT eval and treat   Medical Diagnosis: I63.9 (ICD-10-CM) - Cerebral vascular accident     Evaluation Date: 8/4/2022  Plan of Care Expiration Period: 8/4/2022  Insurance Authorization period Expiration: 12/31/2022  Visit # / Visits Authorized: 1 / 1  FOTO: 8/4/2022    Time In:10:15  Time Out: 11:00  Total Billable (one on one) Time: 45 minutes    Precautions: Standard and Fall    Subjective     History of Current Condition: Josefina reports she was at work as a patient supervisor for [employer] and noticed her right leg was weak, heavy and had muscle spasms. She went to the emergency department where they performed a CT scan. The doctor said nothing came up on the imaging and she returned to work. However, as she was working, she noticed she lost sensation in her right leg. Her family picked her up and she went to [facility] where they diagnosed her with a blood clot on the left side of her brain. Josefina participated in IPR for 3 weeks and stated it went well and she was discharged home. She reports her main concern at this point is weakness in her right leg. She has a history of a heart attack and CVA in 2017 and a stroke in 2018.     Date of Onset: 7/17/2022  Surgical Procedure: none  Imaging: CT scan films  Previous Therapy: IPR      Right Left   Involved Side   [x]     []   Dominant Side    [x]     []       Pain:  Pain Related Behaviors Observed: no   Functional Pain Scale Rating 0-10:   4/10 on average  0/10 at best  10/10 at worst  Location: Right leg  Description: Aching and Throbbing  Aggravating Factors: Standing and Walking  Easing Factors: pain medication, hot bath and  rest    Occupation: Pt Supervisor for [employer]   Working presently: employed  Duties: walking, carrying trays and patient care items, standing     Functional Limitations/Social History:    Prior Level of Function: independent   Current Level of Function:mod I - mod A     Home/Living environment : lives alone - family in close proximity to be able to assist   Home Access: SSH , 1 steps to enter,   DME: standard walker, bedside commode and wheelchair     Leisure: reading, electronics, watching movies     Driving: not currently driving, was driving prior to CVA     Functional Status      Functional Mobility:  Bed mobility: Min A  Roll to left: Min A  Roll to right: Min A  Supine to sit: Mod I  Sit to supine: Mod I  Transfers to bed: Min A  Transfers to toilet: Mod I  Car transfers: Mod I  Wheelchair mobility: Mod I    ADL's:  Feeding: I  Grooming: I  Hygiene: I  UB Dressing: I  LB Dressing: Min A  Toileting: Mod I  Bathing: Min A    IADL's:  Homecare: Mod I  Cooking: Mod I  Laundry: Mod I  Yard work: D  Use of telephone: I  Money management: I  Medication management: I  Handwriting:I  Technology Use:I    Past Medical History/Physical Systems Review:     Past Medical History:  Josefina Martin  has a past medical history of Cerebrovascular accident (CVA), CHF (congestive heart failure), Diabetes mellitus, Hypertension, MI (myocardial infarction), and Stroke.    Past Surgical History:  Josefina Martin  has a past surgical history that includes Cholecystectomy (2013); Tubal ligation; Esophagogastroduodenoscopy (N/A, 10/21/2020); and Esophagogastroduodenoscopy (N/A, 6/15/2021).    Current Medications:  Josefina has a current medication list which includes the following prescription(s): aspirin, atorvastatin, carvedilol, chlorthalidone, clopidogrel, fluticasone propionate, gabapentin, ketoconazole, lisinopril, loratadine, metformin, mirena, nicotine, nicotine (polacrilex), nifedipine, nystatin,  ondansetron, pantoprazole, spironolactone, and venlafaxine.    Allergies:  Review of patient's allergies indicates:  No Known Allergies     Objective     Cognitive Exam:  Oriented Person, Place, Time and Situation   Behaviors normal, cooperative   Follows Commands/attention: Follows multistep  commands   Communication clear/fluent   Memory No Deficits noted    Safety awareness/insight to disability aware of diagnosis, treatment, and prognosis   Coping skills/emotional control Appropriate to situation     Visual/Perceptual:  Comments: wears reading glasses     Physical Exam:  Postural examination/scapula alignment: posture is WNL   Joint integrity: Firm end feeling      Joint Evaluation  [facility]   8/4/2022 [facility]   8/4/2022    Right Left   Scapular Elevation 5/5 5/5   Scapular Retraction 5/5 5/5   Shoulder Flex 0-180 5/5 5/5   Shoulder Extension 0-80 5/5 5/5   Shoulder Abd 0-180 5/5 4+/5   Shoulder ER 0-90 5/5 5/5   Shoulder IR 0-90 5/5 5/5   Shoulder Horizontal adduction 0-90 5/5 5/5   Elbow Flex/Ext 0-150 5/5 5/5   Wrist Flex 0-80 5/5 5/5   Wrist Ext 0-70 5/5 5/5   Supination 0-80 5/5 5/5   Pronation 0-80 5/5 5/5   Ulnar Deviation 5/5 5/5   Radial Deviation  5/5 5/5   *WNL - Within Normal Limits  *NT = Not Tested    Fist: normal     Strength: (ELLA Dynamometer in lbs.) Average 3 trials, Position II:     8/4/2022 8/4/2022   Rung II Right Left   Trial 1 20# 40#   Trial 2 15# 35#   Trial 3 18# 33#   Average 17# 36#     Normal  Average Values  Female Right Left Male: Right Left   20-29 66 lbs 62 lbs         94 lbs 86 lbs   30-39 68 lbs 64 lbs 90 lbs 82 lbs   40-49 64 lbs 62 lbs 80 lbs 74 lbs   50-59 62 lbs 57 lbs 72 lbs 65 lbs   60-69 53 lbs 51 lbs 63 lbs 56 lbs   70+ 44 lbs 42 lbs 54 lbs 48 lbs   SD = +/- 19lbs       Pinch Strength (Measured in lbs)     8/4/2022 8/4/2022    Right Left   Key Pinch 4 # 10 #   3pt Pinch 6 # 11 #   2pt Pinch 3 # 6 #       Fine/Gross Motor Coordination    Assessment  Right   8/4/2022  Left  8/4/2022   BAN (Rapid Alternating Movements)    intact   intact   Finger to Nose (5 times)  intact intact   Finger Flicks (coordination moving from digit flexion to digit extension)  intact intact   *WNL - Within Normal Limits  *NT = Not Tested    Tone:  Modified Per Scale:   0 - No increase in muscle tone    Sensation:  Josefina  reports no change in upper extremity sensation      Sensation Intact  Impaired Comments    [place]  Deep Touch (6.65) [x] []     Protective Sensation (4.56) [x] []     Light Touch (3.61) [x] []           Other Kinesthesia  [x] []     Temperature [x] []     Stereognosis  [x] []        Balance:   Static Sit - GOOD: Takes MODERATE challenges from all directions  Dynamic sit- GOOD: Takes MODERATE challenges from all directions  Static Stand - GOOD: Takes MODERATE challenges from all directions  Dynamic stand - FAIR: Maintains without assist, but unable to take any challenges     Endurance Deficit: mild             CMS Impairment/Limitation/Restriction for FOTO  Survey    Therapist reviewed FOTO scores for Josefina Martin on 8/4/2022.   FOTO documents entered into [employer] - see Media section.    Limitation Score: 24%  Category: Carrying         Treatment     Treatment Time In: 10:50  Treatment Time Out: 11:00  Total Treatment time separate from Evaluation time:10 minutes     Josefina received therapeutic exercises to develop strength and endurance for 10 minutes including:  - home exercise program with green putty     Home Exercises and Patient Education Provided    Education provided:   -role of OT, goals for OT, scheduling/cancellations, insurance limitations with patient.  -Additional Education provided: eval only, current level of function ; adaptive equipment for lower body dressing     Written Home Exercises Provided: yes.  Exercises were reviewed and Josefina was able to demonstrate them prior to the end of the session.    Josefina demonstrated good   understanding of the education provided.     See EMR under Patient Instructions for exercises provided 8/4/2022.    Assessment     Josefina Martin is a 47 y.o. female referred to outpatient occupational therapy and presents with a medical diagnosis of I63.9 (ICD-10-CM) - Cerebral vascular accident   , resulting in decreased muscle strength, impaired function and decreased work ability and demonstrates limitations as described in the chart below. At this time, Josefina reports her upper extremities are within functional limits and she has no complaints at this time. Due to patient goals of walking, right lower extremity weakness and balance, OT is not appropriate at this time as she will be receiving PT. An home exercise program was issued with green putty to address /pinch strength impairments. Josefina is agreeable to eval only plan and has no further questions for OT at the end of session. Therapist also educated on adaptive equipment - reacher and sock aide - to assist with lower body dressing. Thank you for your referral.     Plan of care discussed with patient: Yes  Patient's spiritual, cultural and educational needs considered and patient is agreeable to the plan of care and goals as stated below:     Anticipated Barriers for therapy: none     Medical Necessity is demonstrated by the following  Profile and History Assessment of Occupational Performance Level of Clinical Decision Making Complexity Score   Occupational Profile:   Josefina Martin is a 47 y.o. female who lives alone and is currently employed as patient supervisor. Josefina Martin has difficulty with  dressing  driving/transportation management and amulation   affecting his/her daily functional abilities. His/her main goal for therapy is to address right lower extremity weakness and ambulation deficits.     Comorbidities:   history of CVA    Medical and Therapy History Review:   Brief               Performance  Deficits    Physical:  Muscle Power/Strength   Strength  Pinch Strength    Cognitive:  No Deficits    Psychosocial:    Habits  Routines  Rituals     Clinical Decision Making:  low    Assessment Process:  Problem-Focused Assessments    Modification/Need for Assistance:  Minimal-Moderate Modifications/Assistance    Intervention Selection:  Limited Treatment Options       low  Based on PMHX, co morbidities , data from assessments and functional level of assistance required with task and clinical presentation directly impacting function.       The following goals were discussed with the patient and patient is in agreement with them as to be addressed in the treatment plan.     Patient's Goals for Therapy: to address right lower extremity deficits, balance and ambulation     Plan   Certification Period/Plan of care expiration: 8/4/2022 to 8/4/2022.    Eval only/one time treatment: Pt would not benefit from skilled occupational therapy services at this time. Pt demonstrates good understanding of all education topics and HEP to be performed independently at home.     Corinne Rapier, OT      I certify the need for these services furnished under this plan of treatment and while under my care.  ____________________________________ Physician/Referring Practitioner   Date of Signature

## 2022-08-04 NOTE — PATIENT INSTRUCTIONS
Rules with the putty:   Do not leave it in the sun/car  Do not get it on fabric/clothes, it will not come out  Do not let children/pets play with it because it can be toxic

## 2022-08-05 NOTE — PLAN OF CARE
"  [facility]  Physical Therapy Neurological Rehabilitation Initial Evaluation    Name: Josefina Martin  Clinic Number: [id number]    Therapy Diagnosis:   Encounter Diagnoses   Name Primary?    Weakness of right lower extremity     Gait abnormality      Physician: Donny Cerrato MD    Physician Orders: PT Eval and Treat   Medical Diagnosis from Referral: I63.9 (ICD-10-CM) - Cerebral vascular accident  Evaluation Date: 8/4/2022  Authorization Period Expiration: 12/31/2022  Plan of Care Expiration: 9/30/2022  Visit # / Visits authorized: 1/ 1    Time In: 11:50AM  Time Out: 12:30PM  Total Billable Time: 40 minutes (1 Mod Eval)    Precautions: Standard, Diabetes, Fall, CHF and History of CVAx1 and MIx1    Subjective   Date of onset: Mid July 2022  History of current condition - Josefina reports: She works at [employer] as ; while at work one day, she behan to feel right lower extremity "jerking." Went to [facility], and CT scan was negative. She returned to work but still did not feel back to baseline. She then presented to [facility] ED where MRI showed left LARRY infarct per chart review. She was discharged to [facility] inpatient rehab, and per chart review her stay was from 7/21/2022-7/29/2022. She was given both rolling walker and wheelchair in rehab; also with bedside commode and elevated toilet seat. No AFO ordered. Discharged home where she lives alone but has family close by. CVAx2 in 2017 and 2018 which affected her vision and right upper extremity and right lower extremity. Also with history of MI. Newest stroke with primary effect to right lower extremity and right lower extremity "heaviness" is her chief complaint today.      Medical History:   Past Medical History:   Diagnosis Date    Cerebrovascular accident (CVA) 3/24/2017    CHF (congestive heart failure)     Diabetes mellitus     Hypertension     MI (myocardial infarction)     " Stroke      Surgical History:   Josefina Martin  has a past surgical history that includes Cholecystectomy (2013); Tubal ligation; Esophagogastroduodenoscopy (N/A, 10/21/2020); and Esophagogastroduodenoscopy (N/A, 6/15/2021).    Medications:   Josefina has a current medication list which includes the following prescription(s): aspirin, atorvastatin, carvedilol, chlorthalidone, clopidogrel, fluticasone propionate, gabapentin, ketoconazole, lisinopril, loratadine, metformin, mirena, nicotine, nicotine (polacrilex), nifedipine, nystatin, ondansetron, pantoprazole, spironolactone, and venlafaxine.    Allergies:   Review of patient's allergies indicates:  No Known Allergies     Imaging  - CT Head without Contrast (7/17/2022): 1. No acute intracranial findings. 2. No significant change relative to prior head CT performed 08/16/2020.  - MRI Brain (7/17/2022): There are scattered small areas of restricted diffusion in the superior medial left frontal and parietal lobes, consistent with acute infarcts. There is no focus of restricted diffusion in the right cerebral hemisphere or in either cerebellar hemisphere. There is encephalomalacia in the left occipital lobe, at site of previous infarct or hemorrhage. There is a small chronic lacunar infarct in the deep white matter of the right frontal lobe. There are scattered areas of T2 hyperintensity in the subcortical and periventricular white matter, consistent with mild changes of small vessel ischemic disease. The remainder of the brain parenchyma has normal appearance. The carotid and basilar arteries are patent. The pituitary, orbits, and paranasal sinuses are normal.     Prior Therapy: Physical and occupational therapy in the past for prior CVAs and IPR at [facility] for most recent stroke  Social History: Lives alone but family lives close by  Falls: None endorsed   DME: Rolling walker, wheelchair, bedside commode, elevated toilet seat  Home Environment: Single story home,  "threshold to enter   Exercise Routine / History: Not prior to CVA    Family Present at time of Eval: No   Occupation: Supervisor   Prior Level of Function: Independent and working   Current Level of Function: Mod I with mobility; needs assist for lower body dressing, bathing, food procurement; has not returned to work; not currently driving    Pain:  Current 4/10, worst 10/10, best 0/10   Location: right toes   Description: Aching  Aggravating Factors: Weightbearing  Easing Factors: Rest and Tylenol    Patient's goals: "To be able to use this leg again"    Objective     - Command followin% simple and complex   - Speech: no deficits     Mental status: alert, oriented to person, place, and time, normal mood, behavior, speech, dress, motor activity, and thought processes  Behavior:  calm, cooperative and adequate rapport can be established  Attention Span and Concentration:  Normal    Dominant hand:  right     Posture Alignment: slouched posture    Sensation: Light Touch: Impaired: diminished right lower extremity per screen           Proprioception: Impaired: R ankle    Tone: 0 - No increase in muscle tone  Limbs/muscles affected: Major Muscle groups of bilateral lower extremity     Coordination:   - fine motor: See occupational therapy eval  - UE coordination: See occupational therapy eval    - LE coordination:  Impaired rapid alternating movement    ROM:   UPPER EXTREMITY--AROM/PROM: See occupational therapy eval         RANGE OF MOTION--LOWER EXTREMITIES  (R) LE Impaired hip IR  (L) LE: Impaired hip IR    Upper Extremity Strength: See occupational therapy eval    Lower Extremity Strength (note, MMT may not be indicative of true strength values due to variable effort during assessment)   RLE LLE   Hip Flexion: 2-/5 4-/5   Hip Abduction: 2-/5 5/5   Knee Extension: 2-/5 5/5   Knee Flexion: 3+/5 4+/5   Ankle Dorsiflexion: 2-/5 4+/5   Ankle Plantarflexion: (open chain) 4-/5 5/5     Five Time Sit to Stand: 26.7 " seconds with bilateral upper extremity push off      Self Selected Walking Speed: 0.29m/s    Gait Assessment:   - AD used: Rolling walker  - Assistance: Stand by assist  - Distance: 50+ feet throughout course of evaluation  - Stairs: Not assessed today    Gait Analysis:  Deviations noted: Significant right foot drop with concomitant toe drag; decreased right single leg stance time; decreased hip flexion during right hip swing    Impairments contributing to deviations: right lower extremity weakness      FOTO Stroke Lower Extremity Survey not completed - please procure next     TREATMENT   Treatment not initiated today; suggestions for follow up = AAROM right lower extremity; gravity limited strengthening; reciprocal stepper; standing tolerance; modified R SLS activity; step fan; AFO trial if significant foot drop persists    Home Exercises and Patient Education Provided    Education provided:   - PT plan of care    Written Home Exercises Provided: Not today; provide next.    Assessment   Josefina is a 47 y.o. female referred to outpatient Physical Therapy with a medical diagnosis of I63.9 (ICD-10-CM) - Cerebral vascular accident. Patient presents with right lower extremity weakness; significant gait deviations; impaired bilateral lower extremity muscular power; impaired transfer status; significant deficit in self-selected walking speed; and decreased participation level/return to work status. Patient to benefit from skilled physical therapy services to address listed impairments, improve independence with physical activity, decrease risk for falls/future injury, improve return to work status, and receive education on secondary stroke prevention.     Patient prognosis is Fair.   Patient will benefit from skilled outpatient Physical Therapy to address the deficits stated above and in the chart below, provide patient/family education, and to maximize patient's level of independence.     Plan of care discussed with  patient: Yes  Patient's spiritual, cultural and educational needs considered and patient is agreeable to the plan of care and goals as stated below:     Anticipated Barriers for therapy: History of prior CVA x 2    Medical Necessity is demonstrated by the following  History  Co-morbidities and personal factors that may impact the plan of care Co-morbidities:   Cerebrovascular accident (CVA)   CHF (congestive heart failure)   Diabetes mellitus   Hypertension   MI (myocardial infarction)   Stroke     Personal Factors:   lifestyle     high   Examination  Body Structures and Functions, activity limitations and participation restrictions that may impact the plan of care Body Regions:   lower extremities  trunk    Body Systems:    gross symmetry  ROM  strength  gross coordinated movement  balance  gait  transfers  transitions  motor control  motor learning    Participation Restrictions:   Decreased quality of life secondary to impairments; has not returned to work    Activity limitations:   Learning and applying knowledge  no deficits    General Tasks and Commands  no deficits    Communication  no deficits    Mobility  lifting and carrying objects  walking  driving (bike, car, motorcycle)    Self care  looking after one's health    Domestic Life  shopping  cooking  doing house work (cleaning house, washing dishes, laundry)  assisting others    Interactions/Relationships  no deficits    Life Areas  employment    Community and Social Life  community life  recreation and leisure         high   Clinical Presentation evolving clinical presentation with changing clinical characteristics moderate   Decision Making/ Complexity Score: moderate     Goals:  Short Term Goals: 4 weeks   1. Patient will be compliant with HEP in order to maximize PT benefits  2. Patient will improve self-selected walking speed with least restrictive assistive device to >/=0.6m/s in order to improve home/community ambulation capacity   3. Patient will  score </= 20 Seconds on Five TIme Sit to  order to improve bilateral lower extremity endurance and muscular power for transfers     Long Term Goals: 8 weeks   Patient will improve bilateral lower extremity MMT grades to >/=4/5 in major muscle groups in order to improve strength for ADL completion  Patient will improve self-selected walking speed with least restrictive assistive device to >/=0.8m/s in order to improve home/community ambulation capacity   Patient will score </= 16 Seconds on Five TIme Sit to  order to improve bilateral lower extremity endurance and muscular power for transfers   Patient will perform 1 floor <> stand transfer with bilateral upper extremity support in order to demonstrate safe functional mobility in case of future fall  Patient will report 0 falls from initiation of PT management  Patient will begin some form of home/community fitness in order to sustain progress gained in PT      Plan   Plan of care Certification: 8/4/2022 to 9/30/2022.    Outpatient Physical Therapy 2 times weekly for 8 weeks to include the following interventions: Gait Training, Manual Therapy, Moist Heat/ Ice, Neuromuscular Re-ed, Orthotic Management and Training, Patient Education, Self Care, Therapeutic Activities, Therapeutic Exercise and Modalities PRN.     MELA CHILEL, PT

## 2022-08-09 ENCOUNTER — CLINICAL SUPPORT (OUTPATIENT)
Dept: REHABILITATION | Facility: HOSPITAL | Age: 47
End: 2022-08-09
Payer: MEDICAID

## 2022-08-09 DIAGNOSIS — R29.898 WEAKNESS OF RIGHT LOWER EXTREMITY: Primary | ICD-10-CM

## 2022-08-09 DIAGNOSIS — R26.9 GAIT ABNORMALITY: ICD-10-CM

## 2022-08-09 PROCEDURE — 97110 THERAPEUTIC EXERCISES: CPT | Mod: PN,CQ

## 2022-08-09 NOTE — PROGRESS NOTES
"  [facility]   Physical Therapy Treatment Note     Name: Josefina Martin  Clinic Number: [id number]    Therapy Diagnosis:   Encounter Diagnoses   Name Primary?    Weakness of right lower extremity Yes    Gait abnormality      Physician: No ref. provider found    Visit Date: 8/9/2022    Physician Orders: PT Eval and Treat   Medical Diagnosis from Referral: I63.9 (ICD-10-CM) - Cerebral vascular accident  Evaluation Date: 8/4/2022  Authorization Period Expiration: 12/31/2022  Plan of Care Expiration: 9/30/2022  Visit # / Visits authorized: 2/ 1  FOTO #: 2/5  PTA Visit #: 1/5     Time In: 8:00 am  Time Out: 8:45 am  Total Billable Time: 45 minutes ( 3 TE - MEDICAID)    Precautions: Standard, Diabetes, Fall, CHF and History of CVAx1 and MIx1    SUBJECTIVE     Pt reports: R foot hurts, drags her toe on the ground because she is scared to walk normally due to sensory impairments. Complaints of RLE "heaviness"  She will be compliant with home exercise program. Provided at follow up.  Response to previous treatment: initial eval  Functional change: ongoing    Pain: 7/10  Location: right feet      OBJECTIVE     Objective Measures updated at progress report unless specified.     Treatment     Josefina received the treatments listed below:      therapeutic exercises to develop strength, endurance, ROM and flexibility for 25 minutes including:  Rockerboard dorsiflexion/plantarflexion 2' with 3" holds  Seated dorsiflexion: x20  Seated marching: 2x20  Bridges: 2x10  Clamshells: 2x10 with 3" holds B  LAQ: 2x10 B with 3" hold  Prone HS Curl: 2x10  Standing HS curls: 2x10 B      neuromuscular re-education activities to improve: Balance, Coordination, Kinesthetic and Proprioception for 20 minutes. The following activities were included:  Nustep 3.0 for 6 minutes  Heel/toe raises: 2x10  Step fan: 2' x 2 B  Modified SLS on 4" step: 3x30" R  Sit<>stand: 2x10 with RLE staggered   Step ups 4" forward: 2x10 " B      Patient Education and Home Exercises     Home Exercises Provided and Patient Education Provided     Education provided:   - daily HEP compliance    Written Home Exercises Provided: yes. Exercises were reviewed and Josefina was able to demonstrate them prior to the end of the session.  Josefina demonstrated good  understanding of the education provided. See EMR under Patient Instructions for exercises provided during therapy sessions    ASSESSMENT     Josefina arrived to session with 7/10 reported R plantar foot pain but was agreeable to treatment. Pt presented to clinic ambulating with rolling walker and self imposed R toe drag that pt attributes to a compensation for sensory impairments on R foot. Session consisted of low level strengthening and balance training.  HEP provided today and reviewed with pt during session.  UE support required for all standing exercises.  No loss of balance throughout session.  Pt was appropriately fatigued upon completion of session.  Josefina would benefit from continued PT services to achieve functional goals set at initial evaluation.      Josefina Is progressing well towards her goals.   Pt prognosis is Fair.     Pt will continue to benefit from skilled outpatient physical therapy to address the deficits listed in the problem list box on initial evaluation, provide pt/family education and to maximize pt's level of independence in the home and community environment.     Pt's spiritual, cultural and educational needs considered and pt agreeable to plan of care and goals.     Anticipated barriers to physical therapy: History of prior CVA x 2    Goals:   Short Term Goals: 4 weeks   1. Patient will be compliant with HEP in order to maximize PT benefits  2. Patient will improve self-selected walking speed with least restrictive assistive device to >/=0.6m/s in order to improve home/community ambulation capacity   3. Patient will score </= 20 Seconds on Five TIme Sit to   order to improve bilateral lower extremity endurance and muscular power for transfers      Long Term Goals: 8 weeks   1. Patient will improve bilateral lower extremity MMT grades to >/=4/5 in major muscle groups in order to improve strength for ADL completion  2. Patient will improve self-selected walking speed with least restrictive assistive device to >/=0.8m/s in order to improve home/community ambulation capacity   3. Patient will score </= 16 Seconds on Five TIme Sit to  order to improve bilateral lower extremity endurance and muscular power for transfers   4. Patient will perform 1 floor <> stand transfer with bilateral upper extremity support in order to demonstrate safe functional mobility in case of future fall  5. Patient will report 0 falls from initiation of PT management  6. Patient will begin some form of home/community fitness in order to sustain progress gained in PT      PLAN     Plan to cont with progression of PT goals per POC.    Lalita Nagel, PTA

## 2022-08-11 ENCOUNTER — CLINICAL SUPPORT (OUTPATIENT)
Dept: REHABILITATION | Facility: HOSPITAL | Age: 47
End: 2022-08-11
Payer: MEDICAID

## 2022-08-11 DIAGNOSIS — R26.9 GAIT ABNORMALITY: ICD-10-CM

## 2022-08-11 DIAGNOSIS — R29.898 WEAKNESS OF RIGHT LOWER EXTREMITY: Primary | ICD-10-CM

## 2022-08-11 PROCEDURE — 97110 THERAPEUTIC EXERCISES: CPT | Mod: PN,CQ

## 2022-08-11 NOTE — PROGRESS NOTES
"  [facility]   Physical Therapy Treatment Note     Name: Josefina Martin  Clinic Number: [id number]    Therapy Diagnosis:   Encounter Diagnoses   Name Primary?    Weakness of right lower extremity Yes    Gait abnormality      Physician: Donny Cerrato MD    Visit Date: 8/11/2022    Physician Orders: PT Eval and Treat   Medical Diagnosis from Referral: I63.9 (ICD-10-CM) - Cerebral vascular accident  Evaluation Date: 8/4/2022  Authorization Period Expiration: 12/31/2022  Plan of Care Expiration: 9/30/2022  Visit # / Visits authorized: 3/ 1  FOTO #: 3/5  PTA Visit #: 2/5     Time In: 8:45 am  Time Out: 9:30 am  Total Billable Time: 45 minutes ( 3 TE - MEDICAID)    Precautions: Standard, Diabetes, Fall, CHF and History of CVAx1 and MIx1    SUBJECTIVE     Pt reports: R foot is still hurting today Complaints of RLE "heaviness"  She was compliant with home exercise program.   Response to previous treatment: initial eval  Functional change: ongoing    Pain: 7/10  Location: right feet      OBJECTIVE     Objective Measures updated at progress report unless specified.     Treatment     Josefina received the treatments listed below:      therapeutic exercises to develop strength, endurance, ROM and flexibility for 30 minutes including:  Rockerboard dorsiflexion/plantarflexion 2' with 3" holds  Seated dorsiflexion: x20  Seated marching: 2x20 2# weights  Bridges: 2x10  Clamshells: 2x10 B with red theraband  LAQ: 2x10 B with 2# weights  Prone HS Curl: 2x10 2# weights  Standing Gastroc stretch on fitter board: 3x20"  Standing HS curls: 2x10 B 2# weights    neuromuscular re-education activities to improve: Balance, Coordination, Kinesthetic and Proprioception for 15 minutes. The following activities were included:  Nustep 3.0 for 8 minutes  Standing Heel/toe raises: 2x10  Sit<>stand: 2x10 with RLE staggered   Heel walking with bilateral upper extremity support 4 lengths x 8 feet    Not performed " "today:  Step ups 4" forward: 2x10 B  Step fan: 2' x 2 B  Modified SLS on 4" step: 3x30" R    Patient Education and Home Exercises     Home Exercises Provided and Patient Education Provided     Education provided:   - daily HEP compliance    Written Home Exercises Provided: yes. Exercises were reviewed and Josefina was able to demonstrate them prior to the end of the session.  Josefina demonstrated good  understanding of the education provided. See EMR under Patient Instructions for exercises provided during therapy sessions    ASSESSMENT     Josefina arrived to session with 7/10 reported R plantar foot pain but was agreeable to treatment. Pt presented to clinic ambulating with rolling walker but was able to limit use inside of clinic, walking to bathroom without any AD required and no loss of balance.  Mild R foot drop still observed during ambulation though toe drag not as apparent as previous session.  Progressions in strengthening noted in bold completed without adverse response.  Pt was less challenged with active dorsiflexion this session in seated and standing.  Pt attributes her improvement to HEP compliance since previous session.  Josefina would benefit from continued PT services to achieve functional goals set at initial evaluation.      Josefina Is progressing well towards her goals.   Pt prognosis is Fair.     Pt will continue to benefit from skilled outpatient physical therapy to address the deficits listed in the problem list box on initial evaluation, provide pt/family education and to maximize pt's level of independence in the home and community environment.     Pt's spiritual, cultural and educational needs considered and pt agreeable to plan of care and goals.     Anticipated barriers to physical therapy: History of prior CVA x 2    Goals:   Short Term Goals: 4 weeks   1. Patient will be compliant with HEP in order to maximize PT benefits  2. Patient will improve self-selected walking " speed with least restrictive assistive device to >/=0.6m/s in order to improve home/community ambulation capacity   3. Patient will score </= 20 Seconds on Five TIme Sit to  order to improve bilateral lower extremity endurance and muscular power for transfers      Long Term Goals: 8 weeks   1. Patient will improve bilateral lower extremity MMT grades to >/=4/5 in major muscle groups in order to improve strength for ADL completion  2. Patient will improve self-selected walking speed with least restrictive assistive device to >/=0.8m/s in order to improve home/community ambulation capacity   3. Patient will score </= 16 Seconds on Five TIme Sit to  order to improve bilateral lower extremity endurance and muscular power for transfers   4. Patient will perform 1 floor <> stand transfer with bilateral upper extremity support in order to demonstrate safe functional mobility in case of future fall  5. Patient will report 0 falls from initiation of PT management  6. Patient will begin some form of home/community fitness in order to sustain progress gained in PT      PLAN     Plan to cont with progression of PT goals per POC.    Lalita Nagel, PTA

## 2022-08-16 ENCOUNTER — TELEPHONE (OUTPATIENT)
Dept: REHABILITATION | Facility: HOSPITAL | Age: 47
End: 2022-08-16
Payer: MEDICAID

## 2022-08-16 NOTE — TELEPHONE ENCOUNTER
Called patient regarding missed PT appointment this morning. Said she had called clinic and left voicemail that she wasn't feeling well this morning. Told her missed appointment will not be counted against her today. Confirmed next visit.    Emi Joseph, PT, DPT

## 2022-08-18 ENCOUNTER — CLINICAL SUPPORT (OUTPATIENT)
Dept: REHABILITATION | Facility: HOSPITAL | Age: 47
End: 2022-08-18
Payer: MEDICAID

## 2022-08-18 DIAGNOSIS — R29.898 WEAKNESS OF RIGHT LOWER EXTREMITY: Primary | ICD-10-CM

## 2022-08-18 DIAGNOSIS — R26.9 GAIT ABNORMALITY: ICD-10-CM

## 2022-08-18 PROCEDURE — 97110 THERAPEUTIC EXERCISES: CPT | Mod: PN,CQ

## 2022-08-23 ENCOUNTER — CLINICAL SUPPORT (OUTPATIENT)
Dept: REHABILITATION | Facility: HOSPITAL | Age: 47
End: 2022-08-23
Payer: MEDICAID

## 2022-08-23 DIAGNOSIS — R26.9 GAIT ABNORMALITY: ICD-10-CM

## 2022-08-23 DIAGNOSIS — R29.898 WEAKNESS OF RIGHT LOWER EXTREMITY: Primary | ICD-10-CM

## 2022-08-23 PROCEDURE — 97110 THERAPEUTIC EXERCISES: CPT | Mod: PN

## 2022-08-23 NOTE — PROGRESS NOTES
"  [facility]   Physical Therapy Treatment Note     Name: Josefina Martin  Clinic Number: [id number]    Therapy Diagnosis:   Encounter Diagnoses   Name Primary?    Weakness of right lower extremity Yes    Gait abnormality      Physician: Donny Cerrato MD    Visit Date: 8/23/2022    Physician Orders: PT Eval and Treat   Medical Diagnosis from Referral: I63.9 (ICD-10-CM) - Cerebral vascular accident  Evaluation Date: 8/4/2022  Authorization Period Expiration: 12/31/2022  Plan of Care Expiration: 9/30/2022  Visit # / Visits authorized: 4/ 16  FOTO #: 5/5 performed 8/23/2022  PTA Visit #: 0/5     Time In: 8:01 am  Time Out: 8:45 am  Total Billable Time: 44 minutes ( 3 TE - MEDICAID)    Precautions: Standard, Diabetes, Fall, CHF and History of CVAx1 and MIx1    SUBJECTIVE     Pt reports: has not used walker since a few weeks and she is back to driving herself and she drove to therapy today.  She was compliant with home exercise program.   Response to previous treatment: no adverse response  Functional change: no rolling walker for ambulation and less foot pain, walking more in the home and community.      Pain: 0/10  Location: right foot      OBJECTIVE     Objective Measures updated at progress report unless specified.   FOTO measure completed at start of session x 8 minutes  Treatment     Josefina received the treatments listed below:      therapeutic exercises to develop strength, endurance, ROM and flexibility for 9  minutes including:  FOTO assessment  Standing Gastroc stretch on fitter board: 3x30"    neuromuscular re-education activities to improve: Balance, Coordination, Kinesthetic and Proprioception for 10 minutes. The following activities were included:  Staggered Sit<>stand: x10 with each LE staggered (no UE support)  Step up and overs 4'' step   X 10 each LE leading    Gait training performed for 25 minutes total to increase gait quality, speed, tolerance and " coordination.   Gait training on treadmill x 12 total consisting of forward gait x 6' at 1.4-1.8 speed setting with 1 UE support f/b sidestepping B x 2.5' each way at 0.5-0.6 m/s speed setting with B UE support and 0 LOB with cues as needed for heel strike bilateral lower extremities.   -- Overground in clinic ambulation: 300 feet x 2 trials   -- stair navigation:       3 flights ascending an descending with HR as needed on descent   -- backward walking:  4 x 40 feet with supervision    Patient Education and Home Exercises     Home Exercises Provided and Patient Education Provided     Education provided:   - daily HEP compliance    Written Home Exercises Provided: yes. Exercises were reviewed and Josefina was able to demonstrate them prior to the end of the session.  Josefina demonstrated good  understanding of the education provided. See EMR under Patient Instructions for exercises provided during therapy sessions    ASSESSMENT     Josefina arrived to session with 0/10 reported R foot pain and was agreeable to treatment. She was able to progress greatly during today's session to treadmill and stairs and curbs. She seem motivated to return to work in the next 2-3 weeks. Josefina would benefit from continued PT services to achieve functional goals.      Josefina Is progressing well towards her goals.   Pt prognosis is Fair.     Pt will continue to benefit from skilled outpatient physical therapy to address the deficits listed in the problem list box on initial evaluation, provide pt/family education and to maximize pt's level of independence in the home and community environment.     Pt's spiritual, cultural and educational needs considered and pt agreeable to plan of care and goals.     Anticipated barriers to physical therapy: History of prior CVA x 2    Goals:   Short Term Goals: 4 weeks   1. Patient will be compliant with HEP in order to maximize PT benefits (PROGRESSING, NOT MET)   2. Patient will  "improve self-selected walking speed with least restrictive assistive device to >/=0.6m/s in order to improve home/community ambulation capacity  (PROGRESSING, NOT MET)   3. Patient will score </= 20 Seconds on Five TIme Sit to  order to improve bilateral lower extremity endurance and muscular power for transfers (PROGRESSING, NOT MET)      Long Term Goals: 8 weeks   1. Patient will improve bilateral lower extremity MMT grades to >/=4/5 in major muscle groups in order to improve strength for ADL completion (PROGRESSING, NOT MET)   2. Patient will improve self-selected walking speed with least restrictive assistive device to >/=0.8m/s in order to improve home/community ambulation capacity  (PROGRESSING, NOT MET)   3. Patient will score </= 16 Seconds on Five TIme Sit to  order to improve bilateral lower extremity endurance and muscular power for transfers (PROGRESSING, NOT MET)   4. Patient will perform 1 floor <> stand transfer with bilateral upper extremity support in order to demonstrate safe functional mobility in case of future fall  5. Patient will report 0 falls from initiation of PT management (PROGRESSING, NOT MET)   6. Patient will begin some form of home/community fitness in order to sustain progress gained in PT (PROGRESSING, NOT MET)     PLAN     Plan to cont with progression of PT goals per POC. PROGRESS DYNAMIC GAIT   Standing HS curls: 2x10 B 2# weights  Standing hip abduction/extension: 2x10 2# weights  Standing marching: 2x10 with 2# weights  Standing rockerboard dorsiflexion/plantarflexion 2' with 3" holds  Standing Heel/toe raises: 2x10  Alexia Walker, PT     "

## 2022-08-25 ENCOUNTER — CLINICAL SUPPORT (OUTPATIENT)
Dept: REHABILITATION | Facility: HOSPITAL | Age: 47
End: 2022-08-25
Payer: MEDICAID

## 2022-08-25 DIAGNOSIS — R26.9 GAIT ABNORMALITY: ICD-10-CM

## 2022-08-25 DIAGNOSIS — R29.898 WEAKNESS OF RIGHT LOWER EXTREMITY: Primary | ICD-10-CM

## 2022-08-25 PROCEDURE — 97110 THERAPEUTIC EXERCISES: CPT | Mod: PN,CQ

## 2022-08-25 NOTE — PROGRESS NOTES
"  [facility]   Physical Therapy Treatment Note     Name: Josefina Martin  Clinic Number: [id number]    Therapy Diagnosis:   Encounter Diagnoses   Name Primary?    Weakness of right lower extremity Yes    Gait abnormality      Physician: Donny Cerrato MD    Visit Date: 8/25/2022    Physician Orders: PT Eval and Treat   Medical Diagnosis from Referal: I63.9 (ICD-10-CM) - Cerebral vascular accident  Evaluation Date: 8/4/2022  Authorization Period Expiration: 12/31/2022  Plan of Care Expiration: 9/30/2022  Visit # / Visits authorized: 6/ 16  FOTO #: 1/5 performed 8/23/2022  PTA Visit #: 1/5     Time In: 8:05 am  Time Out: 8:45 am  Total Billable Time: 40 minutes ( 3 TE - MEDICAID)    Precautions: Standard, Diabetes, Fall, CHF and History of CVAx1 and MIx1    SUBJECTIVE     Pt reports: has not used walker since a few weeks and she is back to driving herself and she drove to therapy today.  She was compliant with home exercise program.   Response to previous treatment: no adverse response  Functional change: no rolling walker for ambulation and less foot pain, walking more in the home and community.  Thinks she will be ready for discharge next week.  Pain: 0/10  Location: right foot      OBJECTIVE     Objective Measures updated at progress report unless specified.     Treatment     Josefina received the treatments listed below:      therapeutic exercises to develop strength, endurance, ROM and flexibility for 25 minutes including:  Treadmill 2.0 speed for 10 minutes  Standing HS stretch at stairs: 3x30" B  Standing Gastroc stretch on fitter board: 3x30"  Cybex Hamstring Curl Machine, 4 plates: 2x10  Cybex Leg Press machine, 4 plates: 2x10    Not performed today:  Standing HS curls: 2x10 B 2# weights  Standing hip abduction/extension: 2x10 2# weights  Standing marching: 2x10 with 2# weights  Standing rockerboard dorsiflexion/plantarflexion 2' with 3" holds  Standing Heel/toe raises: " 2x10    neuromuscular re-education activities to improve: Balance, Coordination, Kinesthetic and Proprioception for 20 minutes. The following activities were included:  Upside down bosu squats: 2x10 with fingertip support  Bosu hip drivers: 2x10 B without UE support  Forward walking on airex beam with hurdles (6): 6 lengths x 10 feet  Tandem walking on airex beam: forward 6 lengths x 10 feet  Tandem walking backwards on airex beam: 6 lengths x 10 feet  Lateral steps with cone taps (4) on airex beam: 6 lengths x 10 feet  Forward steps with lateral cone taps (6) on airex beam: 6 lengths x 10 feet    Not performed today:  Staggered Sit<>stand: x10 with each LE staggered (no UE support)  Step up and overs 4'' step   x 10 each LE leading    Gait training performed for 00 minutes total to increase gait quality, speed, tolerance and coordination.     Not performed today:  Gait training on treadmill x 12 total consisting of forward gait x 6' at 1.4-1.8 speed setting with 1 UE support f/b sidestepping B x 2.5' each way at 0.5-0.6 m/s speed setting with B UE support and 0 LOB with cues as needed for heel strike bilateral lower extremities.   Overground in clinic ambulation: 300 feet x 2 trials   stair navigation:       3 flights ascending an descending with HR as needed on descent   backward walking:  4 x 40 feet with supervision    Patient Education and Home Exercises     Home Exercises Provided and Patient Education Provided     Education provided:   - daily HEP compliance    Written Home Exercises Provided: yes. Exercises were reviewed and Josefina was able to demonstrate them prior to the end of the session.  Josefina demonstrated good  understanding of the education provided. See EMR under Patient Instructions for exercises provided during therapy sessions    ASSESSMENT     Josefina arrived to session and reported an absence of her R plantar foot pain this entire week. Pt presented to clinic without rolling walker  and exhibited normalized gait with proper heel strike.  She was able to tolerate progressions in both strength and balance training.  Pt completed high level dynamic balance training in open gym without requiring any UE support and only experienced a single minor loss of balance which she recovered from utilizing stepping strategy.  Josefina would benefit from continued PT services to achieve remaining goals before planned return to work in 3 weeks.      Josefina Is progressing well towards her goals.   Pt prognosis is Fair.     Pt will continue to benefit from skilled outpatient physical therapy to address the deficits listed in the problem list box on initial evaluation, provide pt/family education and to maximize pt's level of independence in the home and community environment.     Pt's spiritual, cultural and educational needs considered and pt agreeable to plan of care and goals.     Anticipated barriers to physical therapy: History of prior CVA x 2    Goals:   Short Term Goals: 4 weeks   1. Patient will be compliant with HEP in order to maximize PT benefits (PROGRESSING, NOT MET)   2. Patient will improve self-selected walking speed with least restrictive assistive device to >/=0.6m/s in order to improve home/community ambulation capacity  (PROGRESSING, NOT MET)   3. Patient will score </= 20 Seconds on Five TIme Sit to  order to improve bilateral lower extremity endurance and muscular power for transfers (PROGRESSING, NOT MET)      Long Term Goals: 8 weeks   1. Patient will improve bilateral lower extremity MMT grades to >/=4/5 in major muscle groups in order to improve strength for ADL completion (PROGRESSING, NOT MET)   2. Patient will improve self-selected walking speed with least restrictive assistive device to >/=0.8m/s in order to improve home/community ambulation capacity  (PROGRESSING, NOT MET)   3. Patient will score </= 16 Seconds on Five TIme Sit to  order to improve bilateral  lower extremity endurance and muscular power for transfers (PROGRESSING, NOT MET)   4. Patient will perform 1 floor <> stand transfer with bilateral upper extremity support in order to demonstrate safe functional mobility in case of future fall  5. Patient will report 0 falls from initiation of PT management (PROGRESSING, NOT MET)   6. Patient will begin some form of home/community fitness in order to sustain progress gained in PT (PROGRESSING, NOT MET)     PLAN     Plan to cont with progression of PT goals per POC.     PROGRESS DYNAMIC GAIT       Lalita Nagel, PTA

## 2022-08-30 ENCOUNTER — CLINICAL SUPPORT (OUTPATIENT)
Dept: REHABILITATION | Facility: HOSPITAL | Age: 47
End: 2022-08-30
Payer: MEDICAID

## 2022-08-30 DIAGNOSIS — R26.9 GAIT ABNORMALITY: ICD-10-CM

## 2022-08-30 DIAGNOSIS — R29.898 WEAKNESS OF RIGHT LOWER EXTREMITY: Primary | ICD-10-CM

## 2022-08-30 PROCEDURE — 97110 THERAPEUTIC EXERCISES: CPT | Mod: PN

## 2022-08-30 NOTE — PROGRESS NOTES
"  [facility]   Physical Therapy Treatment Note     Name: Josefina Martin  Clinic Number: [id number]    Therapy Diagnosis:   Encounter Diagnoses   Name Primary?    Weakness of right lower extremity Yes    Gait abnormality      Physician: Donny Cerrato MD  Visit Date: 8/30/2022    Physician Orders: PT Eval and Treat   Medical Diagnosis from Referal: I63.9 (ICD-10-CM) - Cerebral vascular accident  Evaluation Date: 8/4/2022  Authorization Period Expiration: 12/31/2022  Plan of Care Expiration: 9/30/2022  Visit # / Visits authorized: 6/ 16  FOTO #: 1/5 performed 8/23/2022  PTA Visit #: 1/5     Time In: 8:02 am  Time Out: 8:40 am  Total Billable Time: 38 minutes (3 TE - MEDICAID)    Precautions: Standard, Diabetes, Fall, CHF and History of CVAx1 and MIx1  SUBJECTIVE     Pt reports: she has not been feeling very well over the past few days and when she went to a clinic last night they told her that a bug was going around.  She complained of nausea and fatigue but did not choose to defer PT today. "I dont want to miss my appt."    She was compliant with home exercise program.   Response to previous treatment: no adverse response  Functional change: no rolling walker for ambulation and less foot pain, walking more in the home and community.  Thinks she will be ready for discharge next week.  Pain: 0/10  Location: right foot    Reported soreness today   OBJECTIVE     Objective Measures updated at progress report unless specified.     Treatment     Josefina received the treatments listed below:      therapeutic exercises to develop strength, endurance, ROM and flexibility for 25 minutes including:  Standing HS stretch at stairs: 3x30" B  Standing Gastroc stretch on fitter board: 3x30"  Cybex Hamstring Curl Machine, 4 plates: 3x10  Cybex Leg Press machine, 4 plates: 2 x 10  Standing hip abduction/extension: 2x10 2# weights  Standing Heel/toe raises: 2 x 20  Staggered Sit<>stand: x10 with " each LE staggered (no UE support)    neuromuscular re-education activities to improve: Balance, Coordination, Kinesthetic and Proprioception for 0 minutes. The following activities were included:    Gait training performed for 17 minutes total to increase gait quality, speed, tolerance and coordination.     Gait training on treadmill x 15 total consisting of forward gait x 6' at 1.5-1.8 speed setting with 1 UE support f/b sidestepping B x 3' each way at 0.5-0.6 m/s speed setting with B UE support and 0 LOB with cues as needed for heel strike bilateral lower extremities.   Overground in clinic ambulation: 300 feet x 2 trials     Patient Education and Home Exercises     Home Exercises Provided and Patient Education Provided     Education provided:   - daily HEP compliance    Written Home Exercises Provided: yes. Exercises were reviewed and Josefina was able to demonstrate them prior to the end of the session.  Josefina demonstrated good  understanding of the education provided. See EMR under Patient Instructions for exercises provided during therapy sessions    ASSESSMENT     Josefina arrived to session not feeling well. She required several rest breaks but tolerated treadmill and strength training today.  Much of the high level dynamic balance training was held today.  Josefina would benefit from continued PT services to achieve remaining goals before planned return to work in 3 weeks.      See dynamic gait training in plan section of note.    Josefina is progressing well towards her goals.   Pt prognosis is Fair.     Pt will continue to benefit from skilled outpatient physical therapy to address the deficits listed in the problem list box on initial evaluation, provide pt/family education and to maximize pt's level of independence in the home and community environment.     Pt's spiritual, cultural and educational needs considered and pt agreeable to plan of care and goals.     Anticipated barriers to  physical therapy: History of prior CVA x 2    Goals:   Short Term Goals: 4 weeks   Patient will be compliant with HEP in order to maximize PT benefits (PROGRESSING, NOT MET)   Patient will improve self-selected walking speed with least restrictive assistive device to >/=0.6m/s in order to improve home/community ambulation capacity  (PROGRESSING, NOT MET)   Patient will score </= 20 Seconds on Five TIme Sit to  order to improve bilateral lower extremity endurance and muscular power for transfers (PROGRESSING, NOT MET)      Long Term Goals: 8 weeks   Patient will improve bilateral lower extremity MMT grades to >/=4/5 in major muscle groups in order to improve strength for ADL completion (PROGRESSING, NOT MET)   Patient will improve self-selected walking speed with least restrictive assistive device to >/=0.8m/s in order to improve home/community ambulation capacity  (PROGRESSING, NOT MET)   Patient will score </= 16 Seconds on Five TIme Sit to  order to improve bilateral lower extremity endurance and muscular power for transfers (PROGRESSING, NOT MET)   Patient will perform 1 floor <> stand transfer with bilateral upper extremity support in order to demonstrate safe functional mobility in case of future fall  Patient will report 0 falls from initiation of PT management (PROGRESSING, NOT MET)   Patient will begin some form of home/community fitness in order to sustain progress gained in PT (PROGRESSING, NOT MET)     PLAN     Plan to cont with progression of PT goals per POC.     PROGRESS DYNAMIC GAIT   Upside down bosu squats: 2x10 with fingertip support  Bosu hip drivers: 2x10 B without UE support  Forward walking on airex beam with hurdles (6): 6 lengths x 10 feet  Tandem walking on airex beam: forward 6 lengths x 10 feet  Tandem walking backwards on airex beam: 6 lengths x 10 feet  Lateral steps with cone taps (4) on airex beam: 6 lengths x 10 feet  Forward steps with lateral cone taps (6) on airex  beam: 6 lengths x 10 feet    Alexia Walker, PT

## 2022-09-01 ENCOUNTER — OFFICE VISIT (OUTPATIENT)
Dept: FAMILY MEDICINE | Facility: HOSPITAL | Age: 47
End: 2022-09-01
Payer: MEDICAID

## 2022-09-01 VITALS
DIASTOLIC BLOOD PRESSURE: 90 MMHG | SYSTOLIC BLOOD PRESSURE: 130 MMHG | HEIGHT: 68 IN | WEIGHT: 175.69 LBS | HEART RATE: 66 BPM | BODY MASS INDEX: 26.63 KG/M2

## 2022-09-01 DIAGNOSIS — H92.03 OTALGIA OF BOTH EARS: ICD-10-CM

## 2022-09-01 DIAGNOSIS — I10 ESSENTIAL HYPERTENSION: Primary | ICD-10-CM

## 2022-09-01 DIAGNOSIS — I63.9 CEREBROVASCULAR ACCIDENT (CVA), UNSPECIFIED MECHANISM: ICD-10-CM

## 2022-09-01 PROCEDURE — 99214 OFFICE O/P EST MOD 30 MIN: CPT | Performed by: STUDENT IN AN ORGANIZED HEALTH CARE EDUCATION/TRAINING PROGRAM

## 2022-09-02 RX ORDER — CIPROFLOXACIN AND DEXAMETHASONE 3; 1 MG/ML; MG/ML
4 SUSPENSION/ DROPS AURICULAR (OTIC) 2 TIMES DAILY
Qty: 7.5 ML | Refills: 0 | Status: SHIPPED | OUTPATIENT
Start: 2022-09-02 | End: 2022-09-09

## 2022-09-03 PROBLEM — I25.10 CORONARY ARTERY DISEASE DUE TO LIPID RICH PLAQUE: Status: ACTIVE | Noted: 2022-07-17

## 2022-09-03 PROBLEM — I25.83 CORONARY ARTERY DISEASE DUE TO LIPID RICH PLAQUE: Status: ACTIVE | Noted: 2022-07-17

## 2022-09-03 PROBLEM — E11.9 DIABETES MELLITUS: Status: ACTIVE | Noted: 2022-07-17

## 2022-09-03 PROBLEM — I63.9 ACUTE ISCHEMIC STROKE: Status: ACTIVE | Noted: 2022-07-17

## 2022-09-03 PROBLEM — E78.5 HLD (HYPERLIPIDEMIA): Status: ACTIVE | Noted: 2022-07-17

## 2022-09-03 PROBLEM — E66.3 OVERWEIGHT WITH BODY MASS INDEX (BMI) 25.0-29.9: Status: ACTIVE | Noted: 2021-08-17

## 2022-09-03 PROBLEM — I63.9 CEREBROVASCULAR ACCIDENT (CVA): Status: ACTIVE | Noted: 2022-09-03

## 2022-09-03 RX ORDER — LISINOPRIL 40 MG/1
40 TABLET ORAL DAILY
Qty: 90 TABLET | Refills: 3 | Status: SHIPPED | OUTPATIENT
Start: 2022-09-03 | End: 2023-09-19

## 2022-09-03 RX ORDER — NAPROXEN SODIUM 220 MG/1
1 TABLET, FILM COATED ORAL
COMMUNITY
Start: 2022-05-12 | End: 2023-09-20 | Stop reason: SDUPTHER

## 2022-09-03 RX ORDER — AMLODIPINE BESYLATE 5 MG/1
5 TABLET ORAL
COMMUNITY
Start: 2022-09-01 | End: 2023-09-19

## 2022-09-03 RX ORDER — BACLOFEN 10 MG/1
10 TABLET ORAL
COMMUNITY
Start: 2022-08-17 | End: 2022-09-16

## 2022-09-03 RX ORDER — CARVEDILOL 25 MG/1
25 TABLET ORAL 2 TIMES DAILY WITH MEALS
Qty: 120 TABLET | Refills: 5 | Status: SHIPPED | OUTPATIENT
Start: 2022-09-03 | End: 2023-04-12 | Stop reason: SDUPTHER

## 2022-09-04 NOTE — PROGRESS NOTES
Progress Note  [facility]    Subjective:      Patient ID: Josefina Martin is a 47 y.o. female    Chief Complaint: Follow-up, Medication Refill    Josefina Martin is a 47-year-old female CHF, DMT2, HTN, MI, and CVA presenting following recent CVA which resulted in right leg weakness.     CVA - Patient endorses she presented to [facility] with 3 days of right leg weakness. CT head showed hemorrhage and MRI showed a medial and superior left frontal and parietal lobe acute infarcts. She was admitted on 07/17/2022 and hospitalized for 2 weeks following the CVA. She underwent inpatient rehab for 7 days. Endorses strength is improving in the right leg but still endorses the right leg is hard to  off the ground and feels heavy. On DAPT (ASA + Plavix) and atorvastatin 80 mg daily. Undergoing physical therapy twice weekly. She was also started on baclofen, currently taking 10 mg twice daily for right leg pain and spasticity.  Prior to recent CVA, history of previous CVA while on low-dose aspirin requiring continued dual anti-platelet therapy with aspirin and Plavix for over 3 years in duration.     Hypertension -  current medications include carvedilol 25 mg twice daily, chlorthalidone 25 mg daily, amlodipine 5 mg daily, and lisinopril 40 mg daily.  From chart review, patient was taken off of nifedipine and started on amlodipine at [facility]. Today, she is presenting to clinic for refill of carvedilol and lisinopril. Endorses she took two 40 mg lisinopril tablets prior to coming to clinic to show better control of her blood pressure. Patient with well known history of noncompliance with BP medications with multiple presentations to clinic with systolics greater than 200.      Health Maintenance         Date Due Completion Date    Pneumococcal Vaccines (Age 0-64) (1 - PCV) Never done ---    Foot Exam Never done ---    Eye Exam Never done ---    TETANUS VACCINE Never done ---    Colorectal Cancer Screening Never  done ---    Mammogram 08/02/2020 8/2/2019    Override on 4/9/2018: Not Clinically Appropriate (pt had 1 last yr)    COVID-19 Vaccine (3 - Booster for Pfizer series) 12/08/2021 7/8/2021    Influenza Vaccine (1) Never done ---    Hemoglobin A1c 01/17/2023 7/17/2022    Diabetes Urine Screening 04/14/2023 4/14/2022    Lipid Panel 07/17/2023 7/17/2022    High Dose Statin 09/02/2023 9/2/2022    Aspirin/Antiplatelet Therapy 09/02/2023 9/2/2022    Cervical Cancer Screening 01/04/2024 1/4/2019            Review of Systems   Constitutional:  Negative for chills and fever.   HENT:  Positive for ear discharge and ear pain.    Eyes:  Negative for visual disturbance.   Respiratory:  Negative for shortness of breath.    Cardiovascular:  Negative for chest pain.   Gastrointestinal:  Negative for constipation and diarrhea.   Genitourinary:  Negative for dysuria, frequency and vaginal discharge.   Musculoskeletal:  Positive for gait problem. Negative for back pain.   Neurological:  Positive for weakness (Right lower extremity). Negative for facial asymmetry, speech difficulty and headaches.   Psychiatric/Behavioral:  The patient is not nervous/anxious.       Objective:      Vitals:    09/01/22 1413   BP: (!) 130/90   Pulse: 66     BP Readings from Last 3 Encounters:   09/01/22 (!) 130/90   07/17/22 (!) 175/111   07/14/22 (!) 183/112       Physical Exam  Constitutional:       General: She is not in acute distress.     Appearance: She is not ill-appearing.   Cardiovascular:      Rate and Rhythm: Normal rate and regular rhythm.      Heart sounds: No murmur heard.    No friction rub. No gallop.   Pulmonary:      Breath sounds: No wheezing, rhonchi or rales.   Abdominal:      General: Abdomen is flat. There is no distension.      Tenderness: There is no abdominal tenderness. There is no guarding.   Musculoskeletal:      Right lower leg: No edema.      Left lower leg: No edema.      Comments: 4/5 muscle strength with hip flexion, knee  flexion/extension, dorsiflexion/plantar flexion in right lower extremity.  5/5 muscle strength in left lower extremity   Neurological:      General: No focal deficit present.      Mental Status: She is alert and oriented to person, place, and time.      Gait: Gait abnormal.   Psychiatric:         Mood and Affect: Mood normal.         Behavior: Behavior normal.       Assessment:     1. Essential hypertension    2. Cerebrovascular accident (CVA), unspecified mechanism    3. Otalgia of both ears    4. Mixed hyperlipidemia         Plan:     Uncontrolled hypertension  -  continue carvedilol 25 mg twice daily, lisinopril 40 mg daily, chlorthalidone 25 mg daily, and amlodipine 5 mg daily. Patient with long history of poor compliance with medications and endorses today she took two 40 mg tablets of lisinopril to show better BP results. Given patient is taking medications inappropriately, it remains undetermined if blood pressure is controlled. Provided a long discussion with patient today regarding severity of current situation and high risk for recurrence. Patient agreed to record BP twice daily in BP journal and bring to next clinic follow-up in 1-2 weeks.     Cerebrovascular accident (CVA), unspecified mechanism  - continue DAPT (ASA + Plavix) and atorvastatin 80 mg daily. Continue physical therapy twice weekly. Needs follow-up with neurology as an outpatient. Needs close clinic follow-up and better control of high blood pressure with continued effort with smoking cessation.   - Patient reports with paperwork from credit card company to clinic providing information that she was hospitalized for CVA. Paperwork was completed and given back to patient.    Otalgia  - patient called clinic after being seen reporting that she had another complaint of ear drainage and pain but forgot to mention. Occurring now for > 2 weeks. Was advised to take OTC Allegra or cetirizine at outside clinic but patient reports recurrence of ear  drainage and pain. Also reports ears are itchy.  Given history, likely acute otitis externa. Will provide a 7 day course of Ciprodex and have patient follow-up next week for evaluation    Follow-up in 1-2 weeks    Margarito Ennis DO  [facility], PGY-2  09/01/2022      This note was partially created using [employer] Voice Recognition software. Typographical and content errors may occur with this process. While efforts are made to detect and correct such errors, in some cases errors will persist. For this reason, wording in this document should be considered in the proper context and not strictly verbatim

## 2022-09-06 ENCOUNTER — CLINICAL SUPPORT (OUTPATIENT)
Dept: REHABILITATION | Facility: HOSPITAL | Age: 47
End: 2022-09-06
Payer: MEDICAID

## 2022-09-06 DIAGNOSIS — R29.898 WEAKNESS OF RIGHT LOWER EXTREMITY: Primary | ICD-10-CM

## 2022-09-06 DIAGNOSIS — R26.9 GAIT ABNORMALITY: ICD-10-CM

## 2022-09-06 PROCEDURE — 97110 THERAPEUTIC EXERCISES: CPT | Mod: PN

## 2022-09-06 NOTE — PROGRESS NOTES
I assume primary medical responsibility for this patient. I have reviewed the history, physical, and assessement & treatment plan with the resident and agree that the care is reasonable and necessary. This service has been performed by a resident without the presence of a teaching physician under the primary care exception. If necessary, an addendum of additional findings or evaluation beyond the resident documentation will be noted below.      Torie Main MD    \[facility]\"" Family Medicine

## 2022-09-06 NOTE — PROGRESS NOTES
"  [facility]   Physical Therapy Treatment Note     Name: Josefina Martin  Clinic Number: [id number]    Therapy Diagnosis:   Encounter Diagnoses   Name Primary?    Weakness of right lower extremity Yes    Gait abnormality      Physician: Donny Cerrato MD  Visit Date: 9/6/2022    Physician Orders: PT Eval and Treat   Medical Diagnosis from Referal: I63.9 (ICD-10-CM) - Cerebral vascular accident  Evaluation Date: 8/4/2022  Authorization Period Expiration: 12/31/2022  Plan of Care Expiration: 9/30/2022  Visit # / Visits authorized: 6/ 16  FOTO #: 1/5 performed 8/23/2022  PTA Visit #: 1/5     Time In: 8:06 am  Time Out: 8:53 am  Total Billable Time: 47 minutes (3 TE - MEDICAID)    Precautions: Standard, Diabetes, Fall, CHF and History of CVAx1 and MIx1  SUBJECTIVE     Pt reports: She is feeling better, had an ear infection last week.  Pt says she continues to fatigue at 10 minutes of walking.  She was compliant with home exercise program.   Response to previous treatment: no adverse response  Functional change: community walking x 10 min a few times a week.    Pain: 0/10  Location: N/A     OBJECTIVE     Objective Measures updated at progress report unless specified.     Treatment     Josefina received the treatments listed below:      therapeutic exercises to develop strength, endurance, ROM and flexibility for 30 minutes including:  Standing HS stretch at stairs: 3x30" B  Standing Gastroc stretch on fitter board: 3x30"  Cybex Hamstring Curl Machine, 4 plates: 3x10  Cybex Leg Press machine, 4 plates: 2 x 12  6" forward step ups with opposite knee march 2 x 10 reps bilaterally with single upper extremity support  Standing Heel/toe raises: 2 x 20 with 2# dumb bells  Staggered Sit<>stand: 2 x10 with each LE staggered (no UE support)  Standing hip abduction/extension: 2x10 2# weights (NP- out of time)    neuromuscular re-education activities to improve: Balance, Coordination, Kinesthetic " and Proprioception for 0 minutes. The following activities were included:    Gait training performed for 17 minutes total to increase gait quality, speed, tolerance and coordination.     Gait training on treadmill x 15 total consisting of forward gait x 8' at 1.8 speed setting with 1 UE support, sidestepping B x 3' each way at 0.6 m/s speed setting with B UE support  and retro walking at 0.6 speed setting x 2 minutes 0 LOB with cues as needed for heel strike bilateral lower extremities.   Overground in clinic ambulation: 300 feet x 2 trials with 2# dumb bells    Patient Education and Home Exercises     Home Exercises Provided and Patient Education Provided     Education provided:   - daily HEP compliance    Written Home Exercises Provided: yes. Exercises were reviewed and Josefina was able to demonstrate them prior to the end of the session.  Josefina demonstrated good  understanding of the education provided. See EMR under Patient Instructions for exercises provided during therapy sessions    ASSESSMENT     Josefina arrived to session feeling better than last week and ready to participate in therapy.  Pt tolerated increased activity with only one short seated rest break.  Pt encouraged to increased walking distance/time to progress endurance and explained importance of pushing into fatigue to increase tolerance.  Pt verbalized understanding.        Josefina is progressing well towards her goals.   Pt prognosis is Fair.     Pt will continue to benefit from skilled outpatient physical therapy to address the deficits listed in the problem list box on initial evaluation, provide pt/family education and to maximize pt's level of independence in the home and community environment.     Pt's spiritual, cultural and educational needs considered and pt agreeable to plan of care and goals.     Anticipated barriers to physical therapy: History of prior CVA x 2    Goals:   Short Term Goals: 4 weeks   Patient will be  compliant with HEP in order to maximize PT benefits (PROGRESSING, NOT MET)   Patient will improve self-selected walking speed with least restrictive assistive device to >/=0.6m/s in order to improve home/community ambulation capacity  (PROGRESSING, NOT MET)   Patient will score </= 20 Seconds on Five TIme Sit to  order to improve bilateral lower extremity endurance and muscular power for transfers (PROGRESSING, NOT MET)      Long Term Goals: 8 weeks   Patient will improve bilateral lower extremity MMT grades to >/=4/5 in major muscle groups in order to improve strength for ADL completion (PROGRESSING, NOT MET)   Patient will improve self-selected walking speed with least restrictive assistive device to >/=0.8m/s in order to improve home/community ambulation capacity  (PROGRESSING, NOT MET)   Patient will score </= 16 Seconds on Five TIme Sit to  order to improve bilateral lower extremity endurance and muscular power for transfers (PROGRESSING, NOT MET)   Patient will perform 1 floor <> stand transfer with bilateral upper extremity support in order to demonstrate safe functional mobility in case of future fall  Patient will report 0 falls from initiation of PT management (PROGRESSING, NOT MET)   Patient will begin some form of home/community fitness in order to sustain progress gained in PT (PROGRESSING, NOT MET)     PLAN     Plan to cont with progression of PT goals per POC.     PROGRESS DYNAMIC GAIT   Upside down bosu squats: 2x10 with fingertip support  Bosu hip drivers: 2x10 B without UE support  Forward walking on airex beam with hurdles (6): 6 lengths x 10 feet  Tandem walking on airex beam: forward 6 lengths x 10 feet  Tandem walking backwards on airex beam: 6 lengths x 10 feet  Lateral steps with cone taps (4) on airex beam: 6 lengths x 10 feet  Forward steps with lateral cone taps (6) on airex beam: 6 lengths x 10 feet    Lizzette Strickland, PT

## 2022-09-14 ENCOUNTER — TELEPHONE (OUTPATIENT)
Dept: REHABILITATION | Facility: HOSPITAL | Age: 47
End: 2022-09-14
Payer: MEDICAID

## 2022-09-14 NOTE — TELEPHONE ENCOUNTER
Physical Therapy: No show/Cancellation of Visit  Date: 9/14/2022      Patient was a no call/no show for today's PT appointment. Reason for missed appointment is unknown. Attempted to call pt to find out reason and confirm next appt but pt did not answer. Left message and provided date and time of next appointment. Pt is back at work now so it is possible her current schedule needs to be adjusted.  Patient's next scheduled appointment is Thursday 9/15/22 at 8:45 am.       Initial Evaluation: 8/4/2022  Plan of Care Expiration: 9/30/2022  Cancel: 3  No show: 2    Lalita Nagel, PTA

## 2022-09-21 PROBLEM — Z86.73 HISTORY OF CVA (CEREBROVASCULAR ACCIDENT): Status: ACTIVE | Noted: 2022-09-21

## 2022-09-27 ENCOUNTER — CLINICAL SUPPORT (OUTPATIENT)
Dept: REHABILITATION | Facility: HOSPITAL | Age: 47
End: 2022-09-27
Payer: MEDICAID

## 2022-09-27 DIAGNOSIS — R26.9 GAIT ABNORMALITY: ICD-10-CM

## 2022-09-27 DIAGNOSIS — R29.898 WEAKNESS OF RIGHT LOWER EXTREMITY: Primary | ICD-10-CM

## 2022-09-27 PROCEDURE — 97110 THERAPEUTIC EXERCISES: CPT | Mod: PN,CQ

## 2022-09-27 NOTE — PROGRESS NOTES
"  [facility]   Physical Therapy Treatment Note     Name: Josefina Martin  Clinic Number: [id number]    Therapy Diagnosis:   Encounter Diagnoses   Name Primary?    Weakness of right lower extremity Yes    Gait abnormality      Physician: Donny Cerrato MD  Visit Date: 9/27/2022    Physician Orders: PT Eval and Treat   Medical Diagnosis from Referal: I63.9 (ICD-10-CM) - Cerebral vascular accident  Evaluation Date: 8/4/2022  Authorization Period Expiration: 12/31/2022  Plan of Care Expiration: 9/30/2022  Visit # / Visits authorized: 8/16 (+eval)  FOTO #: 1/5 performed 8/23/2022  PTA Visit #: 1/5     Time In: 8:05 am  Time Out: 8:50 am  Total Billable Time: 45 minutes (3 TE - MEDICAID)    Precautions: Standard, Diabetes, Fall, CHF and History of CVAx1 and MIx1  SUBJECTIVE     Pt reports: No pain upon arrival.  Work is going well, usually a little fatigued by end of day.    She was compliant with home exercise program.   Response to previous treatment: no adverse response  Functional change: community walking x 10 min a few times a week.    Pain: 0/10  Location: N/A     OBJECTIVE     Objective Measures updated at progress report unless specified.     Treatment     Josefina received the treatments listed below:      therapeutic exercises to develop strength, endurance, ROM and flexibility for 30 minutes including:  Standing HS stretch at stairs: 3x30" B  Standing Gastroc stretch on fitter board: 3x30"  Cybex Knee extension machine, 30# 3x10  Cybex Hamstring Curl Machine, 5 plates: 3x10  Cybex Leg Press machine, 6 plates: 3 x 10  6" forward step ups with opposite knee march 2 x 10 reps bilaterally with single upper extremity support  Split squat on two airex pads: 2x10 B  Sit<>stand: 2 x10 with yellow med ball  Standing hip abduction/extension: 2x10 3# weights     neuromuscular re-education activities to improve: Balance, Coordination, Kinesthetic and Proprioception for 0 minutes. The " following activities were included:  Not performed today:  Upside down bosu squats: 2x10 with fingertip support  Bosu hip drivers: 2x10 B without UE support  Forward walking on airex beam with hurdles (6): 6 lengths x 10 feet  Tandem walking on airex beam: forward 6 lengths x 10 feet  Tandem walking backwards on airex beam: 6 lengths x 10 feet  Lateral steps with cone taps (4) on airex beam: 6 lengths x 10 feet  Forward steps with lateral cone taps (6) on airex beam: 6 lengths x 10 feet    Gait training performed for 15 minutes total to increase gait quality, speed, tolerance and coordination.     Gait training on treadmill x 12 total consisting of forward gait x 6' at 2.0 speed setting with 1 UE support, sidestepping B x 2' each way at 0.8 m/s speed setting with B UE support  and retro walking at 0.6 speed setting x 2 minutes 0 LOB with cues as needed for heel strike bilateral lower extremities.   Overground in clinic ambulation: 300 feet x 2 trials with 4# dumb bells    Patient Education and Home Exercises     Home Exercises Provided and Patient Education Provided     Education provided:   - daily HEP compliance    Written Home Exercises Provided: yes. Exercises were reviewed and Josefina was able to demonstrate them prior to the end of the session.  Josefina demonstrated good  understanding of the education provided. See EMR under Patient Instructions for exercises provided during therapy sessions    ASSESSMENT     Josefina arrived to session without any complaints of pain or weakness and was agreeable to treatment.  First session back to therapy following a three week break due to scheduling conflicts with work.  She has returned to work without any issues and only experiences mild fatigue by the end of each work day.  Great tolerance of today's session including numerous strengthening progressions, see bold, without adverse response.  No rest breaks required this today with improvements in overall  activity tolerance noted.  Pt would benefit from continued PT services to achieve the remaining goals set at evaluation.           Josefina is progressing well towards her goals.   Pt prognosis is Fair.     Pt will continue to benefit from skilled outpatient physical therapy to address the deficits listed in the problem list box on initial evaluation, provide pt/family education and to maximize pt's level of independence in the home and community environment.     Pt's spiritual, cultural and educational needs considered and pt agreeable to plan of care and goals.     Anticipated barriers to physical therapy: History of prior CVA x 2    Goals:   Short Term Goals: 4 weeks   Patient will be compliant with HEP in order to maximize PT benefits (PROGRESSING, NOT MET)   Patient will improve self-selected walking speed with least restrictive assistive device to >/=0.6m/s in order to improve home/community ambulation capacity  (PROGRESSING, NOT MET)   Patient will score </= 20 Seconds on Five TIme Sit to  order to improve bilateral lower extremity endurance and muscular power for transfers (PROGRESSING, NOT MET)      Long Term Goals: 8 weeks   Patient will improve bilateral lower extremity MMT grades to >/=4/5 in major muscle groups in order to improve strength for ADL completion (PROGRESSING, NOT MET)   Patient will improve self-selected walking speed with least restrictive assistive device to >/=0.8m/s in order to improve home/community ambulation capacity  (PROGRESSING, NOT MET)   Patient will score </= 16 Seconds on Five TIme Sit to  order to improve bilateral lower extremity endurance and muscular power for transfers (PROGRESSING, NOT MET)   Patient will perform 1 floor <> stand transfer with bilateral upper extremity support in order to demonstrate safe functional mobility in case of future fall  Patient will report 0 falls from initiation of PT management (PROGRESSING, NOT MET)   Patient will begin  some form of home/community fitness in order to sustain progress gained in PT (PROGRESSING, NOT MET)     PLAN     Plan to cont with progression of PT goals per POC.    PROGRESS DYNAMIC GAIT     Lalita Nagel, PTA

## 2022-09-29 ENCOUNTER — OFFICE VISIT (OUTPATIENT)
Dept: URGENT CARE | Facility: CLINIC | Age: 47
End: 2022-09-29
Payer: MEDICAID

## 2022-09-29 VITALS
SYSTOLIC BLOOD PRESSURE: 138 MMHG | WEIGHT: 189 LBS | HEART RATE: 69 BPM | OXYGEN SATURATION: 97 % | BODY MASS INDEX: 27.99 KG/M2 | RESPIRATION RATE: 18 BRPM | TEMPERATURE: 99 F | DIASTOLIC BLOOD PRESSURE: 88 MMHG | HEIGHT: 69 IN

## 2022-09-29 DIAGNOSIS — T14.8XXA SKIN ABRASION: ICD-10-CM

## 2022-09-29 DIAGNOSIS — H92.09 OTALGIA, UNSPECIFIED LATERALITY: ICD-10-CM

## 2022-09-29 DIAGNOSIS — H66.92 ACUTE LEFT OTITIS MEDIA: Primary | ICD-10-CM

## 2022-09-29 PROCEDURE — 3075F PR MOST RECENT SYSTOLIC BLOOD PRESS GE 130-139MM HG: ICD-10-PCS | Mod: CPTII,S$GLB,, | Performed by: NURSE PRACTITIONER

## 2022-09-29 PROCEDURE — 3066F NEPHROPATHY DOC TX: CPT | Mod: CPTII,S$GLB,, | Performed by: NURSE PRACTITIONER

## 2022-09-29 PROCEDURE — 4010F PR ACE/ARB THEARPY RXD/TAKEN: ICD-10-PCS | Mod: CPTII,S$GLB,, | Performed by: NURSE PRACTITIONER

## 2022-09-29 PROCEDURE — 3008F PR BODY MASS INDEX (BMI) DOCUMENTED: ICD-10-PCS | Mod: CPTII,S$GLB,, | Performed by: NURSE PRACTITIONER

## 2022-09-29 PROCEDURE — 3060F POS MICROALBUMINURIA REV: CPT | Mod: CPTII,S$GLB,, | Performed by: NURSE PRACTITIONER

## 2022-09-29 PROCEDURE — 3079F PR MOST RECENT DIASTOLIC BLOOD PRESSURE 80-89 MM HG: ICD-10-PCS | Mod: CPTII,S$GLB,, | Performed by: NURSE PRACTITIONER

## 2022-09-29 PROCEDURE — 1160F PR REVIEW ALL MEDS BY PRESCRIBER/CLIN PHARMACIST DOCUMENTED: ICD-10-PCS | Mod: CPTII,S$GLB,, | Performed by: NURSE PRACTITIONER

## 2022-09-29 PROCEDURE — 1159F MED LIST DOCD IN RCRD: CPT | Mod: CPTII,S$GLB,, | Performed by: NURSE PRACTITIONER

## 2022-09-29 PROCEDURE — 3079F DIAST BP 80-89 MM HG: CPT | Mod: CPTII,S$GLB,, | Performed by: NURSE PRACTITIONER

## 2022-09-29 PROCEDURE — 3008F BODY MASS INDEX DOCD: CPT | Mod: CPTII,S$GLB,, | Performed by: NURSE PRACTITIONER

## 2022-09-29 PROCEDURE — 1159F PR MEDICATION LIST DOCUMENTED IN MEDICAL RECORD: ICD-10-PCS | Mod: CPTII,S$GLB,, | Performed by: NURSE PRACTITIONER

## 2022-09-29 PROCEDURE — 3044F PR MOST RECENT HEMOGLOBIN A1C LEVEL <7.0%: ICD-10-PCS | Mod: CPTII,S$GLB,, | Performed by: NURSE PRACTITIONER

## 2022-09-29 PROCEDURE — 4010F ACE/ARB THERAPY RXD/TAKEN: CPT | Mod: CPTII,S$GLB,, | Performed by: NURSE PRACTITIONER

## 2022-09-29 PROCEDURE — 3075F SYST BP GE 130 - 139MM HG: CPT | Mod: CPTII,S$GLB,, | Performed by: NURSE PRACTITIONER

## 2022-09-29 PROCEDURE — 99213 OFFICE O/P EST LOW 20 MIN: CPT | Mod: S$GLB,,, | Performed by: NURSE PRACTITIONER

## 2022-09-29 PROCEDURE — 3044F HG A1C LEVEL LT 7.0%: CPT | Mod: CPTII,S$GLB,, | Performed by: NURSE PRACTITIONER

## 2022-09-29 PROCEDURE — 3066F PR DOCUMENTATION OF TREATMENT FOR NEPHROPATHY: ICD-10-PCS | Mod: CPTII,S$GLB,, | Performed by: NURSE PRACTITIONER

## 2022-09-29 PROCEDURE — 1160F RVW MEDS BY RX/DR IN RCRD: CPT | Mod: CPTII,S$GLB,, | Performed by: NURSE PRACTITIONER

## 2022-09-29 PROCEDURE — 3060F PR POS MICROALBUMINURIA RESULT DOCUMENTED/REVIEW: ICD-10-PCS | Mod: CPTII,S$GLB,, | Performed by: NURSE PRACTITIONER

## 2022-09-29 PROCEDURE — 99213 PR OFFICE/OUTPT VISIT, EST, LEVL III, 20-29 MIN: ICD-10-PCS | Mod: S$GLB,,, | Performed by: NURSE PRACTITIONER

## 2022-09-29 RX ORDER — MUPIROCIN 20 MG/G
OINTMENT TOPICAL 2 TIMES DAILY
Qty: 30 G | Refills: 0 | Status: SHIPPED | OUTPATIENT
Start: 2022-09-29 | End: 2022-10-04

## 2022-09-29 RX ORDER — AMOXICILLIN AND CLAVULANATE POTASSIUM 875; 125 MG/1; MG/1
1 TABLET, FILM COATED ORAL EVERY 12 HOURS
Qty: 14 TABLET | Refills: 0 | Status: SHIPPED | OUTPATIENT
Start: 2022-09-29 | End: 2022-10-06

## 2022-09-29 NOTE — PROGRESS NOTES
"Subjective:       Patient ID: Josefina Martin is a 47 y.o. female.    Vitals:  height is 5' 8.5" (1.74 m) and weight is 85.7 kg (189 lb). Her temperature is 98.7 °F (37.1 °C). Her blood pressure is 138/88 and her pulse is 69. Her respiration is 18 and oxygen saturation is 97%.     Chief Complaint: Otalgia (Rt and lt ear pain x1 week)    This is a 47 y.o. female who presents today with a chief complaint of bilateral ear pain ongoing for 1 week now, denies any drainage or discharge, patient also reports pain behind her right ear likely secondary to mask, denies fever, body aches or chills, denies cough, wheezing or shortness of breath, denies nausea, vomiting, diarrhea or abdominal pain, denies chest pain or dizziness positional lightheadedness, denies sore throat or trouble swallowing, denies loss of taste or smell, or any other symptoms        Otalgia   There is pain in both ears. This is a new problem. The current episode started in the past 7 days. The problem occurs constantly. The problem has been unchanged. There has been no fever. The pain is at a severity of 6/10. The pain is moderate. Pertinent negatives include no coughing, ear discharge, headaches, neck pain or sore throat. She has tried ear drops for the symptoms. The treatment provided moderate relief. There is no history of a chronic ear infection, hearing loss or a tympanostomy tube.     HENT:  Positive for ear pain. Negative for ear discharge and sore throat.    Neck: Negative for neck pain.   Respiratory:  Negative for cough.    Neurological:  Negative for headaches.     Past Medical History:   Diagnosis Date    Cerebrovascular accident (CVA) 3/24/2017    CHF (congestive heart failure)     Diabetes mellitus     Hypertension     MI (myocardial infarction)     Stroke        Objective:      Physical Exam   Constitutional: She is oriented to person, place, and time. She appears well-developed. She is cooperative.  Non-toxic appearance. She does not " appear ill. No distress.   HENT:   Head: Normocephalic and atraumatic.       Ears:   Right Ear: Hearing, external ear and ear canal normal. No drainage or swelling. Tympanic membrane is not erythematous. A middle ear effusion is present. No decreased hearing is noted.   Left Ear: Hearing, external ear and ear canal normal. No drainage or swelling. Tympanic membrane is erythematous. A middle ear effusion is present. No decreased hearing is noted.   Nose: Nose normal. No mucosal edema, rhinorrhea or nasal deformity. No epistaxis. Right sinus exhibits no maxillary sinus tenderness and no frontal sinus tenderness. Left sinus exhibits no maxillary sinus tenderness and no frontal sinus tenderness.   Mouth/Throat: Uvula is midline, oropharynx is clear and moist and mucous membranes are normal. No trismus in the jaw. Normal dentition. No uvula swelling. No oropharyngeal exudate, posterior oropharyngeal edema or posterior oropharyngeal erythema.   Linear Skin abrasion noted behind right ear, no drainage or discharge noted      Comments: Linear Skin abrasion noted behind right ear, no drainage or discharge noted  Eyes: Conjunctivae and lids are normal. No scleral icterus.   Neck: Trachea normal and phonation normal. Neck supple. No edema present. No erythema present. No neck rigidity present.   Cardiovascular: Normal rate, regular rhythm, normal heart sounds and normal pulses.   Pulmonary/Chest: Effort normal and breath sounds normal. No respiratory distress. She has no decreased breath sounds. She has no rhonchi.   Abdominal: Normal appearance.   Musculoskeletal: Normal range of motion.         General: No deformity. Normal range of motion.   Neurological: She is alert and oriented to person, place, and time. She exhibits normal muscle tone. Coordination normal.   Skin: Skin is warm, dry, intact, not diaphoretic and not pale.   Psychiatric: Her speech is normal and behavior is normal. Judgment and thought content normal.    Nursing note and vitals reviewed.        Patient in no acute distress.  Vitals reassuring.  Discussed results/diagnosis/plan in depth with patient in clinic. Strict precautions given to patient to monitor for worsening signs and symptoms. Advised to follow up with primary.All questions answered. Strict ER precautions given. If your symptoms worsens or fail to improve you should go to the Emergency Room. Discharge and follow-up instructions given verbally/printed. Discharge and follow-up instructions discussed with the patient who expressed understanding and willingness to comply with my recommendations.Patient voiced understanding and in agreement with current treatment plan.     Please be advised this text was dictated with PageStitch software and may contain errors due to translation.    Assessment:       1. Acute left otitis media    2. Otalgia, unspecified laterality    3. Skin abrasion          Plan:         Acute left otitis media  -     amoxicillin-clavulanate 875-125mg (AUGMENTIN) 875-125 mg per tablet; Take 1 tablet by mouth every 12 (twelve) hours. for 7 days  Dispense: 14 tablet; Refill: 0    Otalgia, unspecified laterality    Skin abrasion  -     mupirocin (BACTROBAN) 2 % ointment; Apply topically 2 (two) times daily. for 5 days  Dispense: 30 g; Refill: 0         Medical Decision Making:   Urgent Care Management:  Patient in no acute distress.  Vitals reassuring.  On exam, patient is nontoxic appearing and afebrile.  Physical examination consistent with ear infection.  Medication prescribed over-the-counter medications discussed with patient detail.  Linear skin abrasion noted behind the right ear secondary to mask.  Medication prescribed.  I reiterated the importance of further evaluation if no improvement in symptoms.Patient voiced understanding and in agreement with current treatment plan.         Patient Instructions   PLEASE READ YOUR DISCHARGE INSTRUCTIONS ENTIRELY AS IT CONTAINS IMPORTANT  INFORMATION.    Take the antibiotics to completion    Use over the counter flonase: one spray each nostril twice daily OR two sprays each nostril once daily.   If you find this dries your nose out or your nose bleeds, try using over the counter nasal saline a few minutes prior to using the flonase to moisten the lining of your nose.     Please take an over the counter antihistamine medication (allegra/Claritin/Zyrtec) of your choice as directed.    Tylenol and ibuprofen    Please return or see your primary care doctor if you develop new or worsening symptoms (ear drainage).     Please arrange follow up with your primary medical clinic as soon as possible. You must understand that you've received an Urgent Care treatment only and that you may be released before all of your medical problems are known or treated. You, the patient, will arrange for follow up as instructed. If your symptoms worsen or fail to improve you should go to the Emergency Room.  WE CANNOT RULE OUT ALL POSSIBLE CAUSES OF YOUR SYMPTOMS IN THE URGENT CARE SETTING PLEASE GO TO THE ER IF YOU FEELS YOUR CONDITION IS WORSENING OR YOU WOULD LIKE EMERGENT EVALUATION.

## 2022-09-29 NOTE — PATIENT INSTRUCTIONS
PLEASE READ YOUR DISCHARGE INSTRUCTIONS ENTIRELY AS IT CONTAINS IMPORTANT INFORMATION.    Take the antibiotics to completion    Use over the counter flonase: one spray each nostril twice daily OR two sprays each nostril once daily.   If you find this dries your nose out or your nose bleeds, try using over the counter nasal saline a few minutes prior to using the flonase to moisten the lining of your nose.     Please take an over the counter antihistamine medication (allegra/Claritin/Zyrtec) of your choice as directed.    Tylenol and ibuprofen    Please return or see your primary care doctor if you develop new or worsening symptoms (ear drainage).     Please arrange follow up with your primary medical clinic as soon as possible. You must understand that you've received an Urgent Care treatment only and that you may be released before all of your medical problems are known or treated. You, the patient, will arrange for follow up as instructed. If your symptoms worsen or fail to improve you should go to the Emergency Room.  WE CANNOT RULE OUT ALL POSSIBLE CAUSES OF YOUR SYMPTOMS IN THE URGENT CARE SETTING PLEASE GO TO THE ER IF YOU FEELS YOUR CONDITION IS WORSENING OR YOU WOULD LIKE EMERGENT EVALUATION.

## 2022-09-29 NOTE — LETTER
September 29, 2022      [facility]  [address] [address]  Phone: [phone]  Fax: [phone]       Patient: Josefina Martin   YOB: 1975  Date of Visit: 09/29/2022    To Whom It May Concern:    Jessica Martin  was at [facility] on 09/29/2022. The patient may return to work/school on 09/30/2022 with no restrictions. If you have any questions or concerns, or if I can be of further assistance, please do not hesitate to contact me.    Sincerely,      Radha Joe NP

## 2022-10-21 ENCOUNTER — OFFICE VISIT (OUTPATIENT)
Dept: URGENT CARE | Facility: CLINIC | Age: 47
End: 2022-10-21
Payer: MEDICAID

## 2022-10-21 VITALS
SYSTOLIC BLOOD PRESSURE: 140 MMHG | HEIGHT: 68 IN | OXYGEN SATURATION: 99 % | RESPIRATION RATE: 18 BRPM | WEIGHT: 189 LBS | HEART RATE: 88 BPM | TEMPERATURE: 98 F | BODY MASS INDEX: 28.64 KG/M2 | DIASTOLIC BLOOD PRESSURE: 95 MMHG

## 2022-10-21 DIAGNOSIS — H11.32 SUBCONJUNCTIVAL HEMORRHAGE OF LEFT EYE: Primary | ICD-10-CM

## 2022-10-21 PROCEDURE — 1160F RVW MEDS BY RX/DR IN RCRD: CPT | Mod: CPTII,S$GLB,, | Performed by: FAMILY MEDICINE

## 2022-10-21 PROCEDURE — 3060F POS MICROALBUMINURIA REV: CPT | Mod: CPTII,S$GLB,, | Performed by: FAMILY MEDICINE

## 2022-10-21 PROCEDURE — 3080F DIAST BP >= 90 MM HG: CPT | Mod: CPTII,S$GLB,, | Performed by: FAMILY MEDICINE

## 2022-10-21 PROCEDURE — 3060F PR POS MICROALBUMINURIA RESULT DOCUMENTED/REVIEW: ICD-10-PCS | Mod: CPTII,S$GLB,, | Performed by: FAMILY MEDICINE

## 2022-10-21 PROCEDURE — 3044F HG A1C LEVEL LT 7.0%: CPT | Mod: CPTII,S$GLB,, | Performed by: FAMILY MEDICINE

## 2022-10-21 PROCEDURE — 3077F PR MOST RECENT SYSTOLIC BLOOD PRESSURE >= 140 MM HG: ICD-10-PCS | Mod: CPTII,S$GLB,, | Performed by: FAMILY MEDICINE

## 2022-10-21 PROCEDURE — 3080F PR MOST RECENT DIASTOLIC BLOOD PRESSURE >= 90 MM HG: ICD-10-PCS | Mod: CPTII,S$GLB,, | Performed by: FAMILY MEDICINE

## 2022-10-21 PROCEDURE — 1159F PR MEDICATION LIST DOCUMENTED IN MEDICAL RECORD: ICD-10-PCS | Mod: CPTII,S$GLB,, | Performed by: FAMILY MEDICINE

## 2022-10-21 PROCEDURE — 4010F ACE/ARB THERAPY RXD/TAKEN: CPT | Mod: CPTII,S$GLB,, | Performed by: FAMILY MEDICINE

## 2022-10-21 PROCEDURE — 99213 PR OFFICE/OUTPT VISIT, EST, LEVL III, 20-29 MIN: ICD-10-PCS | Mod: S$GLB,,, | Performed by: FAMILY MEDICINE

## 2022-10-21 PROCEDURE — 3077F SYST BP >= 140 MM HG: CPT | Mod: CPTII,S$GLB,, | Performed by: FAMILY MEDICINE

## 2022-10-21 PROCEDURE — 4010F PR ACE/ARB THEARPY RXD/TAKEN: ICD-10-PCS | Mod: CPTII,S$GLB,, | Performed by: FAMILY MEDICINE

## 2022-10-21 PROCEDURE — 3066F NEPHROPATHY DOC TX: CPT | Mod: CPTII,S$GLB,, | Performed by: FAMILY MEDICINE

## 2022-10-21 PROCEDURE — 3044F PR MOST RECENT HEMOGLOBIN A1C LEVEL <7.0%: ICD-10-PCS | Mod: CPTII,S$GLB,, | Performed by: FAMILY MEDICINE

## 2022-10-21 PROCEDURE — 99213 OFFICE O/P EST LOW 20 MIN: CPT | Mod: S$GLB,,, | Performed by: FAMILY MEDICINE

## 2022-10-21 PROCEDURE — 3066F PR DOCUMENTATION OF TREATMENT FOR NEPHROPATHY: ICD-10-PCS | Mod: CPTII,S$GLB,, | Performed by: FAMILY MEDICINE

## 2022-10-21 PROCEDURE — 1159F MED LIST DOCD IN RCRD: CPT | Mod: CPTII,S$GLB,, | Performed by: FAMILY MEDICINE

## 2022-10-21 PROCEDURE — 1160F PR REVIEW ALL MEDS BY PRESCRIBER/CLIN PHARMACIST DOCUMENTED: ICD-10-PCS | Mod: CPTII,S$GLB,, | Performed by: FAMILY MEDICINE

## 2022-10-21 RX ORDER — BACLOFEN 5 MG/1
TABLET ORAL
COMMUNITY
Start: 2022-09-13 | End: 2023-09-20

## 2022-10-21 RX ORDER — NIFEDIPINE 90 MG/1
90 TABLET, EXTENDED RELEASE ORAL DAILY
COMMUNITY
Start: 2022-09-30 | End: 2023-09-19

## 2022-10-21 NOTE — LETTER
October 21, 2022      [facility] - [facility] Care  [address] [place] [address]  Phone: [phone]  Fax: [phone]       Patient: Josefina Martin   YOB: 1975  Date of Visit: 10/21/2022    To Whom It May Concern:    Jessica Martin  was at [facility] on 10/21/2022. The patient may return to work/school on 10/24/2022 with no restrictions. If you have any questions or concerns, or if I can be of further assistance, please do not hesitate to contact me.    Sincerely,                Jayant Benedict MD

## 2022-10-21 NOTE — PROGRESS NOTES
"Subjective:       Patient ID: Josefina Martin is a 47 y.o. female.    Vitals:  height is 5' 8" (1.727 m) and weight is 85.7 kg (189 lb). Her temperature is 97.8 °F (36.6 °C). Her blood pressure is 140/95 (abnormal) and her pulse is 88. Her respiration is 18 and oxygen saturation is 99%.     Chief Complaint: Eye Problem    Patient presents to clinic with redness in her left eye, pt states it may be a blown blood vessel, x 2 days, home treatments include nothing. No pain or vision loss. NO history of trauma.    Foreign Body in Eye    Eye Problem   The left eye is affected. This is a new problem. The current episode started yesterday. The problem occurs constantly. The problem has been unchanged. There was no injury mechanism. The pain is at a severity of 0/10. The patient is experiencing no pain. There is No known exposure to pink eye. She Does not wear contacts. She has tried nothing for the symptoms. The treatment provided no relief.   ROS    Objective:      Physical Exam   Constitutional: She does not appear ill. No distress. obesity  HENT:   Head: Normocephalic and atraumatic.   Eyes: Lids are normal. Lids are everted and swept, no foreign bodies found. Left conjunctiva has a hemorrhage.     vision grossly intact   Abdominal: Normal appearance.   Neurological: She is alert.   Nursing note and vitals reviewed.      Assessment:       1. Subconjunctival hemorrhage of left eye          Plan:         Subconjunctival hemorrhage of left eye       Patient reassured. Discussed normal course of condition and precautions.            "

## 2022-11-30 NOTE — PROGRESS NOTES
I assume primary medical responsibility for this patient, I have reviewed the case history, findings, diagnosis and treatment plan with the resident and agree that the care is reasonable and necessary. This service has been performed by a resident without the presence of a teaching physician under the primary care exception  Merly Uriarte  8/6/2017     There are no Wet Read(s) to document.

## 2022-12-05 PROBLEM — I63.9 ACUTE ISCHEMIC STROKE: Status: RESOLVED | Noted: 2022-07-17 | Resolved: 2022-12-05

## 2022-12-05 PROBLEM — I63.9 CEREBROVASCULAR ACCIDENT (CVA): Status: RESOLVED | Noted: 2022-09-03 | Resolved: 2022-12-05

## 2023-02-22 ENCOUNTER — TELEPHONE (OUTPATIENT)
Dept: SMOKING CESSATION | Facility: CLINIC | Age: 48
End: 2023-02-22
Payer: MEDICAID

## 2023-02-28 ENCOUNTER — PATIENT MESSAGE (OUTPATIENT)
Dept: ADMINISTRATIVE | Facility: OTHER | Age: 48
End: 2023-02-28
Payer: MEDICAID

## 2023-03-22 ENCOUNTER — OFFICE VISIT (OUTPATIENT)
Dept: OBSTETRICS AND GYNECOLOGY | Facility: CLINIC | Age: 48
End: 2023-03-22
Payer: MEDICAID

## 2023-03-22 VITALS
DIASTOLIC BLOOD PRESSURE: 102 MMHG | SYSTOLIC BLOOD PRESSURE: 173 MMHG | WEIGHT: 179.25 LBS | HEIGHT: 68 IN | BODY MASS INDEX: 27.17 KG/M2

## 2023-03-22 DIAGNOSIS — Z01.419 WELL WOMAN EXAM WITH ROUTINE GYNECOLOGICAL EXAM: Primary | ICD-10-CM

## 2023-03-22 DIAGNOSIS — Z12.4 CERVICAL CANCER SCREENING: ICD-10-CM

## 2023-03-22 DIAGNOSIS — Z12.39 ENCOUNTER FOR SCREENING FOR MALIGNANT NEOPLASM OF BREAST, UNSPECIFIED SCREENING MODALITY: ICD-10-CM

## 2023-03-22 DIAGNOSIS — Z97.5 IUD (INTRAUTERINE DEVICE) IN PLACE: ICD-10-CM

## 2023-03-22 DIAGNOSIS — Z12.39 ENCOUNTER FOR SCREENING BREAST EXAMINATION: ICD-10-CM

## 2023-03-22 PROCEDURE — 99396 PR PREVENTIVE VISIT,EST,40-64: ICD-10-PCS | Mod: S$PBB,,, | Performed by: OBSTETRICS & GYNECOLOGY

## 2023-03-22 PROCEDURE — 99213 OFFICE O/P EST LOW 20 MIN: CPT | Mod: PBBFAC,PO | Performed by: OBSTETRICS & GYNECOLOGY

## 2023-03-22 PROCEDURE — 3077F SYST BP >= 140 MM HG: CPT | Mod: CPTII,,, | Performed by: OBSTETRICS & GYNECOLOGY

## 2023-03-22 PROCEDURE — 4010F ACE/ARB THERAPY RXD/TAKEN: CPT | Mod: CPTII,,, | Performed by: OBSTETRICS & GYNECOLOGY

## 2023-03-22 PROCEDURE — 1160F RVW MEDS BY RX/DR IN RCRD: CPT | Mod: CPTII,,, | Performed by: OBSTETRICS & GYNECOLOGY

## 2023-03-22 PROCEDURE — 99396 PREV VISIT EST AGE 40-64: CPT | Mod: S$PBB,,, | Performed by: OBSTETRICS & GYNECOLOGY

## 2023-03-22 PROCEDURE — 88175 CYTOPATH C/V AUTO FLUID REDO: CPT | Performed by: OBSTETRICS & GYNECOLOGY

## 2023-03-22 PROCEDURE — 99999 PR PBB SHADOW E&M-EST. PATIENT-LVL III: ICD-10-PCS | Mod: PBBFAC,,, | Performed by: OBSTETRICS & GYNECOLOGY

## 2023-03-22 PROCEDURE — 1159F MED LIST DOCD IN RCRD: CPT | Mod: CPTII,,, | Performed by: OBSTETRICS & GYNECOLOGY

## 2023-03-22 PROCEDURE — 3080F PR MOST RECENT DIASTOLIC BLOOD PRESSURE >= 90 MM HG: ICD-10-PCS | Mod: CPTII,,, | Performed by: OBSTETRICS & GYNECOLOGY

## 2023-03-22 PROCEDURE — 1160F PR REVIEW ALL MEDS BY PRESCRIBER/CLIN PHARMACIST DOCUMENTED: ICD-10-PCS | Mod: CPTII,,, | Performed by: OBSTETRICS & GYNECOLOGY

## 2023-03-22 PROCEDURE — 4010F PR ACE/ARB THEARPY RXD/TAKEN: ICD-10-PCS | Mod: CPTII,,, | Performed by: OBSTETRICS & GYNECOLOGY

## 2023-03-22 PROCEDURE — 3008F PR BODY MASS INDEX (BMI) DOCUMENTED: ICD-10-PCS | Mod: CPTII,,, | Performed by: OBSTETRICS & GYNECOLOGY

## 2023-03-22 PROCEDURE — 3080F DIAST BP >= 90 MM HG: CPT | Mod: CPTII,,, | Performed by: OBSTETRICS & GYNECOLOGY

## 2023-03-22 PROCEDURE — 99999 PR PBB SHADOW E&M-EST. PATIENT-LVL III: CPT | Mod: PBBFAC,,, | Performed by: OBSTETRICS & GYNECOLOGY

## 2023-03-22 PROCEDURE — 1159F PR MEDICATION LIST DOCUMENTED IN MEDICAL RECORD: ICD-10-PCS | Mod: CPTII,,, | Performed by: OBSTETRICS & GYNECOLOGY

## 2023-03-22 PROCEDURE — 3077F PR MOST RECENT SYSTOLIC BLOOD PRESSURE >= 140 MM HG: ICD-10-PCS | Mod: CPTII,,, | Performed by: OBSTETRICS & GYNECOLOGY

## 2023-03-22 PROCEDURE — 3008F BODY MASS INDEX DOCD: CPT | Mod: CPTII,,, | Performed by: OBSTETRICS & GYNECOLOGY

## 2023-03-22 PROCEDURE — 81514 NFCT DS BV&VAGINITIS DNA ALG: CPT | Performed by: OBSTETRICS & GYNECOLOGY

## 2023-03-22 RX ORDER — CLOTRIMAZOLE AND BETAMETHASONE DIPROPIONATE 10; .64 MG/G; MG/G
CREAM TOPICAL 2 TIMES DAILY
Qty: 15 G | Refills: 1 | Status: SHIPPED | OUTPATIENT
Start: 2023-03-22 | End: 2023-09-20

## 2023-03-22 RX ORDER — TERCONAZOLE 4 MG/G
1 CREAM VAGINAL NIGHTLY
Qty: 7 G | Refills: 0 | Status: SHIPPED | OUTPATIENT
Start: 2023-03-22 | End: 2023-09-19

## 2023-03-22 NOTE — PROGRESS NOTES
GYNECOLOGY  :  Josefina Martin is a 47 y.o.    Here today for  gyn evaluation     HPI:  Complaints of vaginal discharge with itching and bad odor  for about one week   Also  missed the las annual visit ,due for PAP   Last seen +3 years ago   No menstrual cycles on Mirena IUD    Menstrual cycles :  Sexually active yes  (x)  no (-)  Hx Abnormal PAPs yes(  )   No ( x)   Hx STD  =yes (  )   no (x)  Birth control = Mirena IUD     Last visit to GYN =    Past Medical History:   Diagnosis Date    Cerebrovascular accident (CVA) 3/24/2017    CHF (congestive heart failure)     Diabetes mellitus     Hypertension     MI (myocardial infarction)     Stroke      Past Surgical History:   Procedure Laterality Date    CHOLECYSTECTOMY      history of cholelithiasis    ESOPHAGOGASTRODUODENOSCOPY N/A 10/21/2020    Procedure: EGD (ESOPHAGOGASTRODUODENOSCOPY);  Surgeon: Asha Blackwell MD;  Location: [facility];  Service: Endoscopy;  Laterality: N/A;    ESOPHAGOGASTRODUODENOSCOPY N/A 6/15/2021    Procedure: EGD (ESOPHAGOGASTRODUODENOSCOPY);  Surgeon: Asha Blackwell MD;  Location: [facility];  Service: Endoscopy;  Laterality: N/A;    TUBAL LIGATION       Family History   Problem Relation Age of Onset    Hypertension Mother     Lung cancer Mother     No Known Problems Father     No Known Problems Sister     No Known Problems Daughter     Diabetes Son 14        Takes 2 insulins and metformin use to be bigger wally    Stroke Maternal Uncle 48    Anuerysm Maternal Grandmother     Breast cancer Maternal Grandmother     No Known Problems Sister     No Known Problems Daughter     No Known Problems Son     Breast cancer Maternal Aunt     Breast cancer Maternal Aunt      Social History     Tobacco Use    Smoking status: Every Day     Packs/day: 0.15     Years: 18.00     Pack years: 2.70     Types: Cigarettes    Smokeless tobacco: Never    Tobacco comments:     1 pack/week   Substance Use Topics    Alcohol use: Yes      "Comment: social 1/month    Drug use: No     OB History    Para Term  AB Living   5 4 4   1     SAB IAB Ectopic Multiple Live Births   1              # Outcome Date GA Lbr Flex/2nd Weight Sex Delivery Anes PTL Lv   5 SAB            4 Term            3 Term            2 Term            1 Term                BP (!) 173/102   Ht 5' 8" (1.727 m)   Wt 81.3 kg (179 lb 3.7 oz)   BMI 27.25 kg/m²     Last PAP=   LMP = -  Last Mammogram =   Last Colonoscopy  =-      COMPREHENSIVE GYN HISTORY:  G 5 P 4     PAP History: Denies abnormal Paps.  Infection History: Denies STDs. Denies PID.  Benign History: Denies uterine fibroids. Denies ovarian cysts. Denies endometriosis.   Cancer History: Denies cervical cancer. Denies uterine cancer or hyperplasia. Denies ovarian cancer. Denies vulvar cancer or pre-cancer. Denies vaginal cancer or pre-cancer. Denies breast cancer. Denies colon cancer.  Sexual Activity History: ( x ) Yes   ( - )   no   Menstrual History: Age of menarche: ( 13  )  years. Every (  - )       days, flows for (  - )   days. moderate  flow.  Dysmenorrhea History:  absent  Contraception:  MirenaIUD     ROS  GENERAL: Denies significant weight gain or weight loss. Feeling well overall.  SKIN: Denies rash or lesions.  Normal skin turgor  HEAD: Denies head injury or headache.   NODES: Denies enlarged lymph nodes.   CHEST: Denies chest pain or shortness of breath.   CARDIOVASCULAR: Denies palpitations or left sided chest pain.   ABDOMEN: No abdominal pain,no  diarrhea,constipation  nausea, vomiting or rectal bleeding.   URINARY: normal  Frequency,no  Dysuria or burning on urination.   REPRODUCTIVE: Per HPI   BREASTS: The patient sometimes performs breast self-examination and denies pain, lumps, or nipple discharge.   HEMATOLOGIC: No easy bruisability or excessive bleeding.   MUSCULOSKELETAL: Denies joint pain or swelling.   NEUROLOGIC: Denies syncope or weakness.   PSYCHIATRIC: Denies depression, " anxiety or mood swings.    Physical Exam     Constitutional: She is oriented to person, place, and time. She appears well-developed and well-nourished. No distress.   HENT:   Head: Normocephalic.   NECK: Neck symmetric without masses or thyromegaly.  Cardiovascular: Normal rate and regular rhythm.   Pulmonary/Chest: Effort normal and breath sounds normal. No respiratory distress. She has no wheezes.   Breasts: Symmetrical, no skin changes or visible lesions. No palpable masses, nipple discharge or adenopathy bilaterally.  Abdominal: Soft not distended. Bowel sounds are normal. She exhibits   no masses . No tenderness to palpation.   Genitourinary: Pelvic exam was performed with patient supine.   External genitalia normal no lesions.Normal hair distribution   Adequate perineal body,normal urethral meatus   Vagina moist and well rugated without lesions, whitish  vaginal  Discharge. Mirena IUD string in place    Cervix pink and without lesions. No bleeding. No significant cystocele or rectocele.  Bimanual exam showed uterus normal size, shape and position , mobile and nontender. Adnexa without masses or tenderness. Urethra and bladder normal  Extremities normal no cyanosis ,edema.         A/P Josefina Martin 47 y.o.     Dx=  1- vulvovaginitis   2--Cervical cancer screen  -Breast cancer screen     3-IUD in place     Procedures/Orders:  Pap smear  Vaginosis screen   GC/CT      Assessment /Plan:  s/p normal breast exam     Terazol/lotrisone Rx setn     Patient was counseled today on A.C.S. Pap guidelines and recommendations for yearly pelvic exams, mammograms and monthly self breast exams; to see her PCP for other health maintenance, nutrition and weight gain counseling, discussed lab values.  Discussed colonoscopy recommendations every 10 years starting at age 50   Calcium and vit D daily intake     F/u in 1 yr or PRN      I spent a total of 30 minutes on the day of the visit.This includes face to face time  and non-face to face time preparing to see the patient (eg, review of tests), Obtaining and/or reviewing separately obtained history, Documenting clinical information in the electronic or other health record, Independently interpreting resultsand communicating results to the patient/family/caregiver, or Care coordination.      Dominik Ku M.D.   OB/GYN

## 2023-03-23 RX ORDER — CHLORTHALIDONE 25 MG/1
25 TABLET ORAL DAILY
Qty: 30 TABLET | Refills: 11 | Status: SHIPPED | OUTPATIENT
Start: 2023-03-23 | End: 2023-09-19 | Stop reason: SDUPTHER

## 2023-03-24 ENCOUNTER — PATIENT MESSAGE (OUTPATIENT)
Dept: GASTROENTEROLOGY | Facility: CLINIC | Age: 48
End: 2023-03-24
Payer: MEDICAID

## 2023-03-25 LAB
BACTERIAL VAGINOSIS DNA: POSITIVE
CANDIDA GLABRATA DNA: NEGATIVE
CANDIDA KRUSEI DNA: NEGATIVE
CANDIDA RRNA VAG QL PROBE: POSITIVE
T VAGINALIS RRNA GENITAL QL PROBE: NEGATIVE

## 2023-03-28 ENCOUNTER — PATIENT MESSAGE (OUTPATIENT)
Dept: OBSTETRICS AND GYNECOLOGY | Facility: CLINIC | Age: 48
End: 2023-03-28
Payer: MEDICAID

## 2023-04-05 ENCOUNTER — TELEPHONE (OUTPATIENT)
Dept: OBSTETRICS AND GYNECOLOGY | Facility: CLINIC | Age: 48
End: 2023-04-05
Payer: MEDICAID

## 2023-04-05 NOTE — TELEPHONE ENCOUNTER
----- Message from Dominik Ku MD sent at 4/4/2023  9:12 PM CDT -----  PAP result reviewed\  Satisfactory specimen  Negative for malignancy  Please inform patient  RTC as scheduled  Dominik Ku M.D.   OB/GYN

## 2023-04-12 DIAGNOSIS — I10 ESSENTIAL HYPERTENSION: ICD-10-CM

## 2023-04-12 RX ORDER — CARVEDILOL 25 MG/1
25 TABLET ORAL 2 TIMES DAILY WITH MEALS
Qty: 180 TABLET | Refills: 3 | Status: SHIPPED | OUTPATIENT
Start: 2023-04-12 | End: 2023-10-03

## 2023-04-13 DIAGNOSIS — I10 ESSENTIAL HYPERTENSION: ICD-10-CM

## 2023-04-13 RX ORDER — CARVEDILOL 25 MG/1
25 TABLET ORAL 2 TIMES DAILY WITH MEALS
Qty: 180 TABLET | Refills: 3 | OUTPATIENT
Start: 2023-04-13 | End: 2024-04-12

## 2023-08-13 ENCOUNTER — HOSPITAL ENCOUNTER (EMERGENCY)
Facility: HOSPITAL | Age: 48
Discharge: HOME | End: 2023-08-13
Attending: EMERGENCY MEDICINE
Payer: MEDICAID

## 2023-08-13 VITALS
DIASTOLIC BLOOD PRESSURE: 90 MMHG | RESPIRATION RATE: 16 BRPM | TEMPERATURE: 98 F | OXYGEN SATURATION: 98 % | WEIGHT: 174 LBS | SYSTOLIC BLOOD PRESSURE: 163 MMHG | HEIGHT: 69 IN | HEART RATE: 70 BPM | BODY MASS INDEX: 25.77 KG/M2

## 2023-08-13 DIAGNOSIS — L30.4 INTERTRIGO: ICD-10-CM

## 2023-08-13 DIAGNOSIS — R21 RASH: Primary | ICD-10-CM

## 2023-08-13 DIAGNOSIS — I10 HYPERTENSION, UNSPECIFIED TYPE: ICD-10-CM

## 2023-08-13 PROCEDURE — 99283 EMERGENCY DEPT VISIT LOW MDM: CPT

## 2023-08-13 RX ORDER — CEPHALEXIN 500 MG/1
500 CAPSULE ORAL 4 TIMES DAILY
Qty: 12 CAPSULE | Refills: 0 | Status: SHIPPED | OUTPATIENT
Start: 2023-08-13 | End: 2023-08-16

## 2023-08-13 NOTE — DISCHARGE INSTRUCTIONS
Your groin rash is likely contact dermatitis from humidity and skin contact, please keep it dry with drying agents which you can buy over-the-counter.  You can also apply zinc oxide ointment when your out in walking to act as a skin protective/barrier.  This can be purchased over-the-counter.    There may be some mild early bacterial infection behind her left ear given the cracked skin and pain you will be discharged with 3 days of oral antibiotics.    It is unclear what is causing the rash on her arms and legs, you can continue to apply topical steroid ointment and follow-up with the dermatologist as scheduled tomorrow morning.     You have any new, worsening or worrisome symptoms, return to emergency department closed management.    Blood pressure was elevated, please continue take your blood pressure medications and follow with the primary care doctor for continued blood pressure management.

## 2023-08-13 NOTE — ED PROVIDER NOTES
Encounter Date: 8/13/2023       History     Chief Complaint   Patient presents with    Skin Problem     Has seen dermatology; has had creams and pills; feels like urine is burning. Uti? White patches in ears. Rash on legs and inner thighs. Behind ears swollen      HPI  48-year-old female with past medical history as noted below coming in with multiple rashes.  She is had white rash in and behind her ears for 6-12 months she is seen ENT and Dermatology who described it to psoriasis.  She is prescribed steroid cream, taking pills and has had injections but she feels not improved her symptoms.  She feels like she has some pain behind her left ear which is new.  She also is having a rash under her breast and in her groin in the skin folds which she feels itchy and irritated.  She also complains of bilateral arm and bilateral leg papules that are itchy.    No fevers, cough, chest pain shortness breath, abdominal pain.  No new detergents, clothes, pets, creams.    She has a high with Dermatology tomorrow 7:00 a.m.    Of note she does not have any true dysuria.  She just simply says that when she pees sometimes her inner thighs burn when liquid touches them.  She is no dysuria or frequency at the area from which she urinates.      Review of patient's allergies indicates:  No Known Allergies  Past Medical History:   Diagnosis Date    Cerebrovascular accident (CVA) 3/24/2017    CHF (congestive heart failure)     Diabetes mellitus     Hypertension     MI (myocardial infarction)     Stroke      Past Surgical History:   Procedure Laterality Date    CHOLECYSTECTOMY  2013    history of cholelithiasis    ESOPHAGOGASTRODUODENOSCOPY N/A 10/21/2020    Procedure: EGD (ESOPHAGOGASTRODUODENOSCOPY);  Surgeon: Asha Blackwell MD;  Location: [facility];  Service: Endoscopy;  Laterality: N/A;    ESOPHAGOGASTRODUODENOSCOPY N/A 6/15/2021    Procedure: EGD (ESOPHAGOGASTRODUODENOSCOPY);  Surgeon: Asha Blackwell MD;  Location: [facility];   Service: Endoscopy;  Laterality: N/A;    TUBAL LIGATION       Family History   Problem Relation Age of Onset    Hypertension Mother     Lung cancer Mother     No Known Problems Father     No Known Problems Sister     No Known Problems Daughter     Diabetes Son 14        Takes 2 insulins and metformin use to be bigger wally    Stroke Maternal Uncle 48    Anuerysm Maternal Grandmother     Breast cancer Maternal Grandmother     No Known Problems Sister     No Known Problems Daughter     No Known Problems Son     Breast cancer Maternal Aunt     Breast cancer Maternal Aunt      Social History     Tobacco Use    Smoking status: Every Day     Current packs/day: 0.15     Average packs/day: 0.2 packs/day for 18.0 years (2.7 ttl pk-yrs)     Types: Cigarettes    Smokeless tobacco: Never    Tobacco comments:     1 pack/week   Substance Use Topics    Alcohol use: Yes     Comment: social 1/month    Drug use: No     Review of Systems    Physical Exam     Initial Vitals [08/13/23 1627]   BP Pulse Resp Temp SpO2   (!) 174/113 82 17 97.9 °F (36.6 °C) 98 %      MAP       --         Physical Exam    Physical Exam:  CONSTITUTIONAL: Well developed, well nourished, in no acute distress, calm  HENT: Normocephalic, atraumatic    EYES: Sclerae anicteric   NECK: Supple  RESPIRATORY: Breathing comfortably. Speaking in full sentences   NEUROLOGIC: Alert, interacting normally. No facial droop.   MSK: Moving all four extremities. No visible deformities.   SKIN:  White crusted area inside ear and posterior to the ear with some skin fissures.  There is some mild swelling and tenderness posterior to the left ear with no significant skin changes.    Under right breast as well as in bilateral groin there is area of erythema/humidity with no significant redness or satellite lesions.    Bilateral arms and legs scattered papules with no pustules or vesicles.    PSYCH: Mood and affect normal.       ED Course   Procedures  Labs Reviewed   HIV 1 / 2 ANTIBODY    HEPATITIS C ANTIBODY          Imaging Results    None          Medications - No data to display  Medical Decision Making:   History:   Old Medical Records: I decided to obtain old medical records.  Old Records Summarized: other records.       <> Summary of Records: No records of Dermatology in [facility].    48-year-old female, diabetes coming in with multiple rashes.  Bilateral ear white crusting which was determined to be psoriasis per her dermatologist.  Pt left ear there is some tenderness and swelling concern for possible mild early cellulitis given her cracked skin in that area.  Will supply short course of Keflex for treatment.    Bilateral intertrigo is also noted in the groin as well as under the breast.  No satellite lesions, no significant erythema, low suspicion for fungal infection at this time.  Recommended topical drying agents as well as barrier creams.    Her cause for bilateral arm papules that are itchy.  Possible other manifestation of psoriasis.  Offered her oral steroids which she declined and said she would wait until her dermatology appointment in the morning.  She can continue to use topical steroids which she has been prescribed by her dermatologist.    She is systemically well, this time not consistent with dangerous rash.  Safe for follow-up tomorrow morning with dermatology.    Blood pressure is elevated, she will continue take her blood pressure medications.  Outpatient follow-up for continued blood pressure management.                          Clinical Impression:   Final diagnoses:  [R21] Rash (Primary)  [I10] Hypertension, unspecified type        ED Disposition Condition    Discharge Stable          ED Prescriptions       Medication Sig Dispense Start Date End Date Auth. Provider    cephALEXin (KEFLEX) 500 MG capsule Take 1 capsule (500 mg total) by mouth 4 (four) times daily. for 3 days 12 capsule 8/13/2023 8/16/2023 Uriel Mascorro MD          Follow-up Information       Follow up  With Specialties Details Why Contact Info    Dermatology                 Uriel Mascorro MD  08/13/23 [id number]

## 2023-08-13 NOTE — ED TRIAGE NOTES
Patient is a 48 year old female that presents to the ED via personal vehicle with c/o painful rash that started in the groin area and had spread on her legs and chest. Patient also had white, lynn, dry skin in both her ears and dry flaky skin behind both ears.

## 2023-08-16 ENCOUNTER — OFFICE VISIT (OUTPATIENT)
Dept: OBSTETRICS AND GYNECOLOGY | Facility: CLINIC | Age: 48
End: 2023-08-16
Payer: MEDICAID

## 2023-08-16 VITALS
BODY MASS INDEX: 26.57 KG/M2 | DIASTOLIC BLOOD PRESSURE: 80 MMHG | SYSTOLIC BLOOD PRESSURE: 132 MMHG | WEIGHT: 179.88 LBS

## 2023-08-16 DIAGNOSIS — N89.8 VAGINAL DISCHARGE: ICD-10-CM

## 2023-08-16 DIAGNOSIS — R21 RASH: Primary | ICD-10-CM

## 2023-08-16 PROCEDURE — 4010F ACE/ARB THERAPY RXD/TAKEN: CPT | Mod: CPTII,,, | Performed by: OBSTETRICS & GYNECOLOGY

## 2023-08-16 PROCEDURE — 81514 NFCT DS BV&VAGINITIS DNA ALG: CPT | Performed by: OBSTETRICS & GYNECOLOGY

## 2023-08-16 PROCEDURE — 3079F DIAST BP 80-89 MM HG: CPT | Mod: CPTII,,, | Performed by: OBSTETRICS & GYNECOLOGY

## 2023-08-16 PROCEDURE — 3008F BODY MASS INDEX DOCD: CPT | Mod: CPTII,,, | Performed by: OBSTETRICS & GYNECOLOGY

## 2023-08-16 PROCEDURE — 4010F PR ACE/ARB THEARPY RXD/TAKEN: ICD-10-PCS | Mod: CPTII,,, | Performed by: OBSTETRICS & GYNECOLOGY

## 2023-08-16 PROCEDURE — 1159F MED LIST DOCD IN RCRD: CPT | Mod: CPTII,,, | Performed by: OBSTETRICS & GYNECOLOGY

## 2023-08-16 PROCEDURE — 99999 PR PBB SHADOW E&M-EST. PATIENT-LVL III: CPT | Mod: PBBFAC,,, | Performed by: OBSTETRICS & GYNECOLOGY

## 2023-08-16 PROCEDURE — 3075F SYST BP GE 130 - 139MM HG: CPT | Mod: CPTII,,, | Performed by: OBSTETRICS & GYNECOLOGY

## 2023-08-16 PROCEDURE — 3075F PR MOST RECENT SYSTOLIC BLOOD PRESS GE 130-139MM HG: ICD-10-PCS | Mod: CPTII,,, | Performed by: OBSTETRICS & GYNECOLOGY

## 2023-08-16 PROCEDURE — 3008F PR BODY MASS INDEX (BMI) DOCUMENTED: ICD-10-PCS | Mod: CPTII,,, | Performed by: OBSTETRICS & GYNECOLOGY

## 2023-08-16 PROCEDURE — 99213 OFFICE O/P EST LOW 20 MIN: CPT | Mod: S$PBB,,, | Performed by: OBSTETRICS & GYNECOLOGY

## 2023-08-16 PROCEDURE — 99213 OFFICE O/P EST LOW 20 MIN: CPT | Mod: PBBFAC,PO | Performed by: OBSTETRICS & GYNECOLOGY

## 2023-08-16 PROCEDURE — 1159F PR MEDICATION LIST DOCUMENTED IN MEDICAL RECORD: ICD-10-PCS | Mod: CPTII,,, | Performed by: OBSTETRICS & GYNECOLOGY

## 2023-08-16 PROCEDURE — 3079F PR MOST RECENT DIASTOLIC BLOOD PRESSURE 80-89 MM HG: ICD-10-PCS | Mod: CPTII,,, | Performed by: OBSTETRICS & GYNECOLOGY

## 2023-08-16 PROCEDURE — 99999 PR PBB SHADOW E&M-EST. PATIENT-LVL III: ICD-10-PCS | Mod: PBBFAC,,, | Performed by: OBSTETRICS & GYNECOLOGY

## 2023-08-16 PROCEDURE — 99213 PR OFFICE/OUTPT VISIT, EST, LEVL III, 20-29 MIN: ICD-10-PCS | Mod: S$PBB,,, | Performed by: OBSTETRICS & GYNECOLOGY

## 2023-08-16 RX ORDER — KETOCONAZOLE 20 MG/G
CREAM TOPICAL DAILY
Qty: 30 G | Refills: 0 | Status: SHIPPED | OUTPATIENT
Start: 2023-08-16 | End: 2023-09-20

## 2023-08-16 RX ORDER — TRIAMCINOLONE ACETONIDE 1 MG/G
OINTMENT TOPICAL 2 TIMES DAILY
Qty: 30 G | Refills: 0 | Status: SHIPPED | OUTPATIENT
Start: 2023-08-16 | End: 2023-09-19

## 2023-08-16 RX ORDER — TERCONAZOLE 4 MG/G
1 CREAM VAGINAL DAILY
Qty: 45 G | Refills: 1 | Status: SHIPPED | OUTPATIENT
Start: 2023-08-16 | End: 2023-08-21

## 2023-08-16 NOTE — PROGRESS NOTES
Chief Complaint   Patient presents with    Gynecologic Exam       HPI:   Josefina Martin 48 y.o.  is here for rash in creases of groin and under breast and possible yeast . Has been going on for a month or so uses powder on it which helps. Works long hours and often will be sweating.      No LMP recorded. Patient has had an implant.     Past Medical History:   Diagnosis Date    Cerebrovascular accident (CVA) 3/24/2017    CHF (congestive heart failure)     Diabetes mellitus     Hypertension     MI (myocardial infarction)     Stroke        Past Surgical History:   Procedure Laterality Date    CHOLECYSTECTOMY  2013    history of cholelithiasis    ESOPHAGOGASTRODUODENOSCOPY N/A 10/21/2020    Procedure: EGD (ESOPHAGOGASTRODUODENOSCOPY);  Surgeon: Asha Blackwell MD;  Location: [facility];  Service: Endoscopy;  Laterality: N/A;    ESOPHAGOGASTRODUODENOSCOPY N/A 6/15/2021    Procedure: EGD (ESOPHAGOGASTRODUODENOSCOPY);  Surgeon: Asha Blackwell MD;  Location: [facility];  Service: Endoscopy;  Laterality: N/A;    TUBAL LIGATION         Family History   Problem Relation Age of Onset    Hypertension Mother     Lung cancer Mother     No Known Problems Father     No Known Problems Sister     No Known Problems Daughter     Diabetes Son 14        Takes 2 insulins and metformin use to be bigger wally    Stroke Maternal Uncle 48    Anuerysm Maternal Grandmother     Breast cancer Maternal Grandmother     No Known Problems Sister     No Known Problems Daughter     No Known Problems Son     Breast cancer Maternal Aunt     Breast cancer Maternal Aunt        Social History     Socioeconomic History    Marital status: Single   Occupational History     Employer: [employer]   Tobacco Use    Smoking status: Every Day     Current packs/day: 0.15     Average packs/day: 0.2 packs/day for 18.0 years (2.7 ttl pk-yrs)     Types: Cigarettes    Smokeless tobacco: Never    Tobacco comments:     1 pack/week   Substance and Sexual Activity     Alcohol use: Yes     Comment: social 1/month    Drug use: No    Sexual activity: Yes     Partners: Male     Birth control/protection: None, Surgical       OB History          5    Para   4    Term   4            AB   1    Living   4         SAB   1    IAB        Ectopic        Multiple        Live Births   4                    ROS:    All other ROS negative     PE:   /80   Wt 81.6 kg (179 lb 14.3 oz)   BMI 26.57 kg/m²     APPEARANCE: Well nourished, well developed, in no acute distress.    PELVIC:   EXTERNAL GENITALIA/VULVA: erythema in bilateral creases of thighs with excoriations of skin  URETHRAL MEATUS: Normal size and location, no lesions, no prolapse.  URETHRA: No masses, tenderness, prolapse or scarring.  BLADDER: non-tender, no masses  VAGINA: Moist and well rugated, thick yellow discharge,          1. Rash    2. Vaginal discharge        Plan:    Affirm done, will send in yeast treatment, try topical with plavix  Most likely yeast in other places as well, discussed keeping as dry as possible and will do topical creams    Face to Face time with patient: 20 minutes of total time spent on the encounter, which includes face to face time and non-face to face time preparing to see the patient (eg, review of tests), Obtaining and/or reviewing separately obtained history, Documenting clinical information in the electronic or other health record, Independently interpreting results (not separately reported) and communicating results to the patient/family/caregiver, or Care coordination (not separately reported).

## 2023-08-16 NOTE — LETTER
August 16, 2023      [facility]  [address], [address]  [place] [address]  Phone: [phone]       Patient: Josefina Martin   YOB: 1975  Date of Visit: 08/16/2023    To Whom It May Concern:    Jessica Martin  was at [facility] on 08/16/2023. The patient may return to work on  with no restrictions. If you have any questions or concerns, or if I can be of further assistance, please do not hesitate to contact me.    Sincerely,    Germain Zelaya MA

## 2023-08-19 LAB
BACTERIAL VAGINOSIS DNA: NEGATIVE
CANDIDA GLABRATA DNA: NEGATIVE
CANDIDA KRUSEI DNA: NEGATIVE
CANDIDA RRNA VAG QL PROBE: NEGATIVE
T VAGINALIS RRNA GENITAL QL PROBE: POSITIVE

## 2023-08-21 ENCOUNTER — TELEPHONE (OUTPATIENT)
Dept: OBSTETRICS AND GYNECOLOGY | Facility: CLINIC | Age: 48
End: 2023-08-21

## 2023-08-21 RX ORDER — METRONIDAZOLE 500 MG/1
500 TABLET ORAL EVERY 12 HOURS
Qty: 14 TABLET | Refills: 1 | Status: SHIPPED | OUTPATIENT
Start: 2023-08-21 | End: 2023-08-28

## 2023-08-21 NOTE — TELEPHONE ENCOUNTER
Called patient and discussed results + trich, medications sent for partner. Would like to do JOSE visit so please set up appt in 6 weeks

## 2023-09-19 ENCOUNTER — OFFICE VISIT (OUTPATIENT)
Dept: FAMILY MEDICINE | Facility: HOSPITAL | Age: 48
End: 2023-09-19
Payer: MEDICAID

## 2023-09-19 VITALS
HEIGHT: 69 IN | BODY MASS INDEX: 27.24 KG/M2 | SYSTOLIC BLOOD PRESSURE: 187 MMHG | DIASTOLIC BLOOD PRESSURE: 92 MMHG | WEIGHT: 183.88 LBS | HEART RATE: 61 BPM

## 2023-09-19 DIAGNOSIS — E11.9 TYPE 2 DIABETES MELLITUS WITHOUT COMPLICATION, WITHOUT LONG-TERM CURRENT USE OF INSULIN: ICD-10-CM

## 2023-09-19 DIAGNOSIS — G47.19 EXCESSIVE DAYTIME SLEEPINESS: ICD-10-CM

## 2023-09-19 DIAGNOSIS — I10 UNCONTROLLED HYPERTENSION: Primary | ICD-10-CM

## 2023-09-19 DIAGNOSIS — Z91.89 AT RISK FOR SLEEP APNEA: ICD-10-CM

## 2023-09-19 PROCEDURE — 99214 OFFICE O/P EST MOD 30 MIN: CPT | Performed by: STUDENT IN AN ORGANIZED HEALTH CARE EDUCATION/TRAINING PROGRAM

## 2023-09-19 RX ORDER — OLMESARTAN MEDOXOMIL 40 MG/1
40 TABLET ORAL DAILY
Qty: 90 TABLET | Refills: 3 | Status: SHIPPED | OUTPATIENT
Start: 2023-09-19 | End: 2023-10-03 | Stop reason: SDUPTHER

## 2023-09-19 RX ORDER — ATORVASTATIN CALCIUM 80 MG/1
80 TABLET, FILM COATED ORAL NIGHTLY
COMMUNITY
Start: 2023-07-25 | End: 2023-10-03 | Stop reason: SDUPTHER

## 2023-09-19 RX ORDER — CLOPIDOGREL BISULFATE 75 MG/1
1 TABLET ORAL DAILY
COMMUNITY
Start: 2023-05-18 | End: 2023-09-20

## 2023-09-19 RX ORDER — METFORMIN HYDROCHLORIDE 500 MG/1
500 TABLET ORAL 2 TIMES DAILY
COMMUNITY
Start: 2023-06-05 | End: 2023-09-20

## 2023-09-19 RX ORDER — NIFEDIPINE 90 MG/1
90 TABLET, EXTENDED RELEASE ORAL DAILY
Qty: 90 TABLET | Refills: 3 | Status: SHIPPED | OUTPATIENT
Start: 2023-09-19 | End: 2023-10-03 | Stop reason: SDUPTHER

## 2023-09-19 RX ORDER — CHLORTHALIDONE 25 MG/1
25 TABLET ORAL DAILY
Qty: 90 TABLET | Refills: 3 | Status: SHIPPED | OUTPATIENT
Start: 2023-09-19 | End: 2023-10-03 | Stop reason: SDUPTHER

## 2023-09-19 RX ORDER — OLMESARTAN MEDOXOMIL 40 MG/1
40 TABLET ORAL
COMMUNITY
Start: 2023-06-17 | End: 2023-09-19 | Stop reason: SDUPTHER

## 2023-09-19 RX ORDER — NAPROXEN SODIUM 220 MG/1
1 TABLET, FILM COATED ORAL DAILY
COMMUNITY
Start: 2023-05-18 | End: 2023-10-03

## 2023-09-19 RX ORDER — NIFEDIPINE 90 MG/1
1 TABLET, EXTENDED RELEASE ORAL DAILY
COMMUNITY
Start: 2023-05-18 | End: 2023-09-19

## 2023-09-19 RX ORDER — TRAZODONE HYDROCHLORIDE 100 MG/1
100 TABLET ORAL NIGHTLY PRN
COMMUNITY
Start: 2023-06-21 | End: 2023-10-03

## 2023-09-19 NOTE — PATIENT INSTRUCTIONS
INDIVIDUALIZED HYPERTENSION COMPREHENSIVE PLAN      LIFESTYLE MODIFICATION      A) DIET  EAT LESS SALT  Salt is known as sodium chloride. It is measured in grams (g) or milligrams (mg).  RESTRICT salt with consuming less than 2300 mg (2.3 g) of sodium per day  LIMIT/AVOID high salt foods such as:  canned soups, TV dinners, deli meats, hot dogs, salted potato chips, pickles, and olives  condiments: soy sauce, ketchup, mustard, mayonnaise    DO NOT ADD salt to food at your table. Use black pepper as an alternative seasoning instead of salt.    DASH DIET (Dietary Approaches to Stop Hypertension)  MEDITERRANEAN-STYLE DIET  These diets emphasize intake of fruits and vegetables, whole grains, legumes, low-fat dairy, nuts, use of olive oil (Mediterranean diet specifically), limiting red meat consumption (few times/month), and eating fish and poultry at least twice/week.    DRINK LESS ALCOHOL  In individuals who drink alcohol, reduce alcohol to:  Men: ?2 drinks daily  Women: ?1 drink daily.      B) EXERCISE  Cardiovascular exercise, at least 150 minutes of moderate-intensity physical therapy per week which corresponds to 30 minutes per day, five or more days a week.  Brisk Walking, Jogging, Bicycling, Swimming, Gardening, Pushing a Lawnmower.  If significant limitations related to chronic pain and mobility: Can try activities such as Orlin Chi or seated chair exercises.   Not enough time to exercise?  Park in a parking space far away from the entrance of a store and take the stairs instead of the elevator      CURRENT BLOOD PRESSURE MEDICATIONS    Olmesartan 40 mg daily   nifedipine 90 mg daily  chlorthalidone 25 mg daily      NEXT CLINIC FOLLOW-UP    2-4 weeks      GOALS    Blood Pressure Goal: < 140/90  Today's In-Office BP: 187/92      HOME BLOOD PRESSURE READINGS    DATE  (Month/Date/Year)  Example: 01/01/2023 Morning Blood Pressure Reading  (Systolic/Diastolic)  Example: 154/92 Evening Blood Pressure  Reading  (Systolic/Diastolic)  Example: 135/87

## 2023-09-20 RX ORDER — LORATADINE 10 MG/1
10 TABLET ORAL
COMMUNITY
End: 2024-01-09

## 2023-09-20 RX ORDER — CHLORTHALIDONE 25 MG/1
1 TABLET ORAL DAILY
COMMUNITY
Start: 2023-05-18 | End: 2023-09-20

## 2023-09-20 RX ORDER — ATORVASTATIN CALCIUM 80 MG/1
1 TABLET, FILM COATED ORAL NIGHTLY
COMMUNITY
Start: 2023-05-18 | End: 2023-09-20

## 2023-09-20 RX ORDER — METFORMIN HYDROCHLORIDE 500 MG/1
500 TABLET ORAL 2 TIMES DAILY WITH MEALS
Qty: 180 TABLET | Refills: 1 | Status: SHIPPED | OUTPATIENT
Start: 2023-09-20 | End: 2023-10-03 | Stop reason: SDUPTHER

## 2023-09-20 RX ORDER — OLMESARTAN MEDOXOMIL 40 MG/1
1 TABLET ORAL DAILY
COMMUNITY
Start: 2023-05-18 | End: 2023-09-20

## 2023-09-20 RX ORDER — METFORMIN HYDROCHLORIDE 500 MG/1
1 TABLET ORAL 2 TIMES DAILY WITH MEALS
COMMUNITY
Start: 2023-05-18 | End: 2023-09-20

## 2023-09-20 NOTE — PROGRESS NOTES
I assume primary medical responsibility for this patient. I have reviewed the history, physical, and assessment & treatment plan with the resident and agree that the care is reasonable and necessary. This service has been performed by a resident without the presence of a teaching physician under the primary care exception. If necessary, an addendum of additional findings or evaluation beyond the resident documentation will be noted below.     Uncontrolled hypertension without signs/symptoms of end-organ damage during today's visit.  Encouraged lifestyle modifications along with adherence to current antihypertensive medication regimen.  Encouraged patient to maintain a BP log and bring it to next scheduled clinic visit in approximately 2 weeks.  Recommend follow-up visit in approximately 2 weeks for repeat blood pressure check.  If blood pressure remains uncontrolled at that visit, may consider adding/adjusting current antihypertensive medication regimen.     Miko Perrin Jr., DO    [facility]

## 2023-09-20 NOTE — PROGRESS NOTES
Progress Note  [facility]    Subjective:      Patient ID: Josefina Martin is a 48 y.o. female    Chief Complaint: Hypertension and Medication Problem    Josefina Martin is a 48-year-old female with a PMHx Multiple CVAs w/o residual deficits, HTN, T2DM, CAD s/p multiple stent placement, hx of MI, systolic and diastolic HF, history of tobacco use presenting to reestablish care and for management of her high blood pressure. Patient has also received care at [facility].     # uncontrolled HTN - patient with long history of poorly controlled hypertension and patient with underlying multiple cardiovascular comorbidities.  Current medications include chlorthalidone 25 mg daily, nifedipine 90 mg daily, and losartan 40 mg daily. Reports that she is been without her blood pressure medications for the past 2 days and is in need of refill of medications.  Reports that at home blood pressure readings have shown systolics in the 150s and diastolics in the 70s.  Also reports that she has continued with smoking cessation and has been without any type of tobacco use for over a year.     #CVA - patient with multiple CVAs including a hospitalization at [facility] in 2022 requiring one-week of inpatient rehab due to aphasia and right-sided weakness. CT head showed hemorrhage and MRI showed a medial and superior left frontal and parietal lobe acute infarct during hospitalization.  Reports no recent CVA since that time and reports resolution of symptoms and deficits. Patient with history of previous CVA while on low-dose aspirin requiring continue dual antiplatelet therapy with aspirin and Plavix for over 4 years in duration. Patient also currently on atorvastatin 80 mg daily.     #T2DM - chronic issue but currently controlled and current medications include metformin 500 mg daily. Last on file hemoglobin A1c 6.8 on 12/06/2022.       Health Maintenance         Date Due Completion Date    Pneumococcal Vaccines  (Age 0-64) (1 - PCV) Never done ---    Foot Exam Never done ---    Eye Exam Never done ---    TETANUS VACCINE Never done ---    Colorectal Cancer Screening Never done ---    Mammogram 08/02/2020 8/2/2019    Override on 4/9/2018: Not Clinically Appropriate (pt had 1 last yr)    COVID-19 Vaccine (3 - Pfizer series) 09/02/2021 7/8/2021    Diabetes Urine Screening 04/14/2023 4/14/2022    Hemoglobin A1c 06/06/2023 12/6/2022    Influenza Vaccine (1) Never done ---    Lipid Panel 12/07/2023 12/7/2022    High Dose Statin 09/20/2024 9/20/2023    Aspirin/Antiplatelet Therapy 09/20/2024 9/20/2023    Cervical Cancer Screening 03/22/2026 3/22/2023            Review of Systems   Constitutional:  Negative for fatigue.   HENT:  Negative for congestion and sore throat.    Eyes:  Negative for visual disturbance.   Respiratory:  Negative for shortness of breath.    Cardiovascular:  Negative for chest pain.   Gastrointestinal:  Negative for abdominal pain, blood in stool, constipation, diarrhea, nausea and vomiting.   Genitourinary:  Negative for difficulty urinating, dysuria and frequency.   Musculoskeletal:  Negative for arthralgias and myalgias.   Skin:  Negative for rash.   Allergic/Immunologic: Negative for environmental allergies and food allergies.   Neurological:  Negative for dizziness, weakness, numbness and headaches.   Psychiatric/Behavioral:  Positive for sleep disturbance (Insomnia). Negative for dysphoric mood. The patient is not nervous/anxious.         Objective:      Vitals:    09/19/23 1509   BP: (!) 187/92   Pulse: 61     BP Readings from Last 3 Encounters:   09/19/23 (!) 187/92   08/16/23 132/80   08/13/23 (!) 163/90     Body mass index is 27.15 kg/m².    Physical Exam  Vitals reviewed.   Constitutional:       Appearance: Normal appearance.   HENT:      Head: Normocephalic and atraumatic.   Eyes:      Pupils: Pupils are equal, round, and reactive to light.   Cardiovascular:      Rate and Rhythm: Normal rate and  regular rhythm.   Pulmonary:      Effort: Pulmonary effort is normal.      Breath sounds: Normal breath sounds.   Musculoskeletal:         General: Normal range of motion.      Right lower leg: No edema.      Left lower leg: No edema.   Skin:     General: Skin is warm.      Findings: No rash.   Neurological:      Mental Status: She is alert and oriented to person, place, and time.   Psychiatric:         Mood and Affect: Mood normal.         Behavior: Behavior normal.       Assessment:     1. Uncontrolled hypertension    2. Type 2 diabetes mellitus without complication, without long-term current use of insulin    3. Excessive daytime sleepiness    4. At risk for sleep apnea       Plan:     Uncontrolled hypertension  -     chlorthalidone (HYGROTEN) 25 MG Tab; Take 1 tablet (25 mg total) by mouth once daily.  Dispense: 90 tablet; Refill: 3  -     NIFEdipine (PROCARDIA-XL) 90 MG (OSM) 24 hr tablet; Take 1 tablet (90 mg total) by mouth once daily.  Dispense: 90 tablet; Refill: 3  -     olmesartan (BENICAR) 40 MG tablet; Take 1 tablet (40 mg total) by mouth once daily.  Dispense: 90 tablet; Refill: 3    Type 2 diabetes mellitus without complication, without long-term current use of insulin  -     metFORMIN (GLUCOPHAGE) 500 MG tablet; Take 1 tablet (500 mg total) by mouth 2 (two) times daily with meals.  Dispense: 180 tablet; Refill: 1    Excessive daytime sleepiness  -     Ambulatory referral/consult to Sleep Disorders; Future; Expected date: 09/27/2023    At risk for sleep apnea  -     Ambulatory referral/consult to Sleep Disorders; Future; Expected date: 09/27/2023       STOP-BANG assessment for obstructive sleep apnea        Snores?  YES  +1   Tired?  YES  +1   Observed apnea?  YES  +1   High Blood Pressure?  YES  +1   BMI > 35?  NO   Age > 50  NO   Neck > 16 inch (40 cm)  Not measured   Gender male?  NO        High risk of NICK: Yes 5-8    Int.  risk of NICK: Yes 3-4    Low risk of NICK: Yes 0-2         PLAN:  Reading  hypertension, chronic issue remains uncontrolled with in office blood pressure reading 187/92.  Patient has not taking her medications in the past 2 days which is contributing to acute elevation.  Will plan to continue chlorthalidone 25 mg daily, nifedipine 90 mg daily, and olmesartan 40 mg daily.  Given multiple comorbidities including MI, CAD status post stent placement, and multiple CVAs will require frequent follow-ups with a goal of less than 130/80. Provided information on lifestyle modification and advised to record blood pressure daily in BP log.  Patient quit smoking around 1 year ago which has decreased her risk, continued effort with smoking cessation. Patient also with STOP-BANG questionnaire score of 4 indicating moderate/intermittent risk for NICK. Will plan for referral to sleep medicine for sleep study as  secondary etiologies of uncontrolled hypertension should be considered.     Follow-up in 2 weeks for management of blood pressure and management of chronic medical conditions    Margarito Ennis DO  [facility], PGY-3  09/19/2023      This note was partially created using [employer] Voice Recognition software. Typographical and content errors may occur with this process. While efforts are made to detect and correct such errors, in some cases errors will persist. For this reason, wording in this document should be considered in the proper context and not strictly verbatim

## 2023-09-27 ENCOUNTER — TELEPHONE (OUTPATIENT)
Dept: OBSTETRICS AND GYNECOLOGY | Facility: CLINIC | Age: 48
End: 2023-09-27
Payer: MEDICAID

## 2023-09-27 NOTE — TELEPHONE ENCOUNTER
"----- Message from Sushil Benedict sent at 9/27/2023  4:08 PM CDT -----  Pt Requesting Call Back    Who called: pt  Who called for pt:  Best call back #: [phone]  Add notes: caller said she was told when she requested to speak to Dr. Frank that she was delivering  a pt; caller wants to know is Dr. Frank "back now"    "

## 2023-09-29 ENCOUNTER — OFFICE VISIT (OUTPATIENT)
Dept: OBSTETRICS AND GYNECOLOGY | Facility: CLINIC | Age: 48
End: 2023-09-29
Payer: MEDICAID

## 2023-09-29 VITALS
HEIGHT: 69 IN | SYSTOLIC BLOOD PRESSURE: 132 MMHG | BODY MASS INDEX: 26.38 KG/M2 | DIASTOLIC BLOOD PRESSURE: 80 MMHG | WEIGHT: 178.13 LBS

## 2023-09-29 DIAGNOSIS — N89.8 VAGINAL IRRITATION: ICD-10-CM

## 2023-09-29 DIAGNOSIS — B37.9 YEAST INFECTION: Primary | ICD-10-CM

## 2023-09-29 DIAGNOSIS — A59.9 TRICHOMONOSIS: ICD-10-CM

## 2023-09-29 DIAGNOSIS — R30.0 DYSURIA: ICD-10-CM

## 2023-09-29 PROCEDURE — 4010F PR ACE/ARB THEARPY RXD/TAKEN: ICD-10-PCS | Mod: CPTII,,, | Performed by: OBSTETRICS & GYNECOLOGY

## 2023-09-29 PROCEDURE — 87086 URINE CULTURE/COLONY COUNT: CPT | Performed by: OBSTETRICS & GYNECOLOGY

## 2023-09-29 PROCEDURE — 1159F MED LIST DOCD IN RCRD: CPT | Mod: CPTII,,, | Performed by: OBSTETRICS & GYNECOLOGY

## 2023-09-29 PROCEDURE — 3079F DIAST BP 80-89 MM HG: CPT | Mod: CPTII,,, | Performed by: OBSTETRICS & GYNECOLOGY

## 2023-09-29 PROCEDURE — 99213 OFFICE O/P EST LOW 20 MIN: CPT | Mod: PBBFAC,PO | Performed by: OBSTETRICS & GYNECOLOGY

## 2023-09-29 PROCEDURE — 99999 PR PBB SHADOW E&M-EST. PATIENT-LVL III: CPT | Mod: PBBFAC,,, | Performed by: OBSTETRICS & GYNECOLOGY

## 2023-09-29 PROCEDURE — 4010F ACE/ARB THERAPY RXD/TAKEN: CPT | Mod: CPTII,,, | Performed by: OBSTETRICS & GYNECOLOGY

## 2023-09-29 PROCEDURE — 99213 OFFICE O/P EST LOW 20 MIN: CPT | Mod: S$PBB,,, | Performed by: OBSTETRICS & GYNECOLOGY

## 2023-09-29 PROCEDURE — 3075F SYST BP GE 130 - 139MM HG: CPT | Mod: CPTII,,, | Performed by: OBSTETRICS & GYNECOLOGY

## 2023-09-29 PROCEDURE — 81514 NFCT DS BV&VAGINITIS DNA ALG: CPT | Performed by: OBSTETRICS & GYNECOLOGY

## 2023-09-29 PROCEDURE — 3079F PR MOST RECENT DIASTOLIC BLOOD PRESSURE 80-89 MM HG: ICD-10-PCS | Mod: CPTII,,, | Performed by: OBSTETRICS & GYNECOLOGY

## 2023-09-29 PROCEDURE — 3008F PR BODY MASS INDEX (BMI) DOCUMENTED: ICD-10-PCS | Mod: CPTII,,, | Performed by: OBSTETRICS & GYNECOLOGY

## 2023-09-29 PROCEDURE — 1159F PR MEDICATION LIST DOCUMENTED IN MEDICAL RECORD: ICD-10-PCS | Mod: CPTII,,, | Performed by: OBSTETRICS & GYNECOLOGY

## 2023-09-29 PROCEDURE — 99999 PR PBB SHADOW E&M-EST. PATIENT-LVL III: ICD-10-PCS | Mod: PBBFAC,,, | Performed by: OBSTETRICS & GYNECOLOGY

## 2023-09-29 PROCEDURE — 3075F PR MOST RECENT SYSTOLIC BLOOD PRESS GE 130-139MM HG: ICD-10-PCS | Mod: CPTII,,, | Performed by: OBSTETRICS & GYNECOLOGY

## 2023-09-29 PROCEDURE — 99213 PR OFFICE/OUTPT VISIT, EST, LEVL III, 20-29 MIN: ICD-10-PCS | Mod: S$PBB,,, | Performed by: OBSTETRICS & GYNECOLOGY

## 2023-09-29 PROCEDURE — 3008F BODY MASS INDEX DOCD: CPT | Mod: CPTII,,, | Performed by: OBSTETRICS & GYNECOLOGY

## 2023-09-29 RX ORDER — FLUCONAZOLE 150 MG/1
150 TABLET ORAL
Qty: 2 TABLET | Refills: 0 | Status: SHIPPED | OUTPATIENT
Start: 2023-09-29 | End: 2023-09-29

## 2023-09-29 RX ORDER — TERCONAZOLE 4 MG/G
1 CREAM VAGINAL DAILY
Qty: 45 G | Refills: 1 | Status: SHIPPED | OUTPATIENT
Start: 2023-09-29 | End: 2023-10-06

## 2023-09-29 NOTE — PROGRESS NOTES
Chief Complaint   Patient presents with    Urinary Tract Infection    Vaginal Discharge    STD CHECK       HPI:   Josefina Martin 48 y.o.  is here for repeat trich testing and noticed itching, irritation, urinary frequency and dysuria x a few weeks.      No LMP recorded. Patient has had an implant.     Past Medical History:   Diagnosis Date    Cerebrovascular accident (CVA) 3/24/2017    CHF (congestive heart failure)     Diabetes mellitus     Hypertension     MI (myocardial infarction)     Stroke        Past Surgical History:   Procedure Laterality Date    CHOLECYSTECTOMY  2013    history of cholelithiasis    ESOPHAGOGASTRODUODENOSCOPY N/A 10/21/2020    Procedure: EGD (ESOPHAGOGASTRODUODENOSCOPY);  Surgeon: Asha Blackwell MD;  Location: [facility];  Service: Endoscopy;  Laterality: N/A;    ESOPHAGOGASTRODUODENOSCOPY N/A 6/15/2021    Procedure: EGD (ESOPHAGOGASTRODUODENOSCOPY);  Surgeon: Asha Blackwell MD;  Location: [facility];  Service: Endoscopy;  Laterality: N/A;    TUBAL LIGATION         Family History   Problem Relation Age of Onset    Hypertension Mother     Lung cancer Mother     No Known Problems Father     No Known Problems Sister     No Known Problems Daughter     Diabetes Son 14        Takes 2 insulins and metformin use to be bigger wally    Stroke Maternal Uncle 48    Anuerysm Maternal Grandmother     Breast cancer Maternal Grandmother     No Known Problems Sister     No Known Problems Daughter     No Known Problems Son     Breast cancer Maternal Aunt     Breast cancer Maternal Aunt        Social History     Socioeconomic History    Marital status: Single   Occupational History     Employer: [employer]   Tobacco Use    Smoking status: Every Day     Current packs/day: 0.15     Average packs/day: 0.2 packs/day for 18.0 years (2.7 ttl pk-yrs)     Types: Cigarettes    Smokeless tobacco: Never    Tobacco comments:     1 pack/week   Substance and Sexual Activity    Alcohol use: Yes     Comment: social  "1/month    Drug use: No    Sexual activity: Yes     Partners: Male     Birth control/protection: None, Surgical       OB History          5    Para   4    Term   4            AB   1    Living   4         SAB   1    IAB        Ectopic        Multiple        Live Births   4                    ROS:    All other ROS negative     PE:   /80   Ht 5' 9" (1.753 m)   Wt 80.8 kg (178 lb 2.1 oz)   BMI 26.31 kg/m²     APPEARANCE: Well nourished, well developed, in no acute distress.    PELVIC:   EXTERNAL GENITALIA/VULVA: erythema in bilateral creases of thighs with excoriations of skin  URETHRAL MEATUS: Normal size and location, no lesions, no prolapse.  URETHRA: No masses, tenderness, prolapse or scarring.  BLADDER: non-tender, no masses  VAGINA: Moist and well rugated, thick yellow discharge c/w yeast,          1. Yeast infection    2. Trichomonosis    3. Vaginal irritation    4. Dysuria        Plan:    Affirm done, will send in yeast treatment, try topical with plavix and make sure trich resolved.     F ace to Face time with patient: 20 minutes of total time spent on the encounter, which includes face to face time and non-face to face time preparing to see the patient (eg, review of tests), Obtaining and/or reviewing separately obtained history, Documenting clinical information in the electronic or other health record, Independently interpreting results (not separately reported) and communicating results to the patient/family/caregiver, or Care coordination (not separately reported).             "

## 2023-10-01 ENCOUNTER — PATIENT MESSAGE (OUTPATIENT)
Dept: OBSTETRICS AND GYNECOLOGY | Facility: HOSPITAL | Age: 48
End: 2023-10-01
Payer: MEDICAID

## 2023-10-01 ENCOUNTER — PATIENT MESSAGE (OUTPATIENT)
Dept: OBSTETRICS AND GYNECOLOGY | Facility: CLINIC | Age: 48
End: 2023-10-01
Payer: MEDICAID

## 2023-10-01 LAB
BACTERIAL VAGINOSIS DNA: NEGATIVE
CANDIDA GLABRATA DNA: NEGATIVE
CANDIDA KRUSEI DNA: NEGATIVE
CANDIDA RRNA VAG QL PROBE: POSITIVE
T VAGINALIS RRNA GENITAL QL PROBE: NEGATIVE

## 2023-10-03 ENCOUNTER — OFFICE VISIT (OUTPATIENT)
Dept: FAMILY MEDICINE | Facility: HOSPITAL | Age: 48
End: 2023-10-03
Payer: MEDICAID

## 2023-10-03 VITALS
BODY MASS INDEX: 26.55 KG/M2 | DIASTOLIC BLOOD PRESSURE: 94 MMHG | HEIGHT: 69 IN | SYSTOLIC BLOOD PRESSURE: 129 MMHG | WEIGHT: 179.25 LBS | HEART RATE: 88 BPM

## 2023-10-03 DIAGNOSIS — E87.6 HYPOKALEMIA: ICD-10-CM

## 2023-10-03 DIAGNOSIS — E11.65 UNCONTROLLED TYPE 2 DIABETES MELLITUS WITH HYPERGLYCEMIA: Primary | ICD-10-CM

## 2023-10-03 DIAGNOSIS — E11.9 TYPE 2 DIABETES MELLITUS WITHOUT COMPLICATION, WITHOUT LONG-TERM CURRENT USE OF INSULIN: ICD-10-CM

## 2023-10-03 DIAGNOSIS — Z86.73 HISTORY OF CVA (CEREBROVASCULAR ACCIDENT): ICD-10-CM

## 2023-10-03 DIAGNOSIS — I10 UNCONTROLLED HYPERTENSION: ICD-10-CM

## 2023-10-03 LAB
BACTERIA UR CULT: NORMAL
BACTERIA UR CULT: NORMAL

## 2023-10-03 PROCEDURE — 99214 OFFICE O/P EST MOD 30 MIN: CPT | Performed by: STUDENT IN AN ORGANIZED HEALTH CARE EDUCATION/TRAINING PROGRAM

## 2023-10-03 RX ORDER — SEMAGLUTIDE 0.68 MG/ML
INJECTION, SOLUTION SUBCUTANEOUS
Qty: 3 ML | Refills: 0 | Status: SHIPPED | OUTPATIENT
Start: 2023-10-03 | End: 2023-10-19

## 2023-10-03 RX ORDER — ASPIRIN 81 MG/1
81 TABLET ORAL DAILY
Qty: 90 TABLET | Refills: 3 | Status: SHIPPED | OUTPATIENT
Start: 2023-10-03 | End: 2023-10-19 | Stop reason: SDUPTHER

## 2023-10-03 RX ORDER — ATORVASTATIN CALCIUM 80 MG/1
80 TABLET, FILM COATED ORAL NIGHTLY
Qty: 90 TABLET | Refills: 3 | Status: SHIPPED | OUTPATIENT
Start: 2023-10-03 | End: 2024-01-04 | Stop reason: SDUPTHER

## 2023-10-03 RX ORDER — NIFEDIPINE 90 MG/1
90 TABLET, EXTENDED RELEASE ORAL DAILY
Qty: 90 TABLET | Refills: 1 | Status: SHIPPED | OUTPATIENT
Start: 2023-10-03 | End: 2024-10-02

## 2023-10-03 RX ORDER — BLOOD-GLUCOSE CONTROL, NORMAL
EACH MISCELLANEOUS
Qty: 200 EACH | Refills: 2 | Status: SHIPPED | OUTPATIENT
Start: 2023-10-03

## 2023-10-03 RX ORDER — METFORMIN HYDROCHLORIDE 500 MG/1
500 TABLET ORAL 2 TIMES DAILY WITH MEALS
Qty: 180 TABLET | Refills: 3 | Status: SHIPPED | OUTPATIENT
Start: 2023-10-03 | End: 2023-10-19 | Stop reason: SDUPTHER

## 2023-10-03 RX ORDER — CLOPIDOGREL BISULFATE 75 MG/1
75 TABLET ORAL DAILY
Qty: 90 TABLET | Refills: 3 | Status: SHIPPED | OUTPATIENT
Start: 2023-10-03 | End: 2024-01-04 | Stop reason: SDUPTHER

## 2023-10-03 RX ORDER — DEXTROSE 4 G
TABLET,CHEWABLE ORAL
Qty: 1 EACH | Refills: 0 | Status: SHIPPED | OUTPATIENT
Start: 2023-10-03

## 2023-10-03 RX ORDER — CHLORTHALIDONE 25 MG/1
25 TABLET ORAL DAILY
Qty: 90 TABLET | Refills: 1 | Status: SHIPPED | OUTPATIENT
Start: 2023-10-03 | End: 2024-10-02

## 2023-10-03 RX ORDER — OLMESARTAN MEDOXOMIL 40 MG/1
40 TABLET ORAL DAILY
Qty: 90 TABLET | Refills: 1 | Status: SHIPPED | OUTPATIENT
Start: 2023-10-03

## 2023-10-03 NOTE — PROGRESS NOTES
Progress Note  [facility]    Subjective:      Patient ID: Josefina Martin is a 48 y.o. female    Chief Complaint: Follow-up (HTN) and Diabetes    Josefina Martin is a 48-year-old female with a PMHx Multiple CVAs w/o residual deficits, HTN, T2DM, CAD s/p multiple stent placement, hx of MI, systolic and diastolic HF, history of tobacco use presenting for management of her high blood pressure. Patient has also received care at [facility].     # uncontrolled HTN - patient with long history of poorly controlled hypertension and patient with underlying multiple cardiovascular comorbidities. Current medications include chlorthalidone 25 mg daily, nifedipine 90 mg daily, and olmesartan 40 mg daily. Reports that she has been only taking her chlorthalidone and nifedipine.  Reports that at home blood pressure readings have shown systolics in the 140s and diastolics in the 90s. Reports that she has continued with smoking cessation and has been without any type of tobacco use for over a year.     #CVA - patient with multiple CVAs including a hospitalization at [facility] in 2022 requiring one-week of inpatient rehab due to aphasia and right-sided weakness. CT head showed hemorrhage and MRI showed a medial and superior left frontal and parietal lobe acute infarct during hospitalization.  Reports no recent CVA since that time and reports resolution of symptoms and deficits. Patient with history of previous CVA while on low-dose aspirin requiring continue dual antiplatelet therapy with aspirin and Plavix for over 4 years in duration. Patient also currently on atorvastatin 80 mg daily.     #T2DM - chronic issue and current medications include metformin 500 mg daily. Last on file hemoglobin A1c 6.8 on 12/06/2022.  Endorses that she recently has had increased amounts of vaginal yeast infections in his concern for hyperglycemia.  In office blood glucose 205.         Health Maintenance         Date Due  Completion Date    Pneumococcal Vaccines (Age 0-64) (1 - PCV) Never done ---    Foot Exam Never done ---    Eye Exam Never done ---    TETANUS VACCINE Never done ---    Colorectal Cancer Screening Never done ---    Mammogram 08/02/2020 8/2/2019    Override on 4/9/2018: Not Clinically Appropriate (pt had 1 last yr)    COVID-19 Vaccine (3 - Pfizer series) 09/02/2021 7/8/2021    Diabetes Urine Screening 04/14/2023 4/14/2022    Influenza Vaccine (1) Never done ---    Lipid Panel 12/07/2023 12/7/2022    Hemoglobin A1c 01/03/2024 10/3/2023    High Dose Statin 10/03/2024 10/3/2023    Aspirin/Antiplatelet Therapy 10/03/2024 10/3/2023    Cervical Cancer Screening 03/22/2026 3/22/2023            Review of Systems   Constitutional:  Negative for fatigue.   HENT:  Negative for congestion and sore throat.    Eyes:  Negative for visual disturbance.   Respiratory:  Negative for shortness of breath.    Cardiovascular:  Negative for chest pain.   Gastrointestinal:  Negative for abdominal pain, blood in stool, constipation, diarrhea, nausea and vomiting.   Genitourinary:  Negative for difficulty urinating, dysuria and frequency.        Vaginal yeast infection   Musculoskeletal:  Negative for arthralgias and myalgias.   Skin:  Negative for rash.   Allergic/Immunologic: Negative for environmental allergies and food allergies.   Neurological:  Negative for dizziness, weakness, numbness and headaches.   Psychiatric/Behavioral:  Positive for sleep disturbance (Insomnia). Negative for dysphoric mood. The patient is not nervous/anxious.         Objective:      Vitals:    10/03/23 1506   BP: (!) 129/94   Pulse: 88     BP Readings from Last 3 Encounters:   10/03/23 (!) 129/94   09/29/23 132/80   09/19/23 (!) 187/92     Body mass index is 26.47 kg/m².    Physical Exam  Vitals reviewed.   Constitutional:       Appearance: Normal appearance.   HENT:      Head: Normocephalic and atraumatic.   Eyes:      Pupils: Pupils are equal, round, and  reactive to light.   Cardiovascular:      Rate and Rhythm: Normal rate and regular rhythm.   Pulmonary:      Effort: Pulmonary effort is normal.      Breath sounds: Normal breath sounds.   Musculoskeletal:         General: Normal range of motion.      Right lower leg: No edema.      Left lower leg: No edema.   Skin:     General: Skin is warm.      Findings: No rash.   Neurological:      Mental Status: She is alert and oriented to person, place, and time.   Psychiatric:         Mood and Affect: Mood normal.         Behavior: Behavior normal.     POCT blood glucose: 205    Assessment:     1. Uncontrolled type 2 diabetes mellitus with hyperglycemia    2. Type 2 diabetes mellitus without complication, without long-term current use of insulin    3. Uncontrolled hypertension    4. History of CVA (cerebrovascular accident)    5. Hypokalemia       Plan:     Uncontrolled type 2 diabetes mellitus with hyperglycemia  -     blood-glucose meter kit; To check BG two times daily, to use with insurance preferred meter  Dispense: 1 each; Refill: 0  -     lancets Misc; To check BG two times daily, to use with insurance preferred meter  Dispense: 200 each; Refill: 2  -     blood sugar diagnostic Strp; To check BG two times daily, to use with insurance preferred meter  Dispense: 200 each; Refill: 2  -     semaglutide (OZEMPIC) 0.25 mg or 0.5 mg (2 mg/3 mL) pen injector; Inject 0.25 mg into the skin every 7 days for 30 days, THEN 0.5 mg every 7 days.  Dispense: 4.5 mL; Refill: 0  -     Basic Metabolic Panel; Future; Expected date: 10/03/2023    Type 2 diabetes mellitus without complication, without long-term current use of insulin  -     Hemoglobin A1C; Future; Expected date: 10/03/2023  -     Microalbumin/creatinine urine ratio; Future; Expected date: 10/03/2023  -     metFORMIN (GLUCOPHAGE) 500 MG tablet; Take 1 tablet (500 mg total) by mouth 2 (two) times daily with meals.  Dispense: 180 tablet; Refill: 3    Uncontrolled  hypertension  -     Comprehensive Metabolic Panel; Future; Expected date: 01/31/2024  -     olmesartan (BENICAR) 40 MG tablet; Take 1 tablet (40 mg total) by mouth once daily.  Dispense: 90 tablet; Refill: 1  -     NIFEdipine (PROCARDIA-XL) 90 MG (OSM) 24 hr tablet; Take 1 tablet (90 mg total) by mouth once daily.  Dispense: 90 tablet; Refill: 1  -     chlorthalidone (HYGROTEN) 25 MG Tab; Take 1 tablet (25 mg total) by mouth once daily.  Dispense: 90 tablet; Refill: 1    History of CVA (cerebrovascular accident)  -     atorvastatin (LIPITOR) 80 MG tablet; Take 1 tablet (80 mg total) by mouth every evening.  Dispense: 90 tablet; Refill: 3  -     aspirin (ECOTRIN) 81 MG EC tablet; Take 1 tablet (81 mg total) by mouth once daily.  Dispense: 90 tablet; Refill: 3  -     clopidogreL (PLAVIX) 75 mg tablet; Take 1 tablet (75 mg total) by mouth once daily.  Dispense: 90 tablet; Refill: 3    Hypokalemia  -     Basic Metabolic Panel; Future; Expected date: 10/03/2023    PLAN:  Regarding her type 2 diabetes, remains uncontrolled as with recent hemoglobin A1c 11.1 (up from 6.8 on 12/06/2022). Given large increase in A1c, will need to start a basal insulin but for now will start Ozempic 0.25 mg weekly and provide a blood glucose meter, test strips, and lancets to begin checking her fasting blood glucose levels. Endorses she has been eating candy and drinking soda and advised for now to avoid any type of consumption of this type of food or drink. Advised to continue metformin 500 mg twice daily. Patient also with hypokalemia on CMP, and before starting insulin will plan to repeat a BMP this next week and have close clinic follow-up in 1-2 days.     Regarding patient's hypertension, remains uncontrolled with at home readings showing systolics in 140s and diastolics in the 90s.  Goal < 130/80.  Advised to continue her olmesartan 40 mg daily as she has not been taking this medication since follow-up.  Continue chlorthalidone 40 mg  daily and nifedipine 90 mg daily.     Follow-up in 2-3 days    Margarito Ennis DO  [facility], PGY-3  10/03/2023      This note was partially created using [employer] Voice Recognition software. Typographical and content errors may occur with this process. While efforts are made to detect and correct such errors, in some cases errors will persist. For this reason, wording in this document should be considered in the proper context and not strictly verbatim

## 2023-10-03 NOTE — PATIENT INSTRUCTIONS
INDIVIDUALIZED HYPERTENSION COMPREHENSIVE PLAN      LIFESTYLE MODIFICATION      A) DIET  EAT LESS SALT  Salt is known as sodium chloride. It is measured in grams (g) or milligrams (mg).  RESTRICT salt with consuming less than 2300 mg (2.3 g) of sodium per day  LIMIT/AVOID high salt foods such as:  canned soups, TV dinners, deli meats, hot dogs, salted potato chips, pickles, and olives  condiments: soy sauce, ketchup, mustard, mayonnaise    DO NOT ADD salt to food at your table. Use black pepper as an alternative seasoning instead of salt.    DASH DIET (Dietary Approaches to Stop Hypertension)  MEDITERRANEAN-STYLE DIET  These diets emphasize intake of fruits and vegetables, whole grains, legumes, low-fat dairy, nuts, use of olive oil (Mediterranean diet specifically), limiting red meat consumption (few times/month), and eating fish and poultry at least twice/week.    DRINK LESS ALCOHOL  In individuals who drink alcohol, reduce alcohol to:  Men: ?2 drinks daily  Women: ?1 drink daily.      B) EXERCISE  Cardiovascular exercise, at least 150 minutes of moderate-intensity physical therapy per week which corresponds to 30 minutes per day, five or more days a week.  Brisk Walking, Jogging, Bicycling, Swimming, Gardening, Pushing a Lawnmower.  If significant limitations related to chronic pain and mobility: Can try activities such as Or[employer] or seated chair exercises.   Not enough time to exercise?  Park in a parking space far away from the entrance of a store and take the stairs instead of the elevator      CURRENT BLOOD PRESSURE MEDICATIONS    Nifedipine 90 mg  Chlorthalidone 25 mg daily  Olmesartan 40 mg daily      NEXT CLINIC FOLLOW-UP    2-4 weeks      GOALS    Blood Pressure Goal: < 130/80  Today's In-Office BP: 129/94      HOME BLOOD PRESSURE READINGS    DATE  (Month/Date/Year)  Example: 01/01/2023 Morning Blood Pressure Reading  (Systolic/Diastolic)  Example: 154/92 Evening Blood Pressure  Reading  (Systolic/Diastolic)  Example: 135/87

## 2023-10-04 ENCOUNTER — PATIENT MESSAGE (OUTPATIENT)
Dept: OBSTETRICS AND GYNECOLOGY | Facility: CLINIC | Age: 48
End: 2023-10-04
Payer: MEDICAID

## 2023-10-04 ENCOUNTER — PATIENT MESSAGE (OUTPATIENT)
Dept: OBSTETRICS AND GYNECOLOGY | Facility: HOSPITAL | Age: 48
End: 2023-10-04
Payer: MEDICAID

## 2023-10-05 ENCOUNTER — OFFICE VISIT (OUTPATIENT)
Dept: FAMILY MEDICINE | Facility: HOSPITAL | Age: 48
End: 2023-10-05
Payer: MEDICAID

## 2023-10-05 VITALS
SYSTOLIC BLOOD PRESSURE: 107 MMHG | DIASTOLIC BLOOD PRESSURE: 73 MMHG | WEIGHT: 180.13 LBS | HEART RATE: 90 BPM | BODY MASS INDEX: 26.68 KG/M2 | HEIGHT: 69 IN

## 2023-10-05 DIAGNOSIS — E11.65 UNCONTROLLED TYPE 2 DIABETES MELLITUS WITH HYPERGLYCEMIA: Primary | ICD-10-CM

## 2023-10-05 DIAGNOSIS — I10 ESSENTIAL HYPERTENSION: ICD-10-CM

## 2023-10-05 PROCEDURE — 99215 OFFICE O/P EST HI 40 MIN: CPT | Performed by: STUDENT IN AN ORGANIZED HEALTH CARE EDUCATION/TRAINING PROGRAM

## 2023-10-05 RX ORDER — TRIAMCINOLONE ACETONIDE 1 MG/G
OINTMENT TOPICAL 2 TIMES DAILY
Qty: 30 G | Refills: 0 | Status: SHIPPED | OUTPATIENT
Start: 2023-10-05 | End: 2024-01-09

## 2023-10-05 RX ORDER — KETOCONAZOLE 20 MG/G
CREAM TOPICAL DAILY
Qty: 30 G | Refills: 1 | Status: SHIPPED | OUTPATIENT
Start: 2023-10-05 | End: 2024-01-09

## 2023-10-05 RX ORDER — TERCONAZOLE 8 MG/G
1 CREAM VAGINAL NIGHTLY
Qty: 20 G | Refills: 0 | Status: SHIPPED | OUTPATIENT
Start: 2023-10-05 | End: 2023-10-10

## 2023-10-05 RX ORDER — INSULIN GLARGINE 100 [IU]/ML
10 INJECTION, SOLUTION SUBCUTANEOUS NIGHTLY
Qty: 3 ML | Refills: 2 | Status: SHIPPED | OUTPATIENT
Start: 2023-10-05 | End: 2023-10-12

## 2023-10-05 NOTE — PROGRESS NOTES
Progress Note  [facility]    Subjective:      Patient ID: Josefina Martin is a 48 y.o. female    Chief Complaint: Hypertension and type 2 diabetes    Josefina Martin is a 48-year-old female with a PMHx multiple CVA Multiple CVAs w/o residual deficits, HTN, T2DM, CAD s/p multiple stent placement, hx of MI, systolic and diastolic HF, history of tobacco use presenting for management of her uncontrolled hypertension and diabetes.     # HTN - patient with long history of poorly controlled hypertension and patient with underlying multiple cardiovascular comorbidities. Current medications include chlorthalidone 25 mg daily, nifedipine 90 mg daily, and olmesartan 40 mg daily.  Reports blood pressure readings with systolics in the 110s to 120s and diastolics in the 70s.  In office blood pressure 107/73. Reports that she has continued with smoking cessation and has been without any type of tobacco use for over a year.      #T2DM - chronic issue and current medications include metformin 500 mg twice daily. Last hemoglobin A1c 11.1 on 10/03/2023.  Patient has not been checking her blood glucose levels. Endorses no significant changes in diet but does endorse she is staying away from sugary foods.         Health Maintenance         Date Due Completion Date    Pneumococcal Vaccines (Age 0-64) (1 - PCV) Never done ---    Foot Exam Never done ---    Eye Exam Never done ---    TETANUS VACCINE Never done ---    Colorectal Cancer Screening Never done ---    Mammogram 08/02/2020 8/2/2019    Override on 4/9/2018: Not Clinically Appropriate (pt had 1 last yr)    Influenza Vaccine (1) Never done ---    COVID-19 Vaccine (3 - 2023-24 season) 09/01/2023 7/8/2021    Lipid Panel 12/07/2023 12/7/2022    Hemoglobin A1c 01/03/2024 10/3/2023    Diabetes Urine Screening 10/03/2024 10/3/2023    High Dose Statin 10/10/2024 10/10/2023    Aspirin/Antiplatelet Therapy 10/10/2024 10/10/2023    Cervical Cancer Screening 03/22/2026  3/22/2023            Review of Systems   Constitutional:  Negative for fatigue.   HENT:  Negative for congestion and sore throat.    Eyes:  Positive for visual disturbance (blurry vision).   Respiratory:  Negative for shortness of breath.    Cardiovascular:  Negative for chest pain.   Gastrointestinal:  Negative for abdominal pain, blood in stool, constipation, diarrhea, nausea and vomiting.   Genitourinary:  Negative for difficulty urinating, dysuria and frequency.        Vaginal yeast infection   Musculoskeletal:  Negative for arthralgias and myalgias.   Skin:  Negative for rash.   Allergic/Immunologic: Negative for environmental allergies and food allergies.   Neurological:  Negative for dizziness, weakness, numbness and headaches.   Psychiatric/Behavioral:  Negative for dysphoric mood. Sleep disturbance: Insomnia.The patient is not nervous/anxious.         Objective:      Vitals:    10/05/23 1529   BP: 107/73   Pulse: 90     BP Readings from Last 3 Encounters:   10/05/23 107/73   10/03/23 (!) 129/94   09/29/23 132/80     Body mass index is 26.6 kg/m².    Physical Exam  Vitals reviewed.   Constitutional:       Appearance: Normal appearance.   HENT:      Head: Normocephalic and atraumatic.   Eyes:      Pupils: Pupils are equal, round, and reactive to light.   Cardiovascular:      Rate and Rhythm: Normal rate and regular rhythm.   Pulmonary:      Effort: Pulmonary effort is normal.      Breath sounds: Normal breath sounds.   Musculoskeletal:         General: Normal range of motion.      Right lower leg: No edema.      Left lower leg: No edema.   Skin:     General: Skin is warm.      Findings: No rash.   Neurological:      Mental Status: She is alert and oriented to person, place, and time.   Psychiatric:         Mood and Affect: Mood normal.         Behavior: Behavior normal.         Assessment:     1. Uncontrolled type 2 diabetes mellitus with hyperglycemia    2. Essential hypertension       Plan:     Uncontrolled  "type 2 diabetes mellitus with hyperglycemia  -     insulin glargine (LANTUS U-100 INSULIN) 100 unit/mL injection; Inject 10 Units into the skin every evening.  Dispense: 3 mL; Refill: 2  -     Ambulatory referral/consult to Diabetes Education; Future; Expected date: 10/12/2023  -     POCT Glucose, Hand-Held Device  -     pen needle, diabetic (BD ULTRA-FINE SHORT PEN NEEDLE) 31 gauge x 5/16" Ndle; 1 Needle by Misc.(Non-Drug; Combo Route) route once daily.  Dispense: 100 each; Refill: 2  -     Ambulatory referral/consult to Ophthalmology; Future; Expected date: 10/17/2023    Essential hypertension      -   chronic issue remains controlled with current regimen. Continue recording blood pressure daily      PLAN:    - Regarding type 2 diabetes, chronic issue and remains uncontrolled with last hemoglobin A1c 11.1. Will plan to start Ozempic 0.25 mg weekly and insulin Lantus 10 units nightly with a goal in titration to a fasting blood glucose goal of .  Patient provided glucometer, test strips, and lancets.  Advised patient to start recording fasting blood glucose levels and bring results to next clinic visit in 2-4 weeks. Education provided to patient in clinic by nursing regarding how to deliver insulin as well as check blood glucose levels. Referral placed to diabetes education for additional education regarding blood glucose monitoring and goals. Referral to Ophthalmology placed for yearly screening.  - regarding hypertension, remains controlled and will continue current regimen including chlorthalidone 25 mg daily, nifedipine 90 mg daily, and olmesartan 40 mg daily.  Continue recording blood pressure daily    Follow-up in 2-4 weeks for management of type 2 diabetes    Margarito Ennis DO  [facility], PGY-3  10/05/2023      This note was partially created using [employer] Voice Recognition software. Typographical and content errors may occur with this process. While efforts are made to detect and correct such " errors, in some cases errors will persist. For this reason, wording in this document should be considered in the proper context and not strictly verbatim

## 2023-10-06 RX ORDER — PEN NEEDLE, DIABETIC 30 GX3/16"
1 NEEDLE, DISPOSABLE MISCELLANEOUS DAILY
Qty: 100 EACH | Refills: 2 | Status: SHIPPED | OUTPATIENT
Start: 2023-10-06 | End: 2023-10-12 | Stop reason: SDUPTHER

## 2023-10-10 NOTE — PROGRESS NOTES
I assume primary medical responsibility for this patient. I have reviewed the history, physical, and assessement & treatment plan with the resident and agree that the care is reasonable and necessary. This service has been performed by a resident without the presence of a teaching physician under the primary care exception. If necessary, an addendum of additional findings or evaluation beyond the resident documentation will be noted below.    Patient counseled on risks/benefits of semaglutide. No personal or family history of MEN2/thyroid cancer.     Torie Main MD    \[facility]\"" Family Medicine

## 2023-10-12 ENCOUNTER — OFFICE VISIT (OUTPATIENT)
Dept: FAMILY MEDICINE | Facility: HOSPITAL | Age: 48
End: 2023-10-12
Payer: MEDICAID

## 2023-10-12 VITALS
HEART RATE: 75 BPM | WEIGHT: 177.69 LBS | HEIGHT: 69 IN | BODY MASS INDEX: 26.32 KG/M2 | SYSTOLIC BLOOD PRESSURE: 113 MMHG | DIASTOLIC BLOOD PRESSURE: 82 MMHG

## 2023-10-12 DIAGNOSIS — E11.65 UNCONTROLLED TYPE 2 DIABETES MELLITUS WITH HYPERGLYCEMIA: Primary | ICD-10-CM

## 2023-10-12 PROCEDURE — 99215 OFFICE O/P EST HI 40 MIN: CPT | Performed by: STUDENT IN AN ORGANIZED HEALTH CARE EDUCATION/TRAINING PROGRAM

## 2023-10-12 RX ORDER — PEN NEEDLE, DIABETIC 30 GX3/16"
1 NEEDLE, DISPOSABLE MISCELLANEOUS DAILY
Qty: 100 EACH | Refills: 2 | Status: SHIPPED | OUTPATIENT
Start: 2023-10-12

## 2023-10-12 RX ORDER — INSULIN GLARGINE 100 [IU]/ML
12 INJECTION, SOLUTION SUBCUTANEOUS DAILY
Qty: 15 ML | Refills: 3 | Status: SHIPPED | OUTPATIENT
Start: 2023-10-12 | End: 2024-01-04 | Stop reason: SDUPTHER

## 2023-10-12 NOTE — PROGRESS NOTES
Progress Note  [facility]    Subjective:      Patient ID: Josefina Martin is a 48 y.o. female    Chief Complaint: type 2 diabetes    Josefina Martin is a 48-year-old female with a PMHx multiple CVA Multiple CVAs w/o residual deficits, HTN, T2DM, CAD s/p multiple stent placement, hx of MI, systolic and diastolic HF, history of tobacco use presenting for management of her diabetes.     #T2DM - chronic issue w/ recent worsening control, last hemoglobin A1c 11.1 on 10/03/2023. Current medications include metformin 500 mg twice daily, ozempic .25 mg weekly, and Lantus 10 units nightly. Fasting BG levels of 149 and 165 over the past last two days. Tolerating medications and reports no significant new side effects.           Health Maintenance         Date Due Completion Date    Pneumococcal Vaccines (Age 0-64) (1 - PCV) Never done ---    Foot Exam Never done ---    Eye Exam Never done ---    TETANUS VACCINE Never done ---    Colorectal Cancer Screening Never done ---    Mammogram 08/02/2020 8/2/2019    Override on 4/9/2018: Not Clinically Appropriate (pt had 1 last yr)    Influenza Vaccine (1) Never done ---    COVID-19 Vaccine (3 - 2023-24 season) 09/01/2023 7/8/2021    Lipid Panel 12/07/2023 12/7/2022    Hemoglobin A1c 01/03/2024 10/3/2023    Diabetes Urine Screening 10/03/2024 10/3/2023    High Dose Statin 10/12/2024 10/12/2023    Aspirin/Antiplatelet Therapy 10/12/2024 10/12/2023    Cervical Cancer Screening 03/22/2026 3/22/2023            Review of Systems   Constitutional:  Negative for fatigue.   HENT:  Negative for congestion and sore throat.    Respiratory:  Negative for shortness of breath.    Cardiovascular:  Negative for chest pain.   Gastrointestinal:  Negative for abdominal pain, blood in stool, constipation, diarrhea, nausea and vomiting.   Genitourinary:  Negative for difficulty urinating, dysuria and frequency.        Vaginal yeast infection   Musculoskeletal:  Negative for arthralgias  "and myalgias.   Skin:  Negative for rash.   Allergic/Immunologic: Negative for environmental allergies and food allergies.   Neurological:  Negative for dizziness, weakness, numbness and headaches.   Psychiatric/Behavioral:  Negative for dysphoric mood. The patient is not nervous/anxious.         Objective:      Vitals:    10/12/23 1320   BP: 113/82   Pulse: 75       BP Readings from Last 3 Encounters:   10/12/23 113/82   10/05/23 107/73   10/03/23 (!) 129/94     Body mass index is 26.24 kg/m².    Physical Exam  Vitals reviewed.   Constitutional:       Appearance: Normal appearance.   HENT:      Head: Normocephalic and atraumatic.   Eyes:      Pupils: Pupils are equal, round, and reactive to light.   Cardiovascular:      Rate and Rhythm: Normal rate and regular rhythm.   Pulmonary:      Effort: Pulmonary effort is normal.      Breath sounds: Normal breath sounds.   Musculoskeletal:         General: Normal range of motion.      Right lower leg: No edema.      Left lower leg: No edema.   Skin:     General: Skin is warm.      Findings: No rash.   Neurological:      Mental Status: She is alert and oriented to person, place, and time.   Psychiatric:         Mood and Affect: Mood normal.         Behavior: Behavior normal.       Assessment:     1. Uncontrolled type 2 diabetes mellitus with hyperglycemia         Plan:     Josefina was seen today for follow-up.    Diagnoses and all orders for this visit:    Uncontrolled type 2 diabetes mellitus with hyperglycemia  -     insulin (LANTUS SOLOSTAR U-100 INSULIN) glargine 100 units/mL SubQ pen; Inject 12 Units into the skin once daily.  -     pen needle, diabetic (BD ULTRA-FINE SHORT PEN NEEDLE) 31 gauge x 5/16" Ndle; 1 Needle by Misc.(Non-Drug; Combo Route) route once daily.       PLAN:  Increase dose of Lantus from 10 units to 12 units nightly. Will continue titration to a fasting blood glucose goal of . Next week can increase dose of Ozempic to 0.5 mg weekly if " tolerated. Continue Metformin 500 mg BID. Continue follow-up w/ diabetes education.    Follow-up in 2-4 weeks for management of type 2 diabetes    Margarito Ennis DO  [facility], PGY-3  10/12/2023      This note was partially created using [employer] Voice Recognition software. Typographical and content errors may occur with this process. While efforts are made to detect and correct such errors, in some cases errors will persist. For this reason, wording in this document should be considered in the proper context and not strictly verbatim

## 2023-10-19 ENCOUNTER — OFFICE VISIT (OUTPATIENT)
Dept: FAMILY MEDICINE | Facility: HOSPITAL | Age: 48
End: 2023-10-19
Payer: MEDICAID

## 2023-10-19 VITALS
HEIGHT: 69 IN | WEIGHT: 178.38 LBS | HEART RATE: 82 BPM | BODY MASS INDEX: 26.42 KG/M2 | SYSTOLIC BLOOD PRESSURE: 111 MMHG | DIASTOLIC BLOOD PRESSURE: 76 MMHG

## 2023-10-19 DIAGNOSIS — M25.552 LEFT HIP PAIN: ICD-10-CM

## 2023-10-19 DIAGNOSIS — E11.65 UNCONTROLLED TYPE 2 DIABETES MELLITUS WITH HYPERGLYCEMIA: Primary | ICD-10-CM

## 2023-10-19 DIAGNOSIS — Z86.73 HISTORY OF CVA (CEREBROVASCULAR ACCIDENT): ICD-10-CM

## 2023-10-19 DIAGNOSIS — I10 ESSENTIAL HYPERTENSION: ICD-10-CM

## 2023-10-19 DIAGNOSIS — Z12.11 SCREENING FOR MALIGNANT NEOPLASM OF COLON: ICD-10-CM

## 2023-10-19 PROCEDURE — 99215 OFFICE O/P EST HI 40 MIN: CPT | Performed by: STUDENT IN AN ORGANIZED HEALTH CARE EDUCATION/TRAINING PROGRAM

## 2023-10-19 RX ORDER — METFORMIN HYDROCHLORIDE 500 MG/1
500 TABLET ORAL 2 TIMES DAILY WITH MEALS
Qty: 180 TABLET | Refills: 3 | Status: SHIPPED | OUTPATIENT
Start: 2023-10-19 | End: 2024-01-04 | Stop reason: SDUPTHER

## 2023-10-19 RX ORDER — SEMAGLUTIDE 0.68 MG/ML
0.5 INJECTION, SOLUTION SUBCUTANEOUS
Qty: 3 ML | Refills: 2 | Status: SHIPPED | OUTPATIENT
Start: 2023-10-19 | End: 2024-01-04

## 2023-10-19 RX ORDER — ASPIRIN 81 MG/1
81 TABLET ORAL DAILY
Qty: 90 TABLET | Refills: 3 | Status: SHIPPED | OUTPATIENT
Start: 2023-10-19 | End: 2024-01-04 | Stop reason: SDUPTHER

## 2023-10-19 NOTE — PROGRESS NOTES
Progress Note  [facility]    Subjective:      Patient ID: Josefina Martin is a 48 y.o. female    Chief Complaint: Follow-up (HTN) and T2DM    Josefina Martin is a 48-year-old female with a PMHx multiple CVA Multiple CVAs w/o residual deficits, HTN, T2DM, CAD s/p multiple stent placement, hx of MI, systolic and diastolic HF, history of tobacco use presenting for management of her uncontrolled diabetes.  Patient also reports chronic left hip pain and weakness within her left lower extremity following long days at work. Reports symptoms similar to when she had a stroke.     #T2DM - chronic issue and current medications include metformin 500 mg twice daily, Ozempic 0.25 mg weekly, and insulin Lantus 10 units nightly.  Reports fasting blood glucose levels in the 150s to 160s. Last hemoglobin A1c 11.1 on 10/03/2023.    #HTN - patient with long history of poorly controlled hypertension and patient with underlying multiple cardiovascular comorbidities. Current medications include chlorthalidone 25 mg daily, nifedipine 90 mg daily, and olmesartan 40 mg daily. In office /76.      #CVA - patient with multiple CVAs including a hospitalization at [facility] in 2022 requiring one-week of inpatient rehab due to aphasia and right-sided weakness. CT head showed hemorrhage and MRI showed a medial and superior left frontal and parietal lobe acute infarct during hospitalization.  Reports no recent CVA since that time and reports resolution of symptoms and deficits. Patient with history of previous CVA while on low-dose aspirin requiring continue dual antiplatelet therapy with aspirin and Plavix for over 4 years in duration. Patient also currently on atorvastatin 80 mg daily.             Health Maintenance         Date Due Completion Date    Pneumococcal Vaccines (Age 0-64) (1 - PCV) Never done ---    Foot Exam Never done ---    Eye Exam Never done ---    TETANUS VACCINE Never done ---    Colorectal Cancer Screening  Never done ---    Mammogram 08/02/2020 8/2/2019    Override on 4/9/2018: Not Clinically Appropriate (pt had 1 last yr)    Influenza Vaccine (1) Never done ---    COVID-19 Vaccine (3 - 2023-24 season) 09/01/2023 7/8/2021    Lipid Panel 12/07/2023 12/7/2022    Hemoglobin A1c 01/03/2024 10/3/2023    Diabetes Urine Screening 10/03/2024 10/3/2023    High Dose Statin 10/26/2024 10/26/2023    Aspirin/Antiplatelet Therapy 10/26/2024 10/26/2023    Cervical Cancer Screening 03/22/2026 3/22/2023            Review of Systems   Constitutional:  Negative for fatigue.   HENT:  Negative for congestion and sore throat.    Respiratory:  Negative for shortness of breath.    Cardiovascular:  Negative for chest pain.   Gastrointestinal:  Negative for abdominal pain, blood in stool, constipation, diarrhea, nausea and vomiting.   Genitourinary:  Negative for difficulty urinating, dysuria and frequency.   Musculoskeletal:  Positive for arthralgias (L Hip pain). Negative for myalgias.   Skin:  Negative for rash.   Allergic/Immunologic: Negative for environmental allergies and food allergies.   Neurological:  Negative for dizziness, weakness, numbness and headaches.   Psychiatric/Behavioral:  Negative for dysphoric mood. The patient is not nervous/anxious.         Objective:      Vitals:    10/19/23 1434   BP: 111/76   Pulse: 82     BP Readings from Last 3 Encounters:   10/19/23 111/76   10/12/23 113/82   10/05/23 107/73     Body mass index is 26.34 kg/m².    Physical Exam  Vitals reviewed.   Constitutional:       Appearance: Normal appearance.   HENT:      Head: Normocephalic and atraumatic.   Eyes:      Pupils: Pupils are equal, round, and reactive to light.   Cardiovascular:      Rate and Rhythm: Normal rate and regular rhythm.   Pulmonary:      Effort: Pulmonary effort is normal.      Breath sounds: Normal breath sounds.   Musculoskeletal:         General: Normal range of motion.      Right lower leg: No edema.      Left lower leg: No  edema.   Skin:     General: Skin is warm.      Findings: No rash.   Neurological:      Mental Status: She is alert and oriented to person, place, and time.   Psychiatric:         Mood and Affect: Mood normal.         Behavior: Behavior normal.         Assessment/Plan:     Problem List Items Addressed This Visit          Neuro    History of CVA (cerebrovascular accident)    Overview     Chronic issue and currently controlled  Medications: ASA, Plavix, and atorvastatin 80 mg nightly         Current Assessment & Plan     Continue current medications         Relevant Medications    aspirin (ECOTRIN) 81 MG EC tablet       Cardiac/Vascular    Essential hypertension    Overview     Chronic issue and currently controlled  Medications:  Nifedipine 90 mg daily, olmesartan 40 mg daily, chlorthalidone 25 mg daily  BP Goal < 130/80         Current Assessment & Plan     Currently controlled and continue current medications            Endocrine    Uncontrolled type 2 diabetes mellitus with hyperglycemia - Primary    Overview     Chronic issue and currently uncontrolled  Medications:  Metformin 500 mg BID, Ozempic 0.5 mg weekly, Lantus 12 units daily  Fasting BG Goal          Current Assessment & Plan     Plan to increase Ozempic to 0.5 mg weekly.  Continue Lantus 12 units nightly.           Relevant Medications    metFORMIN (GLUCOPHAGE) 500 MG tablet    semaglutide (OZEMPIC) 0.25 mg or 0.5 mg (2 mg/3 mL) pen injector       Orthopedic    Left hip pain    Relevant Orders    X-Ray Hips Bilateral 2 View Incl AP Pelvis     Other Visit Diagnoses       Screening for malignant neoplasm of colon        Relevant Orders    Cologuard Screening (Multitarget Stool DNA)          Follow-up: 2 weeks for monitoring of diabetes    Margarito Ennis DO  [facility], PGY-3  10/19/2023      This note was partially created using [employer] Voice Recognition software. Typographical and content errors may occur with this process. While efforts are  made to detect and correct such errors, in some cases errors will persist. For this reason, wording in this document should be considered in the proper context and not strictly verbatim

## 2023-10-26 PROBLEM — M25.552 LEFT HIP PAIN: Status: ACTIVE | Noted: 2023-10-26

## 2023-10-30 NOTE — PROGRESS NOTES
I assume primary medical responsibility for this patient. I have reviewed the history, physical, and assessement & treatment plan with the resident and agree that the care is reasonable and necessary. This service has been performed by a resident without the presence of a teaching physician under the primary care exception. If necessary, an addendum of additional findings or evaluation beyond the resident documentation will be noted below.      Torie Main MD    [facility]

## 2023-11-07 NOTE — PROGRESS NOTES
I assume primary medical responsibility for this patient. I have reviewed the history, physical, and assessement & treatment plan with the resident and agree that the care is reasonable and necessary. This service has been performed by a resident with the presence of a teaching physician under the primary care exception. If necessary, an addendum of additional findings or evaluation beyond the resident documentation will be noted below.    Chronic disease and prescription medication management during this visit.      Torie Main MD    [facility]

## 2024-01-04 ENCOUNTER — OFFICE VISIT (OUTPATIENT)
Dept: FAMILY MEDICINE | Facility: HOSPITAL | Age: 49
End: 2024-01-04
Payer: MEDICAID

## 2024-01-04 VITALS
HEIGHT: 69 IN | DIASTOLIC BLOOD PRESSURE: 75 MMHG | BODY MASS INDEX: 25.73 KG/M2 | WEIGHT: 173.75 LBS | HEART RATE: 111 BPM | SYSTOLIC BLOOD PRESSURE: 110 MMHG

## 2024-01-04 DIAGNOSIS — I10 ESSENTIAL HYPERTENSION: ICD-10-CM

## 2024-01-04 DIAGNOSIS — E11.65 UNCONTROLLED TYPE 2 DIABETES MELLITUS WITH HYPERGLYCEMIA: Primary | ICD-10-CM

## 2024-01-04 DIAGNOSIS — M54.50 ACUTE MIDLINE LOW BACK PAIN WITHOUT SCIATICA: ICD-10-CM

## 2024-01-04 DIAGNOSIS — M25.511 ACUTE PAIN OF RIGHT SHOULDER: ICD-10-CM

## 2024-01-04 DIAGNOSIS — Z12.31 ENCOUNTER FOR SCREENING MAMMOGRAM FOR MALIGNANT NEOPLASM OF BREAST: ICD-10-CM

## 2024-01-04 DIAGNOSIS — Z86.73 HISTORY OF CVA (CEREBROVASCULAR ACCIDENT): ICD-10-CM

## 2024-01-04 DIAGNOSIS — Z12.11 SCREENING FOR MALIGNANT NEOPLASM OF COLON: ICD-10-CM

## 2024-01-04 PROCEDURE — 99214 OFFICE O/P EST MOD 30 MIN: CPT | Performed by: STUDENT IN AN ORGANIZED HEALTH CARE EDUCATION/TRAINING PROGRAM

## 2024-01-04 RX ORDER — ATORVASTATIN CALCIUM 80 MG/1
80 TABLET, FILM COATED ORAL NIGHTLY
Qty: 90 TABLET | Refills: 3 | Status: SHIPPED | OUTPATIENT
Start: 2024-01-04

## 2024-01-04 RX ORDER — METHOCARBAMOL 500 MG/1
500 TABLET, FILM COATED ORAL 4 TIMES DAILY
Qty: 40 TABLET | Refills: 0 | Status: SHIPPED | OUTPATIENT
Start: 2024-01-04 | End: 2024-02-02 | Stop reason: SDUPTHER

## 2024-01-04 RX ORDER — METFORMIN HYDROCHLORIDE 500 MG/1
500 TABLET ORAL 2 TIMES DAILY WITH MEALS
Qty: 180 TABLET | Refills: 3 | Status: SHIPPED | OUTPATIENT
Start: 2024-01-04 | End: 2025-01-03

## 2024-01-04 RX ORDER — SEMAGLUTIDE 1.34 MG/ML
1 INJECTION, SOLUTION SUBCUTANEOUS
Qty: 3 ML | Refills: 2 | Status: SHIPPED | OUTPATIENT
Start: 2024-01-04 | End: 2024-01-31 | Stop reason: SDUPTHER

## 2024-01-04 RX ORDER — CLOPIDOGREL BISULFATE 75 MG/1
75 TABLET ORAL DAILY
Qty: 90 TABLET | Refills: 0 | Status: SHIPPED | OUTPATIENT
Start: 2024-01-04

## 2024-01-04 RX ORDER — INSULIN GLARGINE 100 [IU]/ML
12 INJECTION, SOLUTION SUBCUTANEOUS DAILY
Qty: 15 ML | Refills: 1 | Status: SHIPPED | OUTPATIENT
Start: 2024-01-04 | End: 2024-01-31 | Stop reason: SDUPTHER

## 2024-01-04 RX ORDER — ASPIRIN 81 MG/1
81 TABLET ORAL DAILY
Qty: 90 TABLET | Refills: 3 | Status: SHIPPED | OUTPATIENT
Start: 2024-01-04 | End: 2024-01-23 | Stop reason: SDUPTHER

## 2024-01-04 RX ORDER — LIDOCAINE 50 MG/G
2 PATCH TOPICAL DAILY
Qty: 60 PATCH | Refills: 0 | Status: SHIPPED | OUTPATIENT
Start: 2024-01-04 | End: 2024-01-09

## 2024-01-05 ENCOUNTER — TELEPHONE (OUTPATIENT)
Dept: GASTROENTEROLOGY | Facility: CLINIC | Age: 49
End: 2024-01-05
Payer: MEDICAID

## 2024-01-05 NOTE — PROGRESS NOTES
Progress Note  [facility]    Subjective:      Patient ID: Josefina Martin is a 48 y.o. female    Chief Complaint: Follow-up    Josefina Martin is a 48-year-old female with a PMHx multiple CVA Multiple CVAs w/o residual deficits, HTN, T2DM, CAD s/p multiple stent placement, hx of MI, systolic and diastolic HF, history of tobacco use presenting for management of chronic medical conditions.  Patient also does endorse a 2 week history of low back pain and right shoulder pain. 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  She says her symptoms are worse later in the day with continued use. The right shoulder pain is worse with overhead activities and pain is located within the anterior shoulder.    #T2DM - current medications include metformin 500 mg twice daily, Ozempic 0.5 mg weekly, and insulin Lantus 12 units nightly.  Endorses fasting blood glucose levels ranging from 112-148.  Currently tolerating her medications without side effects.  Last hemoglobin A1c 11.1 on 10/03/2023.    #HTN - history of poorly controlled hypertension recently has shown control with compliance with medications.  Current medications include chlorthalidone 25 mg daily, nifedipine 90 mg daily, and olmesartan 40 mg daily.  In office blood pressure 110/75.    # Healthcare maintenance:  Colorectal cancer screening: Currently due for her initial screening. Prefers colonoscopy.   Breast cancer screen: Last mammogram performed August 2019.  Currently due for yearly screening  Cervical cancer screening: Last Pap smear performed March 2023 [facility]          Health Maintenance         Date Due Completion Date    Pneumococcal Vaccines (Age 0-64) (1 - PCV) Never done ---    Foot Exam Never done ---    Eye Exam Never done ---    TETANUS VACCINE Never done ---    Colorectal Cancer Screening Never done ---    Mammogram 08/02/2020 8/2/2019    Override on 4/9/2018: Not Clinically  Appropriate (pt had 1 last yr)    Influenza Vaccine (1) Never done ---    COVID-19 Vaccine (3 - 2023-24 season) 09/01/2023 7/8/2021    Hemoglobin A1c 07/04/2024 1/4/2024    Diabetes Urine Screening 10/03/2024 10/3/2023    High Dose Statin 01/04/2025 1/4/2024    Aspirin/Antiplatelet Therapy 01/04/2025 1/4/2024    Lipid Panel 01/04/2025 1/4/2024    Cervical Cancer Screening 03/22/2026 3/22/2023            Review of Systems   Constitutional:  Negative for fatigue.   HENT:  Negative for congestion and sore throat.    Respiratory:  Negative for shortness of breath.    Cardiovascular:  Negative for chest pain.   Gastrointestinal:  Negative for abdominal pain, blood in stool, constipation, diarrhea, nausea and vomiting.   Genitourinary:  Negative for difficulty urinating, dysuria and frequency.   Musculoskeletal:  Positive for arthralgias (R Shoulder) and back pain. Negative for myalgias.   Skin:  Negative for rash.   Allergic/Immunologic: Negative for environmental allergies and food allergies.   Neurological:  Negative for dizziness, weakness, numbness and headaches.   Psychiatric/Behavioral:  Negative for dysphoric mood. The patient is not nervous/anxious.         Objective:      Vitals:    01/04/24 1447   BP: 110/75   Pulse: (!) 111     BP Readings from Last 3 Encounters:   01/04/24 110/75   10/19/23 111/76   10/12/23 113/82     Body mass index is 25.65 kg/m².    Physical Exam  Vitals reviewed.   Constitutional:       Appearance: Normal appearance.   HENT:      Head: Normocephalic and atraumatic.   Eyes:      Pupils: Pupils are equal, round, and reactive to light.   Cardiovascular:      Rate and Rhythm: Normal rate and regular rhythm.   Pulmonary:      Effort: Pulmonary effort is normal.      Breath sounds: Normal breath sounds.   Musculoskeletal:      Right lower leg: No edema.      Left lower leg: No edema.      Comments: Limited forward flexion and abduction to the right shoulder secondary to pain  Tenderness to  palpation over the right biceps tendon  Positive Speed's test on right  Impingement tests (Neer's and Mclaughlin): Negative  5/5 muscle strength with shoulder abduction/adduction and shoulder flexion.    Hypertonic paraspinal muscles bilaterally in lumbar spine          Skin:     General: Skin is warm.      Findings: No rash.   Neurological:      Mental Status: She is alert and oriented to person, place, and time.   Psychiatric:         Mood and Affect: Mood normal.         Behavior: Behavior normal.         Assessment/Plan:     Josefina Martin is a 48-year-old female with a PMHx multiple CVA Multiple CVAs w/o residual deficits, HTN, T2DM, CAD s/p multiple stent placement, hx of MI, systolic and diastolic HF, history of tobacco use here for management of chronic medical conditions. Patient also with a 2 week history of right shoulder and low back pain.    Uncontrolled type 2 diabetes mellitus with hyperglycemia  -     Hemoglobin A1C; Future; Expected date: 01/04/2024  -     semaglutide (OZEMPIC) 1 mg/dose (4 mg/3 mL); Inject 1 mg into the skin every 7 days.  Dispense: 3 mL; Refill: 2  -     Lipid Panel; Future; Expected date: 01/04/2024  -     Comprehensive Metabolic Panel; Future; Expected date: 01/04/2024  -     metFORMIN (GLUCOPHAGE) 500 MG tablet; Take 1 tablet (500 mg total) by mouth 2 (two) times daily with meals.  Dispense: 180 tablet; Refill: 3  -     insulin (LANTUS SOLOSTAR U-100 INSULIN) glargine 100 units/mL SubQ pen; Inject 12 Units into the skin once daily.  Dispense: 15 mL; Refill: 1  -    chronic issue currently uncontrolled based on last A1c.  Home readings currently at goal though. Will plan for increasing Ozempic to 1 mg weekly, continue metformin 500 mg twice daily, continue Lantus 12 units nightly.  Will repeat A1c.     Essential hypertension       -      chronic issue currently controlled.  In office /75.  Continue current medications and monitoring blood pressure daily.     Acute pain  of right shoulder  -     LIDOcaine (LIDODERM) 5 %; Place 2 patches onto the skin once daily. Remove & Discard patch within 12 hours or as directed by MD. Apply to affected area.  Dispense: 60 patch; Refill: 0  -     methocarbamoL (ROBAXIN) 500 MG Tab; Take 1 tablet (500 mg total) by mouth 4 (four) times daily. for 10 days  Dispense: 40 tablet; Refill: 0  -    likely biceps tendinitis given significant tenderness over the biceps tendon and speed's test positive on exam today. Advised that we could consider CSI to the tendon in future. Can use lidocaine patches over the shoulder and Tylenol as needed.     Acute midline low back pain without sciatica  -     LIDOcaine (LIDODERM) 5 %; Place 2 patches onto the skin once daily. Remove & Discard patch within 12 hours or as directed by MD. Apply to affected area.  Dispense: 60 patch; Refill: 0  -     methocarbamoL (ROBAXIN) 500 MG Tab; Take 1 tablet (500 mg total) by mouth 4 (four) times daily. for 10 days  Dispense: 40 tablet; Refill: 0  -     topical lidocaine patches and Robaxin provided for pain relief.  Continue to remain active. Avoid NSAID use given CVA history.    History of CVA (cerebrovascular accident)  -     Lipid Panel; Future; Expected date: 01/04/2024  -     aspirin (ECOTRIN) 81 MG EC tablet; Take 1 tablet (81 mg total) by mouth once daily.  Dispense: 90 tablet; Refill: 3  -     atorvastatin (LIPITOR) 80 MG tablet; Take 1 tablet (80 mg total) by mouth every evening.  Dispense: 90 tablet; Refill: 3  -     clopidogreL (PLAVIX) 75 mg tablet; Take 1 tablet (75 mg total) by mouth once daily.  Dispense: 90 tablet; Refill: 0  -     chronic issue currently controlled. Continued effort at risk reduction.    Encounter for screening mammogram for malignant neoplasm of breast  -     Mammo Digital Screening [facility] w/ Mike; Future; Expected date: 01/04/2024    Screening for malignant neoplasm of colon  -     Case Request Endoscopy: COLONOSCOPY    Follow-up:  3  donal Ennis DO  [facility], PGY-3  01/04/2024      This note was partially created using [employer] Voice Recognition software. Typographical and content errors may occur with this process. While efforts are made to detect and correct such errors, in some cases errors will persist. For this reason, wording in this document should be considered in the proper context and not strictly verbatim

## 2024-01-08 ENCOUNTER — HOSPITAL ENCOUNTER (OUTPATIENT)
Facility: HOSPITAL | Age: 49
Discharge: HOME | End: 2024-01-11
Attending: EMERGENCY MEDICINE | Admitting: HOSPITALIST
Payer: MEDICAID

## 2024-01-08 DIAGNOSIS — E11.69 TYPE 2 DIABETES MELLITUS WITH OTHER SPECIFIED COMPLICATION, UNSPECIFIED WHETHER LONG TERM INSULIN USE: ICD-10-CM

## 2024-01-08 DIAGNOSIS — Z86.73 HISTORY OF CVA (CEREBROVASCULAR ACCIDENT): ICD-10-CM

## 2024-01-08 DIAGNOSIS — K92.1 MELENA: ICD-10-CM

## 2024-01-08 DIAGNOSIS — R53.1 WEAKNESS: ICD-10-CM

## 2024-01-08 DIAGNOSIS — I50.32 CHRONIC DIASTOLIC HEART FAILURE: ICD-10-CM

## 2024-01-08 DIAGNOSIS — E78.2 MIXED HYPERLIPIDEMIA: ICD-10-CM

## 2024-01-08 DIAGNOSIS — I25.83 CORONARY ARTERY DISEASE DUE TO LIPID RICH PLAQUE: ICD-10-CM

## 2024-01-08 DIAGNOSIS — K92.2 UPPER GI BLEED: Primary | ICD-10-CM

## 2024-01-08 DIAGNOSIS — I25.10 CORONARY ARTERY DISEASE DUE TO LIPID RICH PLAQUE: ICD-10-CM

## 2024-01-08 DIAGNOSIS — Z86.79 HISTORY OF CHF (CONGESTIVE HEART FAILURE): ICD-10-CM

## 2024-01-08 DIAGNOSIS — I69.30 HISTORY OF CVA WITH RESIDUAL DEFICIT: ICD-10-CM

## 2024-01-08 DIAGNOSIS — D62 ACUTE BLOOD LOSS ANEMIA: ICD-10-CM

## 2024-01-08 DIAGNOSIS — K92.2 GASTROINTESTINAL HEMORRHAGE, UNSPECIFIED GASTROINTESTINAL HEMORRHAGE TYPE: ICD-10-CM

## 2024-01-08 DIAGNOSIS — I10 ESSENTIAL HYPERTENSION: ICD-10-CM

## 2024-01-08 DIAGNOSIS — R29.898 RIGHT LEG WEAKNESS: ICD-10-CM

## 2024-01-08 LAB
ALBUMIN SERPL BCP-MCNC: 4.2 G/DL (ref 3.5–5.2)
ALP SERPL-CCNC: 73 U/L (ref 55–135)
ALT SERPL W/O P-5'-P-CCNC: 26 U/L (ref 10–44)
ANION GAP SERPL CALC-SCNC: 14 MMOL/L (ref 8–16)
AST SERPL-CCNC: 32 U/L (ref 10–40)
BACTERIA #/AREA URNS HPF: NORMAL /HPF
BASOPHILS # BLD AUTO: 0.04 K/UL (ref 0–0.2)
BASOPHILS NFR BLD: 0.4 % (ref 0–1.9)
BILIRUB SERPL-MCNC: 0.8 MG/DL (ref 0.1–1)
BILIRUB UR QL STRIP: NEGATIVE
BUN SERPL-MCNC: 70 MG/DL (ref 6–20)
CALCIUM SERPL-MCNC: 10 MG/DL (ref 8.7–10.5)
CHLORIDE SERPL-SCNC: 101 MMOL/L (ref 95–110)
CLARITY UR: CLEAR
CO2 SERPL-SCNC: 20 MMOL/L (ref 23–29)
COLOR UR: YELLOW
CREAT SERPL-MCNC: 1.3 MG/DL (ref 0.5–1.4)
DIFFERENTIAL METHOD BLD: ABNORMAL
EOSINOPHIL # BLD AUTO: 0 K/UL (ref 0–0.5)
EOSINOPHIL NFR BLD: 0.3 % (ref 0–8)
ERYTHROCYTE [DISTWIDTH] IN BLOOD BY AUTOMATED COUNT: 13.9 % (ref 11.5–14.5)
EST. GFR  (NO RACE VARIABLE): 50 ML/MIN/1.73 M^2
FERRITIN SERPL-MCNC: 158 NG/ML (ref 20–300)
GLUCOSE SERPL-MCNC: 105 MG/DL (ref 70–110)
GLUCOSE UR QL STRIP: NEGATIVE
HCT VFR BLD AUTO: 24.8 % (ref 37–48.5)
HCT VFR BLD AUTO: 30.6 % (ref 37–48.5)
HGB BLD-MCNC: 10.7 G/DL (ref 12–16)
HGB BLD-MCNC: 8.7 G/DL (ref 12–16)
HGB UR QL STRIP: ABNORMAL
IMM GRANULOCYTES # BLD AUTO: 0.04 K/UL (ref 0–0.04)
IMM GRANULOCYTES NFR BLD AUTO: 0.4 % (ref 0–0.5)
IRON SERPL-MCNC: 106 UG/DL (ref 30–160)
KETONES UR QL STRIP: NEGATIVE
LEUKOCYTE ESTERASE UR QL STRIP: ABNORMAL
LYMPHOCYTES # BLD AUTO: 4.1 K/UL (ref 1–4.8)
LYMPHOCYTES NFR BLD: 38.1 % (ref 18–48)
MCH RBC QN AUTO: 31.8 PG (ref 27–31)
MCHC RBC AUTO-ENTMCNC: 35 G/DL (ref 32–36)
MCV RBC AUTO: 91 FL (ref 82–98)
MICROSCOPIC COMMENT: NORMAL
MONOCYTES # BLD AUTO: 0.6 K/UL (ref 0.3–1)
MONOCYTES NFR BLD: 5.3 % (ref 4–15)
NEUTROPHILS # BLD AUTO: 6 K/UL (ref 1.8–7.7)
NEUTROPHILS NFR BLD: 55.5 % (ref 38–73)
NITRITE UR QL STRIP: NEGATIVE
NRBC BLD-RTO: 0 /100 WBC
OB PNL STL: POSITIVE
PH UR STRIP: 5 [PH] (ref 5–8)
PLATELET # BLD AUTO: 272 K/UL (ref 150–450)
PMV BLD AUTO: 10.9 FL (ref 9.2–12.9)
POCT GLUCOSE: 77 MG/DL (ref 70–110)
POTASSIUM SERPL-SCNC: 4 MMOL/L (ref 3.5–5.1)
PROT SERPL-MCNC: 8 G/DL (ref 6–8.4)
PROT UR QL STRIP: NEGATIVE
RBC # BLD AUTO: 3.36 M/UL (ref 4–5.4)
RBC #/AREA URNS HPF: 1 /HPF (ref 0–4)
SATURATED IRON: 30 % (ref 20–50)
SODIUM SERPL-SCNC: 135 MMOL/L (ref 136–145)
SP GR UR STRIP: 1.02 (ref 1–1.03)
SQUAMOUS #/AREA URNS HPF: 4 /HPF
TOTAL IRON BINDING CAPACITY: 351 UG/DL (ref 250–450)
TRANSFERRIN SERPL-MCNC: 237 MG/DL (ref 200–375)
TROPONIN I SERPL DL<=0.01 NG/ML-MCNC: <0.006 NG/ML (ref 0–0.03)
URN SPEC COLLECT METH UR: ABNORMAL
UROBILINOGEN UR STRIP-ACNC: NEGATIVE EU/DL
WBC # BLD AUTO: 10.76 K/UL (ref 3.9–12.7)
WBC #/AREA URNS HPF: 4 /HPF (ref 0–5)

## 2024-01-08 PROCEDURE — 84484 ASSAY OF TROPONIN QUANT: CPT | Performed by: NURSE PRACTITIONER

## 2024-01-08 PROCEDURE — 63600175 PHARM REV CODE 636 W HCPCS

## 2024-01-08 PROCEDURE — 99214 OFFICE O/P EST MOD 30 MIN: CPT | Mod: 25,,, | Performed by: INTERNAL MEDICINE

## 2024-01-08 PROCEDURE — 25000003 PHARM REV CODE 250: Performed by: NURSE PRACTITIONER

## 2024-01-08 PROCEDURE — 85014 HEMATOCRIT: CPT | Mod: 91

## 2024-01-08 PROCEDURE — 82962 GLUCOSE BLOOD TEST: CPT

## 2024-01-08 PROCEDURE — C9113 INJ PANTOPRAZOLE SODIUM, VIA: HCPCS | Performed by: EMERGENCY MEDICINE

## 2024-01-08 PROCEDURE — 63600175 PHARM REV CODE 636 W HCPCS: Performed by: EMERGENCY MEDICINE

## 2024-01-08 PROCEDURE — 81000 URINALYSIS NONAUTO W/SCOPE: CPT | Performed by: NURSE PRACTITIONER

## 2024-01-08 PROCEDURE — 85018 HEMOGLOBIN: CPT

## 2024-01-08 PROCEDURE — 80053 COMPREHEN METABOLIC PANEL: CPT | Performed by: NURSE PRACTITIONER

## 2024-01-08 PROCEDURE — C9113 INJ PANTOPRAZOLE SODIUM, VIA: HCPCS

## 2024-01-08 PROCEDURE — 96376 TX/PRO/DX INJ SAME DRUG ADON: CPT

## 2024-01-08 PROCEDURE — 99285 EMERGENCY DEPT VISIT HI MDM: CPT | Mod: 25

## 2024-01-08 PROCEDURE — 25000003 PHARM REV CODE 250

## 2024-01-08 PROCEDURE — G0378 HOSPITAL OBSERVATION PER HR: HCPCS

## 2024-01-08 PROCEDURE — 83540 ASSAY OF IRON: CPT | Performed by: NURSE PRACTITIONER

## 2024-01-08 PROCEDURE — 93005 ELECTROCARDIOGRAM TRACING: CPT

## 2024-01-08 PROCEDURE — 96361 HYDRATE IV INFUSION ADD-ON: CPT

## 2024-01-08 PROCEDURE — 96375 TX/PRO/DX INJ NEW DRUG ADDON: CPT

## 2024-01-08 PROCEDURE — 63600175 PHARM REV CODE 636 W HCPCS: Performed by: NURSE PRACTITIONER

## 2024-01-08 PROCEDURE — 82272 OCCULT BLD FECES 1-3 TESTS: CPT | Performed by: NURSE PRACTITIONER

## 2024-01-08 PROCEDURE — 82728 ASSAY OF FERRITIN: CPT | Performed by: NURSE PRACTITIONER

## 2024-01-08 PROCEDURE — 85025 COMPLETE CBC W/AUTO DIFF WBC: CPT | Performed by: NURSE PRACTITIONER

## 2024-01-08 PROCEDURE — 93010 ELECTROCARDIOGRAM REPORT: CPT | Mod: ,,, | Performed by: INTERNAL MEDICINE

## 2024-01-08 RX ORDER — PANTOPRAZOLE SODIUM 40 MG/10ML
40 INJECTION, POWDER, LYOPHILIZED, FOR SOLUTION INTRAVENOUS 2 TIMES DAILY
Status: DISCONTINUED | OUTPATIENT
Start: 2024-01-09 | End: 2024-01-10

## 2024-01-08 RX ORDER — ONDANSETRON 2 MG/ML
4 INJECTION INTRAMUSCULAR; INTRAVENOUS
Status: COMPLETED | OUTPATIENT
Start: 2024-01-08 | End: 2024-01-08

## 2024-01-08 RX ORDER — IBUPROFEN 200 MG
24 TABLET ORAL
Status: DISCONTINUED | OUTPATIENT
Start: 2024-01-08 | End: 2024-01-11 | Stop reason: HOSPADM

## 2024-01-08 RX ORDER — IBUPROFEN 200 MG
16 TABLET ORAL
Status: DISCONTINUED | OUTPATIENT
Start: 2024-01-08 | End: 2024-01-11 | Stop reason: HOSPADM

## 2024-01-08 RX ORDER — PANTOPRAZOLE SODIUM 40 MG/10ML
40 INJECTION, POWDER, LYOPHILIZED, FOR SOLUTION INTRAVENOUS ONCE
Status: COMPLETED | OUTPATIENT
Start: 2024-01-08 | End: 2024-01-08

## 2024-01-08 RX ORDER — SODIUM CHLORIDE, SODIUM LACTATE, POTASSIUM CHLORIDE, CALCIUM CHLORIDE 600; 310; 30; 20 MG/100ML; MG/100ML; MG/100ML; MG/100ML
INJECTION, SOLUTION INTRAVENOUS CONTINUOUS
Status: DISCONTINUED | OUTPATIENT
Start: 2024-01-08 | End: 2024-01-08

## 2024-01-08 RX ORDER — PANTOPRAZOLE SODIUM 40 MG/10ML
40 INJECTION, POWDER, LYOPHILIZED, FOR SOLUTION INTRAVENOUS DAILY
Status: DISCONTINUED | OUTPATIENT
Start: 2024-01-09 | End: 2024-01-08

## 2024-01-08 RX ORDER — NALOXONE HCL 0.4 MG/ML
0.02 VIAL (ML) INJECTION
Status: DISCONTINUED | OUTPATIENT
Start: 2024-01-08 | End: 2024-01-11 | Stop reason: HOSPADM

## 2024-01-08 RX ORDER — INSULIN ASPART 100 [IU]/ML
1-10 INJECTION, SOLUTION INTRAVENOUS; SUBCUTANEOUS
Status: DISCONTINUED | OUTPATIENT
Start: 2024-01-08 | End: 2024-01-11 | Stop reason: HOSPADM

## 2024-01-08 RX ORDER — GLUCAGON 1 MG
1 KIT INJECTION
Status: DISCONTINUED | OUTPATIENT
Start: 2024-01-08 | End: 2024-01-11 | Stop reason: HOSPADM

## 2024-01-08 RX ORDER — PANTOPRAZOLE SODIUM 40 MG/10ML
40 INJECTION, POWDER, LYOPHILIZED, FOR SOLUTION INTRAVENOUS
Status: COMPLETED | OUTPATIENT
Start: 2024-01-08 | End: 2024-01-08

## 2024-01-08 RX ORDER — CHLORTHALIDONE 25 MG/1
25 TABLET ORAL DAILY
Status: DISCONTINUED | OUTPATIENT
Start: 2024-01-09 | End: 2024-01-09

## 2024-01-08 RX ORDER — DIPHENHYDRAMINE HCL 50 MG
50 CAPSULE ORAL NIGHTLY PRN
Status: DISCONTINUED | OUTPATIENT
Start: 2024-01-08 | End: 2024-01-11 | Stop reason: HOSPADM

## 2024-01-08 RX ORDER — AMOXICILLIN 250 MG
1 CAPSULE ORAL 2 TIMES DAILY PRN
Status: DISCONTINUED | OUTPATIENT
Start: 2024-01-08 | End: 2024-01-11 | Stop reason: HOSPADM

## 2024-01-08 RX ORDER — SODIUM CHLORIDE 0.9 % (FLUSH) 0.9 %
5 SYRINGE (ML) INJECTION
Status: DISCONTINUED | OUTPATIENT
Start: 2024-01-08 | End: 2024-01-11 | Stop reason: HOSPADM

## 2024-01-08 RX ORDER — NIFEDIPINE 30 MG/1
90 TABLET, EXTENDED RELEASE ORAL DAILY
Status: DISCONTINUED | OUTPATIENT
Start: 2024-01-09 | End: 2024-01-09

## 2024-01-08 RX ORDER — TALC
6 POWDER (GRAM) TOPICAL NIGHTLY PRN
Status: DISCONTINUED | OUTPATIENT
Start: 2024-01-08 | End: 2024-01-11 | Stop reason: HOSPADM

## 2024-01-08 RX ORDER — ATORVASTATIN CALCIUM 40 MG/1
80 TABLET, FILM COATED ORAL NIGHTLY
Status: DISCONTINUED | OUTPATIENT
Start: 2024-01-08 | End: 2024-01-11 | Stop reason: HOSPADM

## 2024-01-08 RX ORDER — ONDANSETRON 2 MG/ML
4 INJECTION INTRAMUSCULAR; INTRAVENOUS EVERY 8 HOURS PRN
Status: DISCONTINUED | OUTPATIENT
Start: 2024-01-08 | End: 2024-01-11 | Stop reason: HOSPADM

## 2024-01-08 RX ORDER — ACETAMINOPHEN 325 MG/1
650 TABLET ORAL EVERY 4 HOURS PRN
Status: DISCONTINUED | OUTPATIENT
Start: 2024-01-08 | End: 2024-01-11 | Stop reason: HOSPADM

## 2024-01-08 RX ADMIN — ATORVASTATIN CALCIUM 80 MG: 40 TABLET, FILM COATED ORAL at 08:01

## 2024-01-08 RX ADMIN — PANTOPRAZOLE SODIUM 40 MG: 40 INJECTION, POWDER, LYOPHILIZED, FOR SOLUTION INTRAVENOUS at 03:01

## 2024-01-08 RX ADMIN — SODIUM CHLORIDE, POTASSIUM CHLORIDE, SODIUM LACTATE AND CALCIUM CHLORIDE: 600; 310; 30; 20 INJECTION, SOLUTION INTRAVENOUS at 05:01

## 2024-01-08 RX ADMIN — SODIUM CHLORIDE 1000 ML: 9 INJECTION, SOLUTION INTRAVENOUS at 11:01

## 2024-01-08 RX ADMIN — ONDANSETRON 4 MG: 2 INJECTION, SOLUTION INTRAMUSCULAR; INTRAVENOUS at 09:01

## 2024-01-08 NOTE — ED NOTES
"This RN assumed care of pt. Pt aao x 4. Pt lying on stretcher, respirations even and unlabored. Nadn. Vitals stable. Pt connected to cardiac monitoring, automatic bp cuff, and cont pulse ox. Pt denies any pain currently. States that she just began to feel weak around 0730 am. States while she was at work her right leg "started jerking" and states last time it did this she had a stroke. Leg in relaxed position currently. Even and equal sensation and strength noted to all four extremities. Pt further explains that yesterday and this morning she noted black diarrhea stool. Denies hx of same. Denies GI surgeries or history. Denies drinking alcohol or drug use. Pt repositioned on stretcher and provided with warm blanket for comfort. Pt updated on poc. Medicated per MAR. Reminded of need for urine sample. VU. States unable to void at this time. Bed rails up x 2, call bell within reach, bed in lowest locked position. Pt denies any needs at this time. Care ongoing.   "

## 2024-01-08 NOTE — PROGRESS NOTES
I assume primary medical responsibility for this patient. I have reviewed the history, physical, and assessment & treatment plan with the resident and agree that the care is reasonable and necessary. This service has been performed by a resident without the presence of a teaching physician under the primary care exception. If necessary, an addendum of additional findings or evaluation beyond the resident documentation will be noted below.        Miko Perrin Jr., DO    [facility]

## 2024-01-08 NOTE — ED PROVIDER NOTES
"Emergency Department Encounter  Provider Note    Josefina Martin  [id number]  1/8/2024    Evaluation:    History Acquisition:     Chief Complaint   Patient presents with    Extremity Weakness     Weakness to the right leg and arm that started 0500. Diarrhea and feeling hot since the same time. Hypotension noted while at work.     GI Problem     Black stool since yesterday.       History of Present Illness:  Josefina Martin is a 48 y.o. female who has a past medical history of Cerebrovascular accident (CVA) (3/24/2017), CHF (congestive heart failure), Diabetes mellitus, Hypertension, MI (myocardial infarction), and Stroke.    The patient presents to the ED due to weakness and dark stools.   Patient reports she noticed diarrhea and darks tool while at work this morning.  She also noticed her R leg was weak and "jumping." She states she has a history of stroke in 2022 that affected her R leg. Since then her strength has not been as good as the L.  She denies any chest pain, SOB, headache, vision changes, or upper extremity weakness. She is currently on blood thinners but is not sure of the name.     Additional historians utilized:  none    Prior medical records were reviewed:   FM visit 1/4 for f/u CVA, HTN, DM, CAD/MI s/p stents, CHF, tobacco use  Sleep med visit 10/30 for f/u NICK  FM visit 10/2023 for f/u DM, HTN, CVA, currently on ASA/Plavix    The patient's list of active medical problems, social history, medications, and allergies as documented has been reviewed.     Past Medical History:   Diagnosis Date    Cerebrovascular accident (CVA) 3/24/2017    CHF (congestive heart failure)     Diabetes mellitus     Hypertension     MI (myocardial infarction)     Stroke      Past Surgical History:   Procedure Laterality Date    CHOLECYSTECTOMY  2013    history of cholelithiasis    ESOPHAGOGASTRODUODENOSCOPY N/A 10/21/2020    Procedure: EGD (ESOPHAGOGASTRODUODENOSCOPY);  Surgeon: Asha Blackwell MD;  Location: [facility]" ENDO;  Service: Endoscopy;  Laterality: N/A;    ESOPHAGOGASTRODUODENOSCOPY N/A 6/15/2021    Procedure: EGD (ESOPHAGOGASTRODUODENOSCOPY);  Surgeon: Asha Blackwell MD;  Location[facility] ENDO;  Service: Endoscopy;  Laterality: N/A;    TUBAL LIGATION       Family History   Problem Relation Age of Onset    Hypertension Mother     Lung cancer Mother     No Known Problems Father     No Known Problems Sister     No Known Problems Daughter     Diabetes Son 14        Takes 2 insulins and metformin use to be bigger wally    Stroke Maternal Uncle 48    Anuerysm Maternal Grandmother     Breast cancer Maternal Grandmother     No Known Problems Sister     No Known Problems Daughter     No Known Problems Son     Breast cancer Maternal Aunt     Breast cancer Maternal Aunt      Social History     Socioeconomic History    Marital status: Single   Occupational History     Employer: [employer]   Tobacco Use    Smoking status: Former     Current packs/day: 0.15     Average packs/day: 0.2 packs/day for 18.0 years (2.7 ttl pk-yrs)     Types: Cigarettes    Smokeless tobacco: Never    Tobacco comments:     1 pack/week   Substance and Sexual Activity    Alcohol use: Yes     Comment: social 1/month    Drug use: No    Sexual activity: Yes     Partners: Male     Birth control/protection: None, Surgical     Review of patient's allergies indicates:  No Known Allergies    Review of Systems   Gastrointestinal:  Positive for diarrhea.        + melena   Neurological:  Positive for weakness.         Physical Exam:     Initial Vitals [01/08/24 0845]   BP Pulse Resp Temp SpO2   (!) 96/57 (!) 116 20 97.5 °F (36.4 °C) 99 %      MAP       --         Physical Exam    Nursing note and vitals reviewed.  Constitutional: She appears well-developed and well-nourished. She is not diaphoretic. No distress.   HENT:   Head: Normocephalic and atraumatic.   Mouth/Throat: Oropharynx is clear and moist.   Eyes: EOM are normal. Pupils are equal, round, and reactive to  light.   Neck: No tracheal deviation present.   Cardiovascular:  Normal rate, regular rhythm, normal heart sounds and intact distal pulses.           Pulmonary/Chest: Breath sounds normal. No stridor. No respiratory distress. She has no wheezes.   Abdominal: Abdomen is soft. Bowel sounds are normal. She exhibits no distension and no mass. There is no abdominal tenderness.   Genitourinary:    Genitourinary Comments: Melena on exam, guaiac positive.      Musculoskeletal:         General: No edema. Normal range of motion.     Neurological: She is alert and oriented to person, place, and time. She displays no tremor. No cranial nerve deficit or sensory deficit. She exhibits abnormal muscle tone.   Mild RLE weakness, able to lift into the air but weaker than LLE.    Skin: Skin is warm and dry. Capillary refill takes less than 2 seconds. No pallor.   Psychiatric: She has a normal mood and affect. Her behavior is normal. Thought content normal.         Differential Diagnoses:   Based on available information and initial assessment, Differential Diagnosis includes, but is not limited to:  Lower GI bleed (AVM, colitis, colon cancer, polyp, internal/external hemorrhoid, IBS, rectal injury/foreign body, chronic diarrhea, anal fissure), upper GI bleed (PUD, perforated ulcer, esophageal variceal bleed), Crohns disease, ulcerative colitis, chronic diarrhea, dietary intake      ED Management:   Procedures    Orders Placed This Encounter    Comprehensive metabolic panel    CBC auto differential    Occult blood x 1, stool    Troponin I    Urinalysis, Reflex to Urine Culture Urine, Clean Catch    Urinalysis Microscopic    Comprehensive Metabolic Panel (CMP)    Magnesium    Phosphorus    CBC with Automated Differential    Ferritin    Iron and TIBC    Ferritin    Iron and TIBC    Hemoglobin    Hematocrit    Diet NPO Except for: Medication, Ice Chips, Sips with Medication    Diet Cardiac    Cardiac Monitoring - Adult    Vital Signs     Weigh patient    Strict intake and output (1) Document % meals taken (2) # of urinations (3) # and consistency of BMs    Bladder scan    Notify Physician    Place sequential compression device    Recheck Blood Glucose:    Full code    Inpatient consult to Gastroenterology[facility]    POCT glucose    EKG 12-lead    Insert Saline lock IV    Insert saline lock    Place in Observation    Reason for No Pharmacological VTE Prophylaxis    POCT glucose    sodium chloride 0.9% bolus 1,000 mL 1,000 mL    ondansetron injection 4 mg    pantoprazole injection 40 mg    sodium chloride 0.9% flush 5 mL    melatonin tablet 6 mg    ondansetron injection 4 mg    senna-docusate 8.6-50 mg per tablet 1 tablet    acetaminophen tablet 650 mg    naloxone 0.4 mg/mL injection 0.02 mg    insulin aspart U-100 pen 1-10 Units    glucose chewable tablet 16 g    glucose chewable tablet 24 g    glucagon (human recombinant) injection 1 mg    dextrose 10% bolus 125 mL 125 mL    dextrose 10% bolus 250 mL 250 mL    pantoprazole injection 40 mg    pantoprazole injection 40 mg    atorvastatin tablet 80 mg    chlorthalidone tablet 25 mg    NIFEdipine 24 hr tablet 90 mg    diphenhydrAMINE capsule 50 mg    IP VTE HIGH RISK PATIENT    Case Request Endoscopy: EGD (ESOPHAGOGASTRODUODENOSCOPY)    Progressive Mobility Protocol (mobilize patient to their highest level of functioning at least twice daily)          EKG:   EKG interpretation by ED attending physician:  NSR, rate 76, non-specific ST changes, no ST elevations or acute ischemia, normal intervals.  Compared with prior EKG dated 08/2021, grossly stable without significant change.      Labs:     Labs Reviewed   COMPREHENSIVE METABOLIC PANEL - Abnormal; Notable for the following components:       Result Value    Sodium 135 (*)     CO2 20 (*)     BUN 70 (*)     eGFR 50 (*)     All other components within normal limits   CBC W/ AUTO DIFFERENTIAL - Abnormal; Notable for the following components:    RBC 3.36 (*)      Hemoglobin 10.7 (*)     Hematocrit 30.6 (*)     MCH 31.8 (*)     All other components within normal limits   OCCULT BLOOD X 1, STOOL - Abnormal; Notable for the following components:    Occult Blood Positive (*)     All other components within normal limits   URINALYSIS, REFLEX TO URINE CULTURE - Abnormal; Notable for the following components:    Occult Blood UA 1+ (*)     Leukocytes, UA 1+ (*)     All other components within normal limits    Narrative:     Specimen Source->Urine   TROPONIN I   URINALYSIS MICROSCOPIC    Narrative:     Specimen Source->Urine   FERRITIN   IRON AND TIBC   FERRITIN   IRON AND TIBC   HEMOGLOBIN   HEMATOCRIT   POCT GLUCOSE, HAND-HELD DEVICE   POCT GLUCOSE     Independent review of the labs ordered include:   See ED course    Imaging:     Imaging Results    None            Medications Given:     Medications   sodium chloride 0.9% flush 5 mL (has no administration in time range)   melatonin tablet 6 mg (has no administration in time range)   ondansetron injection 4 mg (has no administration in time range)   senna-docusate 8.6-50 mg per tablet 1 tablet (has no administration in time range)   acetaminophen tablet 650 mg (has no administration in time range)   naloxone 0.4 mg/mL injection 0.02 mg (has no administration in time range)   insulin aspart U-100 pen 1-10 Units (has no administration in time range)   glucose chewable tablet 16 g (has no administration in time range)   glucose chewable tablet 24 g (has no administration in time range)   glucagon (human recombinant) injection 1 mg (has no administration in time range)   dextrose 10% bolus 125 mL 125 mL (has no administration in time range)   dextrose 10% bolus 250 mL 250 mL (has no administration in time range)   pantoprazole injection 40 mg (has no administration in time range)   atorvastatin tablet 80 mg (has no administration in time range)   chlorthalidone tablet 25 mg (has no administration in time range)   NIFEdipine 24 hr tablet  90 mg (has no administration in time range)   diphenhydrAMINE capsule 50 mg (has no administration in time range)   sodium chloride 0.9% bolus 1,000 mL 1,000 mL (0 mLs Intravenous Stopped 1/8/24 1201)   ondansetron injection 4 mg (4 mg Intravenous Given 1/8/24 0900)   pantoprazole injection 40 mg (40 mg Intravenous Given 1/8/24 1516)   pantoprazole injection 40 mg (40 mg Intravenous Given 1/8/24 1553)        Medical Decision Making:    Additional Consideration:   Additional testing considered during clinical course: none    Social determinants of health considered during development of treatment plan include: tobacco use    Current co-morbidities considered which impacted clinical decision making: CVA, HTN, DM, CAD/MI s/p stents, CHF, NICK    Case discussed with additional provider: [facility] FM service for admission and further management of UGIB, acute anemia    ED Course as of 01/08/24 1854   Mon Jan 08, 2024   1240 Comprehensive metabolic panel(!) [DT]   1240 Troponin I [DT]   1240 CBC auto differential(!) [DT]   1306 SpO2: 99 % [SS]   1306 Resp: 20 [SS]   1306 Pulse(!): 116 [SS]   1306 Temp: 97.5 °F (36.4 °C) [SS]   1306 BP(!): 96/57  Hypotensive, tachycardic [SS]   1306 BP: 108/68  BP and HR improving with IVF [SS]   1447 Urinalysis, Reflex to Urine Culture Urine, Clean Catch(!) [SS]   1447 Urinalysis Microscopic  UA without infection [SS]      ED Course User Index  [DT] Page Fonseca NP  [SS] Ryan Mei MD            Medical Decision Making  47 yo F with CVA currently on ASA/plavix, HTN, DM, CAD/MI s/p stents, CHF, NICK presents to ED with diarrhea, dark stool, and worsening R leg weakness.  Mildly hypotensive on arrival. IVF given.  Labs concerning for acute anemia, Hb 10 from baseline 14.   Melena on exam, guaiac positive.  Patient has history of stroke with R-sided weakness in the past, patient states her strength in RLE is not normal at baseline.  No additional symptoms to warrant stroke workup in  ED.  Will admit for H/H trend and further management of UGIB in setting of DAPT.    Problems Addressed:  History of CVA with residual deficit: chronic illness or injury  Melena: complicated acute illness or injury with systemic symptoms that poses a threat to life or bodily functions  Right leg weakness: chronic illness or injury  Upper GI bleed: complicated acute illness or injury with systemic symptoms that poses a threat to life or bodily functions  Weakness: acute illness or injury    Amount and/or Complexity of Data Reviewed  External Data Reviewed: notes.  Labs: ordered. Decision-making details documented in ED Course.  ECG/medicine tests: ordered and independent interpretation performed. Decision-making details documented in ED Course.    Risk  Prescription drug management.  Decision regarding hospitalization.  Diagnosis or treatment significantly limited by social determinants of health.    Critical Care  Total time providing critical care: 45 minutes        Clinical Impression:       ICD-10-CM ICD-9-CM   1. Upper GI bleed  K92.2 578.9   2. Weakness  R53.1 780.79   3. Melena  K92.1 578.1   4. Right leg weakness  R29.898 729.89   5. History of CVA with residual deficit  I69.30 438.9   6. Acute blood loss anemia  D62 285.1         Follow-up Information    None          ED Disposition Condition    Observation Stable              On re-evaluation, the patient's status has improved.  At this time, I believe the patient should be admitted to the hospital for further evaluation and management.  The consulting physician/team agrees with plan and will admit under their service.   The patient and family were updated with test results, overall impression, and further plan of care. All questions were answered.       Ryan Mei MD  01/08/24 [id number]

## 2024-01-08 NOTE — CONSULTS
[facility]    CC: anemia    HPI 48 y.o. female PMHx multiple CVA Multiple CVAs w/o residual deficits (on ASA plavix), HTN, T2DM, CAD s/p multiple stent placement, hx of MI, systolic and diastolic HF, history of tobacco admitted for weakness and hypotension. GI consulted for reports of dark stools.    BP 90-100s/50-70s. 1 episode of dark stools. Does not take NSAIDs. Has chronic shoulder pain for which she takes robaxin. EGD 6/2021 showed esophageal candidiasis and gastric erosions.    Past Medical History  Past Medical History:   Diagnosis Date    Cerebrovascular accident (CVA) 3/24/2017    CHF (congestive heart failure)     Diabetes mellitus     Hypertension     MI (myocardial infarction)     Stroke          Review of Systems  As per HPI    Physical Examination  /72   Pulse 76   Temp 97.5 °F (36.4 °C)   Resp 19   SpO2 100%   General appearance: alert, cooperative, no distress  HENT: Normocephalic, atraumatic. Anicteric sclera. No sublingual jaundice.  Lungs:  Normal work of breathing  Abdomen: Soft, non-tender; bowel sounds normoactive; no organomegaly  Skin: No jaundice  Extremities:  No edema  Neurologic: Alert and oriented    Labs: Hgb 10.7 (baseline 14)    Endoscopy   EGD 6/2021 gastric erosions and esophageal candidiasis.    Assessment: 48 y.o. female PMHx multiple CVA Multiple CVAs w/o residual deficits (on ASA plavix), HTN, T2DM, CAD s/p multiple stent placement, hx of MI, systolic and diastolic HF, history of tobacco admitted for weakness and hypotension. GI consulted for reports of dark stools.    Mild anemia  Dark stools for the past day which has never happened before. Melena on rectal exam. Soft blood pressures. No NSAID use. Gastric erosions seen on EGD in 2021. On ASA/plavix for CVA    Plan:   - Iron studies  - NPO at midnight  - EGD in the am  - IV PPI BID    To be discussed with Dr. Hamida Jaquez MD  Gastroenterology Fellow - [facility]

## 2024-01-08 NOTE — HPI
Patient is a 48 y.o.-year-old female w/ PMHx multiple CVA, HTN, HLD, DM (A1c 6.5 on 1/4/24), MI/CAD s/p multiple stents, CHF (55%EF grade 1 diastolic dysfunction 8/17/21) who presents to the ED with reports of 1 day of black diarrhea with a total of 5 episodes of diarrhea. Her diarrhea is associated with nausea and dizziness. She also endorsed decreased blood pressure at home. She denies any fevers, chest pain, shortness of breath.      In the ED, initial vitals Temp 97.5F, BP 96/57, , RR 20, SpO2 99% on room air. CBC significant for H/H of 10.7/30.6 (baseline 14/41), CMP significant for BUN of 70. And CrCl cannot be calculated (Unknown ideal weight.). troponin negative.   EKG showed normal sinus rhythm.   In the ED patient was treated with zofran, IV protonix, and 1L NS bolus. [facility] admitted patient for acute GI bleed.

## 2024-01-09 ENCOUNTER — ANESTHESIA (OUTPATIENT)
Dept: ENDOSCOPY | Facility: HOSPITAL | Age: 49
End: 2024-01-09
Payer: MEDICAID

## 2024-01-09 ENCOUNTER — ANESTHESIA EVENT (OUTPATIENT)
Dept: ENDOSCOPY | Facility: HOSPITAL | Age: 49
End: 2024-01-09
Payer: MEDICAID

## 2024-01-09 PROBLEM — D62 ACUTE BLOOD LOSS ANEMIA: Status: ACTIVE | Noted: 2024-01-09

## 2024-01-09 LAB
ABO + RH BLD: NORMAL
ALBUMIN SERPL BCP-MCNC: 3.7 G/DL (ref 3.5–5.2)
ALP SERPL-CCNC: 66 U/L (ref 55–135)
ALT SERPL W/O P-5'-P-CCNC: 25 U/L (ref 10–44)
ANION GAP SERPL CALC-SCNC: 9 MMOL/L (ref 8–16)
AST SERPL-CCNC: 23 U/L (ref 10–40)
B-HCG UR QL: NEGATIVE
BASOPHILS # BLD AUTO: 0.03 K/UL (ref 0–0.2)
BASOPHILS NFR BLD: 0.6 % (ref 0–1.9)
BILIRUB SERPL-MCNC: 1 MG/DL (ref 0.1–1)
BLD GP AB SCN CELLS X3 SERPL QL: NORMAL
BUN SERPL-MCNC: 44 MG/DL (ref 6–20)
CALCIUM SERPL-MCNC: 9 MG/DL (ref 8.7–10.5)
CHLORIDE SERPL-SCNC: 105 MMOL/L (ref 95–110)
CO2 SERPL-SCNC: 24 MMOL/L (ref 23–29)
CREAT SERPL-MCNC: 1 MG/DL (ref 0.5–1.4)
CTP QC/QA: YES
DIFFERENTIAL METHOD BLD: ABNORMAL
EOSINOPHIL # BLD AUTO: 0.1 K/UL (ref 0–0.5)
EOSINOPHIL NFR BLD: 1 % (ref 0–8)
ERYTHROCYTE [DISTWIDTH] IN BLOOD BY AUTOMATED COUNT: 13.6 % (ref 11.5–14.5)
EST. GFR  (NO RACE VARIABLE): >60 ML/MIN/1.73 M^2
GLUCOSE SERPL-MCNC: 99 MG/DL (ref 70–110)
HCT VFR BLD AUTO: 25.7 % (ref 37–48.5)
HGB BLD-MCNC: 9 G/DL (ref 12–16)
IMM GRANULOCYTES # BLD AUTO: 0.01 K/UL (ref 0–0.04)
IMM GRANULOCYTES NFR BLD AUTO: 0.2 % (ref 0–0.5)
LYMPHOCYTES # BLD AUTO: 2.4 K/UL (ref 1–4.8)
LYMPHOCYTES NFR BLD: 49.7 % (ref 18–48)
MAGNESIUM SERPL-MCNC: 1.7 MG/DL (ref 1.6–2.6)
MCH RBC QN AUTO: 31.6 PG (ref 27–31)
MCHC RBC AUTO-ENTMCNC: 35 G/DL (ref 32–36)
MCV RBC AUTO: 90 FL (ref 82–98)
MONOCYTES # BLD AUTO: 0.4 K/UL (ref 0.3–1)
MONOCYTES NFR BLD: 7.3 % (ref 4–15)
NEUTROPHILS # BLD AUTO: 2 K/UL (ref 1.8–7.7)
NEUTROPHILS NFR BLD: 41.2 % (ref 38–73)
NRBC BLD-RTO: 0 /100 WBC
PHOSPHATE SERPL-MCNC: 3.4 MG/DL (ref 2.7–4.5)
PLATELET # BLD AUTO: 202 K/UL (ref 150–450)
PMV BLD AUTO: 10.7 FL (ref 9.2–12.9)
POCT GLUCOSE: 166 MG/DL (ref 70–110)
POCT GLUCOSE: 81 MG/DL (ref 70–110)
POCT GLUCOSE: 99 MG/DL (ref 70–110)
POTASSIUM SERPL-SCNC: 3.2 MMOL/L (ref 3.5–5.1)
PROT SERPL-MCNC: 6.4 G/DL (ref 6–8.4)
RBC # BLD AUTO: 2.85 M/UL (ref 4–5.4)
SODIUM SERPL-SCNC: 138 MMOL/L (ref 136–145)
SPECIMEN OUTDATE: NORMAL
WBC # BLD AUTO: 4.91 K/UL (ref 3.9–12.7)

## 2024-01-09 PROCEDURE — C9113 INJ PANTOPRAZOLE SODIUM, VIA: HCPCS

## 2024-01-09 PROCEDURE — 25000003 PHARM REV CODE 250: Performed by: STUDENT IN AN ORGANIZED HEALTH CARE EDUCATION/TRAINING PROGRAM

## 2024-01-09 PROCEDURE — 37000009 HC ANESTHESIA EA ADD 15 MINS: Performed by: INTERNAL MEDICINE

## 2024-01-09 PROCEDURE — 83735 ASSAY OF MAGNESIUM: CPT

## 2024-01-09 PROCEDURE — D9220A PRA ANESTHESIA: Mod: ANES,,, | Performed by: ANESTHESIOLOGY

## 2024-01-09 PROCEDURE — 43235 EGD DIAGNOSTIC BRUSH WASH: CPT | Mod: ,,, | Performed by: INTERNAL MEDICINE

## 2024-01-09 PROCEDURE — G0378 HOSPITAL OBSERVATION PER HR: HCPCS

## 2024-01-09 PROCEDURE — 85025 COMPLETE CBC W/AUTO DIFF WBC: CPT

## 2024-01-09 PROCEDURE — 96366 THER/PROPH/DIAG IV INF ADDON: CPT

## 2024-01-09 PROCEDURE — 25000003 PHARM REV CODE 250: Performed by: NURSE ANESTHETIST, CERTIFIED REGISTERED

## 2024-01-09 PROCEDURE — 86850 RBC ANTIBODY SCREEN: CPT | Performed by: STUDENT IN AN ORGANIZED HEALTH CARE EDUCATION/TRAINING PROGRAM

## 2024-01-09 PROCEDURE — 80053 COMPREHEN METABOLIC PANEL: CPT

## 2024-01-09 PROCEDURE — 63600175 PHARM REV CODE 636 W HCPCS: Performed by: NURSE ANESTHETIST, CERTIFIED REGISTERED

## 2024-01-09 PROCEDURE — 63600175 PHARM REV CODE 636 W HCPCS

## 2024-01-09 PROCEDURE — D9220A PRA ANESTHESIA: Mod: CRNA,,, | Performed by: NURSE ANESTHETIST, CERTIFIED REGISTERED

## 2024-01-09 PROCEDURE — 96376 TX/PRO/DX INJ SAME DRUG ADON: CPT

## 2024-01-09 PROCEDURE — 82962 GLUCOSE BLOOD TEST: CPT

## 2024-01-09 PROCEDURE — 25000003 PHARM REV CODE 250

## 2024-01-09 PROCEDURE — 63600175 PHARM REV CODE 636 W HCPCS: Performed by: STUDENT IN AN ORGANIZED HEALTH CARE EDUCATION/TRAINING PROGRAM

## 2024-01-09 PROCEDURE — 96365 THER/PROPH/DIAG IV INF INIT: CPT

## 2024-01-09 PROCEDURE — 96361 HYDRATE IV INFUSION ADD-ON: CPT | Mod: 59

## 2024-01-09 PROCEDURE — 84100 ASSAY OF PHOSPHORUS: CPT

## 2024-01-09 PROCEDURE — 96372 THER/PROPH/DIAG INJ SC/IM: CPT

## 2024-01-09 PROCEDURE — 96376 TX/PRO/DX INJ SAME DRUG ADON: CPT | Mod: 59

## 2024-01-09 PROCEDURE — 43235 EGD DIAGNOSTIC BRUSH WASH: CPT | Performed by: INTERNAL MEDICINE

## 2024-01-09 PROCEDURE — 37000008 HC ANESTHESIA 1ST 15 MINUTES: Performed by: INTERNAL MEDICINE

## 2024-01-09 RX ORDER — MAGNESIUM SULFATE HEPTAHYDRATE 40 MG/ML
2 INJECTION, SOLUTION INTRAVENOUS ONCE
Status: DISCONTINUED | OUTPATIENT
Start: 2024-01-09 | End: 2024-01-09

## 2024-01-09 RX ORDER — PROPOFOL 10 MG/ML
VIAL (ML) INTRAVENOUS CONTINUOUS PRN
Status: DISCONTINUED | OUTPATIENT
Start: 2024-01-09 | End: 2024-01-09

## 2024-01-09 RX ORDER — PHENYLEPHRINE HYDROCHLORIDE 10 MG/ML
INJECTION INTRAVENOUS
Status: DISCONTINUED | OUTPATIENT
Start: 2024-01-09 | End: 2024-01-09

## 2024-01-09 RX ORDER — POTASSIUM CHLORIDE 20 MEQ/1
40 TABLET, EXTENDED RELEASE ORAL EVERY 4 HOURS
Status: DISCONTINUED | OUTPATIENT
Start: 2024-01-09 | End: 2024-01-09

## 2024-01-09 RX ORDER — DEXMEDETOMIDINE HYDROCHLORIDE 100 UG/ML
INJECTION, SOLUTION INTRAVENOUS
Status: DISCONTINUED | OUTPATIENT
Start: 2024-01-09 | End: 2024-01-09

## 2024-01-09 RX ORDER — LIDOCAINE HYDROCHLORIDE 20 MG/ML
INJECTION, SOLUTION EPIDURAL; INFILTRATION; INTRACAUDAL; PERINEURAL
Status: DISCONTINUED | OUTPATIENT
Start: 2024-01-09 | End: 2024-01-09

## 2024-01-09 RX ORDER — INSULIN GLARGINE 100 [IU]/ML
INJECTION, SOLUTION SUBCUTANEOUS
COMMUNITY
Start: 2023-11-13 | End: 2024-01-09 | Stop reason: SDUPTHER

## 2024-01-09 RX ORDER — POTASSIUM CHLORIDE 20 MEQ/1
40 TABLET, EXTENDED RELEASE ORAL EVERY 4 HOURS
Status: COMPLETED | OUTPATIENT
Start: 2024-01-09 | End: 2024-01-09

## 2024-01-09 RX ORDER — MAGNESIUM SULFATE HEPTAHYDRATE 40 MG/ML
2 INJECTION, SOLUTION INTRAVENOUS ONCE
Status: COMPLETED | OUTPATIENT
Start: 2024-01-09 | End: 2024-01-09

## 2024-01-09 RX ORDER — PROPOFOL 10 MG/ML
VIAL (ML) INTRAVENOUS
Status: DISCONTINUED | OUTPATIENT
Start: 2024-01-09 | End: 2024-01-09

## 2024-01-09 RX ADMIN — DIPHENHYDRAMINE HYDROCHLORIDE 50 MG: 50 CAPSULE ORAL at 08:01

## 2024-01-09 RX ADMIN — PANTOPRAZOLE SODIUM 40 MG: 40 INJECTION, POWDER, FOR SOLUTION INTRAVENOUS at 08:01

## 2024-01-09 RX ADMIN — PANTOPRAZOLE SODIUM 40 MG: 40 INJECTION, POWDER, FOR SOLUTION INTRAVENOUS at 09:01

## 2024-01-09 RX ADMIN — LIDOCAINE HYDROCHLORIDE 80 MG: 20 INJECTION, SOLUTION EPIDURAL; INFILTRATION; INTRACAUDAL; PERINEURAL at 03:01

## 2024-01-09 RX ADMIN — ATORVASTATIN CALCIUM 80 MG: 40 TABLET, FILM COATED ORAL at 08:01

## 2024-01-09 RX ADMIN — DIPHENHYDRAMINE HYDROCHLORIDE 50 MG: 50 CAPSULE ORAL at 12:01

## 2024-01-09 RX ADMIN — DEXMEDETOMIDINE HYDROCHLORIDE 12 MCG: 100 INJECTION, SOLUTION, CONCENTRATE INTRAVENOUS at 03:01

## 2024-01-09 RX ADMIN — TOPICAL ANESTHETIC 1 EACH: 200 SPRAY DENTAL; PERIODONTAL at 03:01

## 2024-01-09 RX ADMIN — SODIUM CHLORIDE, POTASSIUM CHLORIDE, SODIUM LACTATE AND CALCIUM CHLORIDE 500 ML: 600; 310; 30; 20 INJECTION, SOLUTION INTRAVENOUS at 01:01

## 2024-01-09 RX ADMIN — PHENYLEPHRINE HYDROCHLORIDE 200 MCG: 10 INJECTION INTRAVENOUS at 03:01

## 2024-01-09 RX ADMIN — PROPOFOL 150 MCG/KG/MIN: 10 INJECTION, EMULSION INTRAVENOUS at 03:01

## 2024-01-09 RX ADMIN — Medication 30 MG: at 03:01

## 2024-01-09 RX ADMIN — MAGNESIUM SULFATE HEPTAHYDRATE 2 G: 40 INJECTION, SOLUTION INTRAVENOUS at 04:01

## 2024-01-09 RX ADMIN — GLYCOPYRROLATE 0.2 MG: 0.2 INJECTION, SOLUTION INTRAMUSCULAR; INTRAVITREAL at 03:01

## 2024-01-09 RX ADMIN — SODIUM CHLORIDE: 0.9 INJECTION, SOLUTION INTRAVENOUS at 03:01

## 2024-01-09 RX ADMIN — POTASSIUM CHLORIDE 40 MEQ: 1500 TABLET, EXTENDED RELEASE ORAL at 04:01

## 2024-01-09 RX ADMIN — INSULIN ASPART 1 UNITS: 100 INJECTION, SOLUTION INTRAVENOUS; SUBCUTANEOUS at 09:01

## 2024-01-09 RX ADMIN — Medication 70 MG: at 03:01

## 2024-01-09 RX ADMIN — POTASSIUM CHLORIDE 40 MEQ: 1500 TABLET, EXTENDED RELEASE ORAL at 08:01

## 2024-01-09 NOTE — ASSESSMENT & PLAN NOTE
Patient with history of melena, associated with fatigue, dizziness and nausea. Patient arrived with Hgb of 10.7 with a baseline of 14.      Plan:  Consult GI, appreciate recs  EGD on 1/9/24  2 large bore IVs  Loading dose protonix and 40 IV protonix BID  Hgb goals > 8 in history of CAD  F/u ferritin, TIBC, iron studies

## 2024-01-09 NOTE — ASSESSMENT & PLAN NOTE
Patient's FSGs are controlled on current medication regimen.  Last A1c reviewed-   Lab Results   Component Value Date    HGBA1C 6.5 (H) 01/04/2024     Most recent fingerstick glucose reviewed-   Recent Labs   Lab 01/08/24  1608 01/09/24  0930   POCTGLUCOSE 77 99     Current correctional scale  Medium  Maintain anti-hyperglycemic dose as follows-   Antihyperglycemics (From admission, onward)      Start     Stop Route Frequency Ordered    01/09/24 2100  insulin detemir U-100 (Levemir) pen 3 Units         -- SubQ Nightly 01/09/24 1212    01/08/24 1630  insulin aspart U-100 pen 1-10 Units         -- SubQ Before meals & nightly PRN 01/08/24 1532          Hold Oral hypoglycemics while patient is in the hospital.    Plan:  Levemir decreased to 3u nightly due to glucose this morning of 99

## 2024-01-09 NOTE — ED NOTES
Report received from SINA Alvarado  Assumed care of pt  Assisted pt with bed pan   Self perineal care provided   Pt updated on plan of care  Side rail up x 1 and call light within reach

## 2024-01-09 NOTE — ASSESSMENT & PLAN NOTE
Patient with history of melena, associated with fatigue, dizziness and nausea. Patient arrived with Hgb of 10.7 with a baseline of 14.      Plan:  Consult GI, appreciate recs  2 large bore IVs  Loading dose protonix and 40 IV protonix BID  Hgb goals > 7  F/u night H/H  F/u ferritin, TIBC, iron studies

## 2024-01-09 NOTE — SUBJECTIVE & OBJECTIVE
Interval History: Patient had no acute events overnight, she denies any nausea or vomiting and has not passed a dark tarry stool but states that she wiped and it was dark. She is still feeling a bit weak and light headed. She denies chest pain, fevers, chills, SOB.     Review of Systems   Constitutional:  Negative for chills and fever.   Respiratory:  Negative for shortness of breath.    Cardiovascular:  Negative for chest pain.   Gastrointestinal:  Negative for abdominal distention, abdominal pain, blood in stool, diarrhea, nausea and vomiting.   Genitourinary:  Negative for dysuria and flank pain.   Skin:  Negative for pallor and rash.   Neurological:  Positive for dizziness and light-headedness. Negative for headaches.   Psychiatric/Behavioral:  Negative for confusion.      Objective:     Vital Signs (Most Recent):  Temp: 97.6 °F (36.4 °C) (01/09/24 0923)  Pulse: 83 (01/09/24 0811)  Resp: 17 (01/09/24 0810)  BP: 104/69 (01/09/24 0802)  SpO2: 95 % (01/09/24 0811) Vital Signs (24h Range):  Temp:  [97.6 °F (36.4 °C)] 97.6 °F (36.4 °C)  Pulse:  [74-87] 83  Resp:  [14-20] 17  SpO2:  [95 %-100 %] 95 %  BP: ()/(54-78) 104/69        There is no height or weight on file to calculate BMI.    Intake/Output Summary (Last 24 hours) at 1/9/2024 1124  Last data filed at 1/9/2024 0830  Gross per 24 hour   Intake 1039.56 ml   Output --   Net 1039.56 ml         Physical Exam  Vitals and nursing note reviewed.   Constitutional:       Appearance: Normal appearance.   HENT:      Head: Normocephalic and atraumatic.      Nose: Nose normal.      Mouth/Throat:      Mouth: Mucous membranes are moist.   Eyes:      General: No scleral icterus.     Extraocular Movements: Extraocular movements intact.      Pupils: Pupils are equal, round, and reactive to light.      Comments: Pale conjunctiva   Cardiovascular:      Rate and Rhythm: Normal rate and regular rhythm.      Pulses: Normal pulses.      Heart sounds: Normal heart sounds.    Pulmonary:      Effort: Pulmonary effort is normal.      Breath sounds: Normal breath sounds.   Abdominal:      General: Abdomen is flat. There is no distension.      Tenderness: There is no abdominal tenderness.   Musculoskeletal:      Cervical back: Normal range of motion.      Right lower leg: No edema.      Left lower leg: No edema.   Skin:     General: Skin is warm.      Coloration: Skin is not pale.   Neurological:      General: No focal deficit present.      Mental Status: She is alert and oriented to person, place, and time.             Significant Labs: All pertinent labs within the past 24 hours have been reviewed.  CBC:   Recent Labs   Lab 01/08/24  1053 01/08/24  2035 01/09/24  0638   WBC 10.76  --  4.91   HGB 10.7* 8.7* 9.0*   HCT 30.6* 24.8* 25.7*     --  202     CMP:   Recent Labs   Lab 01/08/24  1053 01/09/24  0638   * 138   K 4.0 3.2*    105   CO2 20* 24    99   BUN 70* 44*   CREATININE 1.3 1.0   CALCIUM 10.0 9.0   PROT 8.0 6.4   ALBUMIN 4.2 3.7   BILITOT 0.8 1.0   ALKPHOS 73 66   AST 32 23   ALT 26 25   ANIONGAP 14 9       Significant Imaging: I have reviewed all pertinent imaging results/findings within the past 24 hours.

## 2024-01-09 NOTE — ASSESSMENT & PLAN NOTE
History of multiple CVAs, currently on ASA, plavix, and atorvastatin    Plan:  Hold ASA/plavix in setting of acute GIB

## 2024-01-09 NOTE — TRANSFER OF CARE
Anesthesia Transfer of Care Note    Patient: Josefina Martin    Procedure(s) Performed: Procedure(s) (LRB):  EGD (ESOPHAGOGASTRODUODENOSCOPY) (N/A)    Patient location: GI    Anesthesia Type: general    Transport from OR: Transported from OR on room air with adequate spontaneous ventilation    Post pain: adequate analgesia    Post assessment: no apparent anesthetic complications and tolerated procedure well    Post vital signs: stable    Level of consciousness: awake    Nausea/Vomiting: no nausea/vomiting    Complications: none    Transfer of care protocol was followed      Last vitals: Visit Vitals  /73 (Patient Position: Lying)   Pulse 81   Temp 36.7 °C (98.1 °F)   Resp 16   Wt 78.5 kg (173 lb)   SpO2 97%   BMI 25.55 kg/m²

## 2024-01-09 NOTE — PLAN OF CARE
VN cued into pt's room for introduction with pt's permission.  VN role explained and informed pt that VN would be working with bedside nurse and the rest of the care team.  Fall risk and bed alarm protocol education provided.  Instructed pt to call for assistance and agreeable.  Allowed time for questions. NAD noted.  Will cont to be available as needed.      01/09/24 1721   Admission   Initial VN Admission Questions Complete   Communication Issues? None   Shift   Virtual Nurse - Patient Verbalized Approval Of Camera Use;VN Rounding   Safety/Activity   Patient Rounds call light in patient/parent reach;visualized patient;clutter free environment maintained   Safety Promotion/Fall Prevention Fall Risk reviewed with patient/family;instructed to call staff for mobility   Pain/Comfort/Sleep   Preferred Pain Scale number (Numeric Rating Pain Scale)   Pain Rating (0-10): Rest 0

## 2024-01-09 NOTE — PHARMACY MED REC
"Admission Medication History     The home medication history was taken by Diana Eckert CPhT.    Medication history obtained from, Patient Verified    You may go to "Admission" then "Reconcile Home Medications" tabs to review and/or act upon these items.     The home medication list has been updated by the Pharmacy department.   Please read ALL comments highlighted in yellow.   Please address this information as you see fit.    Feel free to contact us if you have any questions or require assistance.      The medications listed below were removed from the home medication list.  Please reorder if appropriate:  Patient reports no longer taking the following medication(s):  Ketoconazole 2% cream  Lidoderm 5% patch  Loratadine 10 mg  Nystatin powder   Terconazole 0.4% vaginal cream  Triamcinolone 0.1% ointment      Diana Eckert CPhT.  Ext [phone]               .          "

## 2024-01-09 NOTE — SUBJECTIVE & OBJECTIVE
Past Medical History:   Diagnosis Date    Cerebrovascular accident (CVA) 3/24/2017    CHF (congestive heart failure)     Diabetes mellitus     Hypertension     MI (myocardial infarction)     Stroke        Past Surgical History:   Procedure Laterality Date    CHOLECYSTECTOMY  2013    history of cholelithiasis    ESOPHAGOGASTRODUODENOSCOPY N/A 10/21/2020    Procedure: EGD (ESOPHAGOGASTRODUODENOSCOPY);  Surgeon: Asha Blackwell MD;  Location: [facility];  Service: Endoscopy;  Laterality: N/A;    ESOPHAGOGASTRODUODENOSCOPY N/A 6/15/2021    Procedure: EGD (ESOPHAGOGASTRODUODENOSCOPY);  Surgeon: Asha Blackwell MD;  Location: [facility];  Service: Endoscopy;  Laterality: N/A;    TUBAL LIGATION         Review of patient's allergies indicates:  No Known Allergies    No current facility-administered medications on file prior to encounter.     Current Outpatient Medications on File Prior to Encounter   Medication Sig    aspirin (ECOTRIN) 81 MG EC tablet Take 1 tablet (81 mg total) by mouth once daily.    atorvastatin (LIPITOR) 80 MG tablet Take 1 tablet (80 mg total) by mouth every evening.    blood sugar diagnostic Strp To check Blood glucose two times daily    blood-glucose meter Misc Use to check blood glucose    chlorthalidone (HYGROTEN) 25 MG Tab Take 1 tablet (25 mg total) by mouth once daily.    clopidogreL (PLAVIX) 75 mg tablet Take 1 tablet (75 mg total) by mouth once daily.    insulin (LANTUS SOLOSTAR U-100 INSULIN) glargine 100 units/mL SubQ pen Inject 12 Units into the skin once daily.    ketoconazole (NIZORAL) 2 % cream Apply topically once daily. (Patient not taking: Reported on 1/4/2024)    lancets 30 gauge Misc Use to test twice daily    LIDOcaine (LIDODERM) 5 % Place 2 patches onto the skin once daily. Remove & Discard patch within 12 hours or as directed by MD. Apply to affected area.    linaCLOtide (LINZESS) 72 mcg Cap capsule Take 1 capsule (72 mcg total) by mouth before breakfast.    loratadine  "(CLARITIN) 10 mg tablet Take 10 mg by mouth.    metFORMIN (GLUCOPHAGE) 500 MG tablet Take 1 tablet (500 mg total) by mouth 2 (two) times daily with meals.    methocarbamoL (ROBAXIN) 500 MG Tab Take 1 tablet (500 mg total) by mouth 4 (four) times daily. for 10 days    MIRENA 20 mcg/24 hr (5 years) IUD     NIFEdipine (PROCARDIA-XL) 90 MG (OSM) 24 hr tablet Take 1 tablet (90 mg total) by mouth once daily.    nystatin (MYCOSTATIN) powder Apply topically 2 (two) times daily. (Patient not taking: Reported on 10/19/2023)    olmesartan (BENICAR) 40 MG tablet Take 1 tablet (40 mg total) by mouth once daily.    pen needle, diabetic (BD ULTRA-FINE SHORT PEN NEEDLE) 31 gauge x 5/16" Ndle 1 Needle by Misc.(Non-Drug; Combo Route) route once daily.    semaglutide (OZEMPIC) 1 mg/dose (4 mg/3 mL) Inject 1 mg into the skin every 7 days.    terconazole (TERAZOL 7) 0.4 % Crea Place 1 applicator vaginally every evening. (Patient not taking: Reported on 10/19/2023)    triamcinolone acetonide 0.1% (KENALOG) 0.1 % ointment Apply topically 2 (two) times daily. (Patient not taking: Reported on 10/19/2023)     Family History       Problem Relation (Age of Onset)    Anuerysm Maternal Grandmother    Breast cancer Maternal Grandmother, Maternal Aunt, Maternal Aunt    Diabetes Son (14)    Hypertension Mother    Lung cancer Mother    No Known Problems Father, Sister, Daughter, Sister, Daughter, Son    Stroke Maternal Uncle (48)          Tobacco Use    Smoking status: Former     Current packs/day: 0.15     Average packs/day: 0.2 packs/day for 18.0 years (2.7 ttl pk-yrs)     Types: Cigarettes    Smokeless tobacco: Never    Tobacco comments:     1 pack/week   Substance and Sexual Activity    Alcohol use: Yes     Comment: social 1/month    Drug use: No    Sexual activity: Yes     Partners: Male     Birth control/protection: None, Surgical     Review of Systems   Constitutional:  Negative for chills and fever.   Respiratory:  Negative for shortness of " breath.    Cardiovascular:  Negative for chest pain.   Gastrointestinal:  Positive for diarrhea (dark colored), nausea and vomiting. Negative for abdominal distention, abdominal pain and blood in stool.   Genitourinary:  Negative for dysuria and flank pain.   Skin:  Negative for pallor and rash.   Neurological:  Positive for dizziness and light-headedness. Negative for headaches.   Psychiatric/Behavioral:  Negative for confusion.      Objective:     Vital Signs (Most Recent):  Temp: 97.5 °F (36.4 °C) (01/08/24 0845)  Pulse: 87 (01/08/24 2103)  Resp: 19 (01/08/24 2103)  BP: 102/61 (01/08/24 2103)  SpO2: 98 % (01/08/24 2103) Vital Signs (24h Range):  Temp:  [97.5 °F (36.4 °C)] 97.5 °F (36.4 °C)  Pulse:  [] 87  Resp:  [15-20] 19  SpO2:  [96 %-100 %] 98 %  BP: ()/(54-78) 102/61        There is no height or weight on file to calculate BMI.     Physical Exam  Vitals and nursing note reviewed.   Constitutional:       Appearance: Normal appearance.   HENT:      Head: Normocephalic and atraumatic.      Nose: Nose normal.      Mouth/Throat:      Mouth: Mucous membranes are moist.   Eyes:      General: No scleral icterus.     Extraocular Movements: Extraocular movements intact.      Pupils: Pupils are equal, round, and reactive to light.      Comments: Pale conjunctiva   Cardiovascular:      Rate and Rhythm: Normal rate and regular rhythm.      Pulses: Normal pulses.      Heart sounds: Normal heart sounds.   Pulmonary:      Effort: Pulmonary effort is normal.      Breath sounds: Normal breath sounds.   Abdominal:      General: Abdomen is flat. There is no distension.      Tenderness: There is no abdominal tenderness.   Musculoskeletal:      Cervical back: Normal range of motion.      Right lower leg: No edema.      Left lower leg: No edema.   Skin:     General: Skin is warm.      Coloration: Skin is not pale.   Neurological:      General: No focal deficit present.      Mental Status: She is alert and oriented to  person, place, and time.              CRANIAL NERVES     CN III, IV, VI   Pupils are equal, round, and reactive to light.       Significant Labs: All pertinent labs within the past 24 hours have been reviewed.  CBC:   Recent Labs   Lab 01/08/24  1053 01/08/24  2035   WBC 10.76  --    HGB 10.7* 8.7*   HCT 30.6* 24.8*     --      CMP:   Recent Labs   Lab 01/08/24  1053   *   K 4.0      CO2 20*      BUN 70*   CREATININE 1.3   CALCIUM 10.0   PROT 8.0   ALBUMIN 4.2   BILITOT 0.8   ALKPHOS 73   AST 32   ALT 26   ANIONGAP 14       Significant Imaging: I have reviewed all pertinent imaging results/findings within the past 24 hours.  EKG: I have reviewed all pertinent results/findings within the past 24 hours and my personal findings are: normal sinus rhythm

## 2024-01-09 NOTE — NURSING
Arrived on the unit AAOX4, spouse @ bedside, plan of care explained, safety measures in place, call bell in reach, instructed to call for assistance, verbalized understanding requesting to eat, Dr. Desai informed of the same via ssm. Vital signs per flow sheet.

## 2024-01-09 NOTE — ASSESSMENT & PLAN NOTE
Patient with known CAD s/p stent placement, which is controlled Will continue ASA, Plavix, and Statin and monitor for S/Sx of angina/ACS. Continue to monitor on telemetry.     Plan:  Holding ASA., plavix and restart after stable GI bleed  Continue home statin

## 2024-01-09 NOTE — PROGRESS NOTES
Family Medicine  Progress Note    Patient Name: Josefina Martin  MRN: [id number]  Patient Class: OP- Observation   Admission Date: 1/8/2024  Length of Stay: 0 days  Attending Physician: Torie Main MD  Primary Care Provider: Margarito Ennis DO        Subjective:     Principal Problem:GI bleed        HPI:  Patient is a 48 y.o.-year-old female w/ PMHx multiple CVA, HTN, HLD, DM (A1c 6.5 on 1/4/24), MI/CAD s/p multiple stents, CHF (55%EF grade 1 diastolic dysfunction 8/17/21) who presents to the ED with reports of 1 day of black diarrhea with a total of 5 episodes of diarrhea. Her diarrhea is associated with nausea and dizziness. She also endorsed decreased blood pressure at home. She denies any fevers, chest pain, shortness of breath.      In the ED, initial vitals Temp 97.5F, BP 96/57, , RR 20, SpO2 99% on room air. CBC significant for H/H of 10.7/30.6 (baseline 14/41), CMP significant for BUN of 70. And CrCl cannot be calculated (Unknown ideal weight.). troponin negative.   EKG showed normal sinus rhythm.   In the ED patient was treated with zofran, IV protonix, and 1L NS bolus. [facility] admitted patient for acute GI bleed.       Interval History: Patient had no acute events overnight, she denies any nausea or vomiting and has not passed a dark tarry stool but states that she wiped and it was dark. She is still feeling a bit weak and light headed. She denies chest pain, fevers, chills, SOB.     Review of Systems   Constitutional:  Negative for chills and fever.   Respiratory:  Negative for shortness of breath.    Cardiovascular:  Negative for chest pain.   Gastrointestinal:  Negative for abdominal distention, abdominal pain, blood in stool, diarrhea, nausea and vomiting.   Genitourinary:  Negative for dysuria and flank pain.   Skin:  Negative for pallor and rash.   Neurological:  Positive for dizziness and light-headedness. Negative for headaches.   Psychiatric/Behavioral:  Negative for  confusion.      Objective:     Vital Signs (Most Recent):  Temp: 97.6 °F (36.4 °C) (01/09/24 0923)  Pulse: 83 (01/09/24 0811)  Resp: 17 (01/09/24 0810)  BP: 104/69 (01/09/24 0802)  SpO2: 95 % (01/09/24 0811) Vital Signs (24h Range):  Temp:  [97.6 °F (36.4 °C)] 97.6 °F (36.4 °C)  Pulse:  [74-87] 83  Resp:  [14-20] 17  SpO2:  [95 %-100 %] 95 %  BP: ()/(54-78) 104/69        There is no height or weight on file to calculate BMI.    Intake/Output Summary (Last 24 hours) at 1/9/2024 1124  Last data filed at 1/9/2024 0830  Gross per 24 hour   Intake 1039.56 ml   Output --   Net 1039.56 ml         Physical Exam  Vitals and nursing note reviewed.   Constitutional:       Appearance: Normal appearance.   HENT:      Head: Normocephalic and atraumatic.      Nose: Nose normal.      Mouth/Throat:      Mouth: Mucous membranes are moist.   Eyes:      General: No scleral icterus.     Extraocular Movements: Extraocular movements intact.      Pupils: Pupils are equal, round, and reactive to light.      Comments: Pale conjunctiva   Cardiovascular:      Rate and Rhythm: Normal rate and regular rhythm.      Pulses: Normal pulses.      Heart sounds: Normal heart sounds.   Pulmonary:      Effort: Pulmonary effort is normal.      Breath sounds: Normal breath sounds.   Abdominal:      General: Abdomen is flat. There is no distension.      Tenderness: There is no abdominal tenderness.   Musculoskeletal:      Cervical back: Normal range of motion.      Right lower leg: No edema.      Left lower leg: No edema.   Skin:     General: Skin is warm.      Coloration: Skin is not pale.   Neurological:      General: No focal deficit present.      Mental Status: She is alert and oriented to person, place, and time.             Significant Labs: All pertinent labs within the past 24 hours have been reviewed.  CBC:   Recent Labs   Lab 01/08/24  1053 01/08/24  2035 01/09/24  0638   WBC 10.76  --  4.91   HGB 10.7* 8.7* 9.0*   HCT 30.6* 24.8* 25.7*      --  202     CMP:   Recent Labs   Lab 01/08/24  1053 01/09/24  0638   * 138   K 4.0 3.2*    105   CO2 20* 24    99   BUN 70* 44*   CREATININE 1.3 1.0   CALCIUM 10.0 9.0   PROT 8.0 6.4   ALBUMIN 4.2 3.7   BILITOT 0.8 1.0   ALKPHOS 73 66   AST 32 23   ALT 26 25   ANIONGAP 14 9       Significant Imaging: I have reviewed all pertinent imaging results/findings within the past 24 hours.    Assessment/Plan:      * GI bleed  Patient with history of melena, associated with fatigue, dizziness and nausea. Patient arrived with Hgb of 10.7 with a baseline of 14.      Plan:  Consult GI, appreciate recs  EGD on 1/9/24  2 large bore IVs  Loading dose protonix and 40 IV protonix BID  Hgb goals > 8 in history of CAD  F/u ferritin, TIBC, iron studies      History of CVA (cerebrovascular accident)  History of multiple CVAs, currently on ASA, plavix, and atorvastatin    Plan:  Hold ASA/plavix in setting of acute GIB    Diabetes mellitus  Patient's FSGs are controlled on current medication regimen.  Last A1c reviewed-   Lab Results   Component Value Date    HGBA1C 6.5 (H) 01/04/2024     Most recent fingerstick glucose reviewed-   Recent Labs   Lab 01/08/24  1608 01/09/24  0930   POCTGLUCOSE 77 99     Current correctional scale  Medium  Maintain anti-hyperglycemic dose as follows-   Antihyperglycemics (From admission, onward)      Start     Stop Route Frequency Ordered    01/09/24 2100  insulin detemir U-100 (Levemir) pen 3 Units         -- SubQ Nightly 01/09/24 1212    01/08/24 1630  insulin aspart U-100 pen 1-10 Units         -- SubQ Before meals & nightly PRN 01/08/24 1532          Hold Oral hypoglycemics while patient is in the hospital.    Plan:  Levemir decreased to 3u nightly due to glucose this morning of 99    Coronary artery disease due to lipid rich plaque  Patient with known CAD s/p stent placement, which is controlled Will continue ASA, Plavix, and Statin and monitor for S/Sx of angina/ACS.  Continue to monitor on telemetry.     Plan:  Holding ASA., plavix and restart after stable GI bleed  Continue home statin     Chronic diastolic heart failure        Mixed hyperlipidemia  Continue home statin      Essential hypertension  Chronic, controlled. Latest blood pressure and vitals reviewed-     Temp:  [97.6 °F (36.4 °C)]   Pulse:  [74-87]   Resp:  [14-20]   BP: ()/(54-78)   SpO2:  [95 %-100 %] .   Home meds for hypertension were reviewed and noted below.   Hypertension Medications               chlorthalidone (HYGROTEN) 25 MG Tab Take 1 tablet (25 mg total) by mouth once daily.    NIFEdipine (PROCARDIA-XL) 90 MG (OSM) 24 hr tablet Take 1 tablet (90 mg total) by mouth once daily.    olmesartan (BENICAR) 40 MG tablet Take 1 tablet (40 mg total) by mouth once daily.            While in the hospital, will manage blood pressure as follows; Continue home antihypertensive regimen as needed. Arrived in ED hypotensive, will hold hypertension meds in hypotension and add PRN    Will utilize p.r.n. blood pressure medication only if patient's blood pressure greater than 180/110 and she develops symptoms such as worsening chest pain or shortness of breath.      VTE Risk Mitigation (From admission, onward)           Ordered     IP VTE HIGH RISK PATIENT  Once         01/08/24 1532     Place sequential compression device  Until discontinued         01/08/24 1532     Reason for No Pharmacological VTE Prophylaxis  Once        Question:  Reasons:  Answer:  Physician Provided (leave comment)  Comment:  possible procedure, GI bleed    01/08/24 1532                    Discharge Planning   BARRY:      Code Status: Full Code   Is the patient medically ready for discharge?:     Reason for patient still in hospital (select all that apply): Patient trending condition  Discharge Plan A: Home with family          Reuben Esposito MD  [facility], PGY-1  01/09/2024

## 2024-01-09 NOTE — ASSESSMENT & PLAN NOTE
" Loss Discharge Instructions   We recommend you see your provider to check on your physical and emotional recovery, and to walk through your experience within 1-2 weeks.  Any further recommendations for follow up will be discussed with your provider at that time.       The Blues and Grief  After delivery you will experience hormone changes and strong emotions, often referred to as the \"baby blues\".  You may find yourself easily upset, tearful or angry.  In addition, you will be grieving for your own loss and may feel terribly tired and not want to face friends, family or new babies.    Your feelings will be hard to predict during this time.  You may have many ups and downs.  Be kind and patient with yourself.    No two people grieve the same way.  This is true of spouses and partners.  Try to have open communication, but don't depend solely on each other for support.  Reach out to friends, family, clergy and your health care providers.  You don't have to handle this alone.    Normal grief after a pregnancy or  loss often lasts for weeks or months.  Over time, you will have more good days than bad ones.  The first year is usually the hardest as you face significant dates and events for the first time.    At first, your sadness or anxiety may keep you from sleeping, eating, being with others or getting out of the house.  If, after a week, you aren't able to take care of basic daily tasks, please call your care provider right away.  It could be more serious than the blues or grief.  Going back to work:  Even though you did not bring home a living baby, you and your partner have a right to any family and bereavement leave benefits your employer offers.  When you do return to work, be prepared for some adjustment time.    Call your health care provider if you have any of these symptoms:  You soak a sanitary pad with blood within 1 hour, or you see blood clots larger than a golf ball.  Bleeding that lasts " Continue home statin     more than 6 weeks.  You have vaginal discharge that smells bad.   A fever above 100.4 F (38 C), with or without chills  Severe, pain, cramping or tenderness in your lower belly area.  If you have pain that increases or does not go away from an episiotomy or perineal tear  Increased pain, swelling, redness or fluid around your stitches.  A need to urinate more frequently (use the toilet more often), more urgently (use the toilet very quickly), or it burns when you urinate.  Redness, swelling or pain around a vein in your leg.  Problems with coping with sadness, anxiety, or depression.  Your breasts are engorged (hard and swollen), red and very tender and you have a fever.   If you have nausea and vomiting.  If you have chest pain and cough or are gasping for air.  You have questions or concerns after you return home.    Keep your hands clean:  Always wash your hands before touching your perineal area and stitches.  This helps reduce your risk of infection.  If your hands aren't dirty, you may use an alcohol hand-rub to clean your hands. Keep your nails clean and short.

## 2024-01-09 NOTE — ASSESSMENT & PLAN NOTE
Chronic, controlled. Latest blood pressure and vitals reviewed-     Temp:  [97.6 °F (36.4 °C)]   Pulse:  [74-87]   Resp:  [14-20]   BP: ()/(54-78)   SpO2:  [95 %-100 %] .   Home meds for hypertension were reviewed and noted below.   Hypertension Medications               chlorthalidone (HYGROTEN) 25 MG Tab Take 1 tablet (25 mg total) by mouth once daily.    NIFEdipine (PROCARDIA-XL) 90 MG (OSM) 24 hr tablet Take 1 tablet (90 mg total) by mouth once daily.    olmesartan (BENICAR) 40 MG tablet Take 1 tablet (40 mg total) by mouth once daily.            While in the hospital, will manage blood pressure as follows; Continue home antihypertensive regimen as needed. Arrived in ED hypotensive, will hold hypertension meds in hypotension and add PRN    Will utilize p.r.n. blood pressure medication only if patient's blood pressure greater than 180/110 and she develops symptoms such as worsening chest pain or shortness of breath.

## 2024-01-09 NOTE — ANESTHESIA PREPROCEDURE EVALUATION
01/09/2024  Josefina Martin is a 48 y.o., female here for egd d/t concern for GIB       Pre-op Assessment    I have reviewed the Patient Summary Reports.    I have reviewed the NPO Status.   I have reviewed the Medications.     Review of Systems  Anesthesia Hx:  No problems with previous Anesthesia             Denies Family Hx of Anesthesia complications.    Denies Personal Hx of Anesthesia complications.                    Social:  Former Smoker       Hematology/Oncology:       -- Anemia:                                  Cardiovascular:     Hypertension  Past MI CAD   CABG/stent    CHF    hyperlipidemia    Last stress echo showed normal ef                         Hepatic/GI:        Concern for GIB. No GERD.          Neurological:   CVA        CVA <1 year ago. No residual deficits per patient. States it was caused by hypertension.                             Endocrine:  Diabetes, well controlled, using insulin               Physical Exam  General: Well nourished, Cooperative, Alert and Oriented    Airway:  Mallampati: II   Mouth Opening: Normal  TM Distance: Normal  Tongue: Normal  Neck ROM: Normal ROM    Dental:        Anesthesia Plan  Type of Anesthesia, risks & benefits discussed:    Anesthesia Type: Gen Natural Airway  Intra-op Monitoring Plan: Standard ASA Monitors  Post Op Pain Control Plan: multimodal analgesia  Induction:  IV  Informed Consent: Informed consent signed with the Patient and all parties understand the risks and agree with anesthesia plan.  All questions answered.   ASA Score: 3  Anesthesia Plan Notes: Although patient last took ozempic yesterday, patient has no other risk factors for aspiration so I believe it is safe to proceed with MAC anesthesia. We will have ETT available if need be     Ready For Surgery From Anesthesia Perspective.     .

## 2024-01-09 NOTE — PLAN OF CARE
Reported off to [email or web address].  Patient is AAOX4,  denies any present discomforts.  V/s 98.2  87 16 106/63  98%RA.  Patient will be transported to room 427  via stretcher.

## 2024-01-09 NOTE — H&P
Family Medicine  History & Physical    Patient Name: Josefina Martin  MRN: [id number]  Patient Class: OP- Observation  Admission Date: 1/8/2024  Attending Physician: Nagi Desai MD   Primary Care Provider: Margarito Ennis DO         Patient information was obtained from patient and ER records.     Subjective:     Principal Problem:GI bleed    Chief Complaint:   Chief Complaint   Patient presents with    Extremity Weakness     Weakness to the right leg and arm that started 0500. Diarrhea and feeling hot since the same time. Hypotension noted while at work.     GI Problem     Black stool since yesterday.        HPI: Patient is a 48 y.o.-year-old female w/ PMHx multiple CVA, HTN, HLD, DM (A1c 6.5 on 1/4/24), MI/CAD s/p multiple stents, CHF (55%EF grade 1 diastolic dysfunction 8/17/21) who presents to the ED with reports of 1 day of black diarrhea with a total of 5 episodes of diarrhea. Her diarrhea is associated with nausea and dizziness. She also endorsed decreased blood pressure at home. She denies any fevers, chest pain, shortness of breath.      In the ED, initial vitals Temp 97.5F, BP 96/57, , RR 20, SpO2 99% on room air. CBC significant for H/H of 10.7/30.6 (baseline 14/41), CMP significant for BUN of 70. And CrCl cannot be calculated (Unknown ideal weight.). troponin negative.   EKG showed normal sinus rhythm.   In the ED patient was treated with zofran, IV protonix, and 1L NS bolus. [facility] admitted patient for acute GI bleed.       Past Medical History:   Diagnosis Date    Cerebrovascular accident (CVA) 3/24/2017    CHF (congestive heart failure)     Diabetes mellitus     Hypertension     MI (myocardial infarction)     Stroke        Past Surgical History:   Procedure Laterality Date    CHOLECYSTECTOMY  2013    history of cholelithiasis    ESOPHAGOGASTRODUODENOSCOPY N/A 10/21/2020    Procedure: EGD (ESOPHAGOGASTRODUODENOSCOPY);  Surgeon: Asha Blackwell MD;  Location: [facility]  ENDO;  Service: Endoscopy;  Laterality: N/A;    ESOPHAGOGASTRODUODENOSCOPY N/A 6/15/2021    Procedure: EGD (ESOPHAGOGASTRODUODENOSCOPY);  Surgeon: Asha Blackwell MD;  Location: [facility] ENDO;  Service: Endoscopy;  Laterality: N/A;    TUBAL LIGATION         Review of patient's allergies indicates:  No Known Allergies    No current facility-administered medications on file prior to encounter.     Current Outpatient Medications on File Prior to Encounter   Medication Sig    aspirin (ECOTRIN) 81 MG EC tablet Take 1 tablet (81 mg total) by mouth once daily.    atorvastatin (LIPITOR) 80 MG tablet Take 1 tablet (80 mg total) by mouth every evening.    blood sugar diagnostic Strp To check Blood glucose two times daily    blood-glucose meter Misc Use to check blood glucose    chlorthalidone (HYGROTEN) 25 MG Tab Take 1 tablet (25 mg total) by mouth once daily.    clopidogreL (PLAVIX) 75 mg tablet Take 1 tablet (75 mg total) by mouth once daily.    insulin (LANTUS SOLOSTAR U-100 INSULIN) glargine 100 units/mL SubQ pen Inject 12 Units into the skin once daily.    ketoconazole (NIZORAL) 2 % cream Apply topically once daily. (Patient not taking: Reported on 1/4/2024)    lancets 30 gauge Misc Use to test twice daily    LIDOcaine (LIDODERM) 5 % Place 2 patches onto the skin once daily. Remove & Discard patch within 12 hours or as directed by MD. Apply to affected area.    linaCLOtide (LINZESS) 72 mcg Cap capsule Take 1 capsule (72 mcg total) by mouth before breakfast.    loratadine (CLARITIN) 10 mg tablet Take 10 mg by mouth.    metFORMIN (GLUCOPHAGE) 500 MG tablet Take 1 tablet (500 mg total) by mouth 2 (two) times daily with meals.    methocarbamoL (ROBAXIN) 500 MG Tab Take 1 tablet (500 mg total) by mouth 4 (four) times daily. for 10 days    MIRENA 20 mcg/24 hr (5 years) IUD     NIFEdipine (PROCARDIA-XL) 90 MG (OSM) 24 hr tablet Take 1 tablet (90 mg total) by mouth once daily.    nystatin (MYCOSTATIN) powder Apply topically 2  "(two) times daily. (Patient not taking: Reported on 10/19/2023)    olmesartan (BENICAR) 40 MG tablet Take 1 tablet (40 mg total) by mouth once daily.    pen needle, diabetic (BD ULTRA-FINE SHORT PEN NEEDLE) 31 gauge x 5/16" Ndle 1 Needle by Misc.(Non-Drug; Combo Route) route once daily.    semaglutide (OZEMPIC) 1 mg/dose (4 mg/3 mL) Inject 1 mg into the skin every 7 days.    terconazole (TERAZOL 7) 0.4 % Crea Place 1 applicator vaginally every evening. (Patient not taking: Reported on 10/19/2023)    triamcinolone acetonide 0.1% (KENALOG) 0.1 % ointment Apply topically 2 (two) times daily. (Patient not taking: Reported on 10/19/2023)     Family History       Problem Relation (Age of Onset)    Anuerysm Maternal Grandmother    Breast cancer Maternal Grandmother, Maternal Aunt, Maternal Aunt    Diabetes Son (14)    Hypertension Mother    Lung cancer Mother    No Known Problems Father, Sister, Daughter, Sister, Daughter, Son    Stroke Maternal Uncle (48)          Tobacco Use    Smoking status: Former     Current packs/day: 0.15     Average packs/day: 0.2 packs/day for 18.0 years (2.7 ttl pk-yrs)     Types: Cigarettes    Smokeless tobacco: Never    Tobacco comments:     1 pack/week   Substance and Sexual Activity    Alcohol use: Yes     Comment: social 1/month    Drug use: No    Sexual activity: Yes     Partners: Male     Birth control/protection: None, Surgical     Review of Systems   Constitutional:  Negative for chills and fever.   Respiratory:  Negative for shortness of breath.    Cardiovascular:  Negative for chest pain.   Gastrointestinal:  Positive for diarrhea (dark colored), nausea and vomiting. Negative for abdominal distention, abdominal pain and blood in stool.   Genitourinary:  Negative for dysuria and flank pain.   Skin:  Negative for pallor and rash.   Neurological:  Positive for dizziness and light-headedness. Negative for headaches.   Psychiatric/Behavioral:  Negative for confusion.      Objective: "     Vital Signs (Most Recent):  Temp: 97.5 °F (36.4 °C) (01/08/24 0845)  Pulse: 87 (01/08/24 2103)  Resp: 19 (01/08/24 2103)  BP: 102/61 (01/08/24 2103)  SpO2: 98 % (01/08/24 2103) Vital Signs (24h Range):  Temp:  [97.5 °F (36.4 °C)] 97.5 °F (36.4 °C)  Pulse:  [] 87  Resp:  [15-20] 19  SpO2:  [96 %-100 %] 98 %  BP: ()/(54-78) 102/61        There is no height or weight on file to calculate BMI.     Physical Exam  Vitals and nursing note reviewed.   Constitutional:       Appearance: Normal appearance.   HENT:      Head: Normocephalic and atraumatic.      Nose: Nose normal.      Mouth/Throat:      Mouth: Mucous membranes are moist.   Eyes:      General: No scleral icterus.     Extraocular Movements: Extraocular movements intact.      Pupils: Pupils are equal, round, and reactive to light.      Comments: Pale conjunctiva   Cardiovascular:      Rate and Rhythm: Normal rate and regular rhythm.      Pulses: Normal pulses.      Heart sounds: Normal heart sounds.   Pulmonary:      Effort: Pulmonary effort is normal.      Breath sounds: Normal breath sounds.   Abdominal:      General: Abdomen is flat. There is no distension.      Tenderness: There is no abdominal tenderness.   Musculoskeletal:      Cervical back: Normal range of motion.      Right lower leg: No edema.      Left lower leg: No edema.   Skin:     General: Skin is warm.      Coloration: Skin is not pale.   Neurological:      General: No focal deficit present.      Mental Status: She is alert and oriented to person, place, and time.              CRANIAL NERVES     CN III, IV, VI   Pupils are equal, round, and reactive to light.       Significant Labs: All pertinent labs within the past 24 hours have been reviewed.  CBC:   Recent Labs   Lab 01/08/24  1053 01/08/24 2035   WBC 10.76  --    HGB 10.7* 8.7*   HCT 30.6* 24.8*     --      CMP:   Recent Labs   Lab 01/08/24  1053   *   K 4.0      CO2 20*      BUN 70*   CREATININE 1.3    CALCIUM 10.0   PROT 8.0   ALBUMIN 4.2   BILITOT 0.8   ALKPHOS 73   AST 32   ALT 26   ANIONGAP 14       Significant Imaging: I have reviewed all pertinent imaging results/findings within the past 24 hours.  EKG: I have reviewed all pertinent results/findings within the past 24 hours and my personal findings are: normal sinus rhythm  Assessment/Plan:     * GI bleed  Patient with history of melena, associated with fatigue, dizziness and nausea. Patient arrived with Hgb of 10.7 with a baseline of 14.      Plan:  Consult GI, appreciate recs  2 large bore IVs  Loading dose protonix and 40 IV protonix BID  Hgb goals > 7  F/u night H/H  F/u ferritin, TIBC, iron studies      History of CVA (cerebrovascular accident)  History of multiple CVAs, currently on ASA, plavix, and atorvastatin    Plan:  Hold ASA/plavix in setting of acute GIB    Diabetes mellitus  Patient's FSGs are controlled on current medication regimen.  Last A1c reviewed-   Lab Results   Component Value Date    HGBA1C 6.5 (H) 01/04/2024     Most recent fingerstick glucose reviewed-   Recent Labs   Lab 01/08/24  1608   POCTGLUCOSE 77     Current correctional scale  Medium  Maintain anti-hyperglycemic dose as follows-   Antihyperglycemics (From admission, onward)      Start     Stop Route Frequency Ordered    01/09/24 2100  insulin detemir U-100 (Levemir) pen 5 Units         -- SubQ Nightly 01/08/24 2246    01/08/24 1630  insulin aspart U-100 pen 1-10 Units         -- SubQ Before meals & nightly PRN 01/08/24 1532          Hold Oral hypoglycemics while patient is in the hospital.    Coronary artery disease due to lipid rich plaque  Patient with known CAD s/p stent placement, which is controlled Will continue ASA, Plavix, and Statin and monitor for S/Sx of angina/ACS. Continue to monitor on telemetry.     Plan:  Holding ASA., plavix and restart after stable GI bleed  Continue home statin     Chronic diastolic heart failure        Mixed hyperlipidemia  Continue  home statin      Essential hypertension  Chronic, controlled. Latest blood pressure and vitals reviewed-     Temp:  [97.5 °F (36.4 °C)]   Pulse:  []   Resp:  [15-20]   BP: ()/(54-78)   SpO2:  [96 %-100 %] .   Home meds for hypertension were reviewed and noted below.   Hypertension Medications               chlorthalidone (HYGROTEN) 25 MG Tab Take 1 tablet (25 mg total) by mouth once daily.    NIFEdipine (PROCARDIA-XL) 90 MG (OSM) 24 hr tablet Take 1 tablet (90 mg total) by mouth once daily.    olmesartan (BENICAR) 40 MG tablet Take 1 tablet (40 mg total) by mouth once daily.            While in the hospital, will manage blood pressure as follows; Continue home antihypertensive regimen as needed. Arrived in ED hypotensive currently only on nifedipine and chlorthalidone    Will utilize p.r.n. blood pressure medication only if patient's blood pressure greater than 180/110 and she develops symptoms such as worsening chest pain or shortness of breath.      VTE Risk Mitigation (From admission, onward)           Ordered     IP VTE HIGH RISK PATIENT  Once         01/08/24 1532     Place sequential compression device  Until discontinued         01/08/24 1532     Reason for No Pharmacological VTE Prophylaxis  Once        Question:  Reasons:  Answer:  Physician Provided (leave comment)  Comment:  possible procedure, GI bleed    01/08/24 1532                           On 01/08/2024, patient should be placed in hospital observation services under my care in collaboration with Dr. Desai.      Reuben Esposito MD  [facility], PGY-1  01/08/2024

## 2024-01-09 NOTE — PLAN OF CARE
Em - Emergency Dept  Initial Discharge Assessment       Primary Care Provider: Margarito Ennis DO    Admission Diagnosis: Upper GI bleed [K92.2]    Admission Date: 1/8/2024  Expected Discharge Date: SW spoke with pt at bedside. Pt would like to schedule her own follow up with Dr. Ennis after procedure. Pt's s/o to transport home. Independent in all ADLs, GI follow up earliest available Feb see below.    Transition of Care Barriers: (P) None    Payor: MEDICAID / Plan: HEALTHY BLUE [place] / Product Type: Managed Medicaid /     Extended Emergency Contact Information  Primary Emergency Contact: Ely Guzman  Address: [address] [place] [address] [place] [address] [facility]  Home Phone: [phone]  Mobile Phone: [phone]  Relation: Significant other  Secondary Emergency Contact: Violette Jennings   [place]  Mobile Phone: [phone]  Relation: Other    Discharge Plan A: (P) Home with family  Discharge Plan B: (P) Home      [id number] - [place] - [address] AT [facility] [address]  [address]  [place] [address]  Phone: [phone] Fax: [phone]    [facility]  [address] [facility] [address]  [place] [address]  Phone: [phone] Fax: [phone]      Initial Assessment (most recent)       Adult Discharge Assessment - 01/09/24 1042          Discharge Assessment    Assessment Type Discharge Planning Assessment (P)      Confirmed/corrected address, phone number and insurance Yes (P)      Confirmed Demographics Correct on Facesheet (P)      Source of Information patient (P)      People in Home significant other;child(dennis), adult (P)      Do you expect to return to your current living situation? Yes (P)      Do you have help at home or someone to help you manage your care at home? Yes (P)      Who are your caregiver(s) and their phone number(s)? Ely Guzman (Significant other) [phone] (P)      Prior to  hospitilization cognitive status: Alert/Oriented (P)      Current cognitive status: Alert/Oriented (P)      Walking or Climbing Stairs Difficulty no (P)      Dressing/Bathing Difficulty no (P)      Equipment Currently Used at Home none (P)      Patient currently being followed by outpatient case management? No (P)      Do you currently have service(s) that help you manage your care at home? No (P)      Do you take prescription medications? Yes (P)      Do you have prescription coverage? Yes (P)      Coverage Medicaid Healthy Blue (P)      Do you have any problems affording any of your prescribed medications? No (P)      Is the patient taking medications as prescribed? yes (P)      Who is going to help you get home at discharge? Ely Guzman (Significant other) [phone] (P)      How do you get to doctors appointments? car, drives self;family or friend will provide (P)      Discharge Plan A Home with family (P)      Discharge Plan B Home (P)      DME Needed Upon Discharge  none (P)      Discharge Plan discussed with: Patient (P)      Transition of Care Barriers None (P)         Physical Activity    On average, how many days per week do you engage in moderate to strenuous exercise (like a brisk walk)? 3 days (P)      On average, how many minutes do you engage in exercise at this level? 20 min (P)         Housing Stability    In the last 12 months, was there a time when you were not able to pay the mortgage or rent on time? Yes (P)         Social Connections    Are you , , , , never , or living with a partner? Living with partner (P)         OTHER    Name(s) of People in Home Ely Guzman (Significant other) [phone] (P)                         Future Appointments   Date Time Provider Department Center   1/11/2024 11:40 AM [facility] M[facility]MO2 [facility]   2/15/2024  9:00 AM Lucas Worthington FNP [facility]

## 2024-01-09 NOTE — PROVATION PATIENT INSTRUCTIONS
Discharge Summary/Instructions after an Endoscopic Procedure  Patient Name: Josefina Martin  Patient MRN: [id number]  Patient YOB: 1975 Tuesday, January 9, 2024  Frantz Tolliver MD  Dear patient,  As a result of recent federal legislation (The Federal Cures Act), you may   receive lab or pathology results from your procedure in your [facility]   account before your physician is able to contact you. Your physician or   their representative will relay the results to you with their   recommendations at their soonest availability.  Thank you,  Your health is very important to us during the Covid Crisis. Following your   procedure today, you will receive a daily text for 2 weeks asking about   signs or symptoms of Covid 19.  Please respond to this text when you   receive it so we can follow up and keep you as safe as possible.   RESTRICTIONS:  During your procedure today, you received medications for sedation.  These   medications may affect your judgment, balance and coordination.  Therefore,   for 24 hours, you have the following restrictions:   - DO NOT drive a car, operate machinery, make legal/financial decisions,   sign important papers or drink alcohol.    ACTIVITY:  Today: no heavy lifting, straining or running due to procedural   sedation/anesthesia.  The following day: return to full activity including work.  DIET:  Eat and drink normally unless instructed otherwise.     TREATMENT FOR COMMON SIDE EFFECTS:  - Mild abdominal pain, nausea, belching, bloating or excessive gas:  rest,   eat lightly and use a heating pad.  - Sore Throat: treat with throat lozenges and/or gargle with warm salt   water.  - Because air was used during the procedure, expelling large amounts of air   from your rectum or belching is normal.  - If a bowel prep was taken, you may not have a bowel movement for 1-3 days.    This is normal.  SYMPTOMS TO WATCH FOR AND REPORT TO YOUR PHYSICIAN:  1. Abdominal pain or bloating, other  than gas cramps.  2. Chest pain.  3. Back pain.  4. Signs of infection such as: chills or fever occurring within 24 hours   after the procedure.  5. Rectal bleeding, which would show as bright red, maroon, or black stools.   (A tablespoon of blood from the rectum is not serious, especially if   hemorrhoids are present.)  6. Vomiting.  7. Weakness or dizziness.  GO DIRECTLY TO THE NEAREST EMERGENCY ROOM IF YOU HAVE ANY OF THE FOLLOWING:      Difficulty breathing              Chills and/or fever over 101 F   Persistent vomiting and/or vomiting blood   Severe abdominal pain   Severe chest pain   Black, tarry stools   Bleeding- more than one tablespoon   Any other symptom or condition that you feel may need urgent attention  Your doctor recommends these additional instructions:  If any biopsies were taken, your doctors clinic will contact you in 1 to 2   weeks with any results.  - Return patient to [facility] for possible discharge same day.   - Advance diet as tolerated.   - Continue present medications.   - Protonix 40mg twice a day at least 3 months, then daily thereafter  - She has history of Hpylori positive in 2021, this likely represents   unresolved Hpylori. She would benefit from treatment with Pylera.  - Repeat upper endoscopy in 3 months to check healing.   - Return to GI office in 4 weeks.  For questions, problems or results please call your physician - Frantz Tolliver MD.  EMERGENCY PHONE NUMBER: [phone],  LAB RESULTS: [phone]  IF A COMPLICATION OR EMERGENCY SITUATION ARISES AND YOU ARE UNABLE TO REACH   YOUR PHYSICIAN - GO DIRECTLY TO THE EMERGENCY ROOM.  Frantz Tolliver MD  1/9/2024 3:23:44 PM  This report has been verified and signed electronically.  Dear patient,  As a result of recent federal legislation (The Federal Cures Act), you may   receive lab or pathology results from your procedure in your [facility]   account before your physician is able to contact you. Your physician or    their representative will relay the results to you with their   recommendations at their soonest availability.  Thank you,  PROVATION

## 2024-01-09 NOTE — OR NURSING
Patient scheduled for EGD exam today, chart reviewed and report taken from Charisse (nurse).  NPO, IV in place, no family present. Waiting for bed assignment.

## 2024-01-09 NOTE — HOSPITAL COURSE
HPI as above. On first night patient's hgb overnight dropped from 10 to 8.7 overnight and normalized to 9. Patient still had dizziness. Gastroenterology was consulted and patient was started on protonix. ASA and plavix were held in the context of an acute GI bleed. GI took patient for an EGD on 1/9 which showed evidence of a non bleeding ulcer. GI recommended oral protonix for 3 weeks and a follow up with outpatient GI appointment. Before discharge patient was found to have bilateral ear exudate and tenderness to palpation of her right pinna and tragus. Ear drops were prescribed bilaterally and planned patient to be discharged with ear drops. Before discharge patient was also restarted on ASA and plavix.      On the day of discharge patient was back to baseline and hemodynamically stable. She was sent home to resume home meds. Added neomycin otic drops and 3 weeks of protonix (refer below) Education was provided about GI bleeds and ear infections. She agreed to finish course of protonix and otic drops and follow up with GI. Patient will need to follow up with PCP for hospital D/C Follow up. Patient agreeable to discharge plan, expressed understanding, all questions answered, return precautions were discussed.

## 2024-01-09 NOTE — ASSESSMENT & PLAN NOTE
Chronic, controlled. Latest blood pressure and vitals reviewed-     Temp:  [97.5 °F (36.4 °C)]   Pulse:  []   Resp:  [15-20]   BP: ()/(54-78)   SpO2:  [96 %-100 %] .   Home meds for hypertension were reviewed and noted below.   Hypertension Medications               chlorthalidone (HYGROTEN) 25 MG Tab Take 1 tablet (25 mg total) by mouth once daily.    NIFEdipine (PROCARDIA-XL) 90 MG (OSM) 24 hr tablet Take 1 tablet (90 mg total) by mouth once daily.    olmesartan (BENICAR) 40 MG tablet Take 1 tablet (40 mg total) by mouth once daily.            While in the hospital, will manage blood pressure as follows; Continue home antihypertensive regimen as needed. Arrived in ED hypotensive currently only on nifedipine and chlorthalidone    Will utilize p.r.n. blood pressure medication only if patient's blood pressure greater than 180/110 and she develops symptoms such as worsening chest pain or shortness of breath.

## 2024-01-09 NOTE — ASSESSMENT & PLAN NOTE
Patient's FSGs are controlled on current medication regimen.  Last A1c reviewed-   Lab Results   Component Value Date    HGBA1C 6.5 (H) 01/04/2024     Most recent fingerstick glucose reviewed-   Recent Labs   Lab 01/08/24  1608   POCTGLUCOSE 77     Current correctional scale  Medium  Maintain anti-hyperglycemic dose as follows-   Antihyperglycemics (From admission, onward)      Start     Stop Route Frequency Ordered    01/09/24 2100  insulin detemir U-100 (Levemir) pen 5 Units         -- SubQ Nightly 01/08/24 2246    01/08/24 1630  insulin aspart U-100 pen 1-10 Units         -- SubQ Before meals & nightly PRN 01/08/24 1532          Hold Oral hypoglycemics while patient is in the hospital.

## 2024-01-10 PROBLEM — H60.90 OTITIS EXTERNA: Status: ACTIVE | Noted: 2024-01-10

## 2024-01-10 LAB
ALBUMIN SERPL BCP-MCNC: 3.7 G/DL (ref 3.5–5.2)
ALP SERPL-CCNC: 72 U/L (ref 55–135)
ALT SERPL W/O P-5'-P-CCNC: 35 U/L (ref 10–44)
ANION GAP SERPL CALC-SCNC: 11 MMOL/L (ref 8–16)
AST SERPL-CCNC: 34 U/L (ref 10–40)
BASOPHILS # BLD AUTO: 0.04 K/UL (ref 0–0.2)
BASOPHILS NFR BLD: 0.7 % (ref 0–1.9)
BILIRUB SERPL-MCNC: 0.8 MG/DL (ref 0.1–1)
BUN SERPL-MCNC: 25 MG/DL (ref 6–20)
CALCIUM SERPL-MCNC: 8.9 MG/DL (ref 8.7–10.5)
CHLORIDE SERPL-SCNC: 106 MMOL/L (ref 95–110)
CO2 SERPL-SCNC: 22 MMOL/L (ref 23–29)
CREAT SERPL-MCNC: 0.9 MG/DL (ref 0.5–1.4)
DIFFERENTIAL METHOD BLD: ABNORMAL
EOSINOPHIL # BLD AUTO: 0.1 K/UL (ref 0–0.5)
EOSINOPHIL NFR BLD: 1.6 % (ref 0–8)
ERYTHROCYTE [DISTWIDTH] IN BLOOD BY AUTOMATED COUNT: 13.4 % (ref 11.5–14.5)
EST. GFR  (NO RACE VARIABLE): >60 ML/MIN/1.73 M^2
GLUCOSE SERPL-MCNC: 105 MG/DL (ref 70–110)
HCT VFR BLD AUTO: 25.9 % (ref 37–48.5)
HCT VFR BLD AUTO: 25.9 % (ref 37–48.5)
HGB BLD-MCNC: 8.9 G/DL (ref 12–16)
HGB BLD-MCNC: 9.1 G/DL (ref 12–16)
IMM GRANULOCYTES # BLD AUTO: 0.01 K/UL (ref 0–0.04)
IMM GRANULOCYTES NFR BLD AUTO: 0.2 % (ref 0–0.5)
LYMPHOCYTES # BLD AUTO: 2.6 K/UL (ref 1–4.8)
LYMPHOCYTES NFR BLD: 46.4 % (ref 18–48)
MAGNESIUM SERPL-MCNC: 1.9 MG/DL (ref 1.6–2.6)
MCH RBC QN AUTO: 31.8 PG (ref 27–31)
MCHC RBC AUTO-ENTMCNC: 34.4 G/DL (ref 32–36)
MCV RBC AUTO: 93 FL (ref 82–98)
MONOCYTES # BLD AUTO: 0.5 K/UL (ref 0.3–1)
MONOCYTES NFR BLD: 8.3 % (ref 4–15)
NEUTROPHILS # BLD AUTO: 2.4 K/UL (ref 1.8–7.7)
NEUTROPHILS NFR BLD: 42.8 % (ref 38–73)
NRBC BLD-RTO: 0 /100 WBC
PHOSPHATE SERPL-MCNC: 2.8 MG/DL (ref 2.7–4.5)
PLATELET # BLD AUTO: 225 K/UL (ref 150–450)
PMV BLD AUTO: 10.9 FL (ref 9.2–12.9)
POCT GLUCOSE: 123 MG/DL (ref 70–110)
POCT GLUCOSE: 124 MG/DL (ref 70–110)
POCT GLUCOSE: 156 MG/DL (ref 70–110)
POCT GLUCOSE: 177 MG/DL (ref 70–110)
POTASSIUM SERPL-SCNC: 4 MMOL/L (ref 3.5–5.1)
PROT SERPL-MCNC: 6.7 G/DL (ref 6–8.4)
RBC # BLD AUTO: 2.8 M/UL (ref 4–5.4)
SODIUM SERPL-SCNC: 139 MMOL/L (ref 136–145)
WBC # BLD AUTO: 5.67 K/UL (ref 3.9–12.7)

## 2024-01-10 PROCEDURE — 83735 ASSAY OF MAGNESIUM: CPT

## 2024-01-10 PROCEDURE — C9113 INJ PANTOPRAZOLE SODIUM, VIA: HCPCS

## 2024-01-10 PROCEDURE — 25000003 PHARM REV CODE 250: Performed by: STUDENT IN AN ORGANIZED HEALTH CARE EDUCATION/TRAINING PROGRAM

## 2024-01-10 PROCEDURE — 63600175 PHARM REV CODE 636 W HCPCS: Performed by: STUDENT IN AN ORGANIZED HEALTH CARE EDUCATION/TRAINING PROGRAM

## 2024-01-10 PROCEDURE — G0378 HOSPITAL OBSERVATION PER HR: HCPCS

## 2024-01-10 PROCEDURE — 25000003 PHARM REV CODE 250

## 2024-01-10 PROCEDURE — 63600175 PHARM REV CODE 636 W HCPCS

## 2024-01-10 PROCEDURE — 96376 TX/PRO/DX INJ SAME DRUG ADON: CPT

## 2024-01-10 PROCEDURE — 36415 COLL VENOUS BLD VENIPUNCTURE: CPT

## 2024-01-10 PROCEDURE — 80053 COMPREHEN METABOLIC PANEL: CPT

## 2024-01-10 PROCEDURE — 96366 THER/PROPH/DIAG IV INF ADDON: CPT

## 2024-01-10 PROCEDURE — 85014 HEMATOCRIT: CPT

## 2024-01-10 PROCEDURE — 85018 HEMOGLOBIN: CPT

## 2024-01-10 PROCEDURE — 85025 COMPLETE CBC W/AUTO DIFF WBC: CPT

## 2024-01-10 PROCEDURE — 84100 ASSAY OF PHOSPHORUS: CPT

## 2024-01-10 PROCEDURE — 96372 THER/PROPH/DIAG INJ SC/IM: CPT

## 2024-01-10 RX ORDER — MAGNESIUM SULFATE HEPTAHYDRATE 40 MG/ML
2 INJECTION, SOLUTION INTRAVENOUS ONCE
Status: COMPLETED | OUTPATIENT
Start: 2024-01-10 | End: 2024-01-10

## 2024-01-10 RX ORDER — CLOPIDOGREL BISULFATE 75 MG/1
75 TABLET ORAL DAILY
Status: DISCONTINUED | OUTPATIENT
Start: 2024-01-10 | End: 2024-01-10

## 2024-01-10 RX ORDER — ASPIRIN 81 MG/1
81 TABLET ORAL DAILY
Status: DISCONTINUED | OUTPATIENT
Start: 2024-01-10 | End: 2024-01-10

## 2024-01-10 RX ORDER — PANTOPRAZOLE SODIUM 40 MG/1
40 TABLET, DELAYED RELEASE ORAL 2 TIMES DAILY
Status: DISCONTINUED | OUTPATIENT
Start: 2024-01-10 | End: 2024-01-11 | Stop reason: HOSPADM

## 2024-01-10 RX ORDER — CLOPIDOGREL BISULFATE 75 MG/1
75 TABLET ORAL DAILY
Status: DISCONTINUED | OUTPATIENT
Start: 2024-01-10 | End: 2024-01-11 | Stop reason: HOSPADM

## 2024-01-10 RX ORDER — ASPIRIN 81 MG/1
81 TABLET ORAL DAILY
Status: DISCONTINUED | OUTPATIENT
Start: 2024-01-10 | End: 2024-01-11 | Stop reason: HOSPADM

## 2024-01-10 RX ADMIN — MAGNESIUM SULFATE HEPTAHYDRATE 2 G: 40 INJECTION, SOLUTION INTRAVENOUS at 07:01

## 2024-01-10 RX ADMIN — CIPROFLOXACIN HYDROCHLORIDE AND HYDROCORTISONE 3 DROP: 2; 10 SUSPENSION/ DROPS AURICULAR (OTIC) at 08:01

## 2024-01-10 RX ADMIN — DIPHENHYDRAMINE HYDROCHLORIDE 50 MG: 50 CAPSULE ORAL at 08:01

## 2024-01-10 RX ADMIN — INSULIN ASPART 1 UNITS: 100 INJECTION, SOLUTION INTRAVENOUS; SUBCUTANEOUS at 08:01

## 2024-01-10 RX ADMIN — CLOPIDOGREL BISULFATE 75 MG: 75 TABLET ORAL at 10:01

## 2024-01-10 RX ADMIN — ASPIRIN 81 MG: 81 TABLET, COATED ORAL at 10:01

## 2024-01-10 RX ADMIN — INSULIN ASPART 2 UNITS: 100 INJECTION, SOLUTION INTRAVENOUS; SUBCUTANEOUS at 12:01

## 2024-01-10 RX ADMIN — ATORVASTATIN CALCIUM 80 MG: 40 TABLET, FILM COATED ORAL at 08:01

## 2024-01-10 RX ADMIN — PANTOPRAZOLE SODIUM 40 MG: 40 INJECTION, POWDER, FOR SOLUTION INTRAVENOUS at 08:01

## 2024-01-10 RX ADMIN — PANTOPRAZOLE SODIUM 40 MG: 40 TABLET, DELAYED RELEASE ORAL at 08:01

## 2024-01-10 NOTE — ASSESSMENT & PLAN NOTE
Chronic, controlled. Latest blood pressure and vitals reviewed-     Temp:  [97.6 °F (36.4 °C)-98.2 °F (36.8 °C)]   Pulse:  [60-97]   Resp:  [14-20]   BP: ()/(52-77)   SpO2:  [97 %-100 %] .   Home meds for hypertension were reviewed and noted below.   Hypertension Medications               chlorthalidone (HYGROTEN) 25 MG Tab Take 1 tablet (25 mg total) by mouth once daily.    NIFEdipine (PROCARDIA-XL) 90 MG (OSM) 24 hr tablet Take 1 tablet (90 mg total) by mouth once daily.    olmesartan (BENICAR) 40 MG tablet Take 1 tablet (40 mg total) by mouth once daily.            While in the hospital, will manage blood pressure as follows; Adjust home antihypertensive regimen as follows- Arrived in ED hypotensive, will hold hypertension meds in hypotension and add PRN      Will utilize p.r.n. blood pressure medication only if patient's blood pressure greater than 180/110 and she develops symptoms such as worsening chest pain or shortness of breath.

## 2024-01-10 NOTE — ASSESSMENT & PLAN NOTE
Patient's anemia is currently controlled. Has not received any PRBCs to date. Etiology likely d/t acute blood loss which was from ulcer, was found to be non bleeding on EGD  Current CBC reviewed-   Lab Results   Component Value Date    HGB 9.1 (L) 01/10/2024    HCT 25.9 (L) 01/10/2024     Monitor serial CBC and transfuse if patient becomes hemodynamically unstable, symptomatic or H/H drops below 7/21.

## 2024-01-10 NOTE — PLAN OF CARE
"  Problem: Adult Inpatient Plan of Care  Goal: Plan of Care Review  Outcome: Ongoing, Progressing     Problem: Adult Inpatient Plan of Care  Goal: Optimal Comfort and Wellbeing  Outcome: Ongoing, Progressing     Problem: Diabetes Comorbidity  Goal: Blood Glucose Level Within Targeted Range  Outcome: Ongoing, Progressing     Problem: Bleeding (Gastrointestinal Bleeding)  Goal: Hemostasis  Outcome: Ongoing, Progressing    .Plan of care reviewed with the patient. Scheduled medicines given and the patient tolerated well. Given Diphenhydramine 50 mg for insomnia. Fall and safety precautions taken and the standard interventions are in place. On Telemetry monitoring with NSR, no true "red alarms' noted, no acute distress reported either in the shift. Patient's Accu Check was 166 mg/dl, covered with a unit Insulin Aspart. Advised the patient to call for the assistance. Continued monitoring the patient.      "

## 2024-01-10 NOTE — SUBJECTIVE & OBJECTIVE
Interval History: Patient had one dark tarry diarrhea this morning but she denies any other symptoms. Denies fevers, chills, abdominal pain, nausea, vomiting, chest pain, SOB. She denies headache or dizziness. Will start ASA/plavix today and observe for bleeding. Patient also complained about right sided ear pain and appears to have exudate on bilateral external ears    Review of Systems   Constitutional:  Negative for chills and fever.   Respiratory:  Negative for shortness of breath.    Cardiovascular:  Negative for chest pain.   Gastrointestinal:  Positive for diarrhea (dark colored). Negative for abdominal distention, abdominal pain, blood in stool, nausea and vomiting.   Genitourinary:  Negative for dysuria and flank pain.   Skin:  Negative for pallor and rash.   Neurological:  Negative for dizziness, light-headedness and headaches.   Psychiatric/Behavioral:  Negative for confusion.      Objective:     Vital Signs (Most Recent):  Temp: 98 °F (36.7 °C) (01/10/24 1057)  Pulse: 94 (01/10/24 1214)  Resp: 18 (01/10/24 1057)  BP: (!) 94/52 (01/10/24 1057)  SpO2: 100 % (01/10/24 1057) Vital Signs (24h Range):  Temp:  [97.6 °F (36.4 °C)-98.2 °F (36.8 °C)] 98 °F (36.7 °C)  Pulse:  [60-97] 94  Resp:  [14-20] 18  SpO2:  [97 %-100 %] 100 %  BP: ()/(52-77) 94/52     Weight: 82.8 kg (182 lb 8.7 oz)  Body mass index is 27.76 kg/m².    Intake/Output Summary (Last 24 hours) at 1/10/2024 1233  Last data filed at 1/9/2024 1523  Gross per 24 hour   Intake 250 ml   Output --   Net 250 ml         Physical Exam  Vitals and nursing note reviewed.   Constitutional:       Appearance: Normal appearance.   HENT:      Head: Normocephalic and atraumatic.      Nose: Nose normal.      Mouth/Throat:      Mouth: Mucous membranes are moist.   Eyes:      General: No scleral icterus.     Extraocular Movements: Extraocular movements intact.      Pupils: Pupils are equal, round, and reactive to light.      Comments: Pale conjunctiva    Cardiovascular:      Rate and Rhythm: Normal rate and regular rhythm.      Pulses: Normal pulses.      Heart sounds: Normal heart sounds.   Pulmonary:      Effort: Pulmonary effort is normal.      Breath sounds: Normal breath sounds.   Abdominal:      General: Abdomen is flat. There is no distension.      Tenderness: There is no abdominal tenderness.   Musculoskeletal:      Cervical back: Normal range of motion.      Right lower leg: No edema.      Left lower leg: No edema.   Skin:     General: Skin is warm.      Coloration: Skin is not pale.   Neurological:      General: No focal deficit present.      Mental Status: She is alert and oriented to person, place, and time.             Significant Labs: All pertinent labs within the past 24 hours have been reviewed.  CBC:   Recent Labs   Lab 01/08/24 2035 01/09/24  0638 01/10/24  0331   WBC  --  4.91 5.67   HGB 8.7* 9.0* 8.9*   HCT 24.8* 25.7* 25.9*   PLT  --  202 225     CMP:   Recent Labs   Lab 01/09/24 0638 01/10/24  0331    139   K 3.2* 4.0    106   CO2 24 22*   GLU 99 105   BUN 44* 25*   CREATININE 1.0 0.9   CALCIUM 9.0 8.9   PROT 6.4 6.7   ALBUMIN 3.7 3.7   BILITOT 1.0 0.8   ALKPHOS 66 72   AST 23 34   ALT 25 35   ANIONGAP 9 11       Significant Imaging: I have reviewed all pertinent imaging results/findings within the past 24 hours.

## 2024-01-10 NOTE — ASSESSMENT & PLAN NOTE
Patient's FSGs are controlled on current medication regimen.  Last A1c reviewed-   Lab Results   Component Value Date    HGBA1C 6.5 (H) 01/04/2024     Most recent fingerstick glucose reviewed-   Recent Labs   Lab 01/09/24  1630 01/09/24  2047 01/10/24  0607 01/10/24  1057   POCTGLUCOSE 81 166* 123* 177*     Current correctional scale  Medium  Maintain anti-hyperglycemic dose as follows-   Antihyperglycemics (From admission, onward)      Start     Stop Route Frequency Ordered    01/08/24 1630  insulin aspart U-100 pen 1-10 Units         -- SubQ Before meals & nightly PRN 01/08/24 1532          Hold Oral hypoglycemics while patient is in the hospital.    Plan:  Holding nightly insulin due to hypoglycemia, only on SSI at this time

## 2024-01-10 NOTE — ANESTHESIA POSTPROCEDURE EVALUATION
Anesthesia Post Evaluation    Patient: Josefina Martin    Procedure(s) Performed: Procedure(s) (LRB):  EGD (ESOPHAGOGASTRODUODENOSCOPY) (N/A)    Final Anesthesia Type: general      Patient location during evaluation: GI PACU  Patient participation: Yes- Able to Participate  Level of consciousness: awake and alert, oriented and awake  Post-procedure vital signs: reviewed and stable  Pain management: adequate  Airway patency: patent    PONV status at discharge: No PONV  Anesthetic complications: no      Cardiovascular status: stable  Respiratory status: unassisted and room air  Hydration status: euvolemic  Follow-up not needed.              Vitals Value Taken Time   /67 01/10/24 1521   Temp 36.8 °C (98.3 °F) 01/10/24 1521   Pulse 93 01/10/24 1521   Resp 18 01/10/24 1521   SpO2 98 % 01/10/24 1521         No case tracking events are documented in the log.      Pain/Linda Score: Linda Score: 10 (1/9/2024  3:54 PM)

## 2024-01-10 NOTE — ASSESSMENT & PLAN NOTE
History of multiple CVAs, currently on ASA, plavix, and atorvastatin    Plan:  restart ASA/plavix and observe overnight

## 2024-01-10 NOTE — ASSESSMENT & PLAN NOTE
Patient with known CAD s/p stent placement, which is controlled Will continue ASA, Plavix, and Statin and monitor for S/Sx of angina/ACS. Continue to monitor on telemetry.     Plan:  restarting ASA., plavix and will observe with serial H/H  Continue home statin

## 2024-01-10 NOTE — ASSESSMENT & PLAN NOTE
Patient with history of melena, associated with fatigue, dizziness and nausea. Patient arrived with Hgb of 10.7 with a baseline of 14.    EGD on 1/9/24 showed a nonbleeding ulcer.     Plan:  Consult GI, appreciate recs  2 large bore IVs  Loading dose protonix and 40 IV protonix BID  Hgb goals > 8 in history of CAD  F/u ferritin, TIBC, iron studies

## 2024-01-10 NOTE — PROGRESS NOTES
Family Medicine  Progress Note    Patient Name: Josefina Martin  MRN: [id number]  Patient Class: OP- Observation   Admission Date: 1/8/2024  Length of Stay: 0 days  Attending Physician: Torie Main MD  Primary Care Provider: Margarito Ennis DO      Subjective:     Principal Problem:GI bleed      HPI:  Patient is a 48 y.o.-year-old female w/ PMHx multiple CVA, HTN, HLD, DM (A1c 6.5 on 1/4/24), MI/CAD s/p multiple stents, CHF (55%EF grade 1 diastolic dysfunction 8/17/21) who presents to the ED with reports of 1 day of black diarrhea with a total of 5 episodes of diarrhea. Her diarrhea is associated with nausea and dizziness. She also endorsed decreased blood pressure at home. She denies any fevers, chest pain, shortness of breath.      In the ED, initial vitals Temp 97.5F, BP 96/57, , RR 20, SpO2 99% on room air. CBC significant for H/H of 10.7/30.6 (baseline 14/41), CMP significant for BUN of 70. And CrCl cannot be calculated (Unknown ideal weight.). troponin negative.   EKG showed normal sinus rhythm.   In the ED patient was treated with zofran, IV protonix, and 1L NS bolus. [facility] admitted patient for acute GI bleed.       Overview/Hospital Course:  Paitent's hgb overnight dropped from 10 to 8.7 overnight and normalized to 9. Patient still had dizziness. Will get EGD 1/9    Interval History: Patient had one dark tarry diarrhea this morning but she denies any other symptoms. Denies fevers, chills, abdominal pain, nausea, vomiting, chest pain, SOB. She denies headache or dizziness. Will start ASA/plavix today and observe for bleeding. Patient also complained about right sided ear pain and appears to have exudate on bilateral external ears    Review of Systems   Constitutional:  Negative for chills and fever.   Respiratory:  Negative for shortness of breath.    Cardiovascular:  Negative for chest pain.   Gastrointestinal:  Positive for diarrhea (dark colored). Negative for abdominal  distention, abdominal pain, blood in stool, nausea and vomiting.   Genitourinary:  Negative for dysuria and flank pain.   Skin:  Negative for pallor and rash.   Neurological:  Negative for dizziness, light-headedness and headaches.   Psychiatric/Behavioral:  Negative for confusion.      Objective:     Vital Signs (Most Recent):  Temp: 98 °F (36.7 °C) (01/10/24 1057)  Pulse: 94 (01/10/24 1214)  Resp: 18 (01/10/24 1057)  BP: (!) 94/52 (01/10/24 1057)  SpO2: 100 % (01/10/24 1057) Vital Signs (24h Range):  Temp:  [97.6 °F (36.4 °C)-98.2 °F (36.8 °C)] 98 °F (36.7 °C)  Pulse:  [60-97] 94  Resp:  [14-20] 18  SpO2:  [97 %-100 %] 100 %  BP: ()/(52-77) 94/52     Weight: 82.8 kg (182 lb 8.7 oz)  Body mass index is 27.76 kg/m².    Intake/Output Summary (Last 24 hours) at 1/10/2024 1233  Last data filed at 1/9/2024 1523  Gross per 24 hour   Intake 250 ml   Output --   Net 250 ml         Physical Exam  Vitals and nursing note reviewed.   Constitutional:       Appearance: Normal appearance.   HENT:      Head: Normocephalic and atraumatic.      Nose: Nose normal.      Mouth/Throat:      Mouth: Mucous membranes are moist.      Ears: bilateral exudate on ear exam, erythematous external ear, tenderness to palpation of tragus and pinnae bilaterally   Eyes:      General: No scleral icterus.     Extraocular Movements: Extraocular movements intact.      Pupils: Pupils are equal, round, and reactive to light.      Comments: Pale conjunctiva   Cardiovascular:      Rate and Rhythm: Normal rate and regular rhythm.      Pulses: Normal pulses.      Heart sounds: Normal heart sounds.   Pulmonary:      Effort: Pulmonary effort is normal.      Breath sounds: Normal breath sounds.   Abdominal:      General: Abdomen is flat. There is no distension.      Tenderness: There is no abdominal tenderness.   Musculoskeletal:      Cervical back: Normal range of motion.      Right lower leg: No edema.      Left lower leg: No edema.   Skin:     General:  Skin is warm.      Coloration: Skin is not pale.   Neurological:      General: No focal deficit present.      Mental Status: She is alert and oriented to person, place, and time.             Significant Labs: All pertinent labs within the past 24 hours have been reviewed.  CBC:   Recent Labs   Lab 01/08/24 2035 01/09/24  0638 01/10/24  0331   WBC  --  4.91 5.67   HGB 8.7* 9.0* 8.9*   HCT 24.8* 25.7* 25.9*   PLT  --  202 225     CMP:   Recent Labs   Lab 01/09/24 0638 01/10/24  0331    139   K 3.2* 4.0    106   CO2 24 22*   GLU 99 105   BUN 44* 25*   CREATININE 1.0 0.9   CALCIUM 9.0 8.9   PROT 6.4 6.7   ALBUMIN 3.7 3.7   BILITOT 1.0 0.8   ALKPHOS 66 72   AST 23 34   ALT 25 35   ANIONGAP 9 11       Significant Imaging: I have reviewed all pertinent imaging results/findings within the past 24 hours.    Assessment/Plan:      * GI bleed  Patient with history of melena, associated with fatigue, dizziness and nausea. Patient arrived with Hgb of 10.7 with a baseline of 14.    EGD on 1/9/24 showed a nonbleeding ulcer.     Plan:  Consult GI, appreciate recs  2 large bore IVs  Loading dose protonix and 40 IV protonix BID  Hgb goals > 8 in history of CAD  F/u ferritin, TIBC, iron studies      Acute blood loss anemia  Patient's anemia is currently controlled. Has not received any PRBCs to date. Etiology likely d/t acute blood loss which was from ulcer, was found to be non bleeding on EGD  Current CBC reviewed-   Lab Results   Component Value Date    HGB 9.1 (L) 01/10/2024    HCT 25.9 (L) 01/10/2024     Monitor serial CBC and transfuse if patient becomes hemodynamically unstable, symptomatic or H/H drops below 7/21.    History of CVA (cerebrovascular accident)  History of multiple CVAs, currently on ASA, plavix, and atorvastatin    Plan:  restart ASA/plavix and observe overnight    Diabetes mellitus  Patient's FSGs are controlled on current medication regimen.  Last A1c reviewed-   Lab Results   Component Value Date     HGBA1C 6.5 (H) 01/04/2024     Most recent fingerstick glucose reviewed-   Recent Labs   Lab 01/09/24  1630 01/09/24  2047 01/10/24  0607 01/10/24  1057   POCTGLUCOSE 81 166* 123* 177*     Current correctional scale  Medium  Maintain anti-hyperglycemic dose as follows-   Antihyperglycemics (From admission, onward)      Start     Stop Route Frequency Ordered    01/08/24 1630  insulin aspart U-100 pen 1-10 Units         -- SubQ Before meals & nightly PRN 01/08/24 1532          Hold Oral hypoglycemics while patient is in the hospital.    Plan:  Holding nightly insulin due to hypoglycemia, only on SSI at this time    Coronary artery disease due to lipid rich plaque  Patient with known CAD s/p stent placement, which is controlled Will continue ASA, Plavix, and Statin and monitor for S/Sx of angina/ACS. Continue to monitor on telemetry.     Plan:  restarting ASA., plavix and will observe with serial H/H  Continue home statin     Chronic diastolic heart failure        Mixed hyperlipidemia  Continue home statin      Essential hypertension  Chronic, controlled. Latest blood pressure and vitals reviewed-     Temp:  [97.6 °F (36.4 °C)-98.2 °F (36.8 °C)]   Pulse:  [60-97]   Resp:  [14-20]   BP: ()/(52-77)   SpO2:  [97 %-100 %] .   Home meds for hypertension were reviewed and noted below.   Hypertension Medications               chlorthalidone (HYGROTEN) 25 MG Tab Take 1 tablet (25 mg total) by mouth once daily.    NIFEdipine (PROCARDIA-XL) 90 MG (OSM) 24 hr tablet Take 1 tablet (90 mg total) by mouth once daily.    olmesartan (BENICAR) 40 MG tablet Take 1 tablet (40 mg total) by mouth once daily.            While in the hospital, will manage blood pressure as follows; Adjust home antihypertensive regimen as follows- Arrived in ED hypotensive, will hold hypertension meds in hypotension and add PRN      Will utilize p.r.n. blood pressure medication only if patient's blood pressure greater than 180/110 and she develops  symptoms such as worsening chest pain or shortness of breath.    Otitis externa:  Patient with bilateral ear exudate and tenderness to palpation    Plan:  Cipro drops in ears bilaterally    VTE Risk Mitigation (From admission, onward)           Ordered     IP VTE HIGH RISK PATIENT  Once         01/08/24 1532     Place sequential compression device  Until discontinued         01/08/24 1532     Reason for No Pharmacological VTE Prophylaxis  Once        Question:  Reasons:  Answer:  Physician Provided (leave comment)  Comment:  possible procedure, GI bleed    01/08/24 1532                    Discharge Planning   BARRY:      Code Status: Full Code   Is the patient medically ready for discharge?:     Reason for patient still in hospital (select all that apply): Patient trending condition  Discharge Plan A: Home with family          Reuben Esposito MD  [facility], PGY-1  01/10/2024

## 2024-01-11 VITALS
HEART RATE: 84 BPM | WEIGHT: 181.69 LBS | TEMPERATURE: 98 F | DIASTOLIC BLOOD PRESSURE: 77 MMHG | RESPIRATION RATE: 18 BRPM | HEIGHT: 68 IN | SYSTOLIC BLOOD PRESSURE: 112 MMHG | OXYGEN SATURATION: 95 % | BODY MASS INDEX: 27.54 KG/M2

## 2024-01-11 LAB
ALBUMIN SERPL BCP-MCNC: 3.7 G/DL (ref 3.5–5.2)
ALP SERPL-CCNC: 102 U/L (ref 55–135)
ALT SERPL W/O P-5'-P-CCNC: 28 U/L (ref 10–44)
ANION GAP SERPL CALC-SCNC: 7 MMOL/L (ref 8–16)
AST SERPL-CCNC: 26 U/L (ref 10–40)
BASOPHILS # BLD AUTO: 0.03 K/UL (ref 0–0.2)
BASOPHILS NFR BLD: 0.5 % (ref 0–1.9)
BILIRUB SERPL-MCNC: 0.4 MG/DL (ref 0.1–1)
BUN SERPL-MCNC: 16 MG/DL (ref 6–20)
CALCIUM SERPL-MCNC: 8.6 MG/DL (ref 8.7–10.5)
CHLORIDE SERPL-SCNC: 106 MMOL/L (ref 95–110)
CO2 SERPL-SCNC: 25 MMOL/L (ref 23–29)
CREAT SERPL-MCNC: 0.8 MG/DL (ref 0.5–1.4)
DIFFERENTIAL METHOD BLD: ABNORMAL
EOSINOPHIL # BLD AUTO: 0.1 K/UL (ref 0–0.5)
EOSINOPHIL NFR BLD: 1.9 % (ref 0–8)
ERYTHROCYTE [DISTWIDTH] IN BLOOD BY AUTOMATED COUNT: 13.4 % (ref 11.5–14.5)
EST. GFR  (NO RACE VARIABLE): >60 ML/MIN/1.73 M^2
GLUCOSE SERPL-MCNC: 124 MG/DL (ref 70–110)
HCT VFR BLD AUTO: 25 % (ref 37–48.5)
HGB BLD-MCNC: 8.6 G/DL (ref 12–16)
IMM GRANULOCYTES # BLD AUTO: 0.01 K/UL (ref 0–0.04)
IMM GRANULOCYTES NFR BLD AUTO: 0.2 % (ref 0–0.5)
LYMPHOCYTES # BLD AUTO: 2.8 K/UL (ref 1–4.8)
LYMPHOCYTES NFR BLD: 47.6 % (ref 18–48)
MAGNESIUM SERPL-MCNC: 1.9 MG/DL (ref 1.6–2.6)
MCH RBC QN AUTO: 31.6 PG (ref 27–31)
MCHC RBC AUTO-ENTMCNC: 34.4 G/DL (ref 32–36)
MCV RBC AUTO: 92 FL (ref 82–98)
MONOCYTES # BLD AUTO: 0.5 K/UL (ref 0.3–1)
MONOCYTES NFR BLD: 8.5 % (ref 4–15)
NEUTROPHILS # BLD AUTO: 2.4 K/UL (ref 1.8–7.7)
NEUTROPHILS NFR BLD: 41.3 % (ref 38–73)
NRBC BLD-RTO: 0 /100 WBC
PHOSPHATE SERPL-MCNC: 2.3 MG/DL (ref 2.7–4.5)
PLATELET # BLD AUTO: 221 K/UL (ref 150–450)
PMV BLD AUTO: 10.8 FL (ref 9.2–12.9)
POCT GLUCOSE: 109 MG/DL (ref 70–110)
POCT GLUCOSE: 162 MG/DL (ref 70–110)
POTASSIUM SERPL-SCNC: 3.6 MMOL/L (ref 3.5–5.1)
PROT SERPL-MCNC: 6.6 G/DL (ref 6–8.4)
RBC # BLD AUTO: 2.72 M/UL (ref 4–5.4)
SODIUM SERPL-SCNC: 138 MMOL/L (ref 136–145)
WBC # BLD AUTO: 5.9 K/UL (ref 3.9–12.7)

## 2024-01-11 PROCEDURE — 96366 THER/PROPH/DIAG IV INF ADDON: CPT

## 2024-01-11 PROCEDURE — 84100 ASSAY OF PHOSPHORUS: CPT

## 2024-01-11 PROCEDURE — 80053 COMPREHEN METABOLIC PANEL: CPT

## 2024-01-11 PROCEDURE — 85025 COMPLETE CBC W/AUTO DIFF WBC: CPT

## 2024-01-11 PROCEDURE — 83735 ASSAY OF MAGNESIUM: CPT

## 2024-01-11 PROCEDURE — 63600175 PHARM REV CODE 636 W HCPCS: Performed by: STUDENT IN AN ORGANIZED HEALTH CARE EDUCATION/TRAINING PROGRAM

## 2024-01-11 PROCEDURE — 25000003 PHARM REV CODE 250: Performed by: STUDENT IN AN ORGANIZED HEALTH CARE EDUCATION/TRAINING PROGRAM

## 2024-01-11 PROCEDURE — 25000003 PHARM REV CODE 250

## 2024-01-11 PROCEDURE — 36415 COLL VENOUS BLD VENIPUNCTURE: CPT

## 2024-01-11 PROCEDURE — G0378 HOSPITAL OBSERVATION PER HR: HCPCS

## 2024-01-11 RX ORDER — PANTOPRAZOLE SODIUM 40 MG/1
40 TABLET, DELAYED RELEASE ORAL DAILY
Qty: 90 TABLET | Refills: 0 | Status: SHIPPED | OUTPATIENT
Start: 2024-01-11 | End: 2024-04-10

## 2024-01-11 RX ORDER — MAGNESIUM SULFATE HEPTAHYDRATE 40 MG/ML
2 INJECTION, SOLUTION INTRAVENOUS ONCE
Status: COMPLETED | OUTPATIENT
Start: 2024-01-11 | End: 2024-01-11

## 2024-01-11 RX ORDER — NEOMYCIN SULFATE, POLYMYXIN B SULFATE AND HYDROCORTISONE 10; 3.5; 1 MG/ML; MG/ML; [USP'U]/ML
3 SUSPENSION/ DROPS AURICULAR (OTIC) 3 TIMES DAILY
Qty: 10 ML | Refills: 0 | Status: SHIPPED | OUTPATIENT
Start: 2024-01-11 | End: 2024-01-22

## 2024-01-11 RX ORDER — SODIUM,POTASSIUM PHOSPHATES 280-250MG
2 POWDER IN PACKET (EA) ORAL ONCE
Status: COMPLETED | OUTPATIENT
Start: 2024-01-11 | End: 2024-01-11

## 2024-01-11 RX ORDER — TRAMADOL HYDROCHLORIDE 50 MG/1
50 TABLET ORAL ONCE
Status: COMPLETED | OUTPATIENT
Start: 2024-01-11 | End: 2024-01-11

## 2024-01-11 RX ADMIN — PANTOPRAZOLE SODIUM 40 MG: 40 TABLET, DELAYED RELEASE ORAL at 08:01

## 2024-01-11 RX ADMIN — ASPIRIN 81 MG: 81 TABLET, COATED ORAL at 08:01

## 2024-01-11 RX ADMIN — POTASSIUM & SODIUM PHOSPHATES POWDER PACK 280-160-250 MG 2 PACKET: 280-160-250 PACK at 05:01

## 2024-01-11 RX ADMIN — ACETAMINOPHEN 650 MG: 325 TABLET ORAL at 08:01

## 2024-01-11 RX ADMIN — MAGNESIUM SULFATE HEPTAHYDRATE 2 G: 40 INJECTION, SOLUTION INTRAVENOUS at 05:01

## 2024-01-11 RX ADMIN — CIPROFLOXACIN HYDROCHLORIDE AND HYDROCORTISONE 3 DROP: 2; 10 SUSPENSION/ DROPS AURICULAR (OTIC) at 08:01

## 2024-01-11 RX ADMIN — CLOPIDOGREL BISULFATE 75 MG: 75 TABLET ORAL at 08:01

## 2024-01-11 RX ADMIN — TRAMADOL HYDROCHLORIDE 50 MG: 50 TABLET, COATED ORAL at 12:01

## 2024-01-11 NOTE — PROGRESS NOTES
Family Medicine  Progress Note    Patient Name: Josefina Martin  MRN: [id number]  Patient Class: OP- Observation   Admission Date: 1/8/2024  Length of Stay: 0 days  Attending Physician: Dr. Jeronimo  Primary Care Provider: Margarito Ennis DO        Subjective:     Principal Problem:GI bleed        HPI:  Patient is a 48 y.o.-year-old female w/ PMHx multiple CVA, HTN, HLD, DM (A1c 6.5 on 1/4/24), MI/CAD s/p multiple stents, CHF (55%EF grade 1 diastolic dysfunction 8/17/21) who presents to the ED with reports of 1 day of black diarrhea with a total of 5 episodes of diarrhea. Her diarrhea is associated with nausea and dizziness. She also endorsed decreased blood pressure at home. She denies any fevers, chest pain, shortness of breath.      In the ED, initial vitals Temp 97.5F, BP 96/57, , RR 20, SpO2 99% on room air. CBC significant for H/H of 10.7/30.6 (baseline 14/41), CMP significant for BUN of 70. And CrCl cannot be calculated (Unknown ideal weight.). troponin negative.   EKG showed normal sinus rhythm.   In the ED patient was treated with zofran, IV protonix, and 1L NS bolus. [facility] admitted patient for acute GI bleed.       Overview/Hospital Course:  Mariet's hgb overnight dropped from 10 to 8.7 overnight and normalized to 9. Patient still had dizziness. Will get EGD 1/9    Interval History: no acute events overnight, no dark tarry stools. Hgb stable from last night. Still endorses ear pain.     Review of Systems   Constitutional:  Negative for chills and fever.   Respiratory:  Negative for shortness of breath.    Cardiovascular:  Negative for chest pain.   Gastrointestinal:  Negative for abdominal distention, abdominal pain, blood in stool, diarrhea, nausea and vomiting.   Genitourinary:  Negative for dysuria and flank pain.   Skin:  Negative for pallor and rash.   Neurological:  Negative for dizziness, light-headedness and headaches.   Psychiatric/Behavioral:  Negative for confusion.       Objective:     Vital Signs (Most Recent):  Temp: 98.2 °F (36.8 °C) (01/11/24 0746)  Pulse: 86 (01/11/24 0746)  Resp: 18 (01/11/24 0746)  BP: 109/68 (01/11/24 0746)  SpO2: 98 % (01/11/24 0746) Vital Signs (24h Range):  Temp:  [97.5 °F (36.4 °C)-98.3 °F (36.8 °C)] 98.2 °F (36.8 °C)  Pulse:  [76-94] 86  Resp:  [18] 18  SpO2:  [97 %-99 %] 98 %  BP: (106-115)/(55-68) 109/68     Weight: 82.4 kg (181 lb 10.5 oz)  Body mass index is 27.62 kg/m².  No intake or output data in the 24 hours ending 01/11/24 1151      Physical Exam  Vitals and nursing note reviewed.   Constitutional:       Appearance: Normal appearance.   HENT:      Head: Normocephalic and atraumatic.      Ears:      Comments: Exudate on ears bilaterally, tenderness to palpation of pinna and tragus     Nose: Nose normal.      Mouth/Throat:      Mouth: Mucous membranes are moist.   Eyes:      General: No scleral icterus.     Extraocular Movements: Extraocular movements intact.      Pupils: Pupils are equal, round, and reactive to light.   Cardiovascular:      Rate and Rhythm: Normal rate and regular rhythm.      Pulses: Normal pulses.      Heart sounds: Normal heart sounds.   Pulmonary:      Effort: Pulmonary effort is normal.      Breath sounds: Normal breath sounds.   Abdominal:      General: Abdomen is flat. There is no distension.      Tenderness: There is no abdominal tenderness.   Musculoskeletal:      Cervical back: Normal range of motion.      Right lower leg: No edema.      Left lower leg: No edema.   Skin:     General: Skin is warm.      Coloration: Skin is not pale.   Neurological:      General: No focal deficit present.      Mental Status: She is alert and oriented to person, place, and time.             Significant Labs: All pertinent labs within the past 24 hours have been reviewed.  CBC:   Recent Labs   Lab 01/10/24  0331 01/10/24  1323 01/11/24  0322   WBC 5.67  --  5.90   HGB 8.9* 9.1* 8.6*   HCT 25.9* 25.9* 25.0*     --  221     CMP:    Recent Labs   Lab 01/10/24  0331 01/11/24  0322    138   K 4.0 3.6    106   CO2 22* 25    124*   BUN 25* 16   CREATININE 0.9 0.8   CALCIUM 8.9 8.6*   PROT 6.7 6.6   ALBUMIN 3.7 3.7   BILITOT 0.8 0.4   ALKPHOS 72 102   AST 34 26   ALT 35 28   ANIONGAP 11 7*       Significant Imaging: I have reviewed all pertinent imaging results/findings within the past 24 hours.    Assessment/Plan:      * GI bleed  Patient with history of melena, associated with fatigue, dizziness and nausea. Patient arrived with Hgb of 10.7 with a baseline of 14.    EGD on 1/9/24 showed a nonbleeding ulcer.     Plan:  Consult GI, appreciate recs  2 large bore IVs  Oral protonix for 3 weeks per GI  Hgb goals > 8 in history of CAD  F/u ferritin, TIBC, iron studies      Otitis externa  Patient with bilateral ear exudate and tenderness to palpation     Plan:  Ear drops bilaterally, cipro on inpatient.     Acute blood loss anemia  Patient's anemia is currently controlled. Has not received any PRBCs to date. Etiology likely d/t acute blood loss which was from ulcer, was found to be non bleeding on EGD  Current CBC reviewed-   Lab Results   Component Value Date    HGB 8.6 (L) 01/11/2024    HCT 25.0 (L) 01/11/2024     Monitor serial CBC and transfuse if patient becomes hemodynamically unstable, symptomatic or H/H drops below 7/21.    History of CVA (cerebrovascular accident)  History of multiple CVAs, currently on ASA, plavix, and atorvastatin    Plan:  Continue home ASA/plavix    Diabetes mellitus  Patient's FSGs are controlled on current medication regimen.  Last A1c reviewed-   Lab Results   Component Value Date    HGBA1C 6.5 (H) 01/04/2024     Most recent fingerstick glucose reviewed-   Recent Labs   Lab 01/10/24  1519 01/10/24  2031 01/11/24  0543   POCTGLUCOSE 124* 156* 109       Current correctional scale  Medium  Maintain anti-hyperglycemic dose as follows-   Antihyperglycemics (From admission, onward)      Start     Stop Route  Frequency Ordered    01/08/24 1630  insulin aspart U-100 pen 1-10 Units         -- SubQ Before meals & nightly PRN 01/08/24 1532          Hold Oral hypoglycemics while patient is in the hospital.    Plan:  Holding nightly insulin due to hypoglycemia, only on SSI at this time    Coronary artery disease due to lipid rich plaque  Patient with known CAD s/p stent placement, which is controlled Will continue ASA, Plavix, and Statin and monitor for S/Sx of angina/ACS. Continue to monitor on telemetry.     Plan:  restarting ASA., plavix   H/H stable this morning  Continue home statin     Chronic diastolic heart failure        Mixed hyperlipidemia  Continue home statin      Essential hypertension  Chronic, controlled. Latest blood pressure and vitals reviewed-     Temp:  [97.5 °F (36.4 °C)-98.3 °F (36.8 °C)]   Pulse:  [76-94]   Resp:  [18]   BP: (106-115)/(55-68)   SpO2:  [97 %-99 %] .   Home meds for hypertension were reviewed and noted below.   Hypertension Medications               chlorthalidone (HYGROTEN) 25 MG Tab Take 1 tablet (25 mg total) by mouth once daily.    NIFEdipine (PROCARDIA-XL) 90 MG (OSM) 24 hr tablet Take 1 tablet (90 mg total) by mouth once daily.    olmesartan (BENICAR) 40 MG tablet Take 1 tablet (40 mg total) by mouth once daily.            While in the hospital, will manage blood pressure as follows; Adjust home antihypertensive regimen as follows- Arrived in ED hypotensive, will hold hypertension meds in hypotension and add PRN      Will utilize p.r.n. blood pressure medication only if patient's blood pressure greater than 180/110 and she develops symptoms such as worsening chest pain or shortness of breath.      VTE Risk Mitigation (From admission, onward)           Ordered     IP VTE HIGH RISK PATIENT  Once         01/08/24 1532     Place sequential compression device  Until discontinued         01/08/24 1532     Reason for No Pharmacological VTE Prophylaxis  Once        Question:  Reasons:   Answer:  Physician Provided (leave comment)  Comment:  possible procedure, GI bleed    01/08/24 1532                    Discharge Planning   BARRY: 1/11/2024     Code Status: Full Code   Is the patient medically ready for discharge?:     Reason for patient still in hospital (select all that apply): Patient trending condition  Discharge Plan A: Home with family        Reuben Esposito MD  [facility], PGY-1  01/11/2024

## 2024-01-11 NOTE — PLAN OF CARE
CM spoke with pt prior to d/c - she wants to be seen at [facility]   No dme, no hh ordered   Pt has transportation to home   Discharging nurse to review all d/c meds/instructions        01/11/24 1244   Final Note   Assessment Type Final Discharge Note   Anticipated Discharge Disposition Home   What phone number can be called within the next 1-3 days to see how you are doing after discharge? [phone]   Hospital Resources/Appts/Education Provided Appointments scheduled and added to AVS   Post-Acute Status   Discharge Delays None known at this time

## 2024-01-11 NOTE — PROGRESS NOTES
"[facility]           Pharmacy  Admission Medication History     The home medication history was taken by Stanislaw Hills PharmD.      Medication history obtained from Medications listed below were obtained from: Patient/family    Based on information gathered for medication list, you may go to "Admission" then "Reconcile Home Medications" tabs to review and/or act upon those items.     The home medication list has been updated by the Pharmacy department.   Please read ALL comments highlighted in yellow.   Please address this information as you see fit.    Feel free to contact us if you have any questions or require assistance.    The medications listed below were removed from the home medication list.  Please reorder if appropriate:    No discrepancy noted; BP held 2/2 GIB  Potential issues to be addressed PRIOR TO DISCHARGE      No current facility-administered medications on file prior to encounter.     Current Outpatient Medications on File Prior to Encounter   Medication Sig Dispense Refill    aspirin (ECOTRIN) 81 MG EC tablet Take 1 tablet (81 mg total) by mouth once daily. 90 tablet 3    atorvastatin (LIPITOR) 80 MG tablet Take 1 tablet (80 mg total) by mouth every evening. (Patient taking differently: Take 80 mg by mouth once daily.) 90 tablet 3    chlorthalidone (HYGROTEN) 25 MG Tab Take 1 tablet (25 mg total) by mouth once daily. 90 tablet 1    clopidogreL (PLAVIX) 75 mg tablet Take 1 tablet (75 mg total) by mouth once daily. 90 tablet 0    insulin (LANTUS SOLOSTAR U-100 INSULIN) glargine 100 units/mL SubQ pen Inject 12 Units into the skin once daily. (Patient taking differently: Inject 12 Units into the skin every evening.) 15 mL 1    linaCLOtide (LINZESS) 72 mcg Cap capsule Take 1 capsule (72 mcg total) by mouth before breakfast. (Patient taking differently: Take 72 mcg by mouth daily as needed.) 30 each 5    metFORMIN (GLUCOPHAGE) 500 MG tablet Take 1 tablet (500 mg total) by mouth 2 " "(two) times daily with meals. 180 tablet 3    MIRENA 20 mcg/24 hr (5 years) IUD 1 each by Intrauterine route once.      NIFEdipine (PROCARDIA-XL) 90 MG (OSM) 24 hr tablet Take 1 tablet (90 mg total) by mouth once daily. 90 tablet 1    olmesartan (BENICAR) 40 MG tablet Take 1 tablet (40 mg total) by mouth once daily. 90 tablet 1    semaglutide (OZEMPIC) 1 mg/dose (4 mg/3 mL) Inject 1 mg into the skin every 7 days. (Patient taking differently: Inject 0.5 mg into the skin every Thursday.) 3 mL 2    blood sugar diagnostic Strp To check Blood glucose two times daily 200 each 2    blood-glucose meter Misc Use to check blood glucose 1 each 0    lancets 30 gauge Misc Use to test twice daily 200 each 2    methocarbamoL (ROBAXIN) 500 MG Tab Take 1 tablet (500 mg total) by mouth 4 (four) times daily. for 10 days 40 tablet 0    pen needle, diabetic (BD ULTRA-FINE SHORT PEN NEEDLE) 31 gauge x 5/16" Ndle 1 Needle by Misc.(Non-Drug; Combo Route) route once daily. 100 each 2       Please address this information as you see fit.  Feel free to contact us if you have any questions or require assistance.    Stanislaw Hills, PharmD  [phone]     "

## 2024-01-11 NOTE — ASSESSMENT & PLAN NOTE
History of multiple CVAs, currently on ASA, plavix, and atorvastatin    Plan:  Continue home ASA/plavix

## 2024-01-11 NOTE — PLAN OF CARE
Problem: Adult Inpatient Plan of Care  Goal: Plan of Care Review  1/11/2024 1326 by Naomi Masters, RN  Outcome: Met  1/11/2024 1017 by Naomi Masters, RN  Outcome: Ongoing, Progressing  Goal: Patient-Specific Goal (Individualized)  Outcome: Met  Goal: Absence of Hospital-Acquired Illness or Injury  Outcome: Met  Goal: Optimal Comfort and Wellbeing  Outcome: Met  Goal: Readiness for Transition of Care  Outcome: Met     Problem: Diabetes Comorbidity  Goal: Blood Glucose Level Within Targeted Range  Outcome: Met     Problem: Adjustment to Illness (Gastrointestinal Bleeding)  Goal: Optimal Coping with Acute Illness  Outcome: Met     Problem: Bleeding (Gastrointestinal Bleeding)  Goal: Hemostasis  Outcome: Met     Problem: Heart Failure Comorbidity  Goal: Maintenance of Heart Failure Symptom Control  Outcome: Met     Problem: Hypertension Comorbidity  Goal: Blood Pressure in Desired Range  Outcome: Met

## 2024-01-11 NOTE — ASSESSMENT & PLAN NOTE
Patient's anemia is currently controlled. Has not received any PRBCs to date. Etiology likely d/t acute blood loss which was from ulcer, was found to be non bleeding on EGD  Current CBC reviewed-   Lab Results   Component Value Date    HGB 8.6 (L) 01/11/2024    HCT 25.0 (L) 01/11/2024     Monitor serial CBC and transfuse if patient becomes hemodynamically unstable, symptomatic or H/H drops below 7/21.

## 2024-01-11 NOTE — ASSESSMENT & PLAN NOTE
Patient with bilateral ear exudate and tenderness to palpation     Plan:  Ear drops bilaterally, cipro on inpatient.

## 2024-01-11 NOTE — ASSESSMENT & PLAN NOTE
Patient with known CAD s/p stent placement, which is controlled Will continue ASA, Plavix, and Statin and monitor for S/Sx of angina/ACS. Continue to monitor on telemetry.     Plan:  restarting ASA., plavix   H/H stable this morning  Continue home statin

## 2024-01-11 NOTE — ASSESSMENT & PLAN NOTE
Patient with history of melena, associated with fatigue, dizziness and nausea. Patient arrived with Hgb of 10.7 with a baseline of 14.    EGD on 1/9/24 showed a nonbleeding ulcer.     Plan:  Consult GI, appreciate recs  2 large bore IVs  Oral protonix for 3 weeks per GI  Hgb goals > 8 in history of CAD  F/u ferritin, TIBC, iron studies

## 2024-01-11 NOTE — ASSESSMENT & PLAN NOTE
Patient's FSGs are controlled on current medication regimen.  Last A1c reviewed-   Lab Results   Component Value Date    HGBA1C 6.5 (H) 01/04/2024     Most recent fingerstick glucose reviewed-   Recent Labs   Lab 01/10/24  1519 01/10/24  2031 01/11/24  0543   POCTGLUCOSE 124* 156* 109       Current correctional scale  Medium  Maintain anti-hyperglycemic dose as follows-   Antihyperglycemics (From admission, onward)    Start     Stop Route Frequency Ordered    01/08/24 1630  insulin aspart U-100 pen 1-10 Units         -- SubQ Before meals & nightly PRN 01/08/24 1532        Hold Oral hypoglycemics while patient is in the hospital.    Plan:  Holding nightly insulin due to hypoglycemia, only on SSI at this time

## 2024-01-11 NOTE — SUBJECTIVE & OBJECTIVE
Interval History: no acute events overnight, no dark tarry stools. Hgb stable from last night. Still endorses ear pain.     Review of Systems   Constitutional:  Negative for chills and fever.   Respiratory:  Negative for shortness of breath.    Cardiovascular:  Negative for chest pain.   Gastrointestinal:  Negative for abdominal distention, abdominal pain, blood in stool, diarrhea, nausea and vomiting.   Genitourinary:  Negative for dysuria and flank pain.   Skin:  Negative for pallor and rash.   Neurological:  Negative for dizziness, light-headedness and headaches.   Psychiatric/Behavioral:  Negative for confusion.      Objective:     Vital Signs (Most Recent):  Temp: 98.2 °F (36.8 °C) (01/11/24 0746)  Pulse: 86 (01/11/24 0746)  Resp: 18 (01/11/24 0746)  BP: 109/68 (01/11/24 0746)  SpO2: 98 % (01/11/24 0746) Vital Signs (24h Range):  Temp:  [97.5 °F (36.4 °C)-98.3 °F (36.8 °C)] 98.2 °F (36.8 °C)  Pulse:  [76-94] 86  Resp:  [18] 18  SpO2:  [97 %-99 %] 98 %  BP: (106-115)/(55-68) 109/68     Weight: 82.4 kg (181 lb 10.5 oz)  Body mass index is 27.62 kg/m².  No intake or output data in the 24 hours ending 01/11/24 1151      Physical Exam  Vitals and nursing note reviewed.   Constitutional:       Appearance: Normal appearance.   HENT:      Head: Normocephalic and atraumatic.      Ears:      Comments: Exudate on ears bilaterally, tenderness to palpation of pinna and tragus     Nose: Nose normal.      Mouth/Throat:      Mouth: Mucous membranes are moist.   Eyes:      General: No scleral icterus.     Extraocular Movements: Extraocular movements intact.      Pupils: Pupils are equal, round, and reactive to light.   Cardiovascular:      Rate and Rhythm: Normal rate and regular rhythm.      Pulses: Normal pulses.      Heart sounds: Normal heart sounds.   Pulmonary:      Effort: Pulmonary effort is normal.      Breath sounds: Normal breath sounds.   Abdominal:      General: Abdomen is flat. There is no distension.       Tenderness: There is no abdominal tenderness.   Musculoskeletal:      Cervical back: Normal range of motion.      Right lower leg: No edema.      Left lower leg: No edema.   Skin:     General: Skin is warm.      Coloration: Skin is not pale.   Neurological:      General: No focal deficit present.      Mental Status: She is alert and oriented to person, place, and time.             Significant Labs: All pertinent labs within the past 24 hours have been reviewed.  CBC:   Recent Labs   Lab 01/10/24  0331 01/10/24  1323 01/11/24  0322   WBC 5.67  --  5.90   HGB 8.9* 9.1* 8.6*   HCT 25.9* 25.9* 25.0*     --  221     CMP:   Recent Labs   Lab 01/10/24  0331 01/11/24  0322    138   K 4.0 3.6    106   CO2 22* 25    124*   BUN 25* 16   CREATININE 0.9 0.8   CALCIUM 8.9 8.6*   PROT 6.7 6.6   ALBUMIN 3.7 3.7   BILITOT 0.8 0.4   ALKPHOS 72 102   AST 34 26   ALT 35 28   ANIONGAP 11 7*       Significant Imaging: I have reviewed all pertinent imaging results/findings within the past 24 hours.

## 2024-01-11 NOTE — PROGRESS NOTES
Discharge orders noted. Additional clinical references attached. Patient's discharge instructions given by bedside RN and reviewed via this VN.  Education provided on new and previous medications, diagnosis, and follow-up appointments.  New medications delivered by pharmacy. Patient verbalized understanding and teach back method was used. Patient's ride/transportation home at bedside. All questions answered. Transport to [facility] requested. Floor nurse notified.                01/11/24 1324    Notification    Notified Of Discharge Status   Admission   Communication Issues? None   Shift   Virtual Nurse - Rounding Complete   Virtual Nurse - Patient Verbalized Approval Of Camera Use   Safety/Activity   Patient Rounds bed in low position;call light in patient/parent reach;clutter free environment maintained;visualized patient   Safety Promotion/Fall Prevention side rails raised x 2   Positioning   Body Position supine   Head of Bed (HOB) Positioning HOB elevated

## 2024-01-11 NOTE — NURSING
"Pt informed RN and showed picture she had a formed black BM on toilet, pt denies sob, dizziness, lightheadedness. Pt states she feels  "good" and wants to go home. Notified Dr. Vaibhav MD stated pt had a black Bm on yesterday as well, and H/H have been stable. Pt will f/u with GI. Pt ok to go home. Educated pt if pt persists to have dark BM's and feels lightheaded, weak, or sob to come to the ED. Dr. Esposito in agreement.    "

## 2024-01-11 NOTE — PROGRESS NOTES
Future Appointments   Date Time Provider Department Center   1/24/2024  2:00 PM Meir Lujan PA-C [facility]   2/15/2024  9:00 AM Lucas Worthington FNP [facility]

## 2024-01-11 NOTE — PLAN OF CARE
"  Problem: Adult Inpatient Plan of Care  Goal: Plan of Care Review  Outcome: Ongoing, Progressing     Problem: Adult Inpatient Plan of Care  Goal: Optimal Comfort and Wellbeing  Outcome: Ongoing, Progressing     Problem: Diabetes Comorbidity  Goal: Blood Glucose Level Within Targeted Range  Outcome: Ongoing, Progressing     Problem: Bleeding (Gastrointestinal Bleeding)  Goal: Hemostasis  Outcome: Ongoing, Progressing     .Plan of care reviewed with the patient. Scheduled medicines given and the patient tolerated well. Given Diphenhydramine 50 mg for insomnia. Fall and safety precautions taken and the standard interventions are in place. On Telemetry monitoring with NSR, no true "red alarms' noted, no acute distress reported either in the shift. Patient's Accu Check was 156 mg/dl, covered with a unit Insulin Aspart. Advised the patient to call for the assistance. Continued monitoring the patient.   "

## 2024-01-11 NOTE — ASSESSMENT & PLAN NOTE
Chronic, controlled. Latest blood pressure and vitals reviewed-     Temp:  [97.5 °F (36.4 °C)-98.3 °F (36.8 °C)]   Pulse:  [76-94]   Resp:  [18]   BP: (106-115)/(55-68)   SpO2:  [97 %-99 %] .   Home meds for hypertension were reviewed and noted below.   Hypertension Medications               chlorthalidone (HYGROTEN) 25 MG Tab Take 1 tablet (25 mg total) by mouth once daily.    NIFEdipine (PROCARDIA-XL) 90 MG (OSM) 24 hr tablet Take 1 tablet (90 mg total) by mouth once daily.    olmesartan (BENICAR) 40 MG tablet Take 1 tablet (40 mg total) by mouth once daily.            While in the hospital, will manage blood pressure as follows; Adjust home antihypertensive regimen as follows- Arrived in ED hypotensive, will hold hypertension meds in hypotension and add PRN      Will utilize p.r.n. blood pressure medication only if patient's blood pressure greater than 180/110 and she develops symptoms such as worsening chest pain or shortness of breath.

## 2024-01-17 NOTE — DISCHARGE SUMMARY
[facility] Medicine  Discharge Summary      Patient Name: Josefina Martin  MRN: [id number]  PRISCILA: [id number]  Patient Class: OP- Observation  Admission Date: 1/8/2024  Hospital Length of Stay: 0 days  Discharge Date and Time: 1/11/2024  2:27 PM  Attending Physician: Dr. Jeronimo  Discharging Provider: Reuben Esposito MD  Primary Care Provider: Margarito Ennis DO    Primary Care Team: Networked reference to record PCT     HPI:   Patient is a 48 y.o.-year-old female w/ PMHx multiple CVA, HTN, HLD, DM (A1c 6.5 on 1/4/24), MI/CAD s/p multiple stents, CHF (55%EF grade 1 diastolic dysfunction 8/17/21) who presents to the ED with reports of 1 day of black diarrhea with a total of 5 episodes of diarrhea. Her diarrhea is associated with nausea and dizziness. She also endorsed decreased blood pressure at home. She denies any fevers, chest pain, shortness of breath.      In the ED, initial vitals Temp 97.5F, BP 96/57, , RR 20, SpO2 99% on room air. CBC significant for H/H of 10.7/30.6 (baseline 14/41), CMP significant for BUN of 70. And CrCl cannot be calculated (Unknown ideal weight.). troponin negative.   EKG showed normal sinus rhythm.   In the ED patient was treated with zofran, IV protonix, and 1L NS bolus. [facility] admitted patient for acute GI bleed.       Procedure(s) (LRB):  EGD (ESOPHAGOGASTRODUODENOSCOPY) (N/A)      Hospital Course:   HPI as above. On first night patient's hgb overnight dropped from 10 to 8.7 overnight and normalized to 9. Patient still had dizziness. Gastroenterology was consulted and patient was started on protonix. ASA and plavix were held in the context of an acute GI bleed. GI took patient for an EGD on 1/9 which showed evidence of a non bleeding ulcer. GI recommended oral protonix for 3 weeks and a follow up with outpatient GI appointment. Before discharge patient was found to have bilateral ear exudate and tenderness to palpation of her right pinna and tragus. Ear drops were  prescribed bilaterally and planned patient to be discharged with ear drops. Before discharge patient was also restarted on ASA and plavix.      On the day of discharge patient was back to baseline and hemodynamically stable. She was sent home to resume home meds. Added neomycin otic drops and 3 weeks of protonix (refer below) Education was provided about GI bleeds and ear infections. She agreed to finish course of protonix and otic drops and follow up with GI. Patient will need to follow up with PCP for hospital D/C Follow up. Patient agreeable to discharge plan, expressed understanding, all questions answered, return precautions were discussed.         Goals of Care Treatment Preferences:  Code Status: Full Code      Consults:   Consults (From admission, onward)          Status Ordering Provider     Inpatient consult to Gastroenterology-[facility]  Once        Provider:  (Not yet assigned)    MOE Hernández            No new Assessment & Plan notes have been filed under this hospital service since the last note was generated.  Service: Hospital Medicine    Final Active Diagnoses:    Diagnosis Date Noted POA    PRINCIPAL PROBLEM:  GI bleed [K92.2] 01/08/2024 Yes    Otitis externa [H60.90] 01/10/2024 Unknown    Acute blood loss anemia [D62] 01/09/2024 Unknown    History of CVA (cerebrovascular accident) [Z86.73] 09/21/2022 Not Applicable    Diabetes mellitus [E11.9] 07/17/2022 Yes    Coronary artery disease due to lipid rich plaque [I25.10, I25.83] 07/17/2022 Yes    Chronic diastolic heart failure [I50.32] 08/20/2020 Yes    Mixed hyperlipidemia [E78.2] 01/26/2017 Yes    Essential hypertension [I10] 03/15/2016 Yes      Problems Resolved During this Admission:       Discharged Condition: good    Disposition: Home or Self Care    Follow Up:   Follow-up Information       [facility] Follow up on 1/24/2024.    Specialty: Family Medicine  Why: 2:00 pm  Contact information:  [address]  [facility] [address] [phone]  Additional information:  Please park in [address] or [address] and use [place]. Take [facility]. elevators.             Lucas Worthington FNP Follow up on 2/15/2024.    Specialty: Gastroenterology  Why: 9:00 am  Contact information:  [address]   [address]  [facility] [address]  [phone]                           Patient Instructions:      Ambulatory referral/consult to Gastroenterology   Standing Status: Future   Referral Priority: Routine Referral Type: Consultation   Referral Reason: Specialty Services Required   Requested Specialty: Gastroenterology   Number of Visits Requested: 1     Diet diabetic     Notify your health care provider if you experience any of the following:  temperature >100.4     Notify your health care provider if you experience any of the following:  persistent nausea and vomiting or diarrhea     Notify your health care provider if you experience any of the following:  severe uncontrolled pain     Notify your health care provider if you experience any of the following:  redness, tenderness, or signs of infection (pain, swelling, redness, odor or green/yellow discharge around incision site)     Notify your health care provider if you experience any of the following:  difficulty breathing or increased cough     Notify your health care provider if you experience any of the following:  severe persistent headache     Notify your health care provider if you experience any of the following:  worsening rash     Notify your health care provider if you experience any of the following:  persistent dizziness, light-headedness, or visual disturbances     Notify your health care provider if you experience any of the following:  increased confusion or weakness     Activity as tolerated       Significant Diagnostic Studies: N/A    Pending Diagnostic Studies:       None           Medications:  Reconciled Home Medications:      Medication  "List        START taking these medications      neomycin-polymyxin-hydrocortisone 3.5-10,000-1 mg/mL-unit/mL-% otic suspension  Commonly known as: CORTISPORIN  Place 3 drops into both ears 3 (three) times daily. for 6 days     pantoprazole 40 MG tablet  Commonly known as: PROTONIX  Take 1 tablet (40 mg total) by mouth once daily.            CHANGE how you take these medications      atorvastatin 80 MG tablet  Commonly known as: LIPITOR  Take 1 tablet (80 mg total) by mouth every evening.  What changed: when to take this     insulin glargine 100 units/mL SubQ pen  Commonly known as: LANTUS SOLOSTAR U-100 INSULIN  Inject 12 Units into the skin once daily.  What changed: when to take this            CONTINUE taking these medications      aspirin 81 MG EC tablet  Commonly known as: ECOTRIN  Take 1 tablet (81 mg total) by mouth once daily.     BD ULTRA-FINE SHORT PEN NEEDLE 31 gauge x 5/16" Ndle  Generic drug: pen needle, diabetic  1 Needle by Misc.(Non-Drug; Combo Route) route once daily.     chlorthalidone 25 MG Tab  Commonly known as: HYGROTEN  Take 1 tablet (25 mg total) by mouth once daily.     clopidogreL 75 mg tablet  Commonly known as: PLAVIX  Take 1 tablet (75 mg total) by mouth once daily.     EASY TOUCH TWIST LANCETS 30 gauge Misc  Generic drug: lancets  Use to test twice daily     LINZESS 72 mcg Cap capsule  Generic drug: linaCLOtide  Take 1 capsule (72 mcg total) by mouth before breakfast.     metFORMIN 500 MG tablet  Commonly known as: GLUCOPHAGE  Take 1 tablet (500 mg total) by mouth 2 (two) times daily with meals.     MIRENA 21 mcg/24 hours (8 yrs) 52 mg IUD  Generic drug: levonorgestreL  1 each by Intrauterine route once.     NIFEdipine 90 MG (OSM) 24 hr tablet  Commonly known as: PROCARDIA-XL  Take 1 tablet (90 mg total) by mouth once daily.     olmesartan 40 MG tablet  Commonly known as: BENICAR  Take 1 tablet (40 mg total) by mouth once daily.     OZEMPIC 1 mg/dose (4 mg/3 mL)  Generic drug: " semaglutide  Inject 1 mg into the skin every 7 days.     TRUE METRIX GLUCOSE METER Misc  Generic drug: blood-glucose meter  Use to check blood glucose     TRUE METRIX GLUCOSE TEST STRIP Strp  Generic drug: blood sugar diagnostic  To check Blood glucose two times daily            ASK your doctor about these medications      methocarbamoL 500 MG Tab  Commonly known as: ROBAXIN  Take 1 tablet (500 mg total) by mouth 4 (four) times daily. for 10 days  Ask about: Should I take this medication?              Indwelling Lines/Drains at time of discharge:   Lines/Drains/Airways       None                   Time spent on the discharge of patient: 15 minutes    Reuben Esposito MD  \[facility]\"" Family Medicine, PGY-1  01/16/2024

## 2024-01-23 DIAGNOSIS — Z86.73 HISTORY OF CVA (CEREBROVASCULAR ACCIDENT): ICD-10-CM

## 2024-01-24 RX ORDER — ASPIRIN 81 MG/1
81 TABLET ORAL DAILY
Qty: 90 TABLET | Refills: 3 | Status: SHIPPED | OUTPATIENT
Start: 2024-01-24 | End: 2025-01-23

## 2024-01-31 ENCOUNTER — OFFICE VISIT (OUTPATIENT)
Dept: FAMILY MEDICINE | Facility: HOSPITAL | Age: 49
End: 2024-01-31
Attending: FAMILY MEDICINE
Payer: MEDICAID

## 2024-01-31 DIAGNOSIS — Z87.11 HISTORY OF GASTRIC ULCER: ICD-10-CM

## 2024-01-31 DIAGNOSIS — E11.65 UNCONTROLLED TYPE 2 DIABETES MELLITUS WITH HYPERGLYCEMIA: ICD-10-CM

## 2024-01-31 DIAGNOSIS — Z87.891 HISTORY OF TOBACCO ABUSE: ICD-10-CM

## 2024-01-31 DIAGNOSIS — Z09 HOSPITAL DISCHARGE FOLLOW-UP: Primary | ICD-10-CM

## 2024-01-31 DIAGNOSIS — K59.04 CHRONIC IDIOPATHIC CONSTIPATION: ICD-10-CM

## 2024-01-31 PROBLEM — Z72.0 TOBACCO ABUSE: Status: RESOLVED | Noted: 2021-08-17 | Resolved: 2024-01-31

## 2024-01-31 PROCEDURE — 99211 OFF/OP EST MAY X REQ PHY/QHP: CPT | Performed by: FAMILY MEDICINE

## 2024-01-31 RX ORDER — SEMAGLUTIDE 1.34 MG/ML
1 INJECTION, SOLUTION SUBCUTANEOUS
Qty: 3 ML | Refills: 2 | Status: SHIPPED | OUTPATIENT
Start: 2024-01-31 | End: 2024-05-02 | Stop reason: SDUPTHER

## 2024-01-31 RX ORDER — INSULIN GLARGINE 100 [IU]/ML
12 INJECTION, SOLUTION SUBCUTANEOUS DAILY
Qty: 15 ML | Refills: 1 | Status: SHIPPED | OUTPATIENT
Start: 2024-01-31 | End: 2025-01-30

## 2024-01-31 NOTE — PROGRESS NOTES
Subjective:       Patient ID: Josefina Martin is a 48 y.o. female.    Chief Complaint: No chief complaint on file.    48 y.o.-year-old female w/ PMHx multiple CVA, HTN, HLD, DM (A1c 6.5 on 1/4/24), MI/CAD s/p multiple stents, CHF (55%EF grade 1 diastolic dysfunction 8/17/21) who is here for hospital follow up from an episode of heavy dark stools on 1/8/2024 found to have a gastric ulceration as suspected source of GI bleed. By time of discharge on 1/11/2024 her hgb was 8.6 not requiring transfusions with plans to follow up with GI on 2/15. Denies Subsequent dark stools although does have periodic bright red blood on tissue. Hx of longstanding constipation. Hard painful stools for which she takes linzess without relief. Has a BM every 3 days on average.Feeling well now aside from some overall fatigue. No CP or SOB. Since discharge she has resumed her plavix and aspirin.       Review of Systems    Objective:      There were no vitals filed for this visit.  Physical Exam  Vitals and nursing note reviewed.   Constitutional:       General: She is not in acute distress.     Appearance: She is well-developed. She is not ill-appearing.   HENT:      Head: Normocephalic and atraumatic.      Nose: Nose normal.   Eyes:      Conjunctiva/sclera: Conjunctivae normal.   Cardiovascular:      Rate and Rhythm: Normal rate and regular rhythm.      Heart sounds: Normal heart sounds.   Pulmonary:      Effort: Pulmonary effort is normal. No respiratory distress.      Breath sounds: Normal breath sounds. No wheezing or rales.   Abdominal:      General: There is no distension.      Palpations: Abdomen is soft.      Tenderness: There is no abdominal tenderness.   Neurological:      Mental Status: She is alert.      Cranial Nerves: No cranial nerve deficit.             Lab Results   Component Value Date     01/11/2024    K 3.6 01/11/2024     01/11/2024    CO2 25 01/11/2024    BUN 16 01/11/2024    BUN 18 (H) 10/05/2015     CREATININE 0.8 01/11/2024    ANIONGAP 7 (L) 01/11/2024     Lab Results   Component Value Date    HGBA1C 6.5 (H) 01/04/2024     Lab Results   Component Value Date    BNP <10 10/24/2019    BNP 41 05/07/2019    BNP 12 03/24/2017       Lab Results   Component Value Date    WBC 5.90 01/11/2024    HGB 8.6 (L) 01/11/2024    HCT 25.0 (L) 01/11/2024     01/11/2024    GRAN 2.4 01/11/2024    GRAN 41.3 01/11/2024     Lab Results   Component Value Date    CHOL 116 (L) 01/04/2024    HDL 32 (L) 01/04/2024    LDLCALC 43.0 (L) 01/04/2024    TRIG 205 (H) 01/04/2024          Current Outpatient Medications:     aspirin (ECOTRIN) 81 MG EC tablet, Take 1 tablet (81 mg total) by mouth once daily., Disp: 90 tablet, Rfl: 3    atorvastatin (LIPITOR) 80 MG tablet, Take 1 tablet (80 mg total) by mouth every evening. (Patient taking differently: Take 80 mg by mouth once daily.), Disp: 90 tablet, Rfl: 3    blood sugar diagnostic Strp, To check Blood glucose two times daily, Disp: 200 each, Rfl: 2    blood-glucose meter Misc, Use to check blood glucose, Disp: 1 each, Rfl: 0    chlorthalidone (HYGROTEN) 25 MG Tab, Take 1 tablet (25 mg total) by mouth once daily., Disp: 90 tablet, Rfl: 1    clopidogreL (PLAVIX) 75 mg tablet, Take 1 tablet (75 mg total) by mouth once daily., Disp: 90 tablet, Rfl: 0    insulin (LANTUS SOLOSTAR U-100 INSULIN) glargine 100 units/mL SubQ pen, Inject 12 Units into the skin once daily. (Patient taking differently: Inject 12 Units into the skin every evening.), Disp: 15 mL, Rfl: 1    lancets 30 gauge Misc, Use to test twice daily, Disp: 200 each, Rfl: 2    linaCLOtide (LINZESS) 72 mcg Cap capsule, Take 1 capsule (72 mcg total) by mouth before breakfast. (Patient taking differently: Take 72 mcg by mouth daily as needed.), Disp: 30 each, Rfl: 5    metFORMIN (GLUCOPHAGE) 500 MG tablet, Take 1 tablet (500 mg total) by mouth 2 (two) times daily with meals., Disp: 180 tablet, Rfl: 3    MIRENA 20 mcg/24 hr (5 years) IUD, 1  "each by Intrauterine route once., Disp: , Rfl:     NIFEdipine (PROCARDIA-XL) 90 MG (OSM) 24 hr tablet, Take 1 tablet (90 mg total) by mouth once daily., Disp: 90 tablet, Rfl: 1    olmesartan (BENICAR) 40 MG tablet, Take 1 tablet (40 mg total) by mouth once daily., Disp: 90 tablet, Rfl: 1    pantoprazole (PROTONIX) 40 MG tablet, Take 1 tablet (40 mg total) by mouth once daily., Disp: 90 tablet, Rfl: 0    pen needle, diabetic (BD ULTRA-FINE SHORT PEN NEEDLE) 31 gauge x 5/16" Ndle, 1 Needle by Misc.(Non-Drug; Combo Route) route once daily., Disp: 100 each, Rfl: 2    semaglutide (OZEMPIC) 1 mg/dose (4 mg/3 mL), Inject 1 mg into the skin every 7 days. (Patient taking differently: Inject 0.5 mg into the skin every Thursday.), Disp: 3 mL, Rfl: 2        Assessment:       1. Hospital discharge follow-up    2. History of gastric ulcer    3. History of tobacco abuse    4. Chronic idiopathic constipation           Plan:       Hospital discharge follow-up  Dced on 1/11/2024 for suspected resolved gastric ulcer bleeding. Back on asprin and plavix with no dark stools. Only BBR when wiping that may be partly due to her constipation. H/H ordered.some ongoing fatigue only.     History of gastric ulcer  As above. Per GI continue to take protonix./ apt with GI on 2/15.     History of tobacco abuse  None since 2022  Chronic idiopathic constipation  Longstanding for which she takes linzess with no relief. Reviewed dietary contributors as well as psyllium husk she may benefit from. To discuss more with GI.               "

## 2024-02-02 DIAGNOSIS — M25.511 ACUTE PAIN OF RIGHT SHOULDER: ICD-10-CM

## 2024-02-02 DIAGNOSIS — M54.50 ACUTE MIDLINE LOW BACK PAIN WITHOUT SCIATICA: ICD-10-CM

## 2024-02-02 RX ORDER — METHOCARBAMOL 500 MG/1
500 TABLET, FILM COATED ORAL 4 TIMES DAILY
Qty: 40 TABLET | Refills: 0 | Status: SHIPPED | OUTPATIENT
Start: 2024-02-02 | End: 2024-02-12

## 2024-02-05 PROBLEM — Z86.79 HISTORY OF CHF (CONGESTIVE HEART FAILURE): Status: ACTIVE | Noted: 2024-02-05

## 2024-02-15 ENCOUNTER — OFFICE VISIT (OUTPATIENT)
Dept: GASTROENTEROLOGY | Facility: CLINIC | Age: 49
End: 2024-02-15
Payer: MEDICAID

## 2024-02-15 VITALS
SYSTOLIC BLOOD PRESSURE: 111 MMHG | BODY MASS INDEX: 25.9 KG/M2 | WEIGHT: 170.88 LBS | OXYGEN SATURATION: 100 % | HEIGHT: 68 IN | DIASTOLIC BLOOD PRESSURE: 78 MMHG | HEART RATE: 94 BPM

## 2024-02-15 DIAGNOSIS — Z86.19 HISTORY OF HELICOBACTER PYLORI INFECTION: ICD-10-CM

## 2024-02-15 DIAGNOSIS — I25.10 CORONARY ARTERY DISEASE INVOLVING NATIVE CORONARY ARTERY OF NATIVE HEART WITHOUT ANGINA PECTORIS: ICD-10-CM

## 2024-02-15 DIAGNOSIS — K59.04 CHRONIC IDIOPATHIC CONSTIPATION: ICD-10-CM

## 2024-02-15 DIAGNOSIS — I50.32 CHRONIC DIASTOLIC HEART FAILURE: ICD-10-CM

## 2024-02-15 DIAGNOSIS — K92.1 GASTROINTESTINAL HEMORRHAGE WITH MELENA: Primary | ICD-10-CM

## 2024-02-15 PROCEDURE — 1160F RVW MEDS BY RX/DR IN RCRD: CPT | Mod: CPTII,,, | Performed by: NURSE PRACTITIONER

## 2024-02-15 PROCEDURE — 3078F DIAST BP <80 MM HG: CPT | Mod: CPTII,,, | Performed by: NURSE PRACTITIONER

## 2024-02-15 PROCEDURE — 3044F HG A1C LEVEL LT 7.0%: CPT | Mod: CPTII,,, | Performed by: NURSE PRACTITIONER

## 2024-02-15 PROCEDURE — 3074F SYST BP LT 130 MM HG: CPT | Mod: CPTII,,, | Performed by: NURSE PRACTITIONER

## 2024-02-15 PROCEDURE — 1159F MED LIST DOCD IN RCRD: CPT | Mod: CPTII,,, | Performed by: NURSE PRACTITIONER

## 2024-02-15 PROCEDURE — 99214 OFFICE O/P EST MOD 30 MIN: CPT | Mod: S$PBB,,, | Performed by: NURSE PRACTITIONER

## 2024-02-15 PROCEDURE — 99999 PR PBB SHADOW E&M-EST. PATIENT-LVL V: CPT | Mod: PBBFAC,,, | Performed by: NURSE PRACTITIONER

## 2024-02-15 PROCEDURE — 4010F ACE/ARB THERAPY RXD/TAKEN: CPT | Mod: CPTII,,, | Performed by: NURSE PRACTITIONER

## 2024-02-15 PROCEDURE — 3008F BODY MASS INDEX DOCD: CPT | Mod: CPTII,,, | Performed by: NURSE PRACTITIONER

## 2024-02-15 PROCEDURE — 99215 OFFICE O/P EST HI 40 MIN: CPT | Mod: PBBFAC,PN | Performed by: NURSE PRACTITIONER

## 2024-02-15 RX ORDER — AMLODIPINE BESYLATE 5 MG/1
1 TABLET ORAL DAILY
COMMUNITY

## 2024-02-15 RX ORDER — GABAPENTIN 100 MG/1
1 CAPSULE ORAL DAILY
COMMUNITY

## 2024-02-15 RX ORDER — DICYCLOMINE HYDROCHLORIDE 20 MG/1
TABLET ORAL
COMMUNITY

## 2024-02-15 NOTE — PROGRESS NOTES
Subjective:       Patient ID: Josefina Martin is a 48 y.o. female.    Chief Complaint: Colonoscopy, Constipation, and Melena    49 y/o female with hypertension, diabetes mellitus, and hx MI, H. pylori and gastric ulcer presents to clinic for hospital follow up. Patient presented to ED last with c/o melena. Was found to have gastric ulcer on EGD. No recurrent dark stool but does report intermittent BRBPR after BM. States she is constipated and passing stool is difficult. She is prescribed Linzess but only taking prn.            Past Medical History:   Diagnosis Date    Cerebrovascular accident (CVA) 3/24/2017    CHF (congestive heart failure)     Diabetes mellitus     Hypertension     MI (myocardial infarction)     Stroke        Past Surgical History:   Procedure Laterality Date    CHOLECYSTECTOMY  2013    history of cholelithiasis    ESOPHAGOGASTRODUODENOSCOPY N/A 10/21/2020    Procedure: EGD (ESOPHAGOGASTRODUODENOSCOPY);  Surgeon: Asha Blackwell MD;  Location: [facility];  Service: Endoscopy;  Laterality: N/A;    ESOPHAGOGASTRODUODENOSCOPY N/A 6/15/2021    Procedure: EGD (ESOPHAGOGASTRODUODENOSCOPY);  Surgeon: Asha Blackwell MD;  Location: [facility];  Service: Endoscopy;  Laterality: N/A;    ESOPHAGOGASTRODUODENOSCOPY N/A 1/9/2024    Procedure: EGD (ESOPHAGOGASTRODUODENOSCOPY);  Surgeon: Frantz Tolliver MD;  Location: [facility];  Service: Gastroenterology;  Laterality: N/A;    TUBAL LIGATION         Family History   Problem Relation Age of Onset    Hypertension Mother     Lung cancer Mother     No Known Problems Father     No Known Problems Sister     No Known Problems Daughter     Diabetes Son 14        Takes 2 insulins and metformin use to be bigger wally    Stroke Maternal Uncle 48    Anuerysm Maternal Grandmother     Breast cancer Maternal Grandmother     No Known Problems Sister     No Known Problems Daughter     No Known Problems Son     Breast cancer Maternal Aunt     Breast cancer Maternal  Aunt        Social History     Socioeconomic History    Marital status: Single   Occupational History     Employer: [employer]   Tobacco Use    Smoking status: Former     Current packs/day: 0.15     Average packs/day: 0.2 packs/day for 18.0 years (2.7 ttl pk-yrs)     Types: Cigarettes    Smokeless tobacco: Never    Tobacco comments:     1 pack/week   Substance and Sexual Activity    Alcohol use: Yes     Comment: social 1/month    Drug use: No    Sexual activity: Yes     Partners: Male     Birth control/protection: None, Surgical     Social Determinants of Health     Financial Resource Strain: Medium Risk (1/31/2024)    Overall Financial Resource Strain (CARDIA)     Difficulty of Paying Living Expenses: Somewhat hard   Food Insecurity: Food Insecurity Present (1/31/2024)    Hunger Vital Sign     Worried About Running Out of Food in the Last Year: Sometimes true     Ran Out of Food in the Last Year: Never true   Transportation Needs: No Transportation Needs (1/31/2024)    PRAPARE - Transportation     Lack of Transportation (Medical): No     Lack of Transportation (Non-Medical): No   Physical Activity: Sufficiently Active (1/31/2024)    Exercise Vital Sign     Days of Exercise per Week: 5 days     Minutes of Exercise per Session: 40 min   Recent Concern: Physical Activity - Insufficiently Active (1/9/2024)    Exercise Vital Sign     Days of Exercise per Week: 3 days     Minutes of Exercise per Session: 20 min   Stress: Stress Concern Present (1/31/2024)    [place] [employer] - Occupational Stress Questionnaire     Feeling of Stress : Very much   Social Connections: Unknown (1/31/2024)    Social Connection and Isolation Panel [NHANES]     Frequency of Communication with Friends and Family: More than three times a week     Frequency of Social Gatherings with Friends and Family: Once a week     Active Member of Clubs or Organizations: Yes     Marital Status:    Housing Stability: Low Risk   "(1/31/2024)    Housing Stability Vital Sign     Unable to Pay for Housing in the Last Year: No     Number of Places Lived in the Last Year: 1     Unstable Housing in the Last Year: No   Recent Concern: Housing Stability - High Risk (1/9/2024)    Housing Stability Vital Sign     Unable to Pay for Housing in the Last Year: Yes       Review of Systems   Constitutional:  Negative for appetite change, fatigue, fever and unexpected weight change.   HENT:  Negative for trouble swallowing.    Respiratory:  Negative for cough and shortness of breath.    Cardiovascular:  Negative for chest pain.   Gastrointestinal:  Positive for constipation. Negative for abdominal pain and rectal pain.   Genitourinary:  Negative for dysuria.   Musculoskeletal:  Negative for myalgias.   Neurological:  Negative for dizziness and weakness.   Hematological:  Negative for adenopathy.   Psychiatric/Behavioral:  Negative for dysphoric mood.          Objective:     Vitals:    02/15/24 0858   BP: 111/78   BP Location: Right arm   Patient Position: Sitting   BP Method: Medium (Automatic)   Pulse: 94   SpO2: 100%   Weight: 77.5 kg (170 lb 13.7 oz)   Height: 5' 8" (1.727 m)          Physical Exam  Constitutional:       General: She is not in acute distress.     Appearance: Normal appearance. She is not ill-appearing.   HENT:      Head: Normocephalic.   Eyes:      Conjunctiva/sclera: Conjunctivae normal.   Pulmonary:      Effort: Pulmonary effort is normal. No respiratory distress.   Musculoskeletal:         General: Normal range of motion.   Skin:     General: Skin is warm and dry.      Coloration: Skin is not jaundiced or pale.   Neurological:      Mental Status: She is alert and oriented to person, place, and time.   Psychiatric:         Mood and Affect: Mood normal.         Behavior: Behavior normal.               Assessment:         ICD-10-CM ICD-9-CM   1. Gastrointestinal hemorrhage with melena  K92.1 578.1   2. History of Helicobacter pylori " infection  Z86.19 V12.09   3. Chronic idiopathic constipation  K59.04 564.00   4. Chronic diastolic heart failure  I50.32 428.32   5. Coronary artery disease involving native coronary artery of native heart without angina pectoris  I25.10 414.01       Plan:       Gastrointestinal hemorrhage with melena        -     Continue PPI daily. Repeat EGD in 3 months to        check ulcer healing    History of Helicobacter pylori infection  -     H. pylori antigen, stool; Future; Expected date: 02/15/2024    Chronic idiopathic constipation  -     Advised to resume Linzess daily for optimal effectiveness  -     linaCLOtide (LINZESS) 72 mcg Cap capsule; Take 1 capsule (72 mcg total) by mouth before breakfast.  Dispense: 30 each; Refill: 5    Chronic diastolic heart failure  -     Ambulatory referral/consult to Cardiology; Future; Expected date: 02/23/2024    Coronary artery disease involving native coronary artery of native heart without angina pectoris  -     Ambulatory referral/consult to Cardiology; Future; Expected date: 02/23/2024      Follow up if symptoms worsen or fail to improve.     Patient's Medications   New Prescriptions    No medications on file   Previous Medications    AMLODIPINE (NORVASC) 5 MG TABLET    Take 1 tablet by mouth once daily.    ASPIRIN (ECOTRIN) 81 MG EC TABLET    Take 1 tablet (81 mg total) by mouth once daily.    ATORVASTATIN (LIPITOR) 80 MG TABLET    Take 1 tablet (80 mg total) by mouth every evening.    BLOOD SUGAR DIAGNOSTIC STRP    To check Blood glucose two times daily    BLOOD-GLUCOSE METER MISC    Use to check blood glucose    CHLORTHALIDONE (HYGROTEN) 25 MG TAB    Take 1 tablet (25 mg total) by mouth once daily.    CLOPIDOGREL (PLAVIX) 75 MG TABLET    Take 1 tablet (75 mg total) by mouth once daily.    DICYCLOMINE (BENTYL) 20 MG TABLET        GABAPENTIN (NEURONTIN) 100 MG CAPSULE    Take 1 capsule by mouth once daily.    INSULIN (LANTUS SOLOSTAR U-100 INSULIN) GLARGINE 100 UNITS/ML SUBQ  "PEN    Inject 12 Units into the skin once daily.    LANCETS 30 GAUGE MISC    Use to test twice daily    METFORMIN (GLUCOPHAGE) 500 MG TABLET    Take 1 tablet (500 mg total) by mouth 2 (two) times daily with meals.    MIRENA 20 MCG/24 HR (5 YEARS) IUD    1 each by Intrauterine route once.    NIFEDIPINE (PROCARDIA-XL) 90 MG (OSM) 24 HR TABLET    Take 1 tablet (90 mg total) by mouth once daily.    OLMESARTAN (BENICAR) 40 MG TABLET    Take 1 tablet (40 mg total) by mouth once daily.    PANTOPRAZOLE (PROTONIX) 40 MG TABLET    Take 1 tablet (40 mg total) by mouth once daily.    PEN NEEDLE, DIABETIC (BD ULTRA-FINE SHORT PEN NEEDLE) 31 GAUGE X 5/16" NDLE    1 Needle by Misc.(Non-Drug; Combo Route) route once daily.    SEMAGLUTIDE (OZEMPIC) 1 MG/DOSE (4 MG/3 ML)    Inject 1 mg into the skin every 7 days.   Modified Medications    Modified Medication Previous Medication    LINACLOTIDE (LINZESS) 72 MCG CAP CAPSULE linaCLOtide (LINZESS) 72 mcg Cap capsule       Take 1 capsule (72 mcg total) by mouth before breakfast.    Take 1 capsule (72 mcg total) by mouth before breakfast.   Discontinued Medications    No medications on file           "

## 2024-02-16 PROBLEM — B35.4 TINEA CORPORIS: Status: RESOLVED | Noted: 2021-07-28 | Resolved: 2024-02-16

## 2024-02-16 PROBLEM — B37.2 CANDIDAL INTERTRIGO: Status: RESOLVED | Noted: 2022-07-08 | Resolved: 2024-02-16

## 2024-02-21 ENCOUNTER — LAB VISIT (OUTPATIENT)
Dept: LAB | Facility: HOSPITAL | Age: 49
End: 2024-02-21
Attending: NURSE PRACTITIONER
Payer: MEDICAID

## 2024-02-21 DIAGNOSIS — Z86.19 HISTORY OF HELICOBACTER PYLORI INFECTION: ICD-10-CM

## 2024-02-21 PROCEDURE — 87338 HPYLORI STOOL AG IA: CPT | Performed by: NURSE PRACTITIONER

## 2024-02-27 LAB — H PYLORI AG STL QL IA: DETECTED

## 2024-03-08 ENCOUNTER — OFFICE VISIT (OUTPATIENT)
Dept: GASTROENTEROLOGY | Facility: CLINIC | Age: 49
End: 2024-03-08
Payer: MEDICAID

## 2024-03-08 VITALS
HEIGHT: 68 IN | BODY MASS INDEX: 25.94 KG/M2 | DIASTOLIC BLOOD PRESSURE: 81 MMHG | HEART RATE: 68 BPM | WEIGHT: 171.19 LBS | SYSTOLIC BLOOD PRESSURE: 117 MMHG

## 2024-03-08 DIAGNOSIS — E11.65 UNCONTROLLED TYPE 2 DIABETES MELLITUS WITH HYPERGLYCEMIA: ICD-10-CM

## 2024-03-08 DIAGNOSIS — A04.8 H. PYLORI INFECTION: Primary | ICD-10-CM

## 2024-03-08 DIAGNOSIS — K59.04 CHRONIC IDIOPATHIC CONSTIPATION: ICD-10-CM

## 2024-03-08 DIAGNOSIS — Z86.73 HISTORY OF CVA (CEREBROVASCULAR ACCIDENT): ICD-10-CM

## 2024-03-08 PROCEDURE — 3074F SYST BP LT 130 MM HG: CPT | Mod: CPTII,,, | Performed by: NURSE PRACTITIONER

## 2024-03-08 PROCEDURE — 99999 PR PBB SHADOW E&M-EST. PATIENT-LVL V: CPT | Mod: PBBFAC,,, | Performed by: NURSE PRACTITIONER

## 2024-03-08 PROCEDURE — 3008F BODY MASS INDEX DOCD: CPT | Mod: CPTII,,, | Performed by: NURSE PRACTITIONER

## 2024-03-08 PROCEDURE — 1159F MED LIST DOCD IN RCRD: CPT | Mod: CPTII,,, | Performed by: NURSE PRACTITIONER

## 2024-03-08 PROCEDURE — 99214 OFFICE O/P EST MOD 30 MIN: CPT | Mod: S$PBB,,, | Performed by: NURSE PRACTITIONER

## 2024-03-08 PROCEDURE — 4010F ACE/ARB THERAPY RXD/TAKEN: CPT | Mod: CPTII,,, | Performed by: NURSE PRACTITIONER

## 2024-03-08 PROCEDURE — 3079F DIAST BP 80-89 MM HG: CPT | Mod: CPTII,,, | Performed by: NURSE PRACTITIONER

## 2024-03-08 PROCEDURE — 1160F RVW MEDS BY RX/DR IN RCRD: CPT | Mod: CPTII,,, | Performed by: NURSE PRACTITIONER

## 2024-03-08 PROCEDURE — 3044F HG A1C LEVEL LT 7.0%: CPT | Mod: CPTII,,, | Performed by: NURSE PRACTITIONER

## 2024-03-08 PROCEDURE — 99215 OFFICE O/P EST HI 40 MIN: CPT | Mod: PBBFAC,PN | Performed by: NURSE PRACTITIONER

## 2024-03-08 RX ORDER — LEVOFLOXACIN 500 MG/1
500 TABLET, FILM COATED ORAL DAILY
Qty: 14 TABLET | Refills: 0 | Status: SHIPPED | OUTPATIENT
Start: 2024-03-08 | End: 2024-03-28

## 2024-03-08 RX ORDER — PANTOPRAZOLE SODIUM 40 MG/1
40 TABLET, DELAYED RELEASE ORAL 2 TIMES DAILY
Qty: 28 TABLET | Refills: 0 | Status: SHIPPED | OUTPATIENT
Start: 2024-03-08 | End: 2024-04-17

## 2024-03-08 RX ORDER — AMOXICILLIN AND CLAVULANATE POTASSIUM 875; 125 MG/1; MG/1
1 TABLET, FILM COATED ORAL EVERY 12 HOURS
Qty: 28 TABLET | Refills: 0 | Status: SHIPPED | OUTPATIENT
Start: 2024-03-08 | End: 2024-03-28

## 2024-03-08 NOTE — PROGRESS NOTES
Subjective:       Patient ID: Josefina Martin is a 48 y.o. female.    Chief Complaint: Follow-up    49 y/o female with hypertension, diabetes mellitus, and hx MI, H. pylori and gastric ulcer presents to clinic for follow up positive H. Pylori stool test.  Patient initially treated for HP in 10/2020 with Amoxil/Biaxin regimen. EGD repeated in 6/2021 for recurrent abdominal pain; biopsies HP (+) and patient treated with quadruple therapy.  She was admitted to [facility] 1/2024 for GIB and found to have gastric ulcer on EGD; no biopsies taken. Repeat HP stool antigen test positive 2/21/2024. Will start levofloxacin based therapy.        Past Medical History:   Diagnosis Date    Cerebrovascular accident (CVA) 3/24/2017    CHF (congestive heart failure)     Diabetes mellitus     Hypertension     MI (myocardial infarction)     Stroke        Past Surgical History:   Procedure Laterality Date    CHOLECYSTECTOMY  2013    history of cholelithiasis    ESOPHAGOGASTRODUODENOSCOPY N/A 10/21/2020    Procedure: EGD (ESOPHAGOGASTRODUODENOSCOPY);  Surgeon: Asha Blackwell MD;  Location: [facility];  Service: Endoscopy;  Laterality: N/A;    ESOPHAGOGASTRODUODENOSCOPY N/A 6/15/2021    Procedure: EGD (ESOPHAGOGASTRODUODENOSCOPY);  Surgeon: Asha Blackwell MD;  Location: [facility];  Service: Endoscopy;  Laterality: N/A;    ESOPHAGOGASTRODUODENOSCOPY N/A 1/9/2024    Procedure: EGD (ESOPHAGOGASTRODUODENOSCOPY);  Surgeon: Frantz Tolliver MD;  Location: [facility];  Service: Gastroenterology;  Laterality: N/A;    TUBAL LIGATION         Family History   Problem Relation Age of Onset    Hypertension Mother     Lung cancer Mother     No Known Problems Father     No Known Problems Sister     No Known Problems Daughter     Diabetes Son 14        Takes 2 insulins and metformin use to be bigger wally    Stroke Maternal Uncle 48    Anuerysm Maternal Grandmother     Breast cancer Maternal Grandmother     No Known Problems Sister     No  Known Problems Daughter     No Known Problems Son     Breast cancer Maternal Aunt     Breast cancer Maternal Aunt        Social History     Socioeconomic History    Marital status: Single   Occupational History     Employer: [employer]   Tobacco Use    Smoking status: Former     Current packs/day: 0.15     Average packs/day: 0.2 packs/day for 18.0 years (2.7 ttl pk-yrs)     Types: Cigarettes    Smokeless tobacco: Never    Tobacco comments:     1 pack/week   Substance and Sexual Activity    Alcohol use: Yes     Comment: social 1/month    Drug use: No    Sexual activity: Yes     Partners: Male     Birth control/protection: None, Surgical     Social Determinants of Health     Financial Resource Strain: Medium Risk (1/31/2024)    Overall Financial Resource Strain (CARDIA)     Difficulty of Paying Living Expenses: Somewhat hard   Food Insecurity: Food Insecurity Present (1/31/2024)    Hunger Vital Sign     Worried About Running Out of Food in the Last Year: Sometimes true     Ran Out of Food in the Last Year: Never true   Transportation Needs: No Transportation Needs (1/31/2024)    PRAPARE - Transportation     Lack of Transportation (Medical): No     Lack of Transportation (Non-Medical): No   Physical Activity: Sufficiently Active (1/31/2024)    Exercise Vital Sign     Days of Exercise per Week: 5 days     Minutes of Exercise per Session: 40 min   Recent Concern: Physical Activity - Insufficiently Active (1/9/2024)    Exercise Vital Sign     Days of Exercise per Week: 3 days     Minutes of Exercise per Session: 20 min   Stress: Stress Concern Present (1/31/2024)    [place] [employer] - Occupational Stress Questionnaire     Feeling of Stress : Very much   Social Connections: Unknown (1/31/2024)    Social Connection and Isolation Panel [NHANES]     Frequency of Communication with Friends and Family: More than three times a week     Frequency of Social Gatherings with Friends and Family: Once a week      "Active Member of Clubs or Organizations: Yes     Marital Status:    Housing Stability: Low Risk  (1/31/2024)    Housing Stability Vital Sign     Unable to Pay for Housing in the Last Year: No     Number of Places Lived in the Last Year: 1     Unstable Housing in the Last Year: No   Recent Concern: Housing Stability - High Risk (1/9/2024)    Housing Stability Vital Sign     Unable to Pay for Housing in the Last Year: Yes       Review of Systems   Constitutional:  Negative for appetite change, fatigue, fever and unexpected weight change.   HENT:  Negative for trouble swallowing.    Respiratory:  Negative for cough and shortness of breath.    Cardiovascular:  Negative for chest pain.   Gastrointestinal:  Negative for abdominal pain, constipation and rectal pain.   Genitourinary:  Negative for dysuria.   Musculoskeletal:  Negative for myalgias.   Neurological:  Negative for dizziness and weakness.   Hematological:  Negative for adenopathy.   Psychiatric/Behavioral:  Negative for dysphoric mood.          Objective:     Vitals:    03/08/24 1305   BP: 117/81   BP Location: Left arm   Patient Position: Sitting   BP Method: Medium (Automatic)   Pulse: 68   Weight: 77.7 kg (171 lb 3 oz)   Height: 5' 8" (1.727 m)          Physical Exam  Constitutional:       General: She is not in acute distress.     Appearance: Normal appearance. She is not ill-appearing.   HENT:      Head: Normocephalic.   Eyes:      Conjunctiva/sclera: Conjunctivae normal.   Pulmonary:      Effort: Pulmonary effort is normal. No respiratory distress.   Musculoskeletal:         General: Normal range of motion.   Skin:     General: Skin is warm and dry.      Coloration: Skin is not jaundiced or pale.   Neurological:      Mental Status: She is alert and oriented to person, place, and time.   Psychiatric:         Mood and Affect: Mood normal.         Behavior: Behavior normal.               Assessment:         ICD-10-CM ICD-9-CM   1. H. pylori infection "  A04.8 041.86       Plan:       H. pylori infection  -     levoFLOXacin (LEVAQUIN) 500 MG tablet; Take 1 tablet (500 mg total) by mouth once daily. for 14 days  Dispense: 14 tablet; Refill: 0  -     amoxicillin-clavulanate 875-125mg (AUGMENTIN) 875-125 mg per tablet; Take 1 tablet by mouth every 12 (twelve) hours. for 14 days  Dispense: 28 tablet; Refill: 0  -     pantoprazole (PROTONIX) 40 MG tablet; Take 1 tablet (40 mg total) by mouth 2 (two) times daily. for 14 days  Dispense: 28 tablet; Refill: 0      No follow-ups on file.     Patient's Medications   New Prescriptions    AMOXICILLIN-CLAVULANATE 875-125MG (AUGMENTIN) 875-125 MG PER TABLET    Take 1 tablet by mouth every 12 (twelve) hours. for 14 days    LEVOFLOXACIN (LEVAQUIN) 500 MG TABLET    Take 1 tablet (500 mg total) by mouth once daily. for 14 days   Previous Medications    AMLODIPINE (NORVASC) 5 MG TABLET    Take 1 tablet by mouth once daily.    ASPIRIN (ECOTRIN) 81 MG EC TABLET    Take 1 tablet (81 mg total) by mouth once daily.    ATORVASTATIN (LIPITOR) 80 MG TABLET    Take 1 tablet (80 mg total) by mouth every evening.    BLOOD SUGAR DIAGNOSTIC STRP    To check Blood glucose two times daily    BLOOD-GLUCOSE METER MISC    Use to check blood glucose    CHLORTHALIDONE (HYGROTEN) 25 MG TAB    Take 1 tablet (25 mg total) by mouth once daily.    CLOPIDOGREL (PLAVIX) 75 MG TABLET    Take 1 tablet (75 mg total) by mouth once daily.    DICYCLOMINE (BENTYL) 20 MG TABLET        GABAPENTIN (NEURONTIN) 100 MG CAPSULE    Take 1 capsule by mouth once daily.    INSULIN (LANTUS SOLOSTAR U-100 INSULIN) GLARGINE 100 UNITS/ML SUBQ PEN    Inject 12 Units into the skin once daily.    LANCETS 30 GAUGE MISC    Use to test twice daily    LINACLOTIDE (LINZESS) 72 MCG CAP CAPSULE    Take 1 capsule (72 mcg total) by mouth before breakfast.    METFORMIN (GLUCOPHAGE) 500 MG TABLET    Take 1 tablet (500 mg total) by mouth 2 (two) times daily with meals.    MIRENA 20 MCG/24 HR (5  "YEARS) IUD    1 each by Intrauterine route once.    NIFEDIPINE (PROCARDIA-XL) 90 MG (OSM) 24 HR TABLET    Take 1 tablet (90 mg total) by mouth once daily.    OLMESARTAN (BENICAR) 40 MG TABLET    Take 1 tablet (40 mg total) by mouth once daily.    PEN NEEDLE, DIABETIC (BD ULTRA-FINE SHORT PEN NEEDLE) 31 GAUGE X 5/16" NDLE    1 Needle by Misc.(Non-Drug; Combo Route) route once daily.    SEMAGLUTIDE (OZEMPIC) 1 MG/DOSE (4 MG/3 ML)    Inject 1 mg into the skin every 7 days.   Modified Medications    Modified Medication Previous Medication    PANTOPRAZOLE (PROTONIX) 40 MG TABLET pantoprazole (PROTONIX) 40 MG tablet       Take 1 tablet (40 mg total) by mouth 2 (two) times daily. for 14 days    Take 1 tablet (40 mg total) by mouth once daily.   Discontinued Medications    No medications on file             "

## 2024-03-08 NOTE — PATIENT INSTRUCTIONS
- Levofloxacin 500 mg 1 pill once daily  - Augmentin 875-125 mg 1 pill twice daily  - Pantoprazole 40 mg 1 pill twice daily

## 2024-03-09 RX ORDER — CLOPIDOGREL BISULFATE 75 MG/1
75 TABLET ORAL DAILY
Qty: 90 TABLET | Refills: 0 | Status: SHIPPED | OUTPATIENT
Start: 2024-03-09 | End: 2024-05-02 | Stop reason: SDUPTHER

## 2024-03-09 RX ORDER — ATORVASTATIN CALCIUM 80 MG/1
80 TABLET, FILM COATED ORAL NIGHTLY
Qty: 90 TABLET | Refills: 3 | Status: SHIPPED | OUTPATIENT
Start: 2024-03-09

## 2024-03-09 RX ORDER — METFORMIN HYDROCHLORIDE 500 MG/1
500 TABLET ORAL 2 TIMES DAILY WITH MEALS
Qty: 180 TABLET | Refills: 3 | Status: SHIPPED | OUTPATIENT
Start: 2024-03-09 | End: 2025-03-09

## 2024-04-08 PROBLEM — K92.2 GI BLEED: Status: RESOLVED | Noted: 2024-01-08 | Resolved: 2024-04-08

## 2024-04-15 ENCOUNTER — PATIENT MESSAGE (OUTPATIENT)
Dept: GASTROENTEROLOGY | Facility: CLINIC | Age: 49
End: 2024-04-15
Payer: MEDICAID

## 2024-05-02 DIAGNOSIS — Z86.73 HISTORY OF CVA (CEREBROVASCULAR ACCIDENT): ICD-10-CM

## 2024-05-02 DIAGNOSIS — I10 UNCONTROLLED HYPERTENSION: ICD-10-CM

## 2024-05-02 DIAGNOSIS — A04.8 H. PYLORI INFECTION: ICD-10-CM

## 2024-05-02 DIAGNOSIS — E11.65 UNCONTROLLED TYPE 2 DIABETES MELLITUS WITH HYPERGLYCEMIA: ICD-10-CM

## 2024-05-02 RX ORDER — SEMAGLUTIDE 1.34 MG/ML
1 INJECTION, SOLUTION SUBCUTANEOUS
Qty: 3 ML | Refills: 2 | Status: SHIPPED | OUTPATIENT
Start: 2024-05-02 | End: 2025-05-02

## 2024-05-02 RX ORDER — CHLORTHALIDONE 25 MG/1
25 TABLET ORAL DAILY
Qty: 90 TABLET | Refills: 1 | Status: SHIPPED | OUTPATIENT
Start: 2024-05-02 | End: 2025-05-02

## 2024-05-02 RX ORDER — PANTOPRAZOLE SODIUM 40 MG/1
40 TABLET, DELAYED RELEASE ORAL 2 TIMES DAILY
Qty: 28 TABLET | Refills: 0 | OUTPATIENT
Start: 2024-05-02 | End: 2024-05-16

## 2024-05-02 RX ORDER — NIFEDIPINE 90 MG/1
90 TABLET, EXTENDED RELEASE ORAL DAILY
Qty: 90 TABLET | Refills: 1 | Status: SHIPPED | OUTPATIENT
Start: 2024-05-02 | End: 2025-05-02

## 2024-05-03 RX ORDER — CLOPIDOGREL BISULFATE 75 MG/1
75 TABLET ORAL DAILY
Qty: 90 TABLET | Refills: 0 | Status: SHIPPED | OUTPATIENT
Start: 2024-05-03

## 2024-05-07 ENCOUNTER — OFFICE VISIT (OUTPATIENT)
Dept: FAMILY MEDICINE | Facility: HOSPITAL | Age: 49
End: 2024-05-07
Payer: MEDICAID

## 2024-05-07 VITALS
BODY MASS INDEX: 26.56 KG/M2 | HEIGHT: 68 IN | SYSTOLIC BLOOD PRESSURE: 124 MMHG | HEART RATE: 88 BPM | DIASTOLIC BLOOD PRESSURE: 89 MMHG | WEIGHT: 175.25 LBS

## 2024-05-07 DIAGNOSIS — F41.9 ANXIETY AND DEPRESSION: Primary | ICD-10-CM

## 2024-05-07 DIAGNOSIS — F32.A ANXIETY AND DEPRESSION: Primary | ICD-10-CM

## 2024-05-07 DIAGNOSIS — G47.00 INSOMNIA, UNSPECIFIED TYPE: ICD-10-CM

## 2024-05-07 PROBLEM — H60.90 OTITIS EXTERNA: Status: RESOLVED | Noted: 2024-01-10 | Resolved: 2024-05-07

## 2024-05-07 PROBLEM — M62.81 MUSCLE WEAKNESS: Status: RESOLVED | Noted: 2022-08-04 | Resolved: 2024-05-07

## 2024-05-07 PROBLEM — I25.10 CORONARY ARTERY DISEASE DUE TO LIPID RICH PLAQUE: Status: RESOLVED | Noted: 2022-07-17 | Resolved: 2024-05-07

## 2024-05-07 PROBLEM — E78.5 HLD (HYPERLIPIDEMIA): Status: RESOLVED | Noted: 2022-07-17 | Resolved: 2024-05-07

## 2024-05-07 PROBLEM — R10.13 EPIGASTRIC PAIN: Status: RESOLVED | Noted: 2020-10-21 | Resolved: 2024-05-07

## 2024-05-07 PROBLEM — I25.83 CORONARY ARTERY DISEASE DUE TO LIPID RICH PLAQUE: Status: RESOLVED | Noted: 2022-07-17 | Resolved: 2024-05-07

## 2024-05-07 PROBLEM — E11.9 TYPE 2 DIABETES MELLITUS WITHOUT COMPLICATION, WITHOUT LONG-TERM CURRENT USE OF INSULIN: Status: RESOLVED | Noted: 2017-01-26 | Resolved: 2024-05-07

## 2024-05-07 PROBLEM — R26.9 GAIT ABNORMALITY: Status: RESOLVED | Noted: 2022-08-04 | Resolved: 2024-05-07

## 2024-05-07 PROBLEM — R29.898 WEAKNESS OF RIGHT LOWER EXTREMITY: Status: RESOLVED | Noted: 2022-08-04 | Resolved: 2024-05-07

## 2024-05-07 PROCEDURE — 99215 OFFICE O/P EST HI 40 MIN: CPT | Performed by: STUDENT IN AN ORGANIZED HEALTH CARE EDUCATION/TRAINING PROGRAM

## 2024-05-07 RX ORDER — ESCITALOPRAM OXALATE 10 MG/1
10 TABLET ORAL DAILY
Qty: 30 TABLET | Refills: 2 | Status: SHIPPED | OUTPATIENT
Start: 2024-05-07 | End: 2025-05-07

## 2024-05-07 RX ORDER — HYDROXYZINE PAMOATE 25 MG/1
25 CAPSULE ORAL EVERY 12 HOURS PRN
Qty: 30 CAPSULE | Refills: 1 | Status: SHIPPED | OUTPATIENT
Start: 2024-05-07

## 2024-05-07 NOTE — PROGRESS NOTES
Progress Note  [facility]    Subjective:      Patient ID: Josefina Martin is a 48 y.o. female    Chief Complaint: Anxiety, depression, and insomnia    Josefina Martin is a 48-year-old female with a PMHx multiple CVAs w/o residual deficits, HTN, T2DM, CAD s/p multiple stent placement, hx of MI, systolic and diastolic HF, history of tobacco use presenting for for recent worsening anxiety and depression. She says this has been present for over 6 months. She endorses family difficulties with a family member recently getting arrested. She also is suffering from insomnia and endorses sleeping around 4-5 hours per night. Denies any SI. She never has tried any type of medication. She also is interested in seeing a therapist.      Health Maintenance         Date Due Completion Date    Pneumococcal Vaccines (Age 0-64) (1 of 2 - PCV) Never done ---    Foot Exam Never done ---    Eye Exam Never done ---    TETANUS VACCINE Never done ---    Colorectal Cancer Screening Never done ---    Mammogram 08/02/2020 8/2/2019    Override on 4/9/2018: Not Clinically Appropriate (pt had 1 last yr)    COVID-19 Vaccine (3 - 2023-24 season) 09/01/2023 7/8/2021    Hemoglobin A1c 07/04/2024 1/4/2024    Influenza Vaccine (Season Ended) 09/01/2024 ---    Diabetes Urine Screening 10/03/2024 10/3/2023    Lipid Panel 01/04/2025 1/4/2024    High Dose Statin 05/07/2025 5/7/2024    Aspirin/Antiplatelet Therapy 05/07/2025 5/7/2024    Cervical Cancer Screening 03/22/2026 3/22/2023            Review of Systems   Constitutional:  Negative for fatigue.   HENT:  Negative for congestion and sore throat.    Eyes:  Negative for visual disturbance.   Respiratory:  Negative for shortness of breath.    Cardiovascular:  Negative for chest pain.   Gastrointestinal:  Negative for abdominal pain, blood in stool, constipation, diarrhea, nausea and vomiting.   Genitourinary:  Negative for difficulty urinating, dysuria and frequency.   Musculoskeletal:   Negative for arthralgias and myalgias.   Skin:  Negative for rash.   Allergic/Immunologic: Negative for environmental allergies and food allergies.   Neurological:  Negative for dizziness, weakness, numbness and headaches.   Psychiatric/Behavioral:  Positive for dysphoric mood and sleep disturbance. The patient is nervous/anxious.         Objective:      Vitals:    05/07/24 1530   BP: 124/89   Pulse:      BP Readings from Last 3 Encounters:   05/07/24 124/89   03/08/24 117/81   02/15/24 111/78     Body mass index is 26.65 kg/m².    Physical Exam  Vitals reviewed.   Constitutional:       Appearance: Normal appearance.   HENT:      Head: Normocephalic and atraumatic.   Eyes:      Pupils: Pupils are equal, round, and reactive to light.   Cardiovascular:      Rate and Rhythm: Normal rate and regular rhythm.   Pulmonary:      Effort: Pulmonary effort is normal.      Breath sounds: Normal breath sounds.   Abdominal:      Palpations: Abdomen is soft.      Tenderness: There is no abdominal tenderness.   Musculoskeletal:         General: Normal range of motion.      Right lower leg: No edema.      Left lower leg: No edema.   Skin:     General: Skin is warm.      Findings: No rash.   Neurological:      Mental Status: She is alert and oriented to person, place, and time.   Psychiatric:         Mood and Affect: Mood normal.         Behavior: Behavior normal.       Assessment/Plan:     Josefina Martin is a 48-year-old female with a PMHx multiple CVAs w/o residual deficits, HTN, T2DM, CAD s/p multiple stent placement, hx of MI, systolic and diastolic HF, history of tobacco use presenting for for recent worsening anxiety, depression, and insomnia.    Anxiety and depression  -     EScitalopram oxalate (LEXAPRO) 10 MG tablet; Take 1 tablet (10 mg total) by mouth once daily.  Dispense: 30 tablet; Refill: 2  -     hydrOXYzine pamoate (VISTARIL) 25 MG Cap; Take 1 capsule (25 mg total) by mouth every 12 (twelve) hours as needed  (insomnia and anxiety).  Dispense: 30 capsule; Refill: 1  -     Ambulatory referral/consult to Psychology; Future; Expected date: 05/14/2024  -     PHQ-9 Score 17 and SHANIQUA-7 Score 19.  After discussion with patient, will plan to start Lexapro 10 mg daily for her anxiety and depression and hydroxyzine as needed for both an anxiolytic and insomnia. Advised to continue taking Lexapro daily and may take up to 6 weeks to reach full effect. Will also refer patient to clinical psychologist Dr Benjamin for therapy.     Insomnia, unspecified type  -     hydrOXYzine pamoate (VISTARIL) 25 MG Cap; Take 1 capsule (25 mg total) by mouth every 12 (twelve) hours as needed (insomnia and anxiety).  Dispense: 30 capsule; Refill: 1      Follow-up: 4 weeks    Margarito Ennis DO  [facility], PGY-3  05/07/2024      This note was partially created using [employer] Voice Recognition software. Typographical and content errors may occur with this process. While efforts are made to detect and correct such errors, in some cases errors will persist. For this reason, wording in this document should be considered in the proper context and not strictly verbatim

## 2024-05-09 ENCOUNTER — TELEPHONE (OUTPATIENT)
Dept: HEPATOLOGY | Facility: HOSPITAL | Age: 49
End: 2024-05-09
Payer: MEDICAID

## 2024-05-09 NOTE — TELEPHONE ENCOUNTER
Practitioner called patient for consultation after receiving a message on patient's behalf. Patient stated she would call back after receiving her schedule.

## 2024-05-10 ENCOUNTER — PATIENT MESSAGE (OUTPATIENT)
Dept: HEPATOLOGY | Facility: HOSPITAL | Age: 49
End: 2024-05-10
Payer: MEDICAID

## 2024-05-10 ENCOUNTER — TELEPHONE (OUTPATIENT)
Dept: HEPATOLOGY | Facility: HOSPITAL | Age: 49
End: 2024-05-10
Payer: MEDICAID

## 2024-05-29 ENCOUNTER — TELEPHONE (OUTPATIENT)
Dept: ENDOSCOPY | Facility: HOSPITAL | Age: 49
End: 2024-05-29
Payer: MEDICAID

## 2024-06-13 ENCOUNTER — OFFICE VISIT (OUTPATIENT)
Dept: GASTROENTEROLOGY | Facility: CLINIC | Age: 49
End: 2024-06-13
Payer: MEDICAID

## 2024-06-13 VITALS
DIASTOLIC BLOOD PRESSURE: 99 MMHG | HEIGHT: 68 IN | BODY MASS INDEX: 25.57 KG/M2 | HEART RATE: 78 BPM | SYSTOLIC BLOOD PRESSURE: 138 MMHG | WEIGHT: 168.75 LBS

## 2024-06-13 DIAGNOSIS — K92.2 UPPER GI BLEED: ICD-10-CM

## 2024-06-13 DIAGNOSIS — A04.8 H. PYLORI INFECTION: Primary | ICD-10-CM

## 2024-06-13 PROCEDURE — 99214 OFFICE O/P EST MOD 30 MIN: CPT | Mod: S$PBB,,, | Performed by: NURSE PRACTITIONER

## 2024-06-13 PROCEDURE — 99999 PR PBB SHADOW E&M-EST. PATIENT-LVL V: CPT | Mod: PBBFAC,,, | Performed by: NURSE PRACTITIONER

## 2024-06-13 PROCEDURE — 3080F DIAST BP >= 90 MM HG: CPT | Mod: CPTII,,, | Performed by: NURSE PRACTITIONER

## 2024-06-13 PROCEDURE — 4010F ACE/ARB THERAPY RXD/TAKEN: CPT | Mod: CPTII,,, | Performed by: NURSE PRACTITIONER

## 2024-06-13 PROCEDURE — 99215 OFFICE O/P EST HI 40 MIN: CPT | Mod: PBBFAC,PN | Performed by: NURSE PRACTITIONER

## 2024-06-13 PROCEDURE — 1160F RVW MEDS BY RX/DR IN RCRD: CPT | Mod: CPTII,,, | Performed by: NURSE PRACTITIONER

## 2024-06-13 PROCEDURE — 1159F MED LIST DOCD IN RCRD: CPT | Mod: CPTII,,, | Performed by: NURSE PRACTITIONER

## 2024-06-13 PROCEDURE — 3044F HG A1C LEVEL LT 7.0%: CPT | Mod: CPTII,,, | Performed by: NURSE PRACTITIONER

## 2024-06-13 PROCEDURE — 3008F BODY MASS INDEX DOCD: CPT | Mod: CPTII,,, | Performed by: NURSE PRACTITIONER

## 2024-06-13 PROCEDURE — 3075F SYST BP GE 130 - 139MM HG: CPT | Mod: CPTII,,, | Performed by: NURSE PRACTITIONER

## 2024-06-13 NOTE — PROGRESS NOTES
Subjective:       Patient ID: Josefina Martin is a 49 y.o. female.    Chief Complaint: Follow-up    50 y/o female with hypertension, diabetes mellitus, and hx MI, H. pylori and gastric ulcer presents to clinic for follow up. Last seen in clinic 3/2024 for drug resistant HP (see hx of tx below). Today she is feeling well. No abdominal pain, nausea, vomiting, hematochezia,or melena. Will retest stool for eradication of HP.     H. Pylori treatment history  - 10/2020 H. Pylori antibody (+) treated with triple Amoxil/Biaxin regimen  - 6/2021 EGD with biopsies (+) HP treated with quadruple therapy  - 1/2024 inpatient EGD for GIB revealed gastric ulcer; no biopsies taken  - 2/2024 repeat HP stool antigen test (+); started levofloxacin based therapy    Past Medical History:   Diagnosis Date    Cerebrovascular accident (CVA) 3/24/2017    CHF (congestive heart failure)     Diabetes mellitus     Hypertension     MI (myocardial infarction)     Stroke        Past Surgical History:   Procedure Laterality Date    CHOLECYSTECTOMY  2013    history of cholelithiasis    ESOPHAGOGASTRODUODENOSCOPY N/A 10/21/2020    Procedure: EGD (ESOPHAGOGASTRODUODENOSCOPY);  Surgeon: Asha Blackwell MD;  Location: [facility];  Service: Endoscopy;  Laterality: N/A;    ESOPHAGOGASTRODUODENOSCOPY N/A 6/15/2021    Procedure: EGD (ESOPHAGOGASTRODUODENOSCOPY);  Surgeon: Asha Blackwell MD;  Location: [facility];  Service: Endoscopy;  Laterality: N/A;    ESOPHAGOGASTRODUODENOSCOPY N/A 1/9/2024    Procedure: EGD (ESOPHAGOGASTRODUODENOSCOPY);  Surgeon: Frantz Tolliver MD;  Location[facility];  Service: Gastroenterology;  Laterality: N/A;    TUBAL LIGATION         Family History   Problem Relation Name Age of Onset    Hypertension Mother      Lung cancer Mother      No Known Problems Father      No Known Problems Sister      No Known Problems Daughter      Diabetes Son  14        Takes 2 insulins and metformin use to be bigger wally    Stroke  Maternal Uncle  48    Anuerysm Maternal Grandmother      Breast cancer Maternal Grandmother      No Known Problems Sister      No Known Problems Daughter      No Known Problems Son      Breast cancer Maternal Aunt      Breast cancer Maternal Aunt         Social History     Socioeconomic History    Marital status: Single   Occupational History     Employer: [employer]   Tobacco Use    Smoking status: Former     Current packs/day: 0.15     Average packs/day: 0.2 packs/day for 18.0 years (2.7 ttl pk-yrs)     Types: Cigarettes    Smokeless tobacco: Never    Tobacco comments:     1 pack/week   Substance and Sexual Activity    Alcohol use: Yes     Comment: social 1/month    Drug use: No    Sexual activity: Yes     Partners: Male     Birth control/protection: None, Surgical     Social Determinants of Health     Financial Resource Strain: Medium Risk (1/31/2024)    Overall Financial Resource Strain (CARDIA)     Difficulty of Paying Living Expenses: Somewhat hard   Food Insecurity: Food Insecurity Present (1/31/2024)    Hunger Vital Sign     Worried About Running Out of Food in the Last Year: Sometimes true     Ran Out of Food in the Last Year: Never true   Transportation Needs: No Transportation Needs (1/31/2024)    PRAPARE - Transportation     Lack of Transportation (Medical): No     Lack of Transportation (Non-Medical): No   Physical Activity: Sufficiently Active (1/31/2024)    Exercise Vital Sign     Days of Exercise per Week: 5 days     Minutes of Exercise per Session: 40 min   Recent Concern: Physical Activity - Insufficiently Active (1/9/2024)    Exercise Vital Sign     Days of Exercise per Week: 3 days     Minutes of Exercise per Session: 20 min   Stress: Stress Concern Present (1/31/2024)    [place] [employer] - Occupational Stress Questionnaire     Feeling of Stress : Very much   Housing Stability: Low Risk  (1/31/2024)    Housing Stability Vital Sign     Unable to Pay for Housing in the Last  "Year: No     Number of Places Lived in the Last Year: 1     Unstable Housing in the Last Year: No   Recent Concern: Housing Stability - High Risk (1/9/2024)    Housing Stability Vital Sign     Unable to Pay for Housing in the Last Year: Yes       Review of Systems   Constitutional:  Negative for appetite change, fatigue, fever and unexpected weight change.   HENT:  Negative for trouble swallowing.    Respiratory:  Negative for cough and shortness of breath.    Cardiovascular:  Negative for chest pain.   Gastrointestinal:  Negative for abdominal pain, constipation and rectal pain.   Genitourinary:  Negative for dysuria.   Musculoskeletal:  Negative for myalgias.   Neurological:  Negative for dizziness and weakness.   Hematological:  Negative for adenopathy.   Psychiatric/Behavioral:  Negative for dysphoric mood.          Objective:     Vitals:    06/13/24 0927   BP: (!) 138/99   BP Location: Left arm   Patient Position: Sitting   BP Method: Medium (Automatic)   Pulse: 78   Weight: 76.5 kg (168 lb 12.2 oz)   Height: 5' 8" (1.727 m)          Physical Exam  Constitutional:       General: She is not in acute distress.     Appearance: Normal appearance.   HENT:      Head: Normocephalic.   Eyes:      Conjunctiva/sclera: Conjunctivae normal.   Pulmonary:      Effort: Pulmonary effort is normal. No respiratory distress.   Musculoskeletal:         General: Normal range of motion.   Skin:     General: Skin is warm and dry.      Coloration: Skin is not jaundiced or pale.   Neurological:      Mental Status: She is alert and oriented to person, place, and time.   Psychiatric:         Mood and Affect: Mood normal.         Behavior: Behavior normal.               Assessment:         ICD-10-CM ICD-9-CM   1. H. pylori infection  A04.8 041.86   2. Upper GI bleed  K92.2 578.9       Plan:       H. pylori infection  -     H. pylori antigen, stool; Future; Expected date: 06/13/2024      Follow up if symptoms worsen or fail to improve. " "    Patient's Medications   New Prescriptions    No medications on file   Previous Medications    AMLODIPINE (NORVASC) 5 MG TABLET    Take 1 tablet by mouth once daily.    ASPIRIN (ECOTRIN) 81 MG EC TABLET    Take 1 tablet (81 mg total) by mouth once daily.    ATORVASTATIN (LIPITOR) 80 MG TABLET    Take 1 tablet (80 mg total) by mouth every evening.    BLOOD SUGAR DIAGNOSTIC STRP    To check Blood glucose two times daily    BLOOD-GLUCOSE METER MISC    Use to check blood glucose    CHLORTHALIDONE (HYGROTEN) 25 MG TAB    Take 1 tablet (25 mg total) by mouth once daily.    CLOPIDOGREL (PLAVIX) 75 MG TABLET    Take 1 tablet (75 mg total) by mouth once daily.    DICYCLOMINE (BENTYL) 20 MG TABLET        ESCITALOPRAM OXALATE (LEXAPRO) 10 MG TABLET    Take 1 tablet (10 mg total) by mouth once daily.    GABAPENTIN (NEURONTIN) 100 MG CAPSULE    Take 1 capsule by mouth once daily.    HYDROXYZINE PAMOATE (VISTARIL) 25 MG CAP    Take 1 capsule (25 mg total) by mouth every 12 (twelve) hours as needed (insomnia and anxiety).    INSULIN (LANTUS SOLOSTAR U-100 INSULIN) GLARGINE 100 UNITS/ML SUBQ PEN    Inject 12 Units into the skin once daily.    LANCETS 30 GAUGE MISC    Use to test twice daily    LINACLOTIDE (LINZESS) 72 MCG CAP CAPSULE    Take 1 capsule (72 mcg total) by mouth before breakfast.    METFORMIN (GLUCOPHAGE) 500 MG TABLET    Take 1 tablet (500 mg total) by mouth 2 (two) times daily with meals.    MIRENA 20 MCG/24 HR (5 YEARS) IUD    1 each by Intrauterine route once.    NIFEDIPINE (PROCARDIA-XL) 90 MG (OSM) 24 HR TABLET    Take 1 tablet (90 mg total) by mouth once daily.    OLMESARTAN (BENICAR) 40 MG TABLET    Take 1 tablet (40 mg total) by mouth once daily.    PANTOPRAZOLE (PROTONIX) 40 MG TABLET    Take 1 tablet (40 mg total) by mouth 2 (two) times daily. for 14 days    PEN NEEDLE, DIABETIC (BD ULTRA-FINE SHORT PEN NEEDLE) 31 GAUGE X 5/16" NDLE    1 Needle by Misc.(Non-Drug; Combo Route) route once daily.    " SEMAGLUTIDE (OZEMPIC) 1 MG/DOSE (4 MG/3 ML)    Inject 1 mg into the skin every 7 days.   Modified Medications    No medications on file   Discontinued Medications    No medications on file

## 2024-06-17 ENCOUNTER — LAB VISIT (OUTPATIENT)
Dept: LAB | Facility: HOSPITAL | Age: 49
End: 2024-06-17
Attending: NURSE PRACTITIONER
Payer: MEDICAID

## 2024-06-17 DIAGNOSIS — A04.8 H. PYLORI INFECTION: ICD-10-CM

## 2024-06-17 PROCEDURE — 87338 HPYLORI STOOL AG IA: CPT | Performed by: NURSE PRACTITIONER

## 2024-06-26 DIAGNOSIS — A04.8 H. PYLORI INFECTION: Primary | ICD-10-CM

## 2024-06-27 DIAGNOSIS — A04.8 H. PYLORI INFECTION: ICD-10-CM

## 2024-06-27 DIAGNOSIS — G47.00 INSOMNIA, UNSPECIFIED TYPE: ICD-10-CM

## 2024-06-27 DIAGNOSIS — Z86.73 HISTORY OF CVA (CEREBROVASCULAR ACCIDENT): ICD-10-CM

## 2024-06-27 DIAGNOSIS — F32.A ANXIETY AND DEPRESSION: ICD-10-CM

## 2024-06-27 DIAGNOSIS — F41.9 ANXIETY AND DEPRESSION: ICD-10-CM

## 2024-06-27 RX ORDER — HYDROXYZINE PAMOATE 25 MG/1
25 CAPSULE ORAL EVERY 12 HOURS PRN
Qty: 30 CAPSULE | Refills: 1 | Status: CANCELLED | OUTPATIENT
Start: 2024-06-27

## 2024-06-27 RX ORDER — PANTOPRAZOLE SODIUM 40 MG/1
40 TABLET, DELAYED RELEASE ORAL DAILY
Qty: 30 TABLET | Refills: 5 | Status: SHIPPED | OUTPATIENT
Start: 2024-06-27

## 2024-06-27 RX ORDER — CLOPIDOGREL BISULFATE 75 MG/1
75 TABLET ORAL DAILY
Qty: 90 TABLET | Refills: 0 | Status: CANCELLED | OUTPATIENT
Start: 2024-06-27

## 2024-07-01 DIAGNOSIS — Z86.73 HISTORY OF CVA (CEREBROVASCULAR ACCIDENT): ICD-10-CM

## 2024-07-01 RX ORDER — CLOPIDOGREL BISULFATE 75 MG/1
75 TABLET ORAL DAILY
Qty: 90 TABLET | Refills: 0 | Status: SHIPPED | OUTPATIENT
Start: 2024-07-01

## 2024-07-03 DIAGNOSIS — F41.9 ANXIETY AND DEPRESSION: ICD-10-CM

## 2024-07-03 DIAGNOSIS — G47.00 INSOMNIA, UNSPECIFIED TYPE: ICD-10-CM

## 2024-07-03 DIAGNOSIS — F32.A ANXIETY AND DEPRESSION: ICD-10-CM

## 2024-07-03 RX ORDER — HYDROXYZINE PAMOATE 25 MG/1
25 CAPSULE ORAL EVERY 12 HOURS PRN
Qty: 30 CAPSULE | Refills: 1 | OUTPATIENT
Start: 2024-07-03

## 2024-07-04 RX ORDER — HYDROXYZINE PAMOATE 25 MG/1
25 CAPSULE ORAL EVERY 12 HOURS PRN
Qty: 30 CAPSULE | Refills: 1 | OUTPATIENT
Start: 2024-07-04

## 2024-07-11 ENCOUNTER — OFFICE VISIT (OUTPATIENT)
Dept: INFECTIOUS DISEASES | Facility: CLINIC | Age: 49
End: 2024-07-11
Payer: MEDICAID

## 2024-07-11 ENCOUNTER — OFFICE VISIT (OUTPATIENT)
Dept: FAMILY MEDICINE | Facility: HOSPITAL | Age: 49
End: 2024-07-11
Payer: MEDICAID

## 2024-07-11 VITALS
DIASTOLIC BLOOD PRESSURE: 99 MMHG | WEIGHT: 172.19 LBS | SYSTOLIC BLOOD PRESSURE: 151 MMHG | BODY MASS INDEX: 26.1 KG/M2 | HEIGHT: 68 IN | HEART RATE: 76 BPM

## 2024-07-11 VITALS
DIASTOLIC BLOOD PRESSURE: 90 MMHG | WEIGHT: 173.06 LBS | HEART RATE: 111 BPM | BODY MASS INDEX: 26.23 KG/M2 | HEIGHT: 68 IN | TEMPERATURE: 98 F | SYSTOLIC BLOOD PRESSURE: 145 MMHG

## 2024-07-11 DIAGNOSIS — M25.561 CHRONIC PAIN OF BOTH KNEES: ICD-10-CM

## 2024-07-11 DIAGNOSIS — G89.29 CHRONIC PAIN OF BOTH SHOULDERS: ICD-10-CM

## 2024-07-11 DIAGNOSIS — R21 RASH: ICD-10-CM

## 2024-07-11 DIAGNOSIS — F32.A ANXIETY AND DEPRESSION: ICD-10-CM

## 2024-07-11 DIAGNOSIS — I25.118 CORONARY ARTERY DISEASE OF NATIVE HEART WITH STABLE ANGINA PECTORIS, UNSPECIFIED VESSEL OR LESION TYPE: ICD-10-CM

## 2024-07-11 DIAGNOSIS — M25.562 CHRONIC PAIN OF BOTH KNEES: ICD-10-CM

## 2024-07-11 DIAGNOSIS — E11.9 TYPE 2 DIABETES MELLITUS WITHOUT COMPLICATION, WITH LONG-TERM CURRENT USE OF INSULIN: Primary | ICD-10-CM

## 2024-07-11 DIAGNOSIS — Z79.4 TYPE 2 DIABETES MELLITUS WITHOUT COMPLICATION, WITH LONG-TERM CURRENT USE OF INSULIN: Primary | ICD-10-CM

## 2024-07-11 DIAGNOSIS — M25.511 CHRONIC PAIN OF BOTH SHOULDERS: ICD-10-CM

## 2024-07-11 DIAGNOSIS — F41.9 ANXIETY AND DEPRESSION: ICD-10-CM

## 2024-07-11 DIAGNOSIS — Z12.31 ENCOUNTER FOR SCREENING MAMMOGRAM FOR BREAST CANCER: ICD-10-CM

## 2024-07-11 DIAGNOSIS — G89.29 CHRONIC PAIN OF BOTH KNEES: ICD-10-CM

## 2024-07-11 DIAGNOSIS — Z87.19 HISTORY OF GI BLEED: ICD-10-CM

## 2024-07-11 DIAGNOSIS — I10 PRIMARY HYPERTENSION: ICD-10-CM

## 2024-07-11 DIAGNOSIS — A04.8 H. PYLORI INFECTION: Primary | ICD-10-CM

## 2024-07-11 DIAGNOSIS — M25.512 CHRONIC PAIN OF BOTH SHOULDERS: ICD-10-CM

## 2024-07-11 PROCEDURE — 4010F ACE/ARB THERAPY RXD/TAKEN: CPT | Mod: CPTII,,, | Performed by: INTERNAL MEDICINE

## 2024-07-11 PROCEDURE — 99999 PR PBB SHADOW E&M-EST. PATIENT-LVL IV: CPT | Mod: PBBFAC,,, | Performed by: INTERNAL MEDICINE

## 2024-07-11 PROCEDURE — 3077F SYST BP >= 140 MM HG: CPT | Mod: CPTII,,, | Performed by: INTERNAL MEDICINE

## 2024-07-11 PROCEDURE — 99214 OFFICE O/P EST MOD 30 MIN: CPT | Mod: PBBFAC,27 | Performed by: INTERNAL MEDICINE

## 2024-07-11 PROCEDURE — 1159F MED LIST DOCD IN RCRD: CPT | Mod: CPTII,,, | Performed by: INTERNAL MEDICINE

## 2024-07-11 PROCEDURE — 3080F DIAST BP >= 90 MM HG: CPT | Mod: CPTII,,, | Performed by: INTERNAL MEDICINE

## 2024-07-11 PROCEDURE — 3066F NEPHROPATHY DOC TX: CPT | Mod: CPTII,,, | Performed by: INTERNAL MEDICINE

## 2024-07-11 PROCEDURE — 3061F NEG MICROALBUMINURIA REV: CPT | Mod: CPTII,,, | Performed by: INTERNAL MEDICINE

## 2024-07-11 PROCEDURE — 1160F RVW MEDS BY RX/DR IN RCRD: CPT | Mod: CPTII,,, | Performed by: INTERNAL MEDICINE

## 2024-07-11 PROCEDURE — 99204 OFFICE O/P NEW MOD 45 MIN: CPT | Mod: S$PBB,,, | Performed by: INTERNAL MEDICINE

## 2024-07-11 PROCEDURE — 99215 OFFICE O/P EST HI 40 MIN: CPT

## 2024-07-11 PROCEDURE — 3008F BODY MASS INDEX DOCD: CPT | Mod: CPTII,,, | Performed by: INTERNAL MEDICINE

## 2024-07-11 PROCEDURE — 3051F HG A1C>EQUAL 7.0%<8.0%: CPT | Mod: CPTII,,, | Performed by: INTERNAL MEDICINE

## 2024-07-11 RX ORDER — MENTHOL 1.4 %
2 ADHESIVE PATCH, MEDICATED TOPICAL DAILY
Qty: 113 G | Refills: 0 | Status: SHIPPED | OUTPATIENT
Start: 2024-07-11

## 2024-07-11 RX ORDER — METRONIDAZOLE 500 MG/1
500 TABLET ORAL EVERY 8 HOURS
Qty: 42 TABLET | Refills: 0 | Status: SHIPPED | OUTPATIENT
Start: 2024-07-11 | End: 2024-07-11 | Stop reason: SDUPTHER

## 2024-07-11 RX ORDER — CLOTRIMAZOLE AND BETAMETHASONE DIPROPIONATE 10; .64 MG/G; MG/G
CREAM TOPICAL 2 TIMES DAILY
Qty: 45 G | Refills: 0 | Status: SHIPPED | OUTPATIENT
Start: 2024-07-11 | End: 2024-07-25

## 2024-07-11 RX ORDER — CLARITHROMYCIN 500 MG/1
500 TABLET, FILM COATED ORAL EVERY 12 HOURS
Qty: 28 TABLET | Refills: 0 | Status: SHIPPED | OUTPATIENT
Start: 2024-07-11 | End: 2024-07-25

## 2024-07-11 RX ORDER — SEMAGLUTIDE 2.68 MG/ML
2 INJECTION, SOLUTION SUBCUTANEOUS
Qty: 3 ML | Refills: 11 | Status: SHIPPED | OUTPATIENT
Start: 2024-07-11 | End: 2025-07-11

## 2024-07-11 RX ORDER — METRONIDAZOLE 500 MG/1
500 TABLET ORAL EVERY 8 HOURS
Qty: 42 TABLET | Refills: 0 | Status: SHIPPED | OUTPATIENT
Start: 2024-07-11 | End: 2024-07-25

## 2024-07-11 RX ORDER — DICLOFENAC SODIUM 10 MG/G
2 GEL TOPICAL DAILY
Qty: 400 G | Refills: 0 | Status: SHIPPED | OUTPATIENT
Start: 2024-07-11

## 2024-07-11 RX ORDER — CLARITHROMYCIN 500 MG/1
500 TABLET, FILM COATED ORAL EVERY 12 HOURS
Qty: 28 TABLET | Refills: 0 | Status: SHIPPED | OUTPATIENT
Start: 2024-07-11 | End: 2024-07-11 | Stop reason: SDUPTHER

## 2024-07-11 RX ORDER — PANTOPRAZOLE SODIUM 40 MG/1
40 TABLET, DELAYED RELEASE ORAL 2 TIMES DAILY
Qty: 28 TABLET | Refills: 0 | Status: SHIPPED | OUTPATIENT
Start: 2024-07-11 | End: 2024-07-11 | Stop reason: SDUPTHER

## 2024-07-11 RX ORDER — DICLOFENAC SODIUM 10 MG/G
2 GEL TOPICAL 2 TIMES DAILY
Qty: 400 G | Refills: 0 | Status: SHIPPED | OUTPATIENT
Start: 2024-07-11 | End: 2024-07-11

## 2024-07-11 RX ORDER — PANTOPRAZOLE SODIUM 40 MG/1
40 TABLET, DELAYED RELEASE ORAL 2 TIMES DAILY
Qty: 28 TABLET | Refills: 0 | Status: SHIPPED | OUTPATIENT
Start: 2024-07-11 | End: 2024-07-25

## 2024-07-11 NOTE — PROGRESS NOTES
Subjective     Patient ID: Josefina Martin is a 49 y.o. female.    Chief Complaint:H Pylori      History of Present Illness    49 year old female with a history of persistent H pylori infection.  Started with stomach pains.  She was referred to Dr. Marc and was diagnosed for H pylori in 2023 fall.  She received antibiotic treatment.  Repeat test performed and it was positive again.  She is now referred to ID clinic.  Her stomach feels fine now.  But once in a while she has cramps.    Medical History  H pylori  Gastritis  Stroke  MI    Medications  Protonix  Ozempic  Plavix  Lexapro  Hydroxyzine  Chlorthalidone  Nifedipine  Lipitor  Metformin  Linzess  Insulin (lantus insulin)    Review of Systems   All other systems reviewed and are negative.     Objective   Physical Exam  Vitals and nursing note reviewed.   Constitutional:       General: She is not in acute distress.     Appearance: She is well-developed. She is not diaphoretic.   HENT:      Head: Normocephalic and atraumatic.      Right Ear: External ear normal.      Left Ear: External ear normal.      Nose: Nose normal.      Mouth/Throat:      Pharynx: No oropharyngeal exudate.   Eyes:      General: No scleral icterus.        Right eye: No discharge.         Left eye: No discharge.      Conjunctiva/sclera: Conjunctivae normal.      Pupils: Pupils are equal, round, and reactive to light.   Neck:      Thyroid: No thyromegaly.      Vascular: No JVD.      Trachea: No tracheal deviation.   Cardiovascular:      Rate and Rhythm: Normal rate and regular rhythm.      Heart sounds: No murmur heard.     No friction rub. No gallop.   Pulmonary:      Effort: Pulmonary effort is normal. No respiratory distress.      Breath sounds: Normal breath sounds. No stridor. No wheezing or rales.   Chest:      Chest wall: No tenderness.   Abdominal:      General: Bowel sounds are normal. There is no distension.      Palpations: Abdomen is soft. There is no mass.      Tenderness:  There is abdominal tenderness (mild tenderness to abdomen). There is no guarding or rebound.   Musculoskeletal:         General: No tenderness. Normal range of motion.      Cervical back: Normal range of motion and neck supple.   Lymphadenopathy:      Cervical: No cervical adenopathy.   Skin:     General: Skin is warm.      Coloration: Skin is not pale.      Findings: No erythema or rash.   Neurological:      Mental Status: She is alert and oriented to person, place, and time.      Cranial Nerves: No cranial nerve deficit.      Motor: No abnormal muscle tone.      Coordination: Coordination normal.      Deep Tendon Reflexes: Reflexes normal.   Psychiatric:         Behavior: Behavior normal.         Thought Content: Thought content normal.         Judgment: Judgment normal.              Assessment and Plan     1. H. pylori infection : 49 year old female with refractory H pylori infection.  Will treat with clarithromycin, metronidazole and bid proton pump inhibitor.  Will re-check H pylori test about 2 months after completion of therapy.   2. Rash :  Will give trial of lotrisone.         Plan:  H. pylori infection  -     Ambulatory referral/consult to Infectious Disease  -     Discontinue: clarithromycin (BIAXIN) 500 MG tablet; Take 1 tablet (500 mg total) by mouth every 12 (twelve) hours. for 14 days  Dispense: 28 tablet; Refill: 0  -     Discontinue: metroNIDAZOLE (FLAGYL) 500 MG tablet; Take 1 tablet (500 mg total) by mouth every 8 (eight) hours. for 14 days  Dispense: 42 tablet; Refill: 0  -     Discontinue: pantoprazole (PROTONIX) 40 MG tablet; Take 1 tablet (40 mg total) by mouth 2 (two) times daily. for 14 days  Dispense: 28 tablet; Refill: 0  -     clarithromycin (BIAXIN) 500 MG tablet; Take 1 tablet (500 mg total) by mouth every 12 (twelve) hours. for 14 days  Dispense: 28 tablet; Refill: 0  -     Discontinue: pantoprazole (PROTONIX) 40 MG tablet; Take 1 tablet (40 mg total) by mouth 2 (two) times daily. for  14 days  Dispense: 28 tablet; Refill: 0  -     metroNIDAZOLE (FLAGYL) 500 MG tablet; Take 1 tablet (500 mg total) by mouth every 8 (eight) hours. for 14 days  Dispense: 42 tablet; Refill: 0  -     H. pylori antigen, stool; Future; Expected date: 07/11/2024    Rash  -     Discontinue: clotrimazole-betamethasone 1-0.05% (LOTRISONE) cream; Apply topically 2 (two) times daily. for 7 days (Patient not taking: Reported on 7/25/2024)  Dispense: 45 g; Refill: 0

## 2024-07-11 NOTE — PROGRESS NOTES
"Clinic Note  [facility]    Subjective:     Josefina Martin is a 49 y.o. year old who presents to clinic today for a checkup.    PMHx: Anxiety, Depression, CAD s/p multiple stent placement, CVA w/o residual deficits, DMT2, HTN, HF (systolic & diastolic)    Anxiety/Depression:  - Started late 2023. Exacerbating factors include family member recently getting arrested. Symptoms of insomnia. Denies SI.  - 5/7/24 - PHQ9 17, GAD7 19  - 7/11/24: Reports that medications are helping; she feels much better. Denies SI/HI.  - Current regimen: Escitalopram 10mg QD, Hydroxyzine 25mg BID PRN (uses minimally because it makes her feel "off")    CAD:  - Used to see Cardiologist Dr. Shad Jovel.  - Had MI ~5 years ago  - Stent placement ?  - Stress Test 1/2022 - "negative for myocardial ischemia"  - Endorses some stable angina  - Current regimen: Aspirin 81mg, Clopidogrel 75mg    Constipation:  - Reports normal BM with medications, but will  become constipated if she misses a day or 2.  - Current regimen: Linaclotide 72mcg    CVA:  - Reports having multiple strokes in the past.  - Current regimen: Aspirin 81mg, Clopidogrel 75mg, Atorvastatin 80mg    DMT2:  - Checks morning sugars Q3 days. Reports seeing morning sugars of 130s-160s (rarely).  - Current regimen: Lantus 12u in the AM, Metformin 500mg BID, Semaglutide 1mg QD    GI Bleed:  - 1/8/24: Admitted to [facility] for Upper GI bleed 2/2 gastric ulcer 2/2 H Pylori. Reports having completed abx course. Last stool sample contained antigen. Will see ID today  - Patient has had multiple bouts of H. Pylori infections and has failed multiple bouts of abx therapy. Unclear whether medication non-compliance plays a part.    HTN:  - Reports taking morning pressures Q2 days 160s/80s. Does not check after taking medications.  - Pressure today 151/99. Denies taking blood pressure medication this morning.  - Current regimen: Nifedipine 90mg, Olmesartan 40mg, Chlorthalidone " 25mg    Heart Failure - Systolic & Diastolic:  - Echo 2022:  The left ventricle is normal in size with concentric hypertrophy and normal systolic function.  The estimated ejection fraction is 55%.  Grade I left ventricular diastolic dysfunction.  Normal right ventricular size with normal right ventricular systolic function.  Mild left atrial enlargement.  Mild mitral regurgitation.  Mild tricuspid regurgitation.  Mild pulmonic regurgitation.  Normal central venous pressure (3 mmHg).  The estimated PA systolic pressure is 29 mmHg.  The test was stopped because the patient experienced shortness of breath.  The patient's exercise capacity was average.  During stress, the following significant arrhythmias were observed: frequent PVCs.  The ECG portion of this study is abnormal but not diagnostic for ischemia.  The stress echo portion of this study is negative for myocardial ischemia.  - Current regimen: Chlorthalidone 25mg      Review of Systems negative except for symptoms mentioned in HPI      Gynecological History:  - Menstruation: No LMP recorded. Patient has had an implant.  - Mammogram:  - BI-RADS Category 1: Negative   - Pap smear:  - Negative for intraepithelial lesion or malignancy. Reactive cellular changes  - Sexual History: Will ask next time  - Contraception: Mirena - inserted 2019    Obstetric History:     Health Maintenance         Date Due Completion Date    Pneumococcal Vaccines (Age 0-64) (1 of 2 - PCV) Never done ---    Foot Exam Never done ---    Eye Exam Never done ---    TETANUS VACCINE Never done ---    Colorectal Cancer Screening Never done ---    Mammogram 2020    Override on 2018: Not Clinically Appropriate (pt had 1 last yr)    COVID-19 Vaccine (3 - -24 season) 2023    Shingles Vaccine (1 of 2) Never done ---    Hemoglobin A1c 2024    Influenza Vaccine (1) 2024 ---    Diabetes Urine Screening 10/03/2024 10/3/2023     Lipid Panel 01/04/2025 1/4/2024    High Dose Statin 07/11/2025 7/11/2024    Aspirin/Antiplatelet Therapy 07/11/2025 7/11/2024    Cervical Cancer Screening 03/22/2026 3/22/2023            Past Medical History:   Diagnosis Date    Cerebrovascular accident (CVA) 3/24/2017    CHF (congestive heart failure)     Diabetes mellitus     Hypertension     MI (myocardial infarction)     Stroke        Past Surgical History:   Procedure Laterality Date    CHOLECYSTECTOMY  2013    history of cholelithiasis    ESOPHAGOGASTRODUODENOSCOPY N/A 10/21/2020    Procedure: EGD (ESOPHAGOGASTRODUODENOSCOPY);  Surgeon: Asha Blackwell MD;  Location: [facility];  Service: Endoscopy;  Laterality: N/A;    ESOPHAGOGASTRODUODENOSCOPY N/A 6/15/2021    Procedure: EGD (ESOPHAGOGASTRODUODENOSCOPY);  Surgeon: Asha Blackwell MD;  Location: [facility];  Service: Endoscopy;  Laterality: N/A;    ESOPHAGOGASTRODUODENOSCOPY N/A 1/9/2024    Procedure: EGD (ESOPHAGOGASTRODUODENOSCOPY);  Surgeon: Frantz Tolliver MD;  Location[facility];  Service: Gastroenterology;  Laterality: N/A;    TUBAL LIGATION         Family History   Problem Relation Name Age of Onset    Hypertension Mother      Lung cancer Mother      No Known Problems Father      No Known Problems Sister      No Known Problems Daughter      Diabetes Son  14        Takes 2 insulins and metformin use to be bigger wally    Stroke Maternal Uncle  48    Anuerysm Maternal Grandmother      Breast cancer Maternal Grandmother      No Known Problems Sister      No Known Problems Daughter      No Known Problems Son      Breast cancer Maternal Aunt      Breast cancer Maternal Aunt         Social History     Socioeconomic History    Marital status: Single   Occupational History     Employer: [employer]   Tobacco Use    Smoking status: Some Days     Current packs/day: 0.15     Average packs/day: 0.2 packs/day for 18.0 years (2.7 ttl pk-yrs)     Types: Vaping with nicotine, Cigarettes    Smokeless tobacco: Never     Tobacco comments:     1 pack/week   Substance and Sexual Activity    Alcohol use: Yes     Comment: social 1/month    Drug use: No    Sexual activity: Yes     Partners: Male     Birth control/protection: None, Surgical     Social Determinants of Health     Financial Resource Strain: Medium Risk (1/31/2024)    Overall Financial Resource Strain (CARDIA)     Difficulty of Paying Living Expenses: Somewhat hard   Food Insecurity: Food Insecurity Present (1/31/2024)    Hunger Vital Sign     Worried About Running Out of Food in the Last Year: Sometimes true     Ran Out of Food in the Last Year: Never true   Transportation Needs: No Transportation Needs (1/31/2024)    PRAPARE - Transportation     Lack of Transportation (Medical): No     Lack of Transportation (Non-Medical): No   Physical Activity: Sufficiently Active (1/31/2024)    Exercise Vital Sign     Days of Exercise per Week: 5 days     Minutes of Exercise per Session: 40 min   Recent Concern: Physical Activity - Insufficiently Active (1/9/2024)    Exercise Vital Sign     Days of Exercise per Week: 3 days     Minutes of Exercise per Session: 20 min   Stress: Stress Concern Present (1/31/2024)    [employer] - Occupational Stress Questionnaire     Feeling of Stress : Very much   Housing Stability: Low Risk  (1/31/2024)    Housing Stability Vital Sign     Unable to Pay for Housing in the Last Year: No     Number of Places Lived in the Last Year: 1     Unstable Housing in the Last Year: No   Recent Concern: Housing Stability - High Risk (1/9/2024)    Housing Stability Vital Sign     Unable to Pay for Housing in the Last Year: Yes       Current Outpatient Medications   Medication Sig Dispense Refill    amLODIPine (NORVASC) 5 MG tablet Take 1 tablet by mouth once daily.      aspirin (ECOTRIN) 81 MG EC tablet Take 1 tablet (81 mg total) by mouth once daily. 90 tablet 3    atorvastatin (LIPITOR) 80 MG tablet Take 1 tablet (80 mg total) by mouth  "every evening. 90 tablet 3    blood sugar diagnostic Strp To check Blood glucose two times daily 200 each 2    blood-glucose meter Misc Use to check blood glucose 1 each 0    chlorthalidone (HYGROTEN) 25 MG Tab Take 1 tablet (25 mg total) by mouth once daily. 90 tablet 1    clopidogreL (PLAVIX) 75 mg tablet Take 1 tablet (75 mg total) by mouth once daily. 90 tablet 0    dicyclomine (BENTYL) 20 mg tablet       EScitalopram oxalate (LEXAPRO) 10 MG tablet Take 1 tablet (10 mg total) by mouth once daily. 30 tablet 2    gabapentin (NEURONTIN) 100 MG capsule Take 1 capsule by mouth once daily.      hydrOXYzine pamoate (VISTARIL) 25 MG Cap Take 1 capsule (25 mg total) by mouth every 12 (twelve) hours as needed (insomnia and anxiety). 30 capsule 1    insulin (LANTUS SOLOSTAR U-100 INSULIN) glargine 100 units/mL SubQ pen Inject 12 Units into the skin once daily. 15 mL 1    lancets 30 gauge Misc Use to test twice daily 200 each 2    linaCLOtide (LINZESS) 72 mcg Cap capsule Take 1 capsule (72 mcg total) by mouth before breakfast. 30 each 5    metFORMIN (GLUCOPHAGE) 500 MG tablet Take 1 tablet (500 mg total) by mouth 2 (two) times daily with meals. 180 tablet 3    MIRENA 20 mcg/24 hr (5 years) IUD 1 each by Intrauterine route once.      NIFEdipine (PROCARDIA-XL) 90 MG (OSM) 24 hr tablet Take 1 tablet (90 mg total) by mouth once daily. 90 tablet 1    olmesartan (BENICAR) 40 MG tablet Take 1 tablet (40 mg total) by mouth once daily. 90 tablet 1    pantoprazole (PROTONIX) 40 MG tablet Take 1 tablet (40 mg total) by mouth once daily. 30 tablet 5    pen needle, diabetic (BD ULTRA-FINE SHORT PEN NEEDLE) 31 gauge x 5/16" Ndle 1 Needle by Misc.(Non-Drug; Combo Route) route once daily. 100 each 2    camphor-methyl salicyl-menthoL (BENGAY ULTRA STRENGTH) 4-30-10 % Crea Apply 2 g topically once daily. 113 g 0    diclofenac sodium (VOLTAREN ARTHRITIS PAIN) 1 % Gel Apply 2 g topically 2 (two) times daily. 400 g 0    semaglutide (OZEMPIC) 2 " mg/dose (8 mg/3 mL) PnIj Inject 2 mg into the skin every 7 days. 3 mL 11     No current facility-administered medications for this visit.       Review of patient's allergies indicates:  No Known Allergies      Objective:     Vitals:    07/11/24 0828   BP: (!) 151/99   Pulse: 76       Wt Readings from Last 1 Encounters:   07/11/24 78.1 kg (172 lb 2.9 oz)       Physical Exam  Constitutional:       General: She is not in acute distress.  HENT:      Head: Normocephalic and atraumatic.   Eyes:      General:         Right eye: No discharge.         Left eye: No discharge.      Conjunctiva/sclera: Conjunctivae normal.   Cardiovascular:      Rate and Rhythm: Normal rate.      Pulses: Normal pulses.   Pulmonary:      Effort: Pulmonary effort is normal. No respiratory distress.   Abdominal:      General: There is no distension.   Musculoskeletal:      Right lower leg: No edema.      Left lower leg: No edema.   Neurological:      Mental Status: She is alert and oriented to person, place, and time.      Gait: Gait normal.   Psychiatric:         Mood and Affect: Mood normal.         Behavior: Behavior normal.         Assessment/Plan:     Josefina was seen today for follow-up.    Diagnoses and all orders for this visit:    Type 2 diabetes mellitus without complication, with long-term current use of insulin  Diabetes controlled per last A1C. Joint decision made to increase Ozempic dose to 2mg Q7days. Will follow up in 2 weeks for side effect followup. At next visit, will consider adding SGLT2 & discontinuing insulin in setting of proteinuria.  -     semaglutide (OZEMPIC) 2 mg/dose (8 mg/3 mL) PnIj; Inject 2 mg into the skin every 7 days.  -     Hemoglobin A1C; Future  -     Microalbumin/creatinine urine ratio    Anxiety and depression  Patient reports improvement since starting medication. Continue current regimen.    Primary hypertension  Pressures uncontrolled this morning. While patient reports that she did not take her  medications this morning, as medications are long acting, . Patient also was not sure what medications she is taking. Asked patient to follow up in 2 weeks and bring medications. Discontinued Amlodipine as patient is already on Nifedipine. Continue Olmesartan.    Chronic pain of both shoulders  Chronic pain of both knees  Joint stiffness likely secondary to repitive movements in employment. Would benefit from PT/OT. Advised patient to use Bengay cream over Voltaren gel, and use the latter minimally due to possible systemic absorption.  -     Ambulatory referral/consult to Physical/Occupational Therapy; Future  -     camphor-methyl salicyl-menthoL (BENGAY ULTRA STRENGTH) 4-30-10 % Crea; Apply 2 g topically once daily.  -     diclofenac sodium (VOLTAREN ARTHRITIS PAIN) 1 % Gel; Apply 2 g topically 2 (two) times daily.    History of GI bleed  -     CBC Auto Differential; Future    Coronary artery disease of native heart with stable angina pectoris, unspecified vessel or lesion type  As prior cardiologist has left [facility], patient would benefit from establishment with new cardiologist.  -     Ambulatory referral/consult to Cardiology; Future    Encounter for screening mammogram for breast cancer  -     Mammo Digital Screening Bilat w/ Mike; Future        Follow up in about 2 weeks for medication confirmation, new A1C evaluation, possibly adding SGLT2 & remove insulin, further healthcare maintenance.    Case discussed with staff: Dr. Darin Villalobos MD PGY-1  [facility]   07/11/2024 8:21 AM

## 2024-07-25 ENCOUNTER — LAB VISIT (OUTPATIENT)
Dept: LAB | Facility: HOSPITAL | Age: 49
End: 2024-07-25
Payer: MEDICAID

## 2024-07-25 ENCOUNTER — OFFICE VISIT (OUTPATIENT)
Dept: FAMILY MEDICINE | Facility: HOSPITAL | Age: 49
End: 2024-07-25
Payer: MEDICAID

## 2024-07-25 VITALS
BODY MASS INDEX: 26.83 KG/M2 | WEIGHT: 177 LBS | SYSTOLIC BLOOD PRESSURE: 130 MMHG | DIASTOLIC BLOOD PRESSURE: 89 MMHG | HEIGHT: 68 IN | HEART RATE: 70 BPM

## 2024-07-25 DIAGNOSIS — Z79.4 TYPE 2 DIABETES MELLITUS WITHOUT COMPLICATION, WITH LONG-TERM CURRENT USE OF INSULIN: ICD-10-CM

## 2024-07-25 DIAGNOSIS — E11.9 TYPE 2 DIABETES MELLITUS WITHOUT COMPLICATION, WITH LONG-TERM CURRENT USE OF INSULIN: ICD-10-CM

## 2024-07-25 DIAGNOSIS — E11.42 DM TYPE 2 WITH DIABETIC PERIPHERAL NEUROPATHY: ICD-10-CM

## 2024-07-25 DIAGNOSIS — E11.9 TYPE 2 DIABETES MELLITUS WITHOUT COMPLICATION, WITHOUT LONG-TERM CURRENT USE OF INSULIN: Primary | ICD-10-CM

## 2024-07-25 DIAGNOSIS — I10 ESSENTIAL HYPERTENSION: ICD-10-CM

## 2024-07-25 LAB
ALBUMIN/CREAT UR: 16.1 UG/MG (ref 0–30)
CREAT UR-MCNC: 56 MG/DL (ref 15–325)
MICROALBUMIN UR DL<=1MG/L-MCNC: 9 UG/ML

## 2024-07-25 PROCEDURE — 99214 OFFICE O/P EST MOD 30 MIN: CPT

## 2024-07-25 PROCEDURE — 82570 ASSAY OF URINE CREATININE: CPT

## 2024-07-25 RX ORDER — GABAPENTIN 100 MG/1
100 CAPSULE ORAL DAILY
Qty: 60 CAPSULE | Refills: 3 | Status: SHIPPED | OUTPATIENT
Start: 2024-07-25

## 2024-07-25 RX ORDER — CHLORTHALIDONE 25 MG/1
25 TABLET ORAL DAILY
Qty: 90 TABLET | Refills: 1 | Status: SHIPPED | OUTPATIENT
Start: 2024-07-25 | End: 2025-07-25

## 2024-07-25 RX ORDER — GABAPENTIN 100 MG/1
100 CAPSULE ORAL DAILY
Qty: 60 CAPSULE | Refills: 3 | Status: SHIPPED | OUTPATIENT
Start: 2024-07-25 | End: 2024-07-25

## 2024-07-25 NOTE — PROGRESS NOTES
"Clinic Note  [facility]    Subjective:     Josefina Martin is a 49 y.o. year old who presents to clinic today for diabetes follow.    PMHx: Anxiety, Depression, CAD s/p multiple stent placement, CVA w/o residual deficits, DMT2, HTN, HF (systolic & diastolic)     Anxiety/Depression:  - Started late 2023. Exacerbating factors include family member recently getting arrested. Symptoms of insomnia. Denies SI.  - 5/7/24 - PHQ9 17, GAD7 19  - 7/11/24: Reports that medications are helping; she feels much better. Denies SI/HI.  - Current regimen: Escitalopram 10mg QD, Hydroxyzine 25mg BID PRN (uses minimally because it makes her feel "off")     CAD:  - Used to see Cardiologist Dr. Shad Jovel.  - Had MI ~5 years ago  - Stent placement ?  - Stress Test 1/2022 - "negative for myocardial ischemia"  - Endorses some stable angina  - Current regimen: Aspirin 81mg, Clopidogrel 75mg     Constipation:  - Reports normal BM with medications, but will  become constipated if she misses a day or 2.  - Current regimen: Linaclotide 72mcg     CVA:  - Reports having multiple strokes in the past.  - Current regimen: Aspirin 81mg, Clopidogrel 75mg, Atorvastatin 80mg     DMT2:  - Checks morning sugars Q3 days. Reports seeing morning sugars of 130s-160s (rarely).  - Current regimen: Lantus 12u in the AM, Metformin 500mg BID, Semaglutide 1mg QD     GI Bleed:  - 1/8/24: Admitted to [facility] for Upper GI bleed 2/2 gastric ulcer 2/2 H Pylori. Reports having completed abx course. Last stool sample contained antigen. Will see ID today  - Patient has had multiple bouts of H. Pylori infections and has failed multiple bouts of abx therapy. Unclear whether medication non-compliance plays a part.     HTN:  - Reports taking morning pressures Q2 days 160s/80s. Does not check after taking medications.  - Pressure today 151/99. Denies taking blood pressure medication this morning.  - Current regimen: Nifedipine 90mg, Chlorthalidone 25mg   "   Heart Failure - Systolic & Diastolic:  - Echo 2022:  The left ventricle is normal in size with concentric hypertrophy and normal systolic function.  The estimated ejection fraction is 55%.  Grade I left ventricular diastolic dysfunction.  Normal right ventricular size with normal right ventricular systolic function.  Mild left atrial enlargement.  Mild mitral regurgitation.  Mild tricuspid regurgitation.  Mild pulmonic regurgitation.  Normal central venous pressure (3 mmHg).  The estimated PA systolic pressure is 29 mmHg.  The test was stopped because the patient experienced shortness of breath.  The patient's exercise capacity was average.  During stress, the following significant arrhythmias were observed: frequent PVCs.  The ECG portion of this study is abnormal but not diagnostic for ischemia.  The stress echo portion of this study is negative for myocardial ischemia.  - Current regimen: Chlorthalidone 25mg        Review of Systems negative except for symptoms mentioned in HPI     Gynecological History:  - Menstruation: No LMP recorded. Patient has had an implant.  - Mammogram:  - BI-RADS Category 1: Negative   - Pap smear:  - Negative for intraepithelial lesion or malignancy. Reactive cellular changes  - Sexual History: Will ask next time  - Contraception: Mirena - inserted     Obstetric History:     Health Maintenance         Date Due Completion Date    Pneumococcal Vaccines (Age 0-64) (1 of 2 - PCV) Never done ---    Foot Exam Never done ---    Eye Exam Never done ---    TETANUS VACCINE Never done ---    Colorectal Cancer Screening Never done ---    Mammogram 2020    Override on 2018: Not Clinically Appropriate (pt had 1 last yr)    COVID-19 Vaccine (3 - -24 season) 2023    Shingles Vaccine (1 of 2) Never done ---    Influenza Vaccine (1) 2024 ---    Diabetes Urine Screening 10/03/2024 10/3/2023    Lipid Panel 2025    Hemoglobin  A1c 01/11/2025 7/11/2024    High Dose Statin 07/11/2025 7/11/2024    Aspirin/Antiplatelet Therapy 07/11/2025 7/11/2024    Cervical Cancer Screening 03/22/2026 3/22/2023            Past Medical History:   Diagnosis Date    Cerebrovascular accident (CVA) 3/24/2017    CHF (congestive heart failure)     Diabetes mellitus     Hypertension     MI (myocardial infarction)     Stroke        Past Surgical History:   Procedure Laterality Date    CHOLECYSTECTOMY  2013    history of cholelithiasis    ESOPHAGOGASTRODUODENOSCOPY N/A 10/21/2020    Procedure: EGD (ESOPHAGOGASTRODUODENOSCOPY);  Surgeon: Asha Blackwell MD;  Location: [facility];  Service: Endoscopy;  Laterality: N/A;    ESOPHAGOGASTRODUODENOSCOPY N/A 6/15/2021    Procedure: EGD (ESOPHAGOGASTRODUODENOSCOPY);  Surgeon: Asha Blackwell MD;  Location: [facility];  Service: Endoscopy;  Laterality: N/A;    ESOPHAGOGASTRODUODENOSCOPY N/A 1/9/2024    Procedure: EGD (ESOPHAGOGASTRODUODENOSCOPY);  Surgeon: Frantz Tolliver MD;  Location: [facility];  Service: Gastroenterology;  Laterality: N/A;    TUBAL LIGATION         Family History   Problem Relation Name Age of Onset    Hypertension Mother      Lung cancer Mother      No Known Problems Father      No Known Problems Sister      No Known Problems Daughter      Diabetes Son  14        Takes 2 insulins and metformin use to be bigger wally    Stroke Maternal Uncle  48    Anuerysm Maternal Grandmother      Breast cancer Maternal Grandmother      No Known Problems Sister      No Known Problems Daughter      No Known Problems Son      Breast cancer Maternal Aunt      Breast cancer Maternal Aunt         Social History     Socioeconomic History    Marital status: Single   Occupational History     Employer: [employer]   Tobacco Use    Smoking status: Some Days     Current packs/day: 0.15     Average packs/day: 0.2 packs/day for 18.0 years (2.7 ttl pk-yrs)     Types: Vaping with nicotine, Cigarettes    Smokeless tobacco: Never     Tobacco comments:     1 pack/week   Substance and Sexual Activity    Alcohol use: Yes     Comment: social 1/month    Drug use: No    Sexual activity: Yes     Partners: Male     Birth control/protection: None, Surgical     Social Determinants of Health     Financial Resource Strain: Medium Risk (1/31/2024)    Overall Financial Resource Strain (CARDIA)     Difficulty of Paying Living Expenses: Somewhat hard   Food Insecurity: Food Insecurity Present (1/31/2024)    Hunger Vital Sign     Worried About Running Out of Food in the Last Year: Sometimes true     Ran Out of Food in the Last Year: Never true   Transportation Needs: No Transportation Needs (1/31/2024)    PRAPARE - Transportation     Lack of Transportation (Medical): No     Lack of Transportation (Non-Medical): No   Physical Activity: Sufficiently Active (1/31/2024)    Exercise Vital Sign     Days of Exercise per Week: 5 days     Minutes of Exercise per Session: 40 min   Recent Concern: Physical Activity - Insufficiently Active (1/9/2024)    Exercise Vital Sign     Days of Exercise per Week: 3 days     Minutes of Exercise per Session: 20 min   Stress: Stress Concern Present (1/31/2024)    [employer] [place] [employer] - Occupational Stress Questionnaire     Feeling of Stress : Very much   Housing Stability: Low Risk  (1/31/2024)    Housing Stability Vital Sign     Unable to Pay for Housing in the Last Year: No     Number of Places Lived in the Last Year: 1     Unstable Housing in the Last Year: No   Recent Concern: Housing Stability - High Risk (1/9/2024)    Housing Stability Vital Sign     Unable to Pay for Housing in the Last Year: Yes       Current Outpatient Medications   Medication Sig Dispense Refill    aspirin (ECOTRIN) 81 MG EC tablet Take 1 tablet (81 mg total) by mouth once daily. 90 tablet 3    atorvastatin (LIPITOR) 80 MG tablet Take 1 tablet (80 mg total) by mouth every evening. 90 tablet 3    clarithromycin (BIAXIN) 500 MG tablet Take 1  tablet (500 mg total) by mouth every 12 (twelve) hours. for 14 days 28 tablet 0    clopidogreL (PLAVIX) 75 mg tablet Take 1 tablet (75 mg total) by mouth once daily. 90 tablet 0    dicyclomine (BENTYL) 20 mg tablet       EScitalopram oxalate (LEXAPRO) 10 MG tablet Take 1 tablet (10 mg total) by mouth once daily. 30 tablet 2    hydrOXYzine pamoate (VISTARIL) 25 MG Cap Take 1 capsule (25 mg total) by mouth every 12 (twelve) hours as needed (insomnia and anxiety). 30 capsule 1    insulin (LANTUS SOLOSTAR U-100 INSULIN) glargine 100 units/mL SubQ pen Inject 12 Units into the skin once daily. 15 mL 1    linaCLOtide (LINZESS) 72 mcg Cap capsule Take 1 capsule (72 mcg total) by mouth before breakfast. 30 each 5    metFORMIN (GLUCOPHAGE) 500 MG tablet Take 1 tablet (500 mg total) by mouth 2 (two) times daily with meals. 180 tablet 3    metroNIDAZOLE (FLAGYL) 500 MG tablet Take 1 tablet (500 mg total) by mouth every 8 (eight) hours. for 14 days 42 tablet 0    NIFEdipine (PROCARDIA-XL) 90 MG (OSM) 24 hr tablet Take 1 tablet (90 mg total) by mouth once daily. 90 tablet 1    semaglutide (OZEMPIC) 2 mg/dose (8 mg/3 mL) PnIj Inject 2 mg into the skin every 7 days. 3 mL 11    camphor-methyl salicyl-menthoL (BENGAY ULTRA STRENGTH) 4-30-10 % Crea Apply 2 g topically once daily. (Patient not taking: Reported on 7/25/2024) 113 g 0    chlorthalidone (HYGROTEN) 25 MG Tab Take 1 tablet (25 mg total) by mouth once daily. 90 tablet 1    diclofenac sodium (VOLTAREN ARTHRITIS PAIN) 1 % Gel Apply 2 g topically once daily. Use minimally, and only if Bengay cream does not work (Patient not taking: Reported on 7/25/2024) 400 g 0    empagliflozin (JARDIANCE) 10 mg tablet Take 1 tablet (10 mg total) by mouth once daily. 60 tablet 6    gabapentin (NEURONTIN) 100 MG capsule Take 1 capsule (100 mg total) by mouth once daily. 60 capsule 3    lancets 30 gauge Misc Use to test twice daily (Patient not taking: Reported on 7/25/2024) 200 each 2    MIRENA  20 mcg/24 hr (5 years) IUD 1 each by Intrauterine route once. (Patient not taking: Reported on 7/25/2024)       No current facility-administered medications for this visit.       Review of patient's allergies indicates:  No Known Allergies      Objective:     Vitals:    07/25/24 0957   BP: 130/89   Pulse: 70       Wt Readings from Last 1 Encounters:   07/25/24 80.3 kg (177 lb 0.5 oz)       Physical Exam  Constitutional:       General: She is not in acute distress.     Appearance: Normal appearance.   HENT:      Head: Normocephalic and atraumatic.   Eyes:      General:         Right eye: No discharge.         Left eye: No discharge.      Conjunctiva/sclera: Conjunctivae normal.   Cardiovascular:      Rate and Rhythm: Normal rate.   Pulmonary:      Effort: Pulmonary effort is normal. No respiratory distress.   Musculoskeletal:      Right lower leg: No edema.      Left lower leg: No edema.   Skin:     Coloration: Skin is not jaundiced.   Neurological:      Mental Status: She is alert and oriented to person, place, and time.      Gait: Gait normal.   Psychiatric:         Mood and Affect: Mood normal.         Behavior: Behavior normal.         Assessment/Plan:     Diagnoses and all orders for this visit:    Type 2 diabetes mellitus without complication, without long-term current use of insulin  Chronic condition. Uncontrolled. Will add Jardiance to current regimen in setting of increasing A1C.  -     empagliflozin (JARDIANCE) 10 mg tablet; Take 1 tablet (10 mg total) by mouth once daily.    DM type 2 with diabetic peripheral neuropathy  -     gabapentin (NEURONTIN) 100 MG capsule; Take 1 capsule (100 mg total) by mouth once daily.    Essential hypertension  Chronic condition. Controlled. Medications reviewed, med list adjusted. Continue with current regimen.  -     chlorthalidone (HYGROTEN) 25 MG Tab; Take 1 tablet (25 mg total) by mouth once daily.    Other orders  -     Discontinue: gabapentin (NEURONTIN) 100 MG  capsule; Take 1 capsule (100 mg total) by mouth once daily.        Case discussed with staff: Dr. Staci Villalobos MD PGY-2  [facility]   07/25/2024 9:50 AM

## 2024-07-28 ENCOUNTER — HOSPITAL ENCOUNTER (EMERGENCY)
Facility: HOSPITAL | Age: 49
Discharge: HOME | End: 2024-07-28
Attending: EMERGENCY MEDICINE
Payer: MEDICAID

## 2024-07-28 VITALS
BODY MASS INDEX: 25.48 KG/M2 | SYSTOLIC BLOOD PRESSURE: 173 MMHG | HEIGHT: 69 IN | HEART RATE: 77 BPM | OXYGEN SATURATION: 100 % | DIASTOLIC BLOOD PRESSURE: 112 MMHG | TEMPERATURE: 98 F | WEIGHT: 172 LBS | RESPIRATION RATE: 20 BRPM

## 2024-07-28 DIAGNOSIS — J06.9 VIRAL URI WITH COUGH: Primary | ICD-10-CM

## 2024-07-28 LAB
BACTERIA #/AREA URNS HPF: NORMAL /HPF
BILIRUB UR QL STRIP: NEGATIVE
CLARITY UR: CLEAR
COLOR UR: COLORLESS
GLUCOSE UR QL STRIP: ABNORMAL
HGB UR QL STRIP: ABNORMAL
KETONES UR QL STRIP: NEGATIVE
LEUKOCYTE ESTERASE UR QL STRIP: NEGATIVE
MICROSCOPIC COMMENT: NORMAL
NITRITE UR QL STRIP: NEGATIVE
PH UR STRIP: 6 [PH] (ref 5–8)
PROT UR QL STRIP: NEGATIVE
RBC #/AREA URNS HPF: 0 /HPF (ref 0–4)
SARS-COV-2 RDRP RESP QL NAA+PROBE: NEGATIVE
SP GR UR STRIP: >1.03 (ref 1–1.03)
SQUAMOUS #/AREA URNS HPF: 2 /HPF
URN SPEC COLLECT METH UR: ABNORMAL
UROBILINOGEN UR STRIP-ACNC: NEGATIVE EU/DL
YEAST URNS QL MICRO: NORMAL

## 2024-07-28 PROCEDURE — 93010 ELECTROCARDIOGRAM REPORT: CPT | Mod: ,,, | Performed by: STUDENT IN AN ORGANIZED HEALTH CARE EDUCATION/TRAINING PROGRAM

## 2024-07-28 PROCEDURE — 25000003 PHARM REV CODE 250: Performed by: EMERGENCY MEDICINE

## 2024-07-28 PROCEDURE — U0002 COVID-19 LAB TEST NON-CDC: HCPCS | Performed by: PHYSICIAN ASSISTANT

## 2024-07-28 PROCEDURE — 99285 EMERGENCY DEPT VISIT HI MDM: CPT | Mod: 25

## 2024-07-28 PROCEDURE — 81000 URINALYSIS NONAUTO W/SCOPE: CPT | Performed by: EMERGENCY MEDICINE

## 2024-07-28 PROCEDURE — 93005 ELECTROCARDIOGRAM TRACING: CPT

## 2024-07-28 RX ORDER — BENZONATATE 100 MG/1
100 CAPSULE ORAL ONCE
Status: COMPLETED | OUTPATIENT
Start: 2024-07-28 | End: 2024-07-28

## 2024-07-28 RX ORDER — BENZONATATE 100 MG/1
100 CAPSULE ORAL 3 TIMES DAILY PRN
Qty: 21 CAPSULE | Refills: 0 | Status: SHIPPED | OUTPATIENT
Start: 2024-07-28 | End: 2024-08-04

## 2024-07-28 RX ORDER — ACETAMINOPHEN 500 MG
1000 TABLET ORAL
Status: COMPLETED | OUTPATIENT
Start: 2024-07-28 | End: 2024-07-28

## 2024-07-28 RX ADMIN — ACETAMINOPHEN 1000 MG: 500 TABLET ORAL at 04:07

## 2024-07-28 RX ADMIN — BENZONATATE 100 MG: 100 CAPSULE ORAL at 04:07

## 2024-08-01 ENCOUNTER — OFFICE VISIT (OUTPATIENT)
Dept: OBSTETRICS AND GYNECOLOGY | Facility: CLINIC | Age: 49
End: 2024-08-01
Payer: MEDICAID

## 2024-08-01 VITALS
WEIGHT: 172.19 LBS | BODY MASS INDEX: 25.43 KG/M2 | SYSTOLIC BLOOD PRESSURE: 136 MMHG | DIASTOLIC BLOOD PRESSURE: 69 MMHG

## 2024-08-01 DIAGNOSIS — N89.8 VAGINAL IRRITATION: ICD-10-CM

## 2024-08-01 DIAGNOSIS — N89.8 VAGINAL DISCHARGE: ICD-10-CM

## 2024-08-01 DIAGNOSIS — F32.A ANXIETY AND DEPRESSION: ICD-10-CM

## 2024-08-01 DIAGNOSIS — Z97.5 IUD (INTRAUTERINE DEVICE) IN PLACE: Primary | ICD-10-CM

## 2024-08-01 DIAGNOSIS — E11.9 TYPE 2 DIABETES MELLITUS WITHOUT COMPLICATION, WITHOUT LONG-TERM CURRENT USE OF INSULIN: ICD-10-CM

## 2024-08-01 DIAGNOSIS — Z11.3 SCREENING FOR STD (SEXUALLY TRANSMITTED DISEASE): ICD-10-CM

## 2024-08-01 DIAGNOSIS — F41.9 ANXIETY AND DEPRESSION: ICD-10-CM

## 2024-08-01 LAB
OHS QRS DURATION: 88 MS
OHS QTC CALCULATION: 431 MS

## 2024-08-01 PROCEDURE — 99999 PR PBB SHADOW E&M-EST. PATIENT-LVL III: CPT | Mod: PBBFAC,,, | Performed by: OBSTETRICS & GYNECOLOGY

## 2024-08-01 PROCEDURE — 99213 OFFICE O/P EST LOW 20 MIN: CPT | Mod: PBBFAC,PO | Performed by: OBSTETRICS & GYNECOLOGY

## 2024-08-01 PROCEDURE — 3051F HG A1C>EQUAL 7.0%<8.0%: CPT | Mod: CPTII,,, | Performed by: OBSTETRICS & GYNECOLOGY

## 2024-08-01 PROCEDURE — 87491 CHLMYD TRACH DNA AMP PROBE: CPT | Performed by: OBSTETRICS & GYNECOLOGY

## 2024-08-01 PROCEDURE — 3061F NEG MICROALBUMINURIA REV: CPT | Mod: CPTII,,, | Performed by: OBSTETRICS & GYNECOLOGY

## 2024-08-01 PROCEDURE — 4010F ACE/ARB THERAPY RXD/TAKEN: CPT | Mod: CPTII,,, | Performed by: OBSTETRICS & GYNECOLOGY

## 2024-08-01 PROCEDURE — 3066F NEPHROPATHY DOC TX: CPT | Mod: CPTII,,, | Performed by: OBSTETRICS & GYNECOLOGY

## 2024-08-01 PROCEDURE — 3008F BODY MASS INDEX DOCD: CPT | Mod: CPTII,,, | Performed by: OBSTETRICS & GYNECOLOGY

## 2024-08-01 PROCEDURE — 1159F MED LIST DOCD IN RCRD: CPT | Mod: CPTII,,, | Performed by: OBSTETRICS & GYNECOLOGY

## 2024-08-01 PROCEDURE — 99213 OFFICE O/P EST LOW 20 MIN: CPT | Mod: S$PBB,,, | Performed by: OBSTETRICS & GYNECOLOGY

## 2024-08-01 PROCEDURE — 3075F SYST BP GE 130 - 139MM HG: CPT | Mod: CPTII,,, | Performed by: OBSTETRICS & GYNECOLOGY

## 2024-08-01 PROCEDURE — 3078F DIAST BP <80 MM HG: CPT | Mod: CPTII,,, | Performed by: OBSTETRICS & GYNECOLOGY

## 2024-08-01 PROCEDURE — 1160F RVW MEDS BY RX/DR IN RCRD: CPT | Mod: CPTII,,, | Performed by: OBSTETRICS & GYNECOLOGY

## 2024-08-01 PROCEDURE — 87591 N.GONORRHOEAE DNA AMP PROB: CPT | Performed by: OBSTETRICS & GYNECOLOGY

## 2024-08-01 RX ORDER — CLOTRIMAZOLE AND BETAMETHASONE DIPROPIONATE 10; .64 MG/G; MG/G
CREAM TOPICAL 2 TIMES DAILY
Qty: 45 G | Refills: 1 | Status: SHIPPED | OUTPATIENT
Start: 2024-08-01

## 2024-08-01 RX ORDER — METRONIDAZOLE 500 MG/1
2000 TABLET ORAL ONCE
Qty: 4 TABLET | Refills: 0 | Status: SHIPPED | OUTPATIENT
Start: 2024-08-01 | End: 2024-08-03

## 2024-08-01 RX ORDER — ESCITALOPRAM OXALATE 10 MG/1
10 TABLET ORAL DAILY
Qty: 30 TABLET | Refills: 2 | Status: SHIPPED | OUTPATIENT
Start: 2024-08-01 | End: 2025-08-01

## 2024-08-01 NOTE — PROGRESS NOTES
CC: Vaginal Discharge    HPI:   49 y.o. female  complains of white vaginal discharge for 3 days with irritation. No LMP recorded. Patient has had an implant..  She describes her periods as absent with Mirena. She is sexually active with same partner.  She uses IUD for contraception.    ROS:  GENERAL: No fever, chills, fatigability or weight loss.  VULVAR: No pain, no lesions and no itching.  VAGINAL: No relaxation, no itching, no discharge, no abnormal bleeding and no lesions.  ABDOMEN: No abdominal pain. Denies nausea. Denies vomiting. No diarrhea. No constipation  BREAST: Denies pain. No lumps. No discharge.  URINARY: No incontinence, no nocturia, no frequency and no dysuria.  CARDIOVASCULAR: No chest pain. No shortness of breath. No leg cramps.  NEUROLOGICAL: No headaches. No vision changes.        Vitals:    24 1428   BP: 136/69         OBJECTIVE:   She appears well, afebrile.  Abdomen: benign, soft, nontender, no masses.  VULVA: Normal external female genitalia, normal urethra, normal urethral meatus  VAGINA:discharge: copious, white, and thin  CERVIXiud strings in place  UTERUSnormal  ADNEXAnormal adnexa and no mass, fullness, tenderness        ASSESSMENT:   rule out GC or chlamydia and nonspecific vaginitis    PLAN:   Orders Placed This Encounter    metroNIDAZOLE (FLAGYL) 500 MG tablet    clotrimazole-betamethasone 1-0.05% (LOTRISONE) cream     ROV prn if symptoms persist or worsen.  Will f/u on results

## 2024-08-02 LAB
C TRACH DNA SPEC QL NAA+PROBE: NOT DETECTED
N GONORRHOEA DNA SPEC QL NAA+PROBE: NOT DETECTED

## 2024-08-03 LAB
BACTERIAL VAGINOSIS DNA: POSITIVE
CANDIDA GLABRATA DNA: NEGATIVE
CANDIDA KRUSEI DNA: NEGATIVE
CANDIDA RRNA VAG QL PROBE: POSITIVE
T VAGINALIS RRNA GENITAL QL PROBE: POSITIVE

## 2024-08-05 ENCOUNTER — PATIENT MESSAGE (OUTPATIENT)
Dept: OBSTETRICS AND GYNECOLOGY | Facility: CLINIC | Age: 49
End: 2024-08-05
Payer: MEDICAID

## 2024-08-05 ENCOUNTER — TELEPHONE (OUTPATIENT)
Dept: OBSTETRICS AND GYNECOLOGY | Facility: CLINIC | Age: 49
End: 2024-08-05
Payer: MEDICAID

## 2024-08-05 RX ORDER — METRONIDAZOLE 500 MG/1
2000 TABLET ORAL ONCE
Qty: 4 TABLET | Refills: 0 | Status: SHIPPED | OUTPATIENT
Start: 2024-08-05 | End: 2024-08-07

## 2024-08-05 RX ORDER — TERCONAZOLE 8 MG/G
1 CREAM VAGINAL NIGHTLY
Qty: 20 G | Refills: 1 | Status: SHIPPED | OUTPATIENT
Start: 2024-08-05

## 2024-08-28 DIAGNOSIS — Z86.73 HISTORY OF CVA (CEREBROVASCULAR ACCIDENT): Primary | ICD-10-CM

## 2024-08-28 DIAGNOSIS — Z86.79 HISTORY OF CHF (CONGESTIVE HEART FAILURE): ICD-10-CM

## 2024-09-02 DIAGNOSIS — G47.00 INSOMNIA, UNSPECIFIED TYPE: ICD-10-CM

## 2024-09-02 DIAGNOSIS — F32.A ANXIETY AND DEPRESSION: ICD-10-CM

## 2024-09-02 DIAGNOSIS — F41.9 ANXIETY AND DEPRESSION: ICD-10-CM

## 2024-09-03 RX ORDER — HYDROXYZINE PAMOATE 25 MG/1
25 CAPSULE ORAL EVERY 12 HOURS PRN
Qty: 30 CAPSULE | Refills: 1 | Status: SHIPPED | OUTPATIENT
Start: 2024-09-03

## 2024-09-04 DIAGNOSIS — I10 ESSENTIAL HYPERTENSION: ICD-10-CM

## 2024-09-04 DIAGNOSIS — E11.65 UNCONTROLLED TYPE 2 DIABETES MELLITUS WITH HYPERGLYCEMIA: ICD-10-CM

## 2024-09-04 RX ORDER — INSULIN GLARGINE 100 [IU]/ML
12 INJECTION, SOLUTION SUBCUTANEOUS DAILY
Qty: 15 ML | Refills: 1 | Status: SHIPPED | OUTPATIENT
Start: 2024-09-04 | End: 2025-09-04

## 2024-09-04 RX ORDER — CHLORTHALIDONE 25 MG/1
25 TABLET ORAL DAILY
Qty: 90 TABLET | Refills: 1 | Status: SHIPPED | OUTPATIENT
Start: 2024-09-04 | End: 2025-09-04

## 2024-09-18 ENCOUNTER — PATIENT MESSAGE (OUTPATIENT)
Dept: INFECTIOUS DISEASES | Facility: CLINIC | Age: 49
End: 2024-09-18
Payer: MEDICAID

## 2024-09-18 ENCOUNTER — TELEPHONE (OUTPATIENT)
Dept: INFECTIOUS DISEASES | Facility: CLINIC | Age: 49
End: 2024-09-18
Payer: MEDICAID

## 2024-09-18 NOTE — TELEPHONE ENCOUNTER
----- Message from Holly Palumbo MA sent at 9/17/2024  4:41 PM CDT -----  Is this for the infectious doctor can you reschedule it please       VON Bernal23 minutes ago (4:17 PM)    AL  Hello! This is a reminder for your appt in our clinic tomorrow at 4pm. Please be advised, you have 15 minutes after your appt time to check in. If you are beyond your 15 minute window your appt will be rescheduled. If you have any questions please let us know. Thank you!

## 2024-09-19 ENCOUNTER — OFFICE VISIT (OUTPATIENT)
Dept: OBSTETRICS AND GYNECOLOGY | Facility: CLINIC | Age: 49
End: 2024-09-19
Payer: MEDICAID

## 2024-09-19 VITALS
SYSTOLIC BLOOD PRESSURE: 149 MMHG | BODY MASS INDEX: 25.7 KG/M2 | HEART RATE: 78 BPM | WEIGHT: 174.06 LBS | DIASTOLIC BLOOD PRESSURE: 108 MMHG

## 2024-09-19 DIAGNOSIS — Z72.89 OTHER PROBLEMS RELATED TO LIFESTYLE: ICD-10-CM

## 2024-09-19 DIAGNOSIS — Z12.4 PAP SMEAR FOR CERVICAL CANCER SCREENING: ICD-10-CM

## 2024-09-19 DIAGNOSIS — Z01.419 ENCOUNTER FOR ANNUAL ROUTINE GYNECOLOGICAL EXAMINATION: Primary | ICD-10-CM

## 2024-09-19 DIAGNOSIS — Z12.11 COLON CANCER SCREENING: ICD-10-CM

## 2024-09-19 DIAGNOSIS — Z11.3 SCREENING EXAMINATION FOR STD (SEXUALLY TRANSMITTED DISEASE): ICD-10-CM

## 2024-09-19 PROCEDURE — 3066F NEPHROPATHY DOC TX: CPT | Mod: CPTII,,, | Performed by: OBSTETRICS & GYNECOLOGY

## 2024-09-19 PROCEDURE — 3077F SYST BP >= 140 MM HG: CPT | Mod: CPTII,,, | Performed by: OBSTETRICS & GYNECOLOGY

## 2024-09-19 PROCEDURE — 1159F MED LIST DOCD IN RCRD: CPT | Mod: CPTII,,, | Performed by: OBSTETRICS & GYNECOLOGY

## 2024-09-19 PROCEDURE — 99213 OFFICE O/P EST LOW 20 MIN: CPT | Mod: PBBFAC,PO | Performed by: OBSTETRICS & GYNECOLOGY

## 2024-09-19 PROCEDURE — 3080F DIAST BP >= 90 MM HG: CPT | Mod: CPTII,,, | Performed by: OBSTETRICS & GYNECOLOGY

## 2024-09-19 PROCEDURE — 4010F ACE/ARB THERAPY RXD/TAKEN: CPT | Mod: CPTII,,, | Performed by: OBSTETRICS & GYNECOLOGY

## 2024-09-19 PROCEDURE — 99396 PREV VISIT EST AGE 40-64: CPT | Mod: S$PBB,,, | Performed by: OBSTETRICS & GYNECOLOGY

## 2024-09-19 PROCEDURE — 99999 PR PBB SHADOW E&M-EST. PATIENT-LVL III: CPT | Mod: PBBFAC,,, | Performed by: OBSTETRICS & GYNECOLOGY

## 2024-09-19 PROCEDURE — 87491 CHLMYD TRACH DNA AMP PROBE: CPT | Performed by: OBSTETRICS & GYNECOLOGY

## 2024-09-19 PROCEDURE — 3051F HG A1C>EQUAL 7.0%<8.0%: CPT | Mod: CPTII,,, | Performed by: OBSTETRICS & GYNECOLOGY

## 2024-09-19 PROCEDURE — 3061F NEG MICROALBUMINURIA REV: CPT | Mod: CPTII,,, | Performed by: OBSTETRICS & GYNECOLOGY

## 2024-09-19 PROCEDURE — 87591 N.GONORRHOEAE DNA AMP PROB: CPT | Performed by: OBSTETRICS & GYNECOLOGY

## 2024-09-19 PROCEDURE — 3008F BODY MASS INDEX DOCD: CPT | Mod: CPTII,,, | Performed by: OBSTETRICS & GYNECOLOGY

## 2024-09-19 RX ORDER — TRIAMCINOLONE ACETONIDE 1 MG/G
OINTMENT TOPICAL 2 TIMES DAILY
Qty: 30 G | Refills: 0 | Status: SHIPPED | OUTPATIENT
Start: 2024-09-19

## 2024-09-19 RX ORDER — METRONIDAZOLE 500 MG/1
500 TABLET ORAL EVERY 12 HOURS
Qty: 14 TABLET | Refills: 1 | Status: SHIPPED | OUTPATIENT
Start: 2024-09-19 | End: 2024-09-26

## 2024-09-19 NOTE — PROGRESS NOTES
Chief Complaint   Patient presents with    Annual Exam       HISTORY OF PRESENT ILLNESS:   Josefina Martin is a 49 y.o. female  who presents for annual exam. Dx with trichomoniasis last visit and took the medications and still having issues.  Has been itching and irritated.  Doesn't see period with IUD and no hotflashes/night sweats      Past Medical History:   Diagnosis Date    Cerebrovascular accident (CVA) 3/24/2017    CHF (congestive heart failure)     Diabetes mellitus     Hypertension     MI (myocardial infarction)     Stroke           Past Surgical History:   Procedure Laterality Date    CHOLECYSTECTOMY  2013    history of cholelithiasis    ESOPHAGOGASTRODUODENOSCOPY N/A 10/21/2020    Procedure: EGD (ESOPHAGOGASTRODUODENOSCOPY);  Surgeon: Asha Blackwell MD;  Location: [facility];  Service: Endoscopy;  Laterality: N/A;    ESOPHAGOGASTRODUODENOSCOPY N/A 6/15/2021    Procedure: EGD (ESOPHAGOGASTRODUODENOSCOPY);  Surgeon: Asha Blackwell MD;  Location: [facility];  Service: Endoscopy;  Laterality: N/A;    ESOPHAGOGASTRODUODENOSCOPY N/A 1/9/2024    Procedure: EGD (ESOPHAGOGASTRODUODENOSCOPY);  Surgeon: Frantz Tolliver MD;  Location: [facility];  Service: Gastroenterology;  Laterality: N/A;    TUBAL LIGATION           Social History     Socioeconomic History    Marital status: Single   Occupational History     Employer: [employer]   Tobacco Use    Smoking status: Some Days     Current packs/day: 0.15     Average packs/day: 0.2 packs/day for 18.0 years (2.7 ttl pk-yrs)     Types: Vaping with nicotine, Cigarettes    Smokeless tobacco: Never    Tobacco comments:     1 pack/week   Substance and Sexual Activity    Alcohol use: Yes     Comment: social 1/month    Drug use: No    Sexual activity: Yes     Partners: Male     Birth control/protection: None, Surgical     Social Determinants of Health     Financial Resource Strain: Medium Risk (1/31/2024)    Overall Financial Resource Strain (CARDIA)     Difficulty of  Paying Living Expenses: Somewhat hard   Food Insecurity: Food Insecurity Present (2024)    Hunger Vital Sign     Worried About Running Out of Food in the Last Year: Sometimes true     Ran Out of Food in the Last Year: Never true   Transportation Needs: No Transportation Needs (2024)    PRAPARE - Transportation     Lack of Transportation (Medical): No     Lack of Transportation (Non-Medical): No   Physical Activity: Sufficiently Active (2024)    Exercise Vital Sign     Days of Exercise per Week: 5 days     Minutes of Exercise per Session: 40 min   Recent Concern: Physical Activity - Insufficiently Active (2024)    Exercise Vital Sign     Days of Exercise per Week: 3 days     Minutes of Exercise per Session: 20 min   Stress: Stress Concern Present (2024)    [employer] [place] [employer] - Occupational Stress Questionnaire     Feeling of Stress : Very much   Housing Stability: Low Risk  (2024)    Housing Stability Vital Sign     Unable to Pay for Housing in the Last Year: No     Number of Places Lived in the Last Year: 1     Unstable Housing in the Last Year: No   Recent Concern: Housing Stability - High Risk (2024)    Housing Stability Vital Sign     Unable to Pay for Housing in the Last Year: Yes       Family History   Problem Relation Name Age of Onset    Hypertension Mother      Lung cancer Mother      No Known Problems Father      No Known Problems Sister      No Known Problems Daughter      Diabetes Son  14        Takes 2 insulins and metformin use to be bigger wally    Stroke Maternal Uncle  48    Anuerysm Maternal Grandmother      Breast cancer Maternal Grandmother      No Known Problems Sister      No Known Problems Daughter      No Known Problems Son      Breast cancer Maternal Aunt      Breast cancer Maternal Aunt           OB History    Para Term  AB Living   5 4 4   1 4   SAB IAB Ectopic Multiple Live Births   1       4      # Outcome Date GA Lbr  Flex/2nd Weight Sex Type Anes PTL Lv   5 SAB            4 Term      Vag-Spont   MONROE   3 Term      Vag-Spont   MONROE   2 Term      Vag-Spont   MONROE   1 Term      Vag-Spont   MONROE       GYN HISTORY:  PAP History: Denies abnormal Paps; 1/2019 NILM/HPV-; 2023 NILM  Infection History:Denies STDs. Denies PID.  Benign History: Denies uterine fibroids. Denies ovarian cysts. Denies endometriosis Denies other conditions.  Cancer History: Denies cervical cancer. Denies uterine cancer or hyperplasia. Denies ovarian cancer. Denies vulvar cancer or pre-cancer. Denies vaginal cancer or pre-cancer. Denies breast cancer. Denies colon cancer.  Cycle: 12/mon/3d until nov 2018 when started coming Q2 weeks   Had BTL   IUd in place 2/2019 for AUB    ROS:  Negative     BP (!) 149/108   Pulse 78   Wt 78.9 kg (174 lb 0.9 oz)   BMI 25.70 kg/m²      APPEARANCE: Well nourished, well developed, in no acute distress.    Breasts: normal appearing breast with right nipple inverted and left nipple normal appearing, no other masses appreciated in either breast, no rashes, no lymphadenopathy; stable from last exam I have done   Pelvic: Vulva:  normal external genitalia, no erythema,   Vagina: no vaginal discharge  Cervix: IUD strings seen, no CMT   Uterus: mildly enlarged uterus, about 10 weeks, more globular on right side, with good descent and mobility, stable from last exam   Adnexa: no adnexal masses appreciated    12/2018 TVUS: uterus 9.6x5.3x7.2 vol 191; anterior 1 cm fibroid, anterior 1 cm fibroid, posterior fibroid 1.6 cm and fundal fibroid 3.5 cm  Bilateral ovaries normal with no masses seen      1. Encounter for annual routine gynecological examination    2. Pap smear for cervical cancer screening    3. Screening examination for STD (sexually transmitted disease)    4. Other problems related to lifestyle    5. Colon cancer screening          Plan:  1. MMG ordered, encouraged to do   2. Pap smear with HPV ordered today   3. Possible still  trich, will do longer week course, and swabs repeated  4. Doesn't want to do FOBT, will order colonoscopy

## 2024-09-25 ENCOUNTER — PATIENT MESSAGE (OUTPATIENT)
Dept: OBSTETRICS AND GYNECOLOGY | Facility: HOSPITAL | Age: 49
End: 2024-09-25
Payer: MEDICAID

## 2024-09-25 RX ORDER — METRONIDAZOLE 500 MG/1
500 TABLET ORAL EVERY 12 HOURS
Qty: 14 TABLET | Refills: 1 | Status: SHIPPED | OUTPATIENT
Start: 2024-09-25 | End: 2024-10-02

## 2024-10-02 ENCOUNTER — PATIENT MESSAGE (OUTPATIENT)
Dept: OBSTETRICS AND GYNECOLOGY | Facility: CLINIC | Age: 49
End: 2024-10-02
Payer: MEDICAID

## 2024-10-14 DIAGNOSIS — K59.04 CHRONIC IDIOPATHIC CONSTIPATION: ICD-10-CM

## 2024-10-21 ENCOUNTER — PATIENT MESSAGE (OUTPATIENT)
Dept: INFECTIOUS DISEASES | Facility: CLINIC | Age: 49
End: 2024-10-21
Payer: MEDICAID

## 2024-10-31 ENCOUNTER — TELEPHONE (OUTPATIENT)
Dept: INFECTIOUS DISEASES | Facility: CLINIC | Age: 49
End: 2024-10-31
Payer: MEDICAID

## 2024-10-31 ENCOUNTER — PATIENT MESSAGE (OUTPATIENT)
Dept: OBSTETRICS AND GYNECOLOGY | Facility: CLINIC | Age: 49
End: 2024-10-31
Payer: MEDICAID

## 2024-11-05 ENCOUNTER — OFFICE VISIT (OUTPATIENT)
Dept: OBSTETRICS AND GYNECOLOGY | Facility: CLINIC | Age: 49
End: 2024-11-05
Payer: MEDICAID

## 2024-11-05 VITALS
BODY MASS INDEX: 26.48 KG/M2 | SYSTOLIC BLOOD PRESSURE: 158 MMHG | HEIGHT: 68 IN | WEIGHT: 174.69 LBS | HEART RATE: 68 BPM | DIASTOLIC BLOOD PRESSURE: 101 MMHG

## 2024-11-05 DIAGNOSIS — N89.8 VAGINAL IRRITATION: ICD-10-CM

## 2024-11-05 DIAGNOSIS — Z86.19 HISTORY OF TRICHOMONIASIS: Primary | ICD-10-CM

## 2024-11-05 PROCEDURE — 3077F SYST BP >= 140 MM HG: CPT | Mod: CPTII,,, | Performed by: OBSTETRICS & GYNECOLOGY

## 2024-11-05 PROCEDURE — 3080F DIAST BP >= 90 MM HG: CPT | Mod: CPTII,,, | Performed by: OBSTETRICS & GYNECOLOGY

## 2024-11-05 PROCEDURE — 4010F ACE/ARB THERAPY RXD/TAKEN: CPT | Mod: CPTII,,, | Performed by: OBSTETRICS & GYNECOLOGY

## 2024-11-05 PROCEDURE — 1159F MED LIST DOCD IN RCRD: CPT | Mod: CPTII,,, | Performed by: OBSTETRICS & GYNECOLOGY

## 2024-11-05 PROCEDURE — 99214 OFFICE O/P EST MOD 30 MIN: CPT | Mod: PBBFAC,PO | Performed by: OBSTETRICS & GYNECOLOGY

## 2024-11-05 PROCEDURE — 3051F HG A1C>EQUAL 7.0%<8.0%: CPT | Mod: CPTII,,, | Performed by: OBSTETRICS & GYNECOLOGY

## 2024-11-05 PROCEDURE — 3008F BODY MASS INDEX DOCD: CPT | Mod: CPTII,,, | Performed by: OBSTETRICS & GYNECOLOGY

## 2024-11-05 PROCEDURE — 99213 OFFICE O/P EST LOW 20 MIN: CPT | Mod: S$PBB,,, | Performed by: OBSTETRICS & GYNECOLOGY

## 2024-11-05 PROCEDURE — 99999 PR PBB SHADOW E&M-EST. PATIENT-LVL IV: CPT | Mod: PBBFAC,,, | Performed by: OBSTETRICS & GYNECOLOGY

## 2024-11-05 PROCEDURE — 3066F NEPHROPATHY DOC TX: CPT | Mod: CPTII,,, | Performed by: OBSTETRICS & GYNECOLOGY

## 2024-11-05 PROCEDURE — 3061F NEG MICROALBUMINURIA REV: CPT | Mod: CPTII,,, | Performed by: OBSTETRICS & GYNECOLOGY

## 2024-11-05 PROCEDURE — 0352U VAGINOSIS SCREEN BY DNA PROBE: CPT | Performed by: OBSTETRICS & GYNECOLOGY

## 2024-11-05 PROCEDURE — 87591 N.GONORRHOEAE DNA AMP PROB: CPT | Performed by: OBSTETRICS & GYNECOLOGY

## 2024-11-05 RX ORDER — TINIDAZOLE 500 MG/1
2 TABLET ORAL DAILY
Qty: 28 TABLET | Refills: 0 | Status: SHIPPED | OUTPATIENT
Start: 2024-11-05 | End: 2024-11-14

## 2024-11-05 NOTE — PROGRESS NOTES
Chief Complaint   Patient presents with    vaginal irritation       HISTORY OF PRESENT ILLNESS:   Josefina Martin is a 49 y.o. female  who presents for repeat testing for trichomonas. She reports symptoms are improved but still there, took all medications 4 weeks ago.      Past Medical History:   Diagnosis Date    Cerebrovascular accident (CVA) 3/24/2017    CHF (congestive heart failure)     Diabetes mellitus     Hypertension     MI (myocardial infarction)     Stroke           Past Surgical History:   Procedure Laterality Date    CHOLECYSTECTOMY  2013    history of cholelithiasis    ESOPHAGOGASTRODUODENOSCOPY N/A 10/21/2020    Procedure: EGD (ESOPHAGOGASTRODUODENOSCOPY);  Surgeon: Asha Blackwell MD;  Location: [facility];  Service: Endoscopy;  Laterality: N/A;    ESOPHAGOGASTRODUODENOSCOPY N/A 6/15/2021    Procedure: EGD (ESOPHAGOGASTRODUODENOSCOPY);  Surgeon: Asha Blackwell MD;  Location: [facility];  Service: Endoscopy;  Laterality: N/A;    ESOPHAGOGASTRODUODENOSCOPY N/A 1/9/2024    Procedure: EGD (ESOPHAGOGASTRODUODENOSCOPY);  Surgeon: Frantz Tolliver MD;  Location: [facility];  Service: Gastroenterology;  Laterality: N/A;    TUBAL LIGATION           Social History     Socioeconomic History    Marital status: Single   Occupational History     Employer: [employer]   Tobacco Use    Smoking status: Some Days     Current packs/day: 0.15     Average packs/day: 0.2 packs/day for 18.0 years (2.7 ttl pk-yrs)     Types: Vaping with nicotine, Cigarettes    Smokeless tobacco: Never    Tobacco comments:     1 pack/week   Substance and Sexual Activity    Alcohol use: Yes     Comment: social 1/month    Drug use: No    Sexual activity: Not Currently     Partners: Male     Birth control/protection: None, Surgical     Social Drivers of Health     Financial Resource Strain: Medium Risk (1/31/2024)    Overall Financial Resource Strain (CARDIA)     Difficulty of Paying Living Expenses: Somewhat hard   Food Insecurity: Food  Insecurity Present (2024)    Hunger Vital Sign     Worried About Running Out of Food in the Last Year: Sometimes true     Ran Out of Food in the Last Year: Never true   Transportation Needs: No Transportation Needs (2024)    PRAPARE - Transportation     Lack of Transportation (Medical): No     Lack of Transportation (Non-Medical): No   Physical Activity: Sufficiently Active (2024)    Exercise Vital Sign     Days of Exercise per Week: 5 days     Minutes of Exercise per Session: 40 min   Recent Concern: Physical Activity - Insufficiently Active (2024)    Exercise Vital Sign     Days of Exercise per Week: 3 days     Minutes of Exercise per Session: 20 min   Stress: Stress Concern Present (2024)    [employer] - Occupational Stress Questionnaire     Feeling of Stress : Very much   Housing Stability: Low Risk  (2024)    Housing Stability Vital Sign     Unable to Pay for Housing in the Last Year: No     Number of Places Lived in the Last Year: 1     Unstable Housing in the Last Year: No   Recent Concern: Housing Stability - High Risk (2024)    Housing Stability Vital Sign     Unable to Pay for Housing in the Last Year: Yes       Family History   Problem Relation Name Age of Onset    Hypertension Mother      Lung cancer Mother      No Known Problems Father      No Known Problems Sister      No Known Problems Daughter      Diabetes Son  14        Takes 2 insulins and metformin use to be bigger wally    Stroke Maternal Uncle  48    Anuerysm Maternal Grandmother      Breast cancer Maternal Grandmother      No Known Problems Sister      No Known Problems Daughter      No Known Problems Son      Breast cancer Maternal Aunt      Breast cancer Maternal Aunt           OB History    Para Term  AB Living   5 4 4   1 4   SAB IAB Ectopic Multiple Live Births   1       4      # Outcome Date GA Lbr Flex/2nd Weight Sex Type Anes PTL Lv   5 SAB            4 Term       "Vag-Spont   MONROE   3 Term      Vag-Spont   MONROE   2 Term      Vag-Spont   MONROE   1 Term      Vag-Spont   MONROE       GYN HISTORY:  PAP History: Denies abnormal Paps; 1/2019 NILM/HPV-; 2023 NILM  Infection History:Denies STDs. Denies PID.  Benign History: Denies uterine fibroids. Denies ovarian cysts. Denies endometriosis Denies other conditions.  Cancer History: Denies cervical cancer. Denies uterine cancer or hyperplasia. Denies ovarian cancer. Denies vulvar cancer or pre-cancer. Denies vaginal cancer or pre-cancer. Denies breast cancer. Denies colon cancer.  Cycle: 12/mon/3d until nov 2018 when started coming Q2 weeks   Had BTL   IUd in place 2/2019 for AUB    ROS:  Negative     BP (!) 158/101 (Patient Position: Sitting)   Pulse 68   Ht 5' 8" (1.727 m)   Wt 79.2 kg (174 lb 11.4 oz)   BMI 26.57 kg/m²      APPEARANCE: Well nourished, well developed, in no acute distress.    Breasts: normal appearing breast with right nipple inverted and left nipple normal appearing, no other masses appreciated in either breast, no rashes, no lymphadenopathy; stable from last exam I have done   Pelvic: Vulva:  normal external genitalia, no erythema,   Vagina: thick yellow discharge   Cervix: IUD strings seen, no CMT       12/2018 TVUS: uterus 9.6x5.3x7.2 vol 191; anterior 1 cm fibroid, anterior 1 cm fibroid, posterior fibroid 1.6 cm and fundal fibroid 3.5 cm  Bilateral ovaries normal with no masses seen      1. History of trichomoniasis    2. Vaginal irritation            Plan:  1. Concerned for recurrent/resistant trich, did flagyl 1 dose and week long dose, will reswab and do tinidazole 2gm daily x 7 days. If still there will get kit for testing from [facility].      Face to Face time with patient: 20 minutes of total time spent on the encounter, which includes face to face time and non-face to face time preparing to see the patient (eg, review of tests), Obtaining and/or reviewing separately obtained history, Documenting clinical " information in the electronic or other health record, Independently interpreting results (not separately reported) and communicating results to the patient/family/caregiver, or Care coordination (not separately reported).

## 2024-11-07 ENCOUNTER — PATIENT MESSAGE (OUTPATIENT)
Dept: OBSTETRICS AND GYNECOLOGY | Facility: CLINIC | Age: 49
End: 2024-11-07
Payer: MEDICAID

## 2024-11-07 LAB
BACTERIAL VAGINOSIS DNA: NOT DETECTED
C TRACH DNA SPEC QL NAA+PROBE: NOT DETECTED
CANDIDA GLABRATA/KRUSEI: NOT DETECTED
CANDIDA RRNA VAG QL PROBE: NOT DETECTED
N GONORRHOEA DNA SPEC QL NAA+PROBE: NOT DETECTED
TRICHOMONAS VAGINALIS: DETECTED

## 2024-12-10 DIAGNOSIS — I10 UNCONTROLLED HYPERTENSION: ICD-10-CM

## 2024-12-10 RX ORDER — TERCONAZOLE 8 MG/G
1 CREAM VAGINAL NIGHTLY
Qty: 20 G | Refills: 1 | Status: SHIPPED | OUTPATIENT
Start: 2024-12-10

## 2024-12-10 RX ORDER — NIFEDIPINE 90 MG/1
90 TABLET, EXTENDED RELEASE ORAL DAILY
Qty: 90 TABLET | Refills: 1 | Status: CANCELLED | OUTPATIENT
Start: 2024-12-10 | End: 2025-12-10

## 2024-12-10 NOTE — TELEPHONE ENCOUNTER
Refill Routing Note   Medication(s) are not appropriate for processing by [facility] for the following reason(s):        Outside of protocol    [facility] action(s):  Route             Appointments  past 12m or future 3m with PCP    Date Provider   Last Visit   8/1/2024 Pietro Morales MD   Next Visit   Visit date not found Pietro Morales MD   ED visits in past 90 days: 0        Note composed:1:06 PM 12/10/2024

## 2024-12-10 NOTE — TELEPHONE ENCOUNTER
Refill Routing Note   Medication(s) are not appropriate for processing by [facility] for the following reason(s):        New or recently adjusted medication: script restarted 9/19/24    [facility] action(s):  Defer             Appointments  past 12m or future 3m with PCP    Date Provider   Last Visit   11/5/2024 Agueda Frank MD   Next Visit   12/17/2024 Agueda Frank MD   ED visits in past 90 days: 0        Note composed:1:21 PM 12/10/2024

## 2024-12-11 RX ORDER — TRIAMCINOLONE ACETONIDE 1 MG/G
OINTMENT TOPICAL 2 TIMES DAILY
Qty: 30 G | Refills: 1 | Status: SHIPPED | OUTPATIENT
Start: 2024-12-11

## 2024-12-14 ENCOUNTER — HOSPITAL ENCOUNTER (EMERGENCY)
Facility: HOSPITAL | Age: 49
Discharge: HOME | End: 2024-12-14
Attending: EMERGENCY MEDICINE
Payer: MEDICAID

## 2024-12-14 VITALS
HEART RATE: 68 BPM | BODY MASS INDEX: 33.77 KG/M2 | OXYGEN SATURATION: 98 % | DIASTOLIC BLOOD PRESSURE: 99 MMHG | SYSTOLIC BLOOD PRESSURE: 179 MMHG | TEMPERATURE: 98 F | HEIGHT: 60 IN | WEIGHT: 172 LBS | RESPIRATION RATE: 20 BRPM

## 2024-12-14 DIAGNOSIS — R07.9 CHEST PAIN, UNSPECIFIED TYPE: Primary | ICD-10-CM

## 2024-12-14 DIAGNOSIS — R07.9 CHEST PAIN: ICD-10-CM

## 2024-12-14 LAB
ALBUMIN SERPL BCP-MCNC: 4.2 G/DL (ref 3.5–5.2)
ALP SERPL-CCNC: 137 U/L (ref 40–150)
ALT SERPL W/O P-5'-P-CCNC: 15 U/L (ref 10–44)
ANION GAP SERPL CALC-SCNC: 10 MMOL/L (ref 8–16)
AST SERPL-CCNC: 18 U/L (ref 10–40)
BASOPHILS # BLD AUTO: 0.04 K/UL (ref 0–0.2)
BASOPHILS NFR BLD: 0.6 % (ref 0–1.9)
BILIRUB SERPL-MCNC: 0.8 MG/DL (ref 0.1–1)
BNP SERPL-MCNC: 32 PG/ML (ref 0–99)
BUN SERPL-MCNC: 14 MG/DL (ref 6–20)
CALCIUM SERPL-MCNC: 10 MG/DL (ref 8.7–10.5)
CHLORIDE SERPL-SCNC: 101 MMOL/L (ref 95–110)
CO2 SERPL-SCNC: 27 MMOL/L (ref 23–29)
CREAT SERPL-MCNC: 1 MG/DL (ref 0.5–1.4)
CRP SERPL-MCNC: 0.8 MG/L (ref 0–8.2)
D DIMER PPP IA.FEU-MCNC: 0.24 MG/L FEU
DIFFERENTIAL METHOD BLD: NORMAL
EOSINOPHIL # BLD AUTO: 0 K/UL (ref 0–0.5)
EOSINOPHIL NFR BLD: 0.3 % (ref 0–8)
ERYTHROCYTE [DISTWIDTH] IN BLOOD BY AUTOMATED COUNT: 12.6 % (ref 11.5–14.5)
EST. GFR  (NO RACE VARIABLE): >60 ML/MIN/1.73 M^2
GLUCOSE SERPL-MCNC: 189 MG/DL (ref 70–110)
HCT VFR BLD AUTO: 43.5 % (ref 37–48.5)
HGB BLD-MCNC: 14.8 G/DL (ref 12–16)
IMM GRANULOCYTES # BLD AUTO: 0.02 K/UL (ref 0–0.04)
IMM GRANULOCYTES NFR BLD AUTO: 0.3 % (ref 0–0.5)
LYMPHOCYTES # BLD AUTO: 2.4 K/UL (ref 1–4.8)
LYMPHOCYTES NFR BLD: 35.4 % (ref 18–48)
MCH RBC QN AUTO: 30.1 PG (ref 27–31)
MCHC RBC AUTO-ENTMCNC: 34 G/DL (ref 32–36)
MCV RBC AUTO: 88 FL (ref 82–98)
MONOCYTES # BLD AUTO: 0.5 K/UL (ref 0.3–1)
MONOCYTES NFR BLD: 7.3 % (ref 4–15)
NEUTROPHILS # BLD AUTO: 3.8 K/UL (ref 1.8–7.7)
NEUTROPHILS NFR BLD: 56.1 % (ref 38–73)
NRBC BLD-RTO: 0 /100 WBC
PLATELET # BLD AUTO: 222 K/UL (ref 150–450)
PMV BLD AUTO: 11 FL (ref 9.2–12.9)
POTASSIUM SERPL-SCNC: 3.9 MMOL/L (ref 3.5–5.1)
PROT SERPL-MCNC: 7.9 G/DL (ref 6–8.4)
RBC # BLD AUTO: 4.92 M/UL (ref 4–5.4)
SODIUM SERPL-SCNC: 138 MMOL/L (ref 136–145)
TROPONIN I SERPL DL<=0.01 NG/ML-MCNC: 0.01 NG/ML (ref 0–0.03)
TROPONIN I SERPL DL<=0.01 NG/ML-MCNC: <0.006 NG/ML (ref 0–0.03)
WBC # BLD AUTO: 6.73 K/UL (ref 3.9–12.7)

## 2024-12-14 PROCEDURE — 85025 COMPLETE CBC W/AUTO DIFF WBC: CPT | Performed by: EMERGENCY MEDICINE

## 2024-12-14 PROCEDURE — 93010 ELECTROCARDIOGRAM REPORT: CPT | Mod: ,,, | Performed by: STUDENT IN AN ORGANIZED HEALTH CARE EDUCATION/TRAINING PROGRAM

## 2024-12-14 PROCEDURE — 93005 ELECTROCARDIOGRAM TRACING: CPT

## 2024-12-14 PROCEDURE — 85379 FIBRIN DEGRADATION QUANT: CPT | Performed by: EMERGENCY MEDICINE

## 2024-12-14 PROCEDURE — 86140 C-REACTIVE PROTEIN: CPT | Performed by: EMERGENCY MEDICINE

## 2024-12-14 PROCEDURE — 84484 ASSAY OF TROPONIN QUANT: CPT | Mod: 91 | Performed by: EMERGENCY MEDICINE

## 2024-12-14 PROCEDURE — 83880 ASSAY OF NATRIURETIC PEPTIDE: CPT | Performed by: EMERGENCY MEDICINE

## 2024-12-14 PROCEDURE — 99285 EMERGENCY DEPT VISIT HI MDM: CPT | Mod: 25

## 2024-12-14 PROCEDURE — 80053 COMPREHEN METABOLIC PANEL: CPT | Performed by: EMERGENCY MEDICINE

## 2024-12-14 RX ORDER — NAPROXEN 500 MG/1
500 TABLET ORAL 2 TIMES DAILY WITH MEALS
Qty: 60 TABLET | Refills: 0 | Status: SHIPPED | OUTPATIENT
Start: 2024-12-14

## 2024-12-14 RX ORDER — METHOCARBAMOL 500 MG/1
1000 TABLET, FILM COATED ORAL 2 TIMES DAILY PRN
Qty: 30 TABLET | Refills: 0 | Status: SHIPPED | OUTPATIENT
Start: 2024-12-14 | End: 2024-12-27

## 2024-12-14 RX ORDER — ONDANSETRON 4 MG/1
4 TABLET, ORALLY DISINTEGRATING ORAL EVERY 8 HOURS PRN
Qty: 12 TABLET | Refills: 0 | Status: SHIPPED | OUTPATIENT
Start: 2024-12-14 | End: 2024-12-17

## 2024-12-14 NOTE — Clinical Note
"Josefina Ramsay" Mario was seen and treated in our emergency department on 12/14/2024.  She may return to work on 12/15/2024.       If you have any questions or concerns, please don't hesitate to call.      Arleen Kelly RN    "

## 2024-12-14 NOTE — ED NOTES
49 year old female patient presents to the ED with c/o chest pain that began around 0445 this morning. Patient endorses 3/10 cramping like pain that is coming and going. Pt states yesterday she began with nausea and vomiting. HTN and CP occurred after the emesis. Patient denies any accompanying symptoms. Endorses slight headache s/t HTN. Explains she took all her prescribed medications. Denies contact with anyone ill. Assisted into gown. Placed onto continuous cardiac, BP, and O2 monitoring. Hypertensive; vitally stable otherwise. Safety intact. Call light in reach. Denies needs. Nadn. Care ongoing.

## 2024-12-14 NOTE — ED PROVIDER NOTES
"Encounter Date: 12/14/2024       History     Chief Complaint   Patient presents with    Chest Pain     C/o CP starting at 0445 today. Pain described as intermittent "cramping" to center of chest radiates to back at a 3/10. Pt reports intermittent N/V x2 days. Denies taking anything for sx, took daily meds today. /119 in triage.      Patient is a 49-year-old female with a history of heart failure, previous CVA, poorly controlled hypertension who presents to the ED with complaint of chest cramping.  Patient states that she had been vomiting nonbilious nonbloody emesis since yesterday.  She states that approximately 445 this morning she had acute onset of chest pain described as "diffuse cramping. " she denies the pain radiating to her neck or back or other extremities.  She denies any associated shortness of breath.  She states these symptoms lasted for several minutes, approximately 15 a 30 with it is slowly improving upon arrival to the ER today.  She denies taken anything for her symptoms of nausea vomiting or the aforementioned chest cramping. Additionally, she denies any abdominal pain, diarrhea, fever, cough, chills. She denies any hx of CAD.       Review of patient's allergies indicates:  No Known Allergies  Past Medical History:   Diagnosis Date    Cerebrovascular accident (CVA) 3/24/2017    CHF (congestive heart failure)     Diabetes mellitus     Hypertension     MI (myocardial infarction)     Stroke      Past Surgical History:   Procedure Laterality Date    CHOLECYSTECTOMY  2013    history of cholelithiasis    ESOPHAGOGASTRODUODENOSCOPY N/A 10/21/2020    Procedure: EGD (ESOPHAGOGASTRODUODENOSCOPY);  Surgeon: Asha Blackwell MD;  [facility];  Service: Endoscopy;  Laterality: N/A;    ESOPHAGOGASTRODUODENOSCOPY N/A 6/15/2021    Procedure: EGD (ESOPHAGOGASTRODUODENOSCOPY);  Surgeon: Asha Blackwell MD;  Location: [facility];  Service: Endoscopy;  Laterality: N/A;    " ESOPHAGOGASTRODUODENOSCOPY N/A 1/9/2024    Procedure: EGD (ESOPHAGOGASTRODUODENOSCOPY);  Surgeon: Frantz Tolliver MD;  Location: [facility];  Service: Gastroenterology;  Laterality: N/A;    TUBAL LIGATION       Family History   Problem Relation Name Age of Onset    Hypertension Mother      Lung cancer Mother      No Known Problems Father      No Known Problems Sister      No Known Problems Daughter      Diabetes Son  14        Takes 2 insulins and metformin use to be bigger wally    Stroke Maternal Uncle  48    Anuerysm Maternal Grandmother      Breast cancer Maternal Grandmother      No Known Problems Sister      No Known Problems Daughter      No Known Problems Son      Breast cancer Maternal Aunt      Breast cancer Maternal Aunt       Social History     Tobacco Use    Smoking status: Some Days     Current packs/day: 0.15     Average packs/day: 0.2 packs/day for 18.0 years (2.7 ttl pk-yrs)     Types: Vaping with nicotine, Cigarettes    Smokeless tobacco: Never    Tobacco comments:     1 pack/week   Substance Use Topics    Alcohol use: Yes     Comment: social 1/month    Drug use: No     Review of Systems   Constitutional:  Negative for chills and fever.   Respiratory:  Negative for cough and shortness of breath.    Cardiovascular:  Positive for chest pain. Negative for palpitations and leg swelling.   Gastrointestinal:  Positive for vomiting. Negative for abdominal pain, blood in stool and nausea.   Musculoskeletal:  Negative for back pain and myalgias.   Skin:  Negative for pallor and rash.   Neurological:  Negative for dizziness and headaches.   Psychiatric/Behavioral:  Negative for agitation and confusion.        Physical Exam     Initial Vitals [12/14/24 1233]   BP Pulse Resp Temp SpO2   (!) 220/119 65 16 97.8 °F (36.6 °C) 96 %      MAP       --         Physical Exam    Nursing note and vitals reviewed.  Constitutional: She appears well-developed and well-nourished.   HENT:   Head: Normocephalic and atraumatic.    Right Ear: External ear normal.   Left Ear: External ear normal.   Eyes: EOM are normal. Pupils are equal, round, and reactive to light.   Neck: Neck supple.   Normal range of motion.  Cardiovascular:  Normal rate, regular rhythm, normal heart sounds and intact distal pulses.           Carotid pulse 2+  Radial pulse 2+    Pulmonary/Chest: Breath sounds normal.   Abdominal: Abdomen is soft. Bowel sounds are normal.   Musculoskeletal:         General: No tenderness or edema. Normal range of motion.      Cervical back: Normal range of motion and neck supple.     Neurological: She is alert and oriented to person, place, and time. She has normal strength. GCS score is 15. GCS eye subscore is 4. GCS verbal subscore is 5. GCS motor subscore is 6.   No focal neurological deficit appreciated on exam.  Strength 5/5 in all four extremities  Sensation grossly in tact  MAEW  GCS 15  AAOX3    Skin: Skin is warm. Capillary refill takes less than 2 seconds.   Psychiatric: She has a normal mood and affect.         ED Course   Procedures  Labs Reviewed   COMPREHENSIVE METABOLIC PANEL - Abnormal       Result Value    Sodium 138      Potassium 3.9      Chloride 101      CO2 27      Glucose 189 (*)     BUN 14      Creatinine 1.0      Calcium 10.0      Total Protein 7.9      Albumin 4.2      Total Bilirubin 0.8      Alkaline Phosphatase 137      AST 18      ALT 15      eGFR >60      Anion Gap 10     CBC W/ AUTO DIFFERENTIAL    WBC 6.73      RBC 4.92      Hemoglobin 14.8      Hematocrit 43.5      MCV 88      MCH 30.1      MCHC 34.0      RDW 12.6      Platelets 222      MPV 11.0      Immature Granulocytes 0.3      Gran # (ANC) 3.8      Immature Grans (Abs) 0.02      Lymph # 2.4      Mono # 0.5      Eos # 0.0      Baso # 0.04      nRBC 0      Gran % 56.1      Lymph % 35.4      Mono % 7.3      Eosinophil % 0.3      Basophil % 0.6      Differential Method Automated     TROPONIN I    Troponin I <0.006     TROPONIN I    Troponin I 0.006      B-TYPE NATRIURETIC PEPTIDE    BNP 32     D DIMER, QUANTITATIVE   CK   D DIMER, QUANTITATIVE    D-Dimer 0.24     C-REACTIVE PROTEIN    CRP 0.8       EKG Readings: (Independently Interpreted)   Initial Reading: No STEMI. Rhythm: Normal Sinus Rhythm. Ectopy: No Ectopy. Conduction: Normal. ST Segments: Normal ST Segments. T Waves: Normal. Axis: Normal.   Normal sinus rhythm normal axis; no significant ST elevation depression; no STEMI; ventricular rate 63       Imaging Results              X-Ray Chest PA And Lateral (Final result)  Result time 12/14/24 13:30:56      Final result by Jacques Bartholomew DO (12/14/24 13:30:56)                   Impression:      1.  No acute cardiopulmonary process.      Electronically signed by: Jacques Bartholomew DO  Date:    12/14/2024  Time:    13:30               Narrative:    EXAMINATION:  XR CHEST PA AND LATERAL    CLINICAL HISTORY:  Chest Pain;    TECHNIQUE:  PA and lateral views of the chest were performed.    COMPARISON:  07/28/2024    FINDINGS:  The lungs are clear and free of infiltrate.  No pleural effusion or pneumothorax. The heart is not enlarged. There is tortuosity of the descending thoracic aorta.  Surgical clips noted in the right upper quadrant of the abdomen.                                       Medications - No data to display  Medical Decision Making             ED Course as of 12/14/24 1639   Sat Dec 14, 2024   1325 Glucose(!): 189 [LC]   1325 CRP: 0.8 [LC]   1434 X-Ray Chest PA And Lateral  No acute cardiopulmonary process per my independent interpretation.  [LC]   1434 D-Dimer: 0.24 [LC]   1434 BNP: 32 [LC]   1434 Sodium: 138 [LC]   1639 Troponin I: 0.006 [LC]   1639 D-Dimer: 0.24 [LC]   1639 CRP: 0.8 [LC]   1639 WBC: 6.73 [LC]   1639 Hemoglobin: 14.8 [LC]   1639 Hematocrit: 43.5 [LC]      ED Course User Index  [LC] Chano Lozano MD                 Medical Decision Making:   Initial Assessment:   See HPI   ED Management:  - patient's ED workup was extensive  and largely unremarkable.  Patient had 2 troponins in the D-dimer while of which were negative; her chest x-ray was unremarkable without findings of widened mediastinum or acute fluid overload; I do believe that her chest pain symptoms are likely secondary to the reported vomiting she had been experiencing.  She denies any hemoptysis or hematemesis.  Low suspicion for esophageal tear; there is no crepitus or abnormal LFTs on auscultation of the lungs; the patient's EKG was reassuring and unremarkable.  The patient had no subsequent episodes of vomiting while in the ED in her chest pain is nearly resolved.  I will discharge patient home with prescription for naproxen, Robaxin and Zofran for her nausea.  I gave the patient strict ED return instructions for any new or worsening symptoms.  She verbalized understanding of this and expressed a willingness to comply with my recommendations.  The patient states she did take her morning dose of antihypertensive medications prior to arrival.  However as we are approaching nearly 5:00 p.m. she states that she used to take her evening dose.  ED lab results demonstrated no so-called findings of end-organ damage and associated with her elevated blood pressure.  I stressed the importance of good blood pressure control as it increases her risk of stroke and worsening heart disease.  The patient states she would go home and take her blood pressure medications as prescribed.  - No further intervention is indicated at this time after having taken into account the patient's history, physical exam findings, and empirical and objective data obtained during the patient's emergency department workup.   - The patient is at low risk for an emergent medical condition at this time, and I am of the belief that that it is safe to discharge the patient from the emergency department.   - The patient is instructed to follow up as outpatient as indicated on the discharge paperwork.    - I have  discussed the specifics of the workup with the patient and the patient has verbalized understanding of the details of the workup, the diagnosis, the treatment plan, and the need for outpatient follow-up.    - Although the patient has no emergent etiology today this does not preclude the development of an emergent condition so, in addition, I have advised the patient that they can return to the ED and/or activate EMS at any time with worsening of their symptoms, change of their symptoms, or with any other medical complaint.    - The patient remained comfortable and stable during their visit in the ED.    - Discharge and follow-up instructions discussed with the patient who expressed understanding and willingness to comply with my recommendations.  - Results of all emergency department tests  discussed thoroughly with patient; all patient questions answered; pt in agreement with plan  - Pt instructed to follow up with PCP in 2-3 days for recheck of today's complaints  - Pt given strict emergency department return precautions for any new or worsening of symptoms  - Pt discharged from the emergency department in stable condition, in no acute distress                Clinical Impression:  Final diagnoses:  [R07.9] Chest pain  [R07.9] Chest pain, unspecified type (Primary)          ED Disposition Condition    Discharge Stable          ED Prescriptions       Medication Sig Dispense Start Date End Date Auth. Provider    naproxen (NAPROSYN) 500 MG tablet Take 1 tablet (500 mg total) by mouth 2 (two) times daily with meals. 60 tablet 12/14/2024 -- Chano Lozano MD    methocarbamoL (ROBAXIN) 500 MG Tab Take 2 tablets (1,000 mg total) by mouth 2 (two) times daily as needed (muscle pain). 30 tablet 12/14/2024 12/19/2024 Chano Lozano MD    ondansetron (ZOFRAN-ODT) 4 MG TbDL Take 1 tablet (4 mg total) by mouth every 8 (eight) hours as needed (nausea). 12 tablet 12/14/2024 12/17/2024 Chano Lozano MD           Follow-up Information       Follow up With Specialties Details Why Contact Info    Ryann Villalobos MD Family Medicine Schedule an appointment as soon as possible for a visit   [address]  [address] [address]  [phone]               Chano Lozano MD  12/14/24 [id number]       Chano Lozano MD  12/14/24 1942

## 2024-12-16 LAB
OHS QRS DURATION: 90 MS
OHS QTC CALCULATION: 417 MS

## 2024-12-17 DIAGNOSIS — I10 UNCONTROLLED HYPERTENSION: ICD-10-CM

## 2024-12-17 RX ORDER — NIFEDIPINE 90 MG/1
90 TABLET, EXTENDED RELEASE ORAL DAILY
Qty: 90 TABLET | Refills: 1 | Status: CANCELLED | OUTPATIENT
Start: 2024-12-10 | End: 2025-12-10

## 2024-12-24 DIAGNOSIS — I10 UNCONTROLLED HYPERTENSION: ICD-10-CM

## 2024-12-24 RX ORDER — NIFEDIPINE 90 MG/1
90 TABLET, EXTENDED RELEASE ORAL DAILY
Qty: 90 TABLET | Refills: 1 | Status: CANCELLED | OUTPATIENT
Start: 2024-12-10 | End: 2025-12-10

## 2025-01-26 DIAGNOSIS — Z86.73 HISTORY OF CVA (CEREBROVASCULAR ACCIDENT): ICD-10-CM

## 2025-01-27 RX ORDER — CLOPIDOGREL BISULFATE 75 MG/1
75 TABLET ORAL DAILY
Qty: 90 TABLET | Refills: 0 | Status: SHIPPED | OUTPATIENT
Start: 2025-01-27

## 2025-02-14 DIAGNOSIS — Z86.73 HISTORY OF CVA (CEREBROVASCULAR ACCIDENT): ICD-10-CM

## 2025-02-14 RX ORDER — ASPIRIN 81 MG/1
81 TABLET ORAL DAILY
Qty: 90 TABLET | Refills: 3 | Status: SHIPPED | OUTPATIENT
Start: 2025-02-14 | End: 2026-02-14

## 2025-02-15 RX ORDER — TRIAMCINOLONE ACETONIDE 1 MG/G
OINTMENT TOPICAL 2 TIMES DAILY
Qty: 30 G | Refills: 0 | Status: SHIPPED | OUTPATIENT
Start: 2025-02-15

## 2025-02-15 NOTE — TELEPHONE ENCOUNTER
Refill Decision Note   Josefinaserafin Martin  is requesting a refill authorization.  Brief Assessment and Rationale for Refill:  Approve     Medication Therapy Plan:        Comments:     Note composed:12:24 AM 02/15/2025           
The patient denies chest pain, SOB, syncope,  headache, visual disturbances, CVA, PE, DVT, KAM, abdominal pain, N/V/D/C, hematochezia, melena, dysuria, hematuria, fever, chills.

## 2025-03-09 DIAGNOSIS — I10 UNCONTROLLED HYPERTENSION: ICD-10-CM

## 2025-03-09 DIAGNOSIS — Z86.73 HISTORY OF CVA (CEREBROVASCULAR ACCIDENT): ICD-10-CM

## 2025-03-09 RX ORDER — NIFEDIPINE 90 MG/1
90 TABLET, EXTENDED RELEASE ORAL DAILY
Qty: 90 TABLET | Refills: 1 | Status: CANCELLED | OUTPATIENT
Start: 2025-03-09 | End: 2026-03-09

## 2025-03-09 RX ORDER — CLOPIDOGREL BISULFATE 75 MG/1
75 TABLET ORAL DAILY
Qty: 90 TABLET | Refills: 0 | Status: CANCELLED | OUTPATIENT
Start: 2025-03-09

## 2025-03-10 ENCOUNTER — PATIENT MESSAGE (OUTPATIENT)
Dept: OBSTETRICS AND GYNECOLOGY | Facility: CLINIC | Age: 50
End: 2025-03-10
Payer: MEDICAID

## 2025-03-10 DIAGNOSIS — Z86.73 HISTORY OF CVA (CEREBROVASCULAR ACCIDENT): ICD-10-CM

## 2025-03-10 DIAGNOSIS — I10 UNCONTROLLED HYPERTENSION: ICD-10-CM

## 2025-03-10 RX ORDER — TERCONAZOLE 8 MG/G
1 CREAM VAGINAL NIGHTLY
Qty: 20 G | Refills: 1 | Status: SHIPPED | OUTPATIENT
Start: 2025-03-10

## 2025-03-10 NOTE — TELEPHONE ENCOUNTER
Refill Routing Note   Medication(s) are not appropriate for processing by [facility] for the following reason(s):        Outside of protocol    [facility] action(s):  Route               Appointments  past 12m or future 3m with PCP    Date Provider   Last Visit   8/1/2024 Pietro Morales MD   Next Visit   Visit date not found Pietro Morales MD   ED visits in past 90 days: 1        Note composed:12:10 PM 03/10/2025

## 2025-03-11 RX ORDER — NIFEDIPINE 90 MG/1
90 TABLET, EXTENDED RELEASE ORAL DAILY
Qty: 90 TABLET | Refills: 1 | Status: SHIPPED | OUTPATIENT
Start: 2025-03-11 | End: 2026-03-11

## 2025-03-11 RX ORDER — CLOPIDOGREL BISULFATE 75 MG/1
75 TABLET ORAL DAILY
Qty: 90 TABLET | Refills: 0 | Status: SHIPPED | OUTPATIENT
Start: 2025-03-11

## 2025-03-12 DIAGNOSIS — E11.65 UNCONTROLLED TYPE 2 DIABETES MELLITUS WITH HYPERGLYCEMIA: ICD-10-CM

## 2025-03-12 DIAGNOSIS — Z86.73 HISTORY OF CVA (CEREBROVASCULAR ACCIDENT): ICD-10-CM

## 2025-03-12 RX ORDER — METFORMIN HYDROCHLORIDE 500 MG/1
500 TABLET ORAL 2 TIMES DAILY WITH MEALS
Qty: 180 TABLET | Refills: 3 | Status: CANCELLED | OUTPATIENT
Start: 2025-03-12 | End: 2026-03-12

## 2025-03-12 RX ORDER — ATORVASTATIN CALCIUM 80 MG/1
80 TABLET, FILM COATED ORAL NIGHTLY
Qty: 90 TABLET | Refills: 3 | Status: CANCELLED | OUTPATIENT
Start: 2025-03-12

## 2025-03-14 DIAGNOSIS — E11.65 UNCONTROLLED TYPE 2 DIABETES MELLITUS WITH HYPERGLYCEMIA: ICD-10-CM

## 2025-03-14 DIAGNOSIS — Z86.73 HISTORY OF CVA (CEREBROVASCULAR ACCIDENT): ICD-10-CM

## 2025-03-14 RX ORDER — ATORVASTATIN CALCIUM 80 MG/1
80 TABLET, FILM COATED ORAL NIGHTLY
Qty: 90 TABLET | Refills: 3 | Status: CANCELLED | OUTPATIENT
Start: 2025-03-12

## 2025-03-14 RX ORDER — METFORMIN HYDROCHLORIDE 500 MG/1
500 TABLET ORAL 2 TIMES DAILY WITH MEALS
Qty: 180 TABLET | Refills: 3 | Status: CANCELLED | OUTPATIENT
Start: 2025-03-12 | End: 2026-03-12

## 2025-03-21 ENCOUNTER — TELEPHONE (OUTPATIENT)
Dept: OBSTETRICS AND GYNECOLOGY | Facility: CLINIC | Age: 50
End: 2025-03-21
Payer: MEDICAID

## 2025-03-21 NOTE — TELEPHONE ENCOUNTER
----- Message from Lydia sent at 3/20/2025 12:59 PM CDT -----  Type:  Needs Medical AdviceWho Called: ptSymptoms (please be specific): vaginal bleeding  How long has patient had these symptoms:  couple of days Pharmacy name and phone #:  [facility] Phone: [phone]: [phone] the patient rather a call back or a response via [facility]? Call Best Call Back Number:  [phone] Information: Symptom: Vaginal Bleeding - Not PregnantOutcome: Schedule an appointment to be seen within 24 hours.Reason: Caller denied all higher acuity questionsThe caller accepted this outcome.

## 2025-03-24 DIAGNOSIS — Z86.73 HISTORY OF CVA (CEREBROVASCULAR ACCIDENT): ICD-10-CM

## 2025-03-24 DIAGNOSIS — E11.65 UNCONTROLLED TYPE 2 DIABETES MELLITUS WITH HYPERGLYCEMIA: ICD-10-CM

## 2025-03-24 RX ORDER — ATORVASTATIN CALCIUM 80 MG/1
80 TABLET, FILM COATED ORAL NIGHTLY
Qty: 90 TABLET | Refills: 3 | Status: CANCELLED | OUTPATIENT
Start: 2025-03-12

## 2025-03-24 RX ORDER — METFORMIN HYDROCHLORIDE 500 MG/1
500 TABLET ORAL 2 TIMES DAILY WITH MEALS
Qty: 180 TABLET | Refills: 3 | Status: CANCELLED | OUTPATIENT
Start: 2025-03-12 | End: 2026-03-12

## 2025-03-27 ENCOUNTER — OFFICE VISIT (OUTPATIENT)
Dept: OBSTETRICS AND GYNECOLOGY | Facility: CLINIC | Age: 50
End: 2025-03-27
Payer: MEDICAID

## 2025-03-27 VITALS
DIASTOLIC BLOOD PRESSURE: 90 MMHG | SYSTOLIC BLOOD PRESSURE: 132 MMHG | WEIGHT: 169.81 LBS | HEIGHT: 68 IN | BODY MASS INDEX: 25.73 KG/M2

## 2025-03-27 DIAGNOSIS — A59.9 TRICHOMONIASIS: Primary | ICD-10-CM

## 2025-03-27 PROCEDURE — 99213 OFFICE O/P EST LOW 20 MIN: CPT | Mod: PBBFAC,PO | Performed by: OBSTETRICS & GYNECOLOGY

## 2025-03-27 PROCEDURE — 3080F DIAST BP >= 90 MM HG: CPT | Mod: CPTII,,, | Performed by: OBSTETRICS & GYNECOLOGY

## 2025-03-27 PROCEDURE — 99999 PR PBB SHADOW E&M-EST. PATIENT-LVL III: CPT | Mod: PBBFAC,,, | Performed by: OBSTETRICS & GYNECOLOGY

## 2025-03-27 PROCEDURE — 3075F SYST BP GE 130 - 139MM HG: CPT | Mod: CPTII,,, | Performed by: OBSTETRICS & GYNECOLOGY

## 2025-03-27 PROCEDURE — 1159F MED LIST DOCD IN RCRD: CPT | Mod: CPTII,,, | Performed by: OBSTETRICS & GYNECOLOGY

## 2025-03-27 PROCEDURE — 99213 OFFICE O/P EST LOW 20 MIN: CPT | Mod: S$PBB,,, | Performed by: OBSTETRICS & GYNECOLOGY

## 2025-03-27 PROCEDURE — 3008F BODY MASS INDEX DOCD: CPT | Mod: CPTII,,, | Performed by: OBSTETRICS & GYNECOLOGY

## 2025-03-27 RX ORDER — TINIDAZOLE 500 MG/1
2 TABLET ORAL DAILY
Qty: 28 TABLET | Refills: 0 | Status: SHIPPED | OUTPATIENT
Start: 2025-03-27 | End: 2025-04-04

## 2025-03-27 RX ORDER — TRIAMCINOLONE ACETONIDE 1 MG/G
OINTMENT TOPICAL 2 TIMES DAILY
Qty: 30 G | Refills: 0 | Status: SHIPPED | OUTPATIENT
Start: 2025-03-27

## 2025-03-27 RX ORDER — FLUCONAZOLE 150 MG/1
150 TABLET ORAL
Qty: 2 TABLET | Refills: 0 | Status: SHIPPED | OUTPATIENT
Start: 2025-03-27 | End: 2025-04-02

## 2025-03-27 NOTE — PROGRESS NOTES
Chief Complaint   Patient presents with    Vaginal Pain       HISTORY OF PRESENT ILLNESS:   Josefina Martin is a 49 y.o. female  who presents for annual exam. Dx with trichomoniasis last visit and took the medications and still having issues. She has done flagyl 500 twice daily for a week and 2 weeks and tinidazole for a week. She isn't active now. Is having more irritation. Will feel better for a while then comes back .      Past Medical History:   Diagnosis Date    Cerebrovascular accident (CVA) 3/24/2017    CHF (congestive heart failure)     Diabetes mellitus     Hypertension     MI (myocardial infarction)     Stroke           Past Surgical History:   Procedure Laterality Date    CHOLECYSTECTOMY  2013    history of cholelithiasis    ESOPHAGOGASTRODUODENOSCOPY N/A 10/21/2020    Procedure: EGD (ESOPHAGOGASTRODUODENOSCOPY);  Surgeon: Asha Blackwell MD;  Location: [facility];  Service: Endoscopy;  Laterality: N/A;    ESOPHAGOGASTRODUODENOSCOPY N/A 6/15/2021    Procedure: EGD (ESOPHAGOGASTRODUODENOSCOPY);  Surgeon: Asha Blackwell MD;  Location: [facility];  Service: Endoscopy;  Laterality: N/A;    ESOPHAGOGASTRODUODENOSCOPY N/A 1/9/2024    Procedure: EGD (ESOPHAGOGASTRODUODENOSCOPY);  Surgeon: Frantz Tolliver MD;  Location: [facility];  Service: Gastroenterology;  Laterality: N/A;    TUBAL LIGATION           Social History     Socioeconomic History    Marital status: Single   Occupational History     Employer: [employer]   Tobacco Use    Smoking status: Some Days     Current packs/day: 0.15     Average packs/day: 0.2 packs/day for 18.0 years (2.7 ttl pk-yrs)     Types: Vaping with nicotine, Cigarettes    Smokeless tobacco: Never    Tobacco comments:     1 pack/week   Substance and Sexual Activity    Alcohol use: Yes     Comment: social 1/month    Drug use: No    Sexual activity: Not Currently     Partners: Male     Birth control/protection: None, Surgical     Social Drivers of Health     Financial Resource  Strain: Medium Risk (2024)    Overall Financial Resource Strain (CARDIA)     Difficulty of Paying Living Expenses: Somewhat hard   Food Insecurity: Food Insecurity Present (2024)    Hunger Vital Sign     Worried About Running Out of Food in the Last Year: Sometimes true     Ran Out of Food in the Last Year: Never true   Transportation Needs: No Transportation Needs (2024)    PRAPARE - Transportation     Lack of Transportation (Medical): No     Lack of Transportation (Non-Medical): No   Physical Activity: Sufficiently Active (2024)    Exercise Vital Sign     Days of Exercise per Week: 5 days     Minutes of Exercise per Session: 40 min   Recent Concern: Physical Activity - Insufficiently Active (2024)    Exercise Vital Sign     Days of Exercise per Week: 3 days     Minutes of Exercise per Session: 20 min   Stress: Stress Concern Present (2024)    [employer] [place] [employer] - Occupational Stress Questionnaire     Feeling of Stress : Very much   Housing Stability: Low Risk  (2024)    Housing Stability Vital Sign     Unable to Pay for Housing in the Last Year: No     Number of Places Lived in the Last Year: 1     Unstable Housing in the Last Year: No   Recent Concern: Housing Stability - High Risk (2024)    Housing Stability Vital Sign     Unable to Pay for Housing in the Last Year: Yes       Family History   Problem Relation Name Age of Onset    Anuerysm Maternal Grandmother      Breast cancer Maternal Grandmother      No Known Problems Father      Cervical cancer Mother      Hypertension Mother      Lung cancer Mother      No Known Problems Sister      No Known Problems Sister      No Known Problems Daughter      No Known Problems Daughter      Diabetes Son  14        Takes 2 insulins and metformin use to be bigger wally    No Known Problems Son      Breast cancer Maternal Aunt      Breast cancer Maternal Aunt      Stroke Maternal Uncle  48         OB History     "Para Term  AB Living   5 4 4  1 4   SAB IAB Ectopic Multiple Live Births   1    4      # Outcome Date GA Lbr Flex/2nd Weight Sex Type Anes PTL Lv   5 SAB            4 Term      Vag-Spont   MONROE   3 Term      Vag-Spont   MONROE   2 Term      Vag-Spont   MONROE   1 Term      Vag-Spont   MONROE       GYN HISTORY:  PAP History: Denies abnormal Paps; 2019 NILM/HPV-;  NILM  Infection History:Denies STDs. Denies PID.  Benign History: Denies uterine fibroids. Denies ovarian cysts. Denies endometriosis Denies other conditions.  Cancer History: Denies cervical cancer. Denies uterine cancer or hyperplasia. Denies ovarian cancer. Denies vulvar cancer or pre-cancer. Denies vaginal cancer or pre-cancer. Denies breast cancer. Denies colon cancer.  Cycle:  until 2018 when started coming Q2 weeks   Had BTL   IUd in place 2019 for AUB    ROS:  Negative     BP (!) 132/90 (Patient Position: Sitting)   Ht 5' 8" (1.727 m)   Wt 77 kg (169 lb 12.8 oz)   BMI 25.82 kg/m²      APPEARANCE: Well nourished, well developed, in no acute distress.    Breasts: normal appearing breast with right nipple inverted and left nipple normal appearing, no other masses appreciated in either breast, no rashes, no lymphadenopathy; stable from last exam I have done   Pelvic: Vulva:  normal external genitalia, no erythema,   Vagina: thick yellow vaginal discharge and erythema.   Cervix: no CMT           1. Trichomoniasis            Plan:  1. Will reswab and get culture from [facility]. Will repeat tinidazole and other choice would be to do it + vaginal tinidazole for 2 weeks and if still has it then consult ID.     Face to Face time with patient: 20 minutes of total time spent on the encounter, which includes face to face time and non-face to face time preparing to see the patient (eg, review of tests), Obtaining and/or reviewing separately obtained history, Documenting clinical information in the electronic or other health record, Independently " interpreting results (not separately reported) and communicating results to the patient/family/caregiver, or Care coordination (not separately reported).

## 2025-04-08 ENCOUNTER — PATIENT MESSAGE (OUTPATIENT)
Dept: OBSTETRICS AND GYNECOLOGY | Facility: CLINIC | Age: 50
End: 2025-04-08
Payer: COMMERCIAL

## 2025-04-08 NOTE — TELEPHONE ENCOUNTER
Called lab because they canceled the vaginosis screen by PCR which is the swab we needed for her recurrent trichomoniasis. They are going to re-run it. Still waiting on CDC kit.  When we get it will reschedule her appt for screening.

## 2025-04-15 ENCOUNTER — TELEPHONE (OUTPATIENT)
Dept: OBSTETRICS AND GYNECOLOGY | Facility: HOSPITAL | Age: 50
End: 2025-04-15
Payer: COMMERCIAL

## 2025-04-15 NOTE — TELEPHONE ENCOUNTER
Please let her know we got the testing kit from the [facility] so we can set her up an appointment to come do the testing. Pls set her up a GYN appointment. Appt has to be Monday, Tuesday or wed so we have time to ship it.

## 2025-05-01 DIAGNOSIS — I10 ESSENTIAL HYPERTENSION: ICD-10-CM

## 2025-05-01 DIAGNOSIS — F41.9 ANXIETY AND DEPRESSION: ICD-10-CM

## 2025-05-01 DIAGNOSIS — G47.00 INSOMNIA, UNSPECIFIED TYPE: ICD-10-CM

## 2025-05-01 DIAGNOSIS — E11.65 UNCONTROLLED TYPE 2 DIABETES MELLITUS WITH HYPERGLYCEMIA: ICD-10-CM

## 2025-05-01 DIAGNOSIS — Z86.73 HISTORY OF CVA (CEREBROVASCULAR ACCIDENT): ICD-10-CM

## 2025-05-01 DIAGNOSIS — F32.A ANXIETY AND DEPRESSION: ICD-10-CM

## 2025-05-01 RX ORDER — CLOTRIMAZOLE AND BETAMETHASONE DIPROPIONATE 10; .64 MG/G; MG/G
CREAM TOPICAL 2 TIMES DAILY
Qty: 45 G | Refills: 1 | Status: CANCELLED | OUTPATIENT
Start: 2025-05-01

## 2025-05-01 RX ORDER — CLOTRIMAZOLE AND BETAMETHASONE DIPROPIONATE 10; .64 MG/G; MG/G
CREAM TOPICAL 2 TIMES DAILY
Qty: 45 G | Refills: 1 | Status: SHIPPED | OUTPATIENT
Start: 2025-05-01

## 2025-05-01 NOTE — TELEPHONE ENCOUNTER
Refill Routing Note   Medication(s) are not appropriate for processing by [facility] for the following reason(s):        Outside of protocol    [facility] action(s):  Route               Appointments  past 12m or future 3m with PCP    Date Provider   Last Visit   8/1/2024 Pietro Morales MD   Next Visit   Visit date not found Pietro Morales MD   ED visits in past 90 days: 0        Note composed:11:48 AM 05/01/2025

## 2025-05-05 RX ORDER — METFORMIN HYDROCHLORIDE 500 MG/1
500 TABLET ORAL 2 TIMES DAILY WITH MEALS
Qty: 180 TABLET | Refills: 3 | Status: SHIPPED | OUTPATIENT
Start: 2025-05-05 | End: 2026-05-05

## 2025-05-05 RX ORDER — HYDROXYZINE PAMOATE 25 MG/1
25 CAPSULE ORAL EVERY 12 HOURS PRN
Qty: 30 CAPSULE | Refills: 1 | Status: SHIPPED | OUTPATIENT
Start: 2025-05-05

## 2025-05-05 RX ORDER — ESCITALOPRAM OXALATE 10 MG/1
10 TABLET ORAL DAILY
Qty: 30 TABLET | Refills: 2 | Status: SHIPPED | OUTPATIENT
Start: 2025-05-05 | End: 2026-05-05

## 2025-05-05 RX ORDER — ATORVASTATIN CALCIUM 80 MG/1
80 TABLET, FILM COATED ORAL NIGHTLY
Qty: 90 TABLET | Refills: 3 | Status: SHIPPED | OUTPATIENT
Start: 2025-05-05

## 2025-05-05 RX ORDER — CHLORTHALIDONE 25 MG/1
25 TABLET ORAL DAILY
Qty: 90 TABLET | Refills: 1 | Status: SHIPPED | OUTPATIENT
Start: 2025-05-05 | End: 2026-05-05

## 2025-05-12 RX ORDER — ONDANSETRON 4 MG/1
4 TABLET, ORALLY DISINTEGRATING ORAL EVERY 8 HOURS
Qty: 12 TABLET | Refills: 0 | Status: SHIPPED | OUTPATIENT
Start: 2025-05-12

## 2025-05-16 ENCOUNTER — HOSPITAL ENCOUNTER (EMERGENCY)
Facility: HOSPITAL | Age: 50
Discharge: HOME | End: 2025-05-16
Attending: EMERGENCY MEDICINE

## 2025-05-16 VITALS
HEART RATE: 76 BPM | DIASTOLIC BLOOD PRESSURE: 104 MMHG | OXYGEN SATURATION: 99 % | RESPIRATION RATE: 18 BRPM | TEMPERATURE: 98 F | SYSTOLIC BLOOD PRESSURE: 190 MMHG

## 2025-05-16 DIAGNOSIS — I10 HYPERTENSION, UNSPECIFIED TYPE: ICD-10-CM

## 2025-05-16 DIAGNOSIS — J06.9 VIRAL URI: Primary | ICD-10-CM

## 2025-05-16 LAB
CTP QC/QA: YES
CTP QC/QA: YES
GROUP A STREP MOLECULAR (OHS): NEGATIVE
POC MOLECULAR INFLUENZA A AGN: NEGATIVE
POC MOLECULAR INFLUENZA B AGN: NEGATIVE
POCT GLUCOSE: 182 MG/DL (ref 70–110)
SARS-COV-2 RDRP RESP QL NAA+PROBE: NEGATIVE

## 2025-05-16 PROCEDURE — 87635 SARS-COV-2 COVID-19 AMP PRB: CPT | Performed by: EMERGENCY MEDICINE

## 2025-05-16 PROCEDURE — 82962 GLUCOSE BLOOD TEST: CPT

## 2025-05-16 PROCEDURE — 87651 STREP A DNA AMP PROBE: CPT | Performed by: NURSE PRACTITIONER

## 2025-05-16 PROCEDURE — 87502 INFLUENZA DNA AMP PROBE: CPT

## 2025-05-16 PROCEDURE — 99284 EMERGENCY DEPT VISIT MOD MDM: CPT

## 2025-05-16 RX ORDER — FLUTICASONE PROPIONATE 50 MCG
1 SPRAY, SUSPENSION (ML) NASAL 2 TIMES DAILY PRN
Qty: 16 G | Refills: 0 | Status: SHIPPED | OUTPATIENT
Start: 2025-05-16

## 2025-05-16 RX ORDER — CETIRIZINE HYDROCHLORIDE 10 MG/1
10 TABLET ORAL DAILY
Qty: 30 TABLET | Refills: 0 | Status: SHIPPED | OUTPATIENT
Start: 2025-05-16 | End: 2025-06-15

## 2025-05-16 NOTE — DISCHARGE INSTRUCTIONS

## 2025-05-16 NOTE — ED NOTES
Pt discharged from ED with all belongings. Reviewed AVS with patient including medications, verbalized understanding. Pt ambulatory independently with steady gait. VSS and pt in no visible distress upon departure.

## 2025-05-16 NOTE — ED PROVIDER NOTES
Encounter Date: 5/16/2025       History     Chief Complaint   Patient presents with    URI     Sinus congestion with body aches and sore throat since yesterday. Treating with OTC cold meds with minimal relief. Denies fever. States compliant with BP meds. Presents awake, alert.     49 yr old female presents to the ER with reports of congestion, sore throat and body aches that began yesterday. [place] sure of sick contacts. Denies fever, rash or neck stiffness. No vomiting or diarrhea. Denies chest pain or sob. PMH of CHF, CVA, DM, HTN, MI.     The history is provided by the patient. No  was used.     Review of patient's allergies indicates:  No Known Allergies  Past Medical History:   Diagnosis Date    Cerebrovascular accident (CVA) 3/24/2017    CHF (congestive heart failure)     Diabetes mellitus     Hypertension     MI (myocardial infarction)     Stroke      Past Surgical History:   Procedure Laterality Date    CHOLECYSTECTOMY  2013    history of cholelithiasis    ESOPHAGOGASTRODUODENOSCOPY N/A 10/21/2020    Procedure: EGD (ESOPHAGOGASTRODUODENOSCOPY);  Surgeon: Asha Blackwell MD;  Location: [facility];  Service: Endoscopy;  Laterality: N/A;    ESOPHAGOGASTRODUODENOSCOPY N/A 6/15/2021    Procedure: EGD (ESOPHAGOGASTRODUODENOSCOPY);  Surgeon: Asha Blackwell MD;  Location: [facility];  Service: Endoscopy;  Laterality: N/A;    ESOPHAGOGASTRODUODENOSCOPY N/A 1/9/2024    Procedure: EGD (ESOPHAGOGASTRODUODENOSCOPY);  Surgeon: Frantz Tolliver MD;  Location: [facility];  Service: Gastroenterology;  Laterality: N/A;    TUBAL LIGATION       Family History   Problem Relation Name Age of Onset    Anuerysm Maternal Grandmother      Breast cancer Maternal Grandmother      No Known Problems Father      Cervical cancer Mother      Hypertension Mother      Lung cancer Mother      No Known Problems Sister      No Known Problems Sister      No Known Problems Daughter       No Known Problems Daughter      Diabetes Son  14        Takes 2 insulins and metformin use to be bigger wally    No Known Problems Son      Breast cancer Maternal Aunt      Breast cancer Maternal Aunt      Stroke Maternal Uncle  48     Social History[1]  Review of Systems   HENT:  Positive for congestion and sore throat.    Musculoskeletal:  Positive for myalgias.       Physical Exam     Initial Vitals [05/16/25 1551]   BP Pulse Resp Temp SpO2   (!) 204/120 83 15 98 °F (36.7 °C) 95 %      MAP       --         Physical Exam    Constitutional: She appears well-developed and well-nourished.   HENT:   Head: Normocephalic and atraumatic.   Right Ear: Hearing and tympanic membrane normal.   Left Ear: Hearing and tympanic membrane normal.   Nose: Rhinorrhea present. Mouth/Throat: No oral lesions. No trismus in the jaw. No uvula swelling. Posterior oropharyngeal erythema present. No oropharyngeal exudate.   Eyes: Lids are normal. Pupils are equal, round, and reactive to light.   Neck:   Normal range of motion.  Cardiovascular:  Normal rate.           Pulmonary/Chest: Breath sounds normal. No respiratory distress. She has no wheezes. She has no rhonchi.   Abdominal: Abdomen is soft. There is no abdominal tenderness.   Musculoskeletal:         General: Normal range of motion.      Cervical back: Normal range of motion. No rigidity. No spinous process tenderness or muscular tenderness.     Neurological: She is alert and oriented to person, place, and time.   Skin: Skin is warm and dry. No rash noted.   Psychiatric: She has a normal mood and affect. Her behavior is normal. Judgment and thought content normal.         ED Course   Procedures  Labs Reviewed   POCT GLUCOSE - Abnormal       Result Value    POCT Glucose 182 (*)    GROUP A STREP, MOLECULAR - Normal    Group A Strep Molecular Negative      Narrative:     Arcanobacterium haemolyticum and Beta Streptococcus group C and G will not be detected by this test method.   Please order Throat Culture ([id number]) if suspected.       SARS-COV-2 RDRP GENE    POC Rapid COVID Negative       Acceptable Yes     POCT INFLUENZA A/B MOLECULAR    POC Molecular Influenza A Ag Negative      POC Molecular Influenza B Ag Negative       Acceptable Yes     POCT GLUCOSE MONITORING CONTINUOUS          Imaging Results    None          Medications - No data to display  Medical Decision Making  Differential Diagnosis includes, but is not limited to:  COVID-19 infection, influenza, bacterial sinusitis, allergic rhinitis, bacterial/viral pharyngitis, peritonsillar abscess, retropharyngeal abscess, bacterial/viral pneumonia.     Amount and/or Complexity of Data Reviewed  Labs: ordered. Decision-making details documented in ED Course.    Risk  OTC drugs.               ED Course as of 05/16/25 1652   Fri May 16, 2025   1610 POCT glucose(!) [DT]   1634 Group A Strep, Molecular [DT]   1640 POCT Influenza A/B Molecular [DT]   1641 POCT COVID-19 Rapid Screening [DT]   1646 Pt states she did not take her BP meds this morning however is denying any headache or vision changes. Only presenting with congestion and sore throat. Pt states she will take her meds upon going home. STRICT return precautions given otherwise stable for dc [DT]      ED Course User Index  [DT] Page Fonseca NP                           Clinical Impression:  Final diagnoses:  [J06.9] Viral URI (Primary)  [I10] Hypertension, unspecified type          ED Disposition Condition    Discharge Stable          ED Prescriptions       Medication Sig Dispense Start Date End Date Auth. Provider    fluticasone propionate (FLONASE) 50 mcg/actuation nasal spray 1 spray (50 mcg total) by Each Nostril route 2 (two) times daily as needed for Rhinitis. 16 g 5/16/2025 -- Page Fonseca NP    cetirizine (ZYRTEC) 10 MG tablet Take 1 tablet (10 mg total) by mouth once daily. 30 tablet 5/16/2025 6/15/2025 Page Fonseca, MELINDA           Follow-up Information       Follow up With Specialties Details Why Contact Info    Ryann Villalobos MD Family Medicine Schedule an appointment as soon as possible for a visit in 2 days  [address]  [place] [address]  [place] [id number]  [phone]                   [1]   Social History  Tobacco Use    Smoking status: Some Days     Current packs/day: 0.15     Average packs/day: 0.2 packs/day for 18.0 years (2.7 ttl pk-yrs)     Types: Vaping with nicotine, Cigarettes    Smokeless tobacco: Never    Tobacco comments:     1 pack/week   Substance Use Topics    Alcohol use: Yes     Comment: social 1/month    Drug use: No        Page Fonseca NP  05/16/25 [phone]

## 2025-05-16 NOTE — ED NOTES
Pt states she did not take any BP meds today. Denies any h/a blurred vision or other related sx. MELINDA Abel at bedside, instructed pt to go home and take scheduled meds. Pt verbalizes understanding.